# Patient Record
Sex: FEMALE | Race: WHITE | NOT HISPANIC OR LATINO | Employment: OTHER | URBAN - METROPOLITAN AREA
[De-identification: names, ages, dates, MRNs, and addresses within clinical notes are randomized per-mention and may not be internally consistent; named-entity substitution may affect disease eponyms.]

---

## 2020-07-09 ENCOUNTER — TELEPHONE (OUTPATIENT)
Dept: HEMATOLOGY/ONCOLOGY | Facility: CLINIC | Age: 55
End: 2020-07-09

## 2020-07-09 NOTE — TELEPHONE ENCOUNTER
LM for pt to call me regarding appt with DR. Negro made on her my chart acct.  And then need for record collection and proper insurance documentation.  Direct number left in message.

## 2020-07-13 ENCOUNTER — TELEPHONE (OUTPATIENT)
Dept: HEMATOLOGY/ONCOLOGY | Facility: CLINIC | Age: 55
End: 2020-07-13

## 2020-07-13 NOTE — TELEPHONE ENCOUNTER
"Pt returned my call regarding her appt with DR. Negro for tomorrow.  She has [place] medicare and has not attempted to get any [place] insurance to date.  She is living with her parents right now and we updated her home phone number as she stated she does "not get good cell reception out there. " She was inquiring about how to get insurance coverage. I explained that she would have to cancel her [place] medicare and then apply for [place] coverage.  She was frustrated with the insurance attaining process and stated for me to just cancel her appt for tomorrow until she can get her insurance situation cleared up.  I offered for the social work team to reach out to her to possibly help her obtain some coverage. She agreed to have them call her and will apply as directed. I have spoken to Mariajose Burk in social work and she will reach out to the pt . Once insurance coverage is obtained I will set the pt up with Dr. Negro as this is the MD she is requesting since he was her father's MD in the past.  She will still need to provide us with records .  She has my direct contact number to reach me once she has insurance.  I will schedule at that point.   "

## 2020-07-13 NOTE — TELEPHONE ENCOUNTER
Pt referred to [facility] by nurse navigator due to concern over her insurance not being accepted at [facility] ([place] Medicare). Navigator mentioned pt being upset. SW called to talk to pt and offer assistance. It is unclear if patient has a [place] Medicare plan or if she just has traditional Medicare. [facility] will e-mail pt so that pt has SW contact info and asked her to e-mail SW a copy of card. She e-mailed a traditional Medicare card to SW. Tomorrow morning will ask registration team to verify coverage. Also referred pt to [facility] for assistance with determining if she qualifies for a Medicare Savings Program. Will plan to f/u again tomorrow.

## 2020-07-14 ENCOUNTER — DOCUMENTATION ONLY (OUTPATIENT)
Dept: HEMATOLOGY/ONCOLOGY | Facility: CLINIC | Age: 55
End: 2020-07-14

## 2020-07-14 ENCOUNTER — TELEPHONE (OUTPATIENT)
Dept: HEMATOLOGY/ONCOLOGY | Facility: CLINIC | Age: 55
End: 2020-07-14

## 2020-07-14 ENCOUNTER — SOCIAL WORK (OUTPATIENT)
Dept: HEMATOLOGY/ONCOLOGY | Facility: CLINIC | Age: 55
End: 2020-07-14

## 2020-07-14 NOTE — PROGRESS NOTES
Met with pt to f/u. She was able to come and get a Medicaid application in with [facility] team member. Spent about an hour with patient assisting to coordinate benefits. She does have Medicare A & B but listed as secondary. Contacted Medicare coordination of benefits ([phone]). Patient spoke to the representative and had them drop [employer] as her primary coverage since pt said this was canceled when Medicare A & B became effective. Now Medicare should be primary and only coverage. Representative advised that it would take about 72 hours to reflect in the system and recommended waiting to bill Medicaid for at least 10 days. As such, pt was scheduled to see Dr. Negro for consult July 27th. 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    Completed initial distress screening and addressed with pt (score 7/10) (see below). She is receptive to an appointment with a psychologist for therapy once she is worked up. She is currently in town 1) to seek a second opinion on treatment and 2) to help her mother who is sick. She anticipates being in town at least 6 months with ultimate goal to return to [place], [place], where she has lived the last 20 years.     SW will plan to f/u with pt over the upcoming weeks to complete full needs assessment and make appropriate referrals. Pt provided with SW contact info and encouraged to call if needed in interim.     DISTRESS SCREENING 7/14/2020   Distress Score 7    No   Housing No   Insurance / Financial Yes   Transportation No   Work / School No   Treatment Decisions No   Dealing with Children No   Dealing with Partner No   Ability to Have Children No   Family Health Issues Yes   Depression Yes   Fears Yes   Nervousness Yes   Sadness Yes   Worry Yes   Loss of Interest in Usual Activities Yes   Spritual / Yarsanism Concerns No   Appearance Yes   Bathing / Dressing No   Breathing Yes   Changes in Urination Yes   Constipation No   Diarrhea No   Eating No   Fatigue  Yes   Feeling Swollen No   Fevers No   Getting Around Yes   Indigestion No   Memory / Concentration Yes   Mouth Sores No   Nausea Yes   Nose Dry / Congested No   Pain Yes   Sexual Yes   Skin Dry / Itchy No   Sleep No   Substance Abuse No   Tingling in Hands / Feet Yes

## 2020-07-14 NOTE — TELEPHONE ENCOUNTER
Patient access rep ran pt's Medicare card. Confirmed that she DOES have Medicare A & B, but it is secondary. She has [facility], which upon further investigation, is a [place] health insurance plan. Attempted to reach pt to discuss; left voicemail on phone. Called and spoke to her mother, who said pt was asleep but she would have her call SW. SW will f/u when call is returned.

## 2020-07-14 NOTE — NURSING
Pt arrived today in person at the [facility] and was meeting with Mariajose Burk in social work.  Mariajose asked that I meet with the patient regarding her insurance, medical record collection and scheduling appts for pt.  I met with patient and together with pt and Mariajose we set up appt for pt to see Dr. Negro 20 at the [facility] center.  This will give us enough time to establish pts insurance and obtain outside records that are needed to make the visit a success as pt is requesting to see Dr. Negro only as he was her father's doctor in the past.  She understands that her insurance may take a while to process and she is wanting to wait until all of that is straight prior to the appt.  I will request records from provider information she shared with me during our meeting and will document this request once accomplished. She has given me an envelope with some records in it from her previous providers in [place]. I will scan this into her record. See Mariajoes's note from today. Pt has my direct number to call with any further concerns leading up to future appt.   Oncology Navigation   Intake  Contact Method: In person  Date Worked: 20  Appointment Date: 20  Schedule to Appointment Timeline (days): 13  Multiple appointments: No  Reason if booked > 7 days after scheduling: Waiting on records;Patient request;Transfer of care;Other  Other reason booked > 7 days: insurance     Treatment                  Acuity  Treatment Tolerability: Minimal symptoms  ECO  Comorbidities in Medical History: 0   Needed: 0  Support: 1  Verbalizes Financial Concerns: 1  Psychological Factors (+1 each): Emotional during conversation  Verbalizes the need for more education: 1  Navigation Acuity: 8     Follow Up  No follow-ups on file.

## 2020-07-27 ENCOUNTER — TELEPHONE (OUTPATIENT)
Dept: HEMATOLOGY/ONCOLOGY | Facility: CLINIC | Age: 55
End: 2020-07-27

## 2020-07-27 NOTE — PROGRESS NOTES
Called pt to check in since she rescheduled her consult today due to fever. Pt is speaking fairly rapidly. Said she has been running fever off and on for the last 3 days. Does not feel that it is COVID, and thinks it may have been due to helping mother clean grace/dirty old trailer. Nevertheless, she is planning to get a COVID test. If she tests negative she requests a sooner appointment because she is afraid she may run out of pain pills and she is also having issues with her colostomy bag. Asked pt to call SW after she gets COVID test to reschedule f/u based on outcome. Nurse navigator notified.

## 2020-07-28 ENCOUNTER — TELEPHONE (OUTPATIENT)
Dept: HEMATOLOGY/ONCOLOGY | Facility: CLINIC | Age: 55
End: 2020-07-28

## 2020-07-28 ENCOUNTER — DOCUMENTATION ONLY (OUTPATIENT)
Dept: HEMATOLOGY/ONCOLOGY | Facility: CLINIC | Age: 55
End: 2020-07-28

## 2020-07-28 NOTE — PROGRESS NOTES
Rec'd e-mail with negative test results per recent fever. Coordinated with nurse navigator to get consult rescheduled. Will scan test results into EMR and will plan to f/u when pt sees Dr. Negro for future plans/needs.    DISTRESS SCREENING 7/14/2020   Distress Score 7    No   Housing No   Insurance / Financial Yes   Transportation No   Work / School No   Treatment Decisions No   Dealing with Children No   Dealing with Partner No   Ability to Have Children No   Family Health Issues Yes   Depression Yes   Fears Yes   Nervousness Yes   Sadness Yes   Worry Yes   Loss of Interest in Usual Activities Yes   Spritual / Worship Concerns No   Appearance Yes   Bathing / Dressing No   Breathing Yes   Changes in Urination Yes   Constipation No   Diarrhea No   Eating No   Fatigue Yes   Feeling Swollen No   Fevers No   Getting Around Yes   Indigestion No   Memory / Concentration Yes   Mouth Sores No   Nausea Yes   Nose Dry / Congested No   Pain Yes   Sexual Yes   Skin Dry / Itchy No   Sleep No   Substance Abuse No   Tingling in Hands / Feet Yes     Distress screening previously addressed, encouraged her to bring up physical symptoms to MD.

## 2020-07-28 NOTE — TELEPHONE ENCOUNTER
Per pt request , appt with dr. Negro changed to 8/5/20 at the [place] location. She wanted to reschedule following a bout with fever. She has been tested for Covid and has found to be negative for that , strep and the flu. Those test results were scanned into pt media tab of epic chart per Mariajose Burk. She will attend appt as set now and has my direct number to call me if she needs anything further.

## 2020-08-05 ENCOUNTER — OFFICE VISIT (OUTPATIENT)
Dept: HEMATOLOGY/ONCOLOGY | Facility: CLINIC | Age: 55
End: 2020-08-05
Payer: MEDICARE

## 2020-08-05 ENCOUNTER — LAB VISIT (OUTPATIENT)
Dept: LAB | Facility: HOSPITAL | Age: 55
End: 2020-08-05
Attending: INTERNAL MEDICINE
Payer: MEDICARE

## 2020-08-05 VITALS
HEART RATE: 87 BPM | SYSTOLIC BLOOD PRESSURE: 97 MMHG | HEIGHT: 64 IN | WEIGHT: 91.25 LBS | DIASTOLIC BLOOD PRESSURE: 65 MMHG | TEMPERATURE: 98 F | BODY MASS INDEX: 15.58 KG/M2

## 2020-08-05 DIAGNOSIS — N34.2 INFECTIVE URETHRITIS: ICD-10-CM

## 2020-08-05 DIAGNOSIS — R18.8 OTHER ASCITES: ICD-10-CM

## 2020-08-05 DIAGNOSIS — C77.2 CANCER OF APPENDIX METASTATIC TO INTRA-ABDOMINAL LYMPH NODE: ICD-10-CM

## 2020-08-05 DIAGNOSIS — Z12.89 ENCOUNTER FOR SCREENING FOR MALIGNANT NEOPLASM OF OTHER SITES: ICD-10-CM

## 2020-08-05 DIAGNOSIS — C18.1 CANCER OF APPENDIX METASTATIC TO INTRA-ABDOMINAL LYMPH NODE: ICD-10-CM

## 2020-08-05 DIAGNOSIS — C18.1 CANCER OF APPENDIX METASTATIC TO INTRA-ABDOMINAL LYMPH NODE: Primary | ICD-10-CM

## 2020-08-05 DIAGNOSIS — C77.2 CANCER OF APPENDIX METASTATIC TO INTRA-ABDOMINAL LYMPH NODE: Primary | ICD-10-CM

## 2020-08-05 LAB
ALBUMIN SERPL BCP-MCNC: 3.4 G/DL (ref 3.5–5.2)
ALP SERPL-CCNC: 119 U/L (ref 55–135)
ALT SERPL W/O P-5'-P-CCNC: 15 U/L (ref 10–44)
ANION GAP SERPL CALC-SCNC: 10 MMOL/L (ref 8–16)
AST SERPL-CCNC: 21 U/L (ref 10–40)
BASOPHILS # BLD AUTO: 0.05 K/UL (ref 0–0.2)
BASOPHILS NFR BLD: 0.5 % (ref 0–1.9)
BILIRUB SERPL-MCNC: 0.3 MG/DL (ref 0.1–1)
BUN SERPL-MCNC: 22 MG/DL (ref 6–20)
CALCIUM SERPL-MCNC: 9.3 MG/DL (ref 8.7–10.5)
CHLORIDE SERPL-SCNC: 107 MMOL/L (ref 95–110)
CO2 SERPL-SCNC: 21 MMOL/L (ref 23–29)
CREAT SERPL-MCNC: 0.9 MG/DL (ref 0.5–1.4)
DIFFERENTIAL METHOD: ABNORMAL
EOSINOPHIL # BLD AUTO: 0.2 K/UL (ref 0–0.5)
EOSINOPHIL NFR BLD: 1.8 % (ref 0–8)
ERYTHROCYTE [DISTWIDTH] IN BLOOD BY AUTOMATED COUNT: 12.8 % (ref 11.5–14.5)
EST. GFR  (AFRICAN AMERICAN): >60 ML/MIN/1.73 M^2
EST. GFR  (NON AFRICAN AMERICAN): >60 ML/MIN/1.73 M^2
GLUCOSE SERPL-MCNC: 86 MG/DL (ref 70–110)
HCT VFR BLD AUTO: 34.2 % (ref 37–48.5)
HGB BLD-MCNC: 10.6 G/DL (ref 12–16)
IMM GRANULOCYTES # BLD AUTO: 0.06 K/UL (ref 0–0.04)
IMM GRANULOCYTES NFR BLD AUTO: 0.6 % (ref 0–0.5)
LDH SERPL L TO P-CCNC: 189 U/L (ref 110–260)
LYMPHOCYTES # BLD AUTO: 2.2 K/UL (ref 1–4.8)
LYMPHOCYTES NFR BLD: 22 % (ref 18–48)
MCH RBC QN AUTO: 28.3 PG (ref 27–31)
MCHC RBC AUTO-ENTMCNC: 31 G/DL (ref 32–36)
MCV RBC AUTO: 91 FL (ref 82–98)
MONOCYTES # BLD AUTO: 0.6 K/UL (ref 0.3–1)
MONOCYTES NFR BLD: 6.1 % (ref 4–15)
NEUTROPHILS # BLD AUTO: 7 K/UL (ref 1.8–7.7)
NEUTROPHILS NFR BLD: 69.6 % (ref 38–73)
NRBC BLD-RTO: 0 /100 WBC
PLATELET # BLD AUTO: 420 K/UL (ref 150–350)
PMV BLD AUTO: 8.5 FL (ref 9.2–12.9)
POTASSIUM SERPL-SCNC: 4.3 MMOL/L (ref 3.5–5.1)
PROT SERPL-MCNC: 7.6 G/DL (ref 6–8.4)
RBC # BLD AUTO: 3.74 M/UL (ref 4–5.4)
SODIUM SERPL-SCNC: 138 MMOL/L (ref 136–145)
WBC # BLD AUTO: 10.05 K/UL (ref 3.9–12.7)

## 2020-08-05 PROCEDURE — 99999 PR PBB SHADOW E&M-EST. PATIENT-LVL IV: ICD-10-PCS | Mod: PBBFAC,,, | Performed by: INTERNAL MEDICINE

## 2020-08-05 PROCEDURE — 83615 LACTATE (LD) (LDH) ENZYME: CPT

## 2020-08-05 PROCEDURE — 82728 ASSAY OF FERRITIN: CPT

## 2020-08-05 PROCEDURE — 85025 COMPLETE CBC W/AUTO DIFF WBC: CPT

## 2020-08-05 PROCEDURE — 99214 OFFICE O/P EST MOD 30 MIN: CPT | Mod: PBBFAC | Performed by: INTERNAL MEDICINE

## 2020-08-05 PROCEDURE — 83540 ASSAY OF IRON: CPT

## 2020-08-05 PROCEDURE — 99205 PR OFFICE/OUTPT VISIT, NEW, LEVL V, 60-74 MIN: ICD-10-PCS | Mod: S$PBB,,, | Performed by: INTERNAL MEDICINE

## 2020-08-05 PROCEDURE — 36415 COLL VENOUS BLD VENIPUNCTURE: CPT

## 2020-08-05 PROCEDURE — 80053 COMPREHEN METABOLIC PANEL: CPT

## 2020-08-05 PROCEDURE — 99999 PR PBB SHADOW E&M-EST. PATIENT-LVL IV: CPT | Mod: PBBFAC,,, | Performed by: INTERNAL MEDICINE

## 2020-08-05 PROCEDURE — 99205 OFFICE O/P NEW HI 60 MIN: CPT | Mod: S$PBB,,, | Performed by: INTERNAL MEDICINE

## 2020-08-05 RX ORDER — FENTANYL 25 UG/1
1 PATCH TRANSDERMAL
Qty: 5 PATCH | Refills: 0 | Status: SHIPPED | OUTPATIENT
Start: 2020-08-05 | End: 2020-08-13 | Stop reason: SDUPTHER

## 2020-08-05 RX ORDER — OXYCODONE AND ACETAMINOPHEN 10; 325 MG/1; MG/1
1 TABLET ORAL EVERY 4 HOURS PRN
COMMUNITY
Start: 2020-07-23 | End: 2020-08-05 | Stop reason: SDUPTHER

## 2020-08-05 RX ORDER — OXYCODONE AND ACETAMINOPHEN 10; 325 MG/1; MG/1
1 TABLET ORAL EVERY 4 HOURS PRN
Qty: 60 TABLET | Refills: 0 | Status: SHIPPED | OUTPATIENT
Start: 2020-08-05 | End: 2020-08-28 | Stop reason: SDUPTHER

## 2020-08-05 NOTE — PROGRESS NOTES
Subjective:       Patient ID: Madeline Warner is a 55 y.o. female.    Chief Complaint: Cancer (Appendiceal) and Results    HPI 52-year-old female without significant medical problems until 10/30/2017 when diagnosed with acute appendicitis.  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  Patient completed 12 cycles of FOLFOX chemotherapy and is status post cytoreductive surgery with HIPEC on 09/19/2018 all performed at [facility] in [facility].  Patient underwent a exploratory laparotomy on 02/20/2020 with lysis of adhesions, loop colostomy.  And serosal enterorrhaphy small and large bowel.  Patient has return to the [facility] to be closer to family in for follow-up.  Today she is complaining of dysuria and is concerned she has a urinary tract infection.    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 No pertinent family history.  Social History     Socioeconomic History    Marital status:      Spouse name: Not on file    Number of children: Not on file    Years of education: Not on file    Highest education level: Not on file   Occupational History    Not on file   Social Needs    Financial resource strain: Not on file    Food insecurity     Worry: Not on file     Inability: Not on file    Transportation needs     Medical: Not on file     Non-medical: Not on file   Tobacco Use    Smoking status: Never Smoker    Smokeless tobacco: Never Used   Substance and Sexual Activity    Alcohol use: Never     Frequency: Never    Drug use: Never     Sexual activity: Never   Lifestyle    Physical activity     Days per week: Not on file     Minutes per session: Not on file    Stress: Not on file   Relationships    Social connections     Talks on phone: Not on file     Gets together: Not on file     Attends [employer] service: Not on file     Active member of club or organization: Not on file     Attends meetings of clubs or organizations: Not on file     Relationship status: Not on file   Other Topics Concern    Not on file   Social History Narrative    Not on file     History reviewed. No pertinent surgical history.    Labs:  No results found for: WBC, HGB, HCT, MCV, PLT  BMP  No results found for: NA, K, CL, CO2, BUN, CREATININE, CALCIUM, ANIONGAP, ESTGFRAFRICA, EGFRNONAA  No results found for: ALT, AST, GGT, ALKPHOS, BILITOT    No results found for: IRON, TIBC, FERRITIN, SATURATEDIRO  No results found for: [id number]  No results found for: FOLATE  No results found for: TSH      Review of Systems   Constitutional: Positive for activity change, appetite change, fatigue and unexpected weight change. Negative for chills, diaphoresis and fever.   HENT: Negative for congestion, dental problem, drooling, ear discharge, ear pain, facial swelling, hearing loss, mouth sores, nosebleeds, postnasal drip, rhinorrhea, sinus pressure, sneezing, sore throat, tinnitus, trouble swallowing and voice change.    Eyes: Negative for photophobia, pain, discharge, redness, itching and visual disturbance.   Respiratory: Negative for cough, choking, chest tightness, shortness of breath, wheezing and stridor.    Cardiovascular: Negative for chest pain, palpitations and leg swelling.   Gastrointestinal: Negative for abdominal distention, abdominal pain, anal bleeding, blood in stool, constipation, diarrhea, nausea, rectal pain and vomiting.   Endocrine: Negative for cold intolerance, heat intolerance, polydipsia, polyphagia and polyuria.   Genitourinary: Positive for dysuria.  Negative for decreased urine volume, difficulty urinating, dyspareunia, enuresis, flank pain, frequency, genital sores, hematuria, menstrual problem, pelvic pain, urgency, vaginal bleeding, vaginal discharge and vaginal pain.   Musculoskeletal: Positive for arthralgias and myalgias. Negative for back pain, gait problem, joint swelling, neck pain and neck stiffness.   Skin: Negative for color change, pallor and rash.   Allergic/Immunologic: Negative for environmental allergies, food allergies and immunocompromised state.   Neurological: Positive for weakness. Negative for dizziness, tremors, seizures, syncope, facial asymmetry, speech difficulty, light-headedness, numbness and headaches.   Hematological: Negative for adenopathy. Does not bruise/bleed easily.   Psychiatric/Behavioral: Positive for dysphoric mood. Negative for agitation, behavioral problems, confusion, decreased concentration, hallucinations, self-injury, sleep disturbance and suicidal ideas. The patient is nervous/anxious. The patient is not hyperactive.        Objective:      Physical Exam  Vitals signs reviewed.   Constitutional:       General: She is not in acute distress.     Appearance: She is well-developed. She is cachectic. She is ill-appearing. She is not diaphoretic.   HENT:      Head: Normocephalic and atraumatic.      Right Ear: External ear normal.      Left Ear: External ear normal.      Nose: Nose normal.      Right Sinus: No maxillary sinus tenderness or frontal sinus tenderness.      Left Sinus: No maxillary sinus tenderness or frontal sinus tenderness.      Mouth/Throat:      Pharynx: No oropharyngeal exudate.   Eyes:      General: Lids are normal. No scleral icterus.        Right eye: No discharge.         Left eye: No discharge.      Conjunctiva/sclera: Conjunctivae normal.      Right eye: Right conjunctiva is not injected. No hemorrhage.     Left eye: Left conjunctiva is not injected. No hemorrhage.     Pupils: Pupils are equal,  round, and reactive to light.   Neck:      Musculoskeletal: Normal range of motion and neck supple.      Thyroid: No thyromegaly.      Vascular: No JVD.      Trachea: No tracheal deviation.   Cardiovascular:      Rate and Rhythm: Normal rate.   Pulmonary:      Effort: Pulmonary effort is normal. No respiratory distress.      Breath sounds: No stridor.   Chest:      Chest wall: No tenderness.   Abdominal:      General: Bowel sounds are normal. There is no distension.      Palpations: Abdomen is soft. There is no hepatomegaly, splenomegaly or mass.      Tenderness: There is no abdominal tenderness. There is no rebound.       Musculoskeletal: Normal range of motion.         General: No tenderness.   Lymphadenopathy:      Cervical: No cervical adenopathy.      Upper Body:      Right upper body: No supraclavicular adenopathy.      Left upper body: No supraclavicular adenopathy.   Skin:     General: Skin is dry.      Findings: No erythema or rash.   Neurological:      Mental Status: She is alert and oriented to person, place, and time.      Cranial Nerves: No cranial nerve deficit.      Coordination: Coordination normal.   Psychiatric:         Behavior: Behavior normal.         Thought Content: Thought content normal.         Judgment: Judgment normal.             Assessment:      1. 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 Other ascites    3. Encounter for screening for malignant neoplasm of other sites    4. Infective urethritis           Plan:     Extensive conversation and review of records.  At this point I have recommended that she obtain a most recent imaging studies from [facility] in [facility] nurse navigator where once this is done will proceed with CT chest abdomen pelvis for comparison.  At this point patient does request additional narcotics.  Prescription for fentanyl patch 25 mcg Q 72 hr along with Percocet given in [facility].  Patient will be seen by palliative  care and I will see patient after imaging studies over the next 1-2 weeks baseline laboratory studies today UA urinalysis ordered if positive will followed and sent to her local pharmacy antibiotic for treatment        Oh Negro Jr, MD FACP

## 2020-08-06 ENCOUNTER — LAB VISIT (OUTPATIENT)
Dept: LAB | Facility: HOSPITAL | Age: 55
End: 2020-08-06
Attending: INTERNAL MEDICINE
Payer: MEDICARE

## 2020-08-06 DIAGNOSIS — N34.2 INFECTIVE URETHRITIS: ICD-10-CM

## 2020-08-06 DIAGNOSIS — C77.2 CANCER OF APPENDIX METASTATIC TO INTRA-ABDOMINAL LYMPH NODE: ICD-10-CM

## 2020-08-06 DIAGNOSIS — C18.1 CANCER OF APPENDIX METASTATIC TO INTRA-ABDOMINAL LYMPH NODE: ICD-10-CM

## 2020-08-06 LAB
FERRITIN SERPL-MCNC: 304 NG/ML (ref 20–300)
IRON SERPL-MCNC: 32 UG/DL (ref 30–160)
SATURATED IRON: 11 % (ref 20–50)
TOTAL IRON BINDING CAPACITY: 292 UG/DL (ref 250–450)
TRANSFERRIN SERPL-MCNC: 197 MG/DL (ref 200–375)

## 2020-08-06 PROCEDURE — 87077 CULTURE AEROBIC IDENTIFY: CPT

## 2020-08-06 PROCEDURE — 87088 URINE BACTERIA CULTURE: CPT

## 2020-08-06 PROCEDURE — 87086 URINE CULTURE/COLONY COUNT: CPT

## 2020-08-06 PROCEDURE — 87186 SC STD MICRODIL/AGAR DIL: CPT

## 2020-08-07 DIAGNOSIS — N30.00 ACUTE CYSTITIS WITHOUT HEMATURIA: Primary | ICD-10-CM

## 2020-08-07 RX ORDER — AMOXICILLIN AND CLAVULANATE POTASSIUM 500; 125 MG/1; MG/1
1 TABLET, FILM COATED ORAL 2 TIMES DAILY
Qty: 20 TABLET | Refills: 0 | Status: SHIPPED | OUTPATIENT
Start: 2020-08-07 | End: 2020-08-17

## 2020-08-10 LAB — BACTERIA UR CULT: ABNORMAL

## 2020-08-13 ENCOUNTER — OFFICE VISIT (OUTPATIENT)
Dept: PALLIATIVE MEDICINE | Facility: CLINIC | Age: 55
End: 2020-08-13
Payer: MEDICARE

## 2020-08-13 DIAGNOSIS — C77.2 CANCER OF APPENDIX METASTATIC TO INTRA-ABDOMINAL LYMPH NODE: ICD-10-CM

## 2020-08-13 DIAGNOSIS — C18.1 CANCER OF APPENDIX METASTATIC TO INTRA-ABDOMINAL LYMPH NODE: ICD-10-CM

## 2020-08-13 PROCEDURE — 99205 OFFICE O/P NEW HI 60 MIN: CPT | Mod: 95,,, | Performed by: FAMILY MEDICINE

## 2020-08-13 PROCEDURE — 99205 PR OFFICE/OUTPT VISIT, NEW, LEVL V, 60-74 MIN: ICD-10-PCS | Mod: 95,,, | Performed by: FAMILY MEDICINE

## 2020-08-13 RX ORDER — FENTANYL 25 UG/1
1 PATCH TRANSDERMAL
Qty: 15 PATCH | Refills: 0 | Status: SHIPPED | OUTPATIENT
Start: 2020-08-13 | End: 2020-08-28 | Stop reason: SDUPTHER

## 2020-08-13 NOTE — LETTER
August 17, 2020      Oh Negro MD  [address]  [place] [address]           [facility]  [address]  [facility] [place] [facility] [place] [address]  Phone: [phone]  Fax: [phone]          Patient: Madeline Warner   MR Number: [id number]   YOB: 1965   Date of Visit: 8/13/2020       Dear Dr. Oh Negro:    Thank you for referring Madeline Warner to me for evaluation. Attached you will find relevant portions of my assessment and plan of care.    If you have questions, please do not hesitate to call me. I look forward to following Madeline Warner along with you.    Sincerely,    Anita Grimes MD    Enclosure  CC:  No Recipients    If you would like to receive this communication electronically, please contact [email or web address] or [phone] to request more information on [facility] access.    For providers and/or their staff who would like to refer a patient to [facility], please contact us through our one-stop-shop provider referral line, [facility], at [phone].    If you feel you have received this communication in error or would no longer like to receive these types of communications, please e-mail [email or web address]

## 2020-08-13 NOTE — PROGRESS NOTES
"Subjective:       Patient ID: Madeline Warner is a 55 y.o. female.    Chief Complaint: initial palliative consult  The patient location is: [place]  The chief complaint leading to consultation is: initial palliative consult    Visit type: audiovisual    Face to Face time with patient: 35  60 minutes of total time spent on the encounter, which includes face to face time and non-face to face time preparing to see the patient (eg, review of tests), Obtaining and/or reviewing separately obtained history, Documenting clinical information in the electronic or other health record, Independently interpreting results (not separately reported) and communicating results to the patient/family/caregiver, or Care coordination (not separately reported).         Each patient to whom he or she provides medical services by telemedicine is:  (1) informed of the relationship between the physician and patient and the respective role of any other health care provider with respect to management of the patient; and (2) notified that he or she may decline to receive medical services by telemedicine and may withdraw from such care at any time.    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 We discussed the role of palliative care in pain and symptom management, goals of care discussions, home based healthcare service coordination, and advanced care planning.     Her pain is focused around the site of her ostomy stoma and has begun to worsen and generalize to her abdomen. She has good functional status and quality of life and reports that her pain has been controlled much better since starting on fentanyl 25 mcg patch prescribed by her oncologist. She has oxycodone for PRN use but has needed less since adding fentanyl. 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 She denies nausea, vomiting, significant anxiety or sadness. She feels she is coping well, considering.          Advance Care Planning     Power of   I initiated the process of advance care planning today and explained the importance of this process to the patient.  I introduced the concept of advance directives to the patient, as well. Then the patient received detailed information about the importance of designating a Health Care Power of  (HCPOA). She was also instructed to communicate with this person about their wishes for future healthcare, should she become sick and lose decision-making capacity. The patient has not previously appointed a HCPOA. After our discussion, the patient has decided to complete a HCPOA and has appointed her mother and NAME:Nancy Su [phone], secondary would be daughter Carolyn Ramirez.   I spent a total time of 10 minutes discussing this issue with the patient.    Advance Care Planning     Living Will  During this visit, I engaged the patient  in the advance care planning process.  The patient and I reviewed the role for advance directives and their purpose in directing future healthcare if the patient's unable to speak for him/herself.  At this point in time, the patient does have full decision-making capacity.  We discussed different extreme health states that she could experience, and reviewed what kind of medical care she would want in those situations.  The patient communicated that if she were comatose and had little chance of a meaningful recovery, she would not want machines/life-sustaining treatments used. In addition to the above preference, other important end-of-life issues for the patient include. The patient has completed a living will to reflect these preferences.  I spent a total of 20 minutes engaging the patient in this advance care planning discussion. Does not want prolonged life " support or artificial  Nutrition.                        does not have any pertinent problems on file.  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 No pertinent surgical history.  History reviewed. No pertinent family history.  Social History     Socioeconomic History    Marital status:      Spouse name: Not on file    Number of children: Not on file    Years of education: Not on file    Highest education level: Not on file   Occupational History    Not on file   Social Needs    Financial resource strain: Not on file    Food insecurity     Worry: Not on file     Inability: Not on file    Transportation needs     Medical: Not on file     Non-medical: Not on file   Tobacco Use    Smoking status: Never Smoker    Smokeless tobacco: Never Used   Substance and Sexual Activity    Alcohol use: Never     Frequency: Never    Drug use: Never    Sexual activity: Never   Lifestyle    Physical activity     Days per week: Not on file     Minutes per session: Not on file    Stress: Not on file   Relationships    Social connections     Talks on phone: Not on file     Gets together: Not on file     Attends Buddhist service: Not on file     Active member of club or organization: Not on file     Attends meetings of clubs or organizations: Not on file     Relationship status: Not on file   Other Topics Concern    Not on file   Social History Narrative    Not on file     Review of Systems   A comprehensive 14-point review of systems was reviewed with patient and was negative other than as specified above.     Objective:   There were no vitals filed for this visit.     Physical Exam  Constitutional:       General: She is not in acute distress.     Appearance: She is well-developed.   HENT:      Head: Normocephalic and atraumatic.   Eyes:      General: No scleral icterus.  Neck:       Musculoskeletal: Normal range of motion and neck supple.   Pulmonary:      Effort: Pulmonary effort is normal. No respiratory distress.   Musculoskeletal: Normal range of motion.   Skin:     Findings: No rash.   Neurological:      Mental Status: She is alert and oriented to person, place, and time.   Psychiatric:         Behavior: Behavior normal.         Thought Content: Thought content normal.         Review of Symptoms    Symptom Assessment (ESAS 0-10 Scale)  Pain:  4  Dyspnea:  3  Anxiety:  3  Nausea:  2  Depression:  2  Anorexia:  3  Fatigue:  4  Insomnia:  0  Restlessness:  0  Agitation:  0     CAM / Delirium:  Negative    Pain Assessment:  Location(s): abdomen      ECOG Performance Status Grade:  1 - Ambulates, capable of light work      Assessment:       1. 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 I advised her that I will take over her pain prescriptions; new rx for fentanyl sent in. She is planning a trip to visit friends in [place]. We discussed logistics of keeping up her pain meds while being out of state for several weeks; she reports having a palliative MD in [place] who she will schedule an appointment with if needed.     Thank you for involving me in the care of this patient.     Anita Grimes MD  > 50% of   60   min visit spent in chart review, face to face discussion of goals of care, symptom assessment, coordination of care and emotional support

## 2020-08-18 ENCOUNTER — DOCUMENTATION ONLY (OUTPATIENT)
Dept: HEMATOLOGY/ONCOLOGY | Facility: CLINIC | Age: 55
End: 2020-08-18

## 2020-08-18 ENCOUNTER — DOCUMENTATION ONLY (OUTPATIENT)
Dept: PALLIATIVE MEDICINE | Facility: CLINIC | Age: 55
End: 2020-08-18

## 2020-08-18 ENCOUNTER — TELEPHONE (OUTPATIENT)
Dept: HEMATOLOGY/ONCOLOGY | Facility: CLINIC | Age: 55
End: 2020-08-18

## 2020-08-18 NOTE — PROGRESS NOTES
Palliative Care:    Called patient's home number, no answer, no voicemail. Sent message via patient portal with 1 month follow up appointment.  Also, requested patient call me at her convenience to discuss coordination of visits for completion of ACP documents.    Palmira Bland RN

## 2020-08-18 NOTE — TELEPHONE ENCOUNTER
Spoke to pt on the phone to schedule ct scans ordered by Dr. Negro. I reviewed my role as nurse navigaotr and gave her my contact information. I informed her that we received her scan on disc from previous facility. Pt states she is out of town until next week, so together we scheduled her appt for 8/27 @ 4pm at the [facility] on [address]. Pt knows to fast ( have nothing by mouth) for 6 hours prior to the test and to arrive 1hr before test time. We also scheduled for her to f/u with Dr. Negro on 8/28@ 320pm at the cancer center location. Pt is agreeable to all of the above.

## 2020-08-24 DIAGNOSIS — N30.00 ACUTE CYSTITIS WITHOUT HEMATURIA: Primary | ICD-10-CM

## 2020-08-24 RX ORDER — CIPROFLOXACIN 500 MG/1
500 TABLET ORAL 2 TIMES DAILY
Qty: 20 TABLET | Refills: 0 | Status: SHIPPED | OUTPATIENT
Start: 2020-08-24 | End: 2020-09-03

## 2020-08-27 ENCOUNTER — HOSPITAL ENCOUNTER (OUTPATIENT)
Dept: RADIOLOGY | Facility: HOSPITAL | Age: 55
Discharge: HOME | End: 2020-08-27
Attending: INTERNAL MEDICINE
Payer: MEDICARE

## 2020-08-27 DIAGNOSIS — Z12.89 ENCOUNTER FOR SCREENING FOR MALIGNANT NEOPLASM OF OTHER SITES: ICD-10-CM

## 2020-08-27 DIAGNOSIS — R18.8 OTHER ASCITES: ICD-10-CM

## 2020-08-27 PROCEDURE — 74177 CT ABD & PELVIS W/CONTRAST: CPT | Mod: TC

## 2020-08-27 PROCEDURE — 71260 CT THORAX DX C+: CPT | Mod: TC

## 2020-08-27 PROCEDURE — 25500020 PHARM REV CODE 255: Performed by: INTERNAL MEDICINE

## 2020-08-27 RX ADMIN — IOHEXOL 100 ML: 350 INJECTION, SOLUTION INTRAVENOUS at 04:08

## 2020-08-28 ENCOUNTER — OFFICE VISIT (OUTPATIENT)
Dept: HEMATOLOGY/ONCOLOGY | Facility: CLINIC | Age: 55
End: 2020-08-28
Payer: MEDICARE

## 2020-08-28 VITALS
HEIGHT: 64 IN | TEMPERATURE: 97 F | DIASTOLIC BLOOD PRESSURE: 79 MMHG | BODY MASS INDEX: 16.78 KG/M2 | SYSTOLIC BLOOD PRESSURE: 122 MMHG | HEART RATE: 75 BPM | OXYGEN SATURATION: 97 % | WEIGHT: 98.31 LBS

## 2020-08-28 DIAGNOSIS — C77.2 CANCER OF APPENDIX METASTATIC TO INTRA-ABDOMINAL LYMPH NODE: ICD-10-CM

## 2020-08-28 DIAGNOSIS — G89.3 CHRONIC PAIN DUE TO NEOPLASM: ICD-10-CM

## 2020-08-28 DIAGNOSIS — C18.1 CANCER OF APPENDIX METASTATIC TO INTRA-ABDOMINAL LYMPH NODE: ICD-10-CM

## 2020-08-28 DIAGNOSIS — N30.00 ACUTE CYSTITIS WITHOUT HEMATURIA: Primary | ICD-10-CM

## 2020-08-28 PROCEDURE — 99214 OFFICE O/P EST MOD 30 MIN: CPT | Mod: S$PBB,,, | Performed by: INTERNAL MEDICINE

## 2020-08-28 PROCEDURE — 99213 OFFICE O/P EST LOW 20 MIN: CPT | Mod: PBBFAC | Performed by: INTERNAL MEDICINE

## 2020-08-28 PROCEDURE — 99214 PR OFFICE/OUTPT VISIT, EST, LEVL IV, 30-39 MIN: ICD-10-PCS | Mod: S$PBB,,, | Performed by: INTERNAL MEDICINE

## 2020-08-28 PROCEDURE — 99999 PR PBB SHADOW E&M-EST. PATIENT-LVL III: ICD-10-PCS | Mod: PBBFAC,,, | Performed by: INTERNAL MEDICINE

## 2020-08-28 PROCEDURE — 99999 PR PBB SHADOW E&M-EST. PATIENT-LVL III: CPT | Mod: PBBFAC,,, | Performed by: INTERNAL MEDICINE

## 2020-08-28 RX ORDER — OXYCODONE AND ACETAMINOPHEN 10; 325 MG/1; MG/1
1 TABLET ORAL EVERY 4 HOURS PRN
Qty: 60 TABLET | Refills: 0 | Status: SHIPPED | OUTPATIENT
Start: 2020-08-28 | End: 2020-09-18 | Stop reason: SDUPTHER

## 2020-08-28 RX ORDER — FENTANYL 25 UG/1
1 PATCH TRANSDERMAL
Qty: 15 PATCH | Refills: 0 | Status: SHIPPED | OUTPATIENT
Start: 2020-08-28 | End: 2020-10-12 | Stop reason: SDUPTHER

## 2020-08-28 NOTE — PROGRESS NOTES
Subjective:       Patient ID: Madeline Warner is a 55 y.o. female.    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  Patient had UTI symptoms started initially on amoxicillin was not successful treated with Cipro and improvement.  Before treatment.    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 No pertinent family history.  Social History     Socioeconomic History    Marital status:      Spouse name: Not on file    Number of children: Not on file    Years of education: Not on file    Highest education level: Not on file   Occupational History    Not on file   Social Needs    Financial resource strain: Not on file    Food insecurity     Worry: Not on file     Inability: Not on file    Transportation needs     Medical: Not on file     Non-medical: Not on file   Tobacco Use    Smoking status: Never Smoker    Smokeless tobacco: Never Used   Substance and Sexual Activity    Alcohol use: Never     Frequency: Never    Drug use: Never    Sexual activity: Never   Lifestyle    Physical activity     Days per week: Not on file     Minutes per session: Not on file    Stress: Not on file   Relationships    Social connections     Talks on phone: Not on file     Gets together: Not on file     Attends Mandaeism service: Not on file     Active member of club or organization: Not on file     Attends meetings of clubs or organizations: Not on file     Relationship status: Not on file   Other Topics Concern    Not on file   Social History Narrative    Not on file     History reviewed. No pertinent surgical history.    Labs:  Lab Results   Component Value Date    WBC 10.05 08/05/2020    HGB 10.6 (L) 08/05/2020    HCT 34.2 (L) 08/05/2020    MCV 91 08/05/2020     (H) 08/05/2020     BMP  Lab Results   Component Value Date      08/05/2020    K 4.3 08/05/2020     08/05/2020    CO2 21 (L) 08/05/2020    BUN 22 (H) 08/05/2020    CREATININE 0.9 08/05/2020    CALCIUM 9.3 08/05/2020    ANIONGAP 10 08/05/2020    ESTGFRAFRICA >60 08/05/2020    EGFRNONAA >60 08/05/2020     Lab Results   Component Value Date    ALT 15 08/05/2020    AST 21 08/05/2020    ALKPHOS 119 08/05/2020    BILITOT 0.3 08/05/2020       Lab Results   Component Value Date    IRON 32 08/05/2020    TIBC 292 08/05/2020    FERRITIN 304 (H) 08/05/2020     No results found for: ROOMMGKH28  No results found for: FOLATE  No results found for: TSH      Review of Systems   Constitutional: Positive for activity change and fatigue. Negative for chills, diaphoresis, fever and unexpected weight change.   HENT: Negative for congestion, dental problem, drooling, ear discharge, ear pain, facial swelling, hearing loss, mouth sores, nosebleeds, postnasal drip, rhinorrhea, sinus pressure, sneezing, sore throat, tinnitus, trouble swallowing and voice change.    Eyes: Negative for photophobia, pain, discharge, redness, itching and visual disturbance.   Respiratory: Negative for cough, choking, chest tightness, shortness of breath, wheezing and stridor.    Cardiovascular: Negative for chest pain, palpitations and leg swelling.   Gastrointestinal: Negative for abdominal distention, abdominal pain, anal bleeding, blood in stool, constipation, diarrhea, nausea, rectal pain and vomiting.   Endocrine: Negative for cold intolerance, heat intolerance, polydipsia, polyphagia and polyuria.   Genitourinary: Positive for difficulty urinating, enuresis and frequency. Negative for decreased urine volume, dyspareunia, dysuria, flank pain, genital sores, hematuria, menstrual problem, pelvic pain, urgency, vaginal bleeding, vaginal discharge and vaginal pain.   Musculoskeletal: Negative for arthralgias, back pain, gait problem, joint swelling, myalgias, neck pain and neck stiffness.   Skin: Negative for  color change, pallor and rash.   Allergic/Immunologic: Negative for environmental allergies, food allergies and immunocompromised state.   Neurological: Positive for weakness. Negative for dizziness, tremors, seizures, syncope, facial asymmetry, speech difficulty, light-headedness, numbness and headaches.   Hematological: Negative for adenopathy. Does not bruise/bleed easily.   Psychiatric/Behavioral: Positive for dysphoric mood. Negative for agitation, behavioral problems, confusion, decreased concentration, hallucinations, self-injury, sleep disturbance and suicidal ideas. The patient is nervous/anxious. The patient is not hyperactive.        Objective:      Physical Exam  Vitals signs reviewed.   Constitutional:       General: She is not in acute distress.     Appearance: She is well-developed and underweight. She is ill-appearing. She is not diaphoretic.   HENT:      Head: Normocephalic and atraumatic.      Right Ear: External ear normal.      Left Ear: External ear normal.      Nose: Nose normal.      Right Sinus: No maxillary sinus tenderness or frontal sinus tenderness.      Left Sinus: No maxillary sinus tenderness or frontal sinus tenderness.      Mouth/Throat:      Pharynx: No oropharyngeal exudate.   Eyes:      General: Lids are normal. No scleral icterus.        Right eye: No discharge.         Left eye: No discharge.      Conjunctiva/sclera: Conjunctivae normal.      Right eye: Right conjunctiva is not injected. No hemorrhage.     Left eye: Left conjunctiva is not injected. No hemorrhage.     Pupils: Pupils are equal, round, and reactive to light.   Neck:      Musculoskeletal: Normal range of motion and neck supple.      Thyroid: No thyromegaly.      Vascular: No JVD.      Trachea: No tracheal deviation.   Cardiovascular:      Rate and Rhythm: Normal rate.   Pulmonary:      Effort: Pulmonary effort is normal. No respiratory distress.      Breath sounds: No stridor.   Chest:      Chest wall: No  tenderness.   Abdominal:      General: Bowel sounds are normal. There is no distension.      Palpations: Abdomen is soft. There is no hepatomegaly, splenomegaly or mass.      Tenderness: There is no abdominal tenderness. There is no rebound.   Musculoskeletal: Normal range of motion.         General: No tenderness.   Lymphadenopathy:      Cervical: No cervical adenopathy.      Upper Body:      Right upper body: No supraclavicular adenopathy.      Left upper body: No supraclavicular adenopathy.   Skin:     General: Skin is dry.      Findings: No erythema or rash.   Neurological:      Mental Status: She is alert and oriented to person, place, and time.      Cranial Nerves: No cranial nerve deficit.      Coordination: Coordination normal.   Psychiatric:         Behavior: Behavior normal.         Thought Content: Thought content normal.         Judgment: Judgment normal.             Assessment:      1. Acute cystitis without hematuria    2. 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 Chronic pain due to neoplasm           Plan:   Will repeat culture on 09/10/2020 patient will be seen back week of 09/14/2020 for review continue with chronic stable narcotics all narcotics written within [facility].  Recent CT chest abdomen pelvis today compared to previous from [facility] demonstrates no evidence of progressive disease reassurance given will make referral to Urology to see whether not any options available for urinary incontinence          Oh Negro Jr, MD FACP

## 2020-09-02 ENCOUNTER — SOCIAL WORK (OUTPATIENT)
Dept: HEMATOLOGY/ONCOLOGY | Facility: CLINIC | Age: 55
End: 2020-09-02

## 2020-09-02 ENCOUNTER — OFFICE VISIT (OUTPATIENT)
Dept: HEMATOLOGY/ONCOLOGY | Facility: CLINIC | Age: 55
End: 2020-09-02
Payer: MEDICARE

## 2020-09-02 VITALS
BODY MASS INDEX: 16.41 KG/M2 | TEMPERATURE: 100 F | OXYGEN SATURATION: 100 % | DIASTOLIC BLOOD PRESSURE: 82 MMHG | SYSTOLIC BLOOD PRESSURE: 130 MMHG | WEIGHT: 96.13 LBS | RESPIRATION RATE: 18 BRPM | HEIGHT: 64 IN | HEART RATE: 109 BPM

## 2020-09-02 DIAGNOSIS — R11.0 NAUSEA: ICD-10-CM

## 2020-09-02 DIAGNOSIS — R32 URINARY INCONTINENCE IN FEMALE: ICD-10-CM

## 2020-09-02 DIAGNOSIS — C77.2 CANCER OF APPENDIX METASTATIC TO INTRA-ABDOMINAL LYMPH NODE: ICD-10-CM

## 2020-09-02 DIAGNOSIS — C18.1 CANCER OF APPENDIX METASTATIC TO INTRA-ABDOMINAL LYMPH NODE: ICD-10-CM

## 2020-09-02 DIAGNOSIS — C79.9 METASTATIC SIGNET RING CELL CARCINOMA: Primary | ICD-10-CM

## 2020-09-02 DIAGNOSIS — C18.1 MALIGNANT NEOPLASM OF APPENDIX: ICD-10-CM

## 2020-09-02 DIAGNOSIS — N30.00 ACUTE CYSTITIS WITHOUT HEMATURIA: ICD-10-CM

## 2020-09-02 PROCEDURE — 99999 PR PBB SHADOW E&M-EST. PATIENT-LVL V: ICD-10-PCS | Mod: PBBFAC,,, | Performed by: INTERNAL MEDICINE

## 2020-09-02 PROCEDURE — 99999 PR PBB SHADOW E&M-EST. PATIENT-LVL V: CPT | Mod: PBBFAC,,, | Performed by: INTERNAL MEDICINE

## 2020-09-02 PROCEDURE — 99215 OFFICE O/P EST HI 40 MIN: CPT | Mod: PBBFAC | Performed by: INTERNAL MEDICINE

## 2020-09-02 PROCEDURE — 99215 PR OFFICE/OUTPT VISIT, EST, LEVL V, 40-54 MIN: ICD-10-PCS | Mod: S$PBB,,, | Performed by: INTERNAL MEDICINE

## 2020-09-02 PROCEDURE — 99215 OFFICE O/P EST HI 40 MIN: CPT | Mod: S$PBB,,, | Performed by: INTERNAL MEDICINE

## 2020-09-02 RX ORDER — ONDANSETRON 8 MG/1
8 TABLET, ORALLY DISINTEGRATING ORAL EVERY 12 HOURS PRN
Qty: 30 TABLET | Refills: 1 | Status: SHIPPED | OUTPATIENT
Start: 2020-09-02 | End: 2021-02-22

## 2020-09-02 NOTE — PROGRESS NOTES
Subjective:      DATE OF VISIT: 9/2/20     ?  Patient ID:?Madeline Warner is a 55 y.o. female.?? MR#: [id number]   ?    ? Primary Care Providers:  Primary Doctor No (General)     CHIEF COMPLAINT: Establish care with new provider????   ?   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   CURRENT TREATMENT: TBD    PAST TREATMENT:  Appendectomy, 10/30/17  Right hemicolectomy, 12/1/17  FOLFOX x 12 q2wk cycles  Cytoreduction, HIPEC, 09/19/2018  Exploratory laparotomy, lysis of adhesion, colostomy, 2/12/20  ?   ONCOLOGIC HISTORY:   ?   I had the pleasure meeting for the 1st time Ms. Warner as she transfers her care to [place], previously treated in [place].    I have carefully reviewed her oncologic history along with her notable for the following:    Ms. Warner has been in excellent health without known medical issues.      She presented at 52 years old to outside hospital in [place] with acute abdominal pain on 10/30/2017 diagnosed with acute appendicitis.     10/30/2017 appendectomy  Pathology report noted 2.5x0.7cm carcinoma, ex-goblet cell type carcinoid, high-grade, proximal margin positive, lymphovascular invasion present, pT4 NX MX.    12/01/2017 right hemicolectomy  Pathology:  Metastatic signet ring cell adenocarcinoma in peritoneal implant    FOLFOX x 12 q2wk cycles    09/19/2018 cytoreduction, HIPEC  Pathology:  Pelvic nodule, colon nodule, rectal nodule, rectal margin final, bilateral ovaries with involvement of metastatic signet ring cell adenocarcinoma    9/2019 EGD with benign findings    1-2/2020 she reports abdominal discomfort. 2/9/20 CT reported abnormal wall thickening finding suggestive of obstruction.    02/07/2020 rectal wall biopsy adenocarcinoma with signet ring morphology    2/13/20 exploratory laparotomy, lysis of adhesion and colostomy revealed numerous peritoneal deposits.  Pathology:  Peritoneum biopsy metastatic signet ring cell  carcinoma    02/19/2020 MRI brain noted to be negative for metastatic disease    3/4/20 PET without noted avid disease or peritoneal carcinomatosis    05/15/2020 CT chest abdomen pelvis  report noted status post hemicolectomy left lower quadrant colostomy.   no evidence of bowel obstruction, new ascites or peritoneal carcinomatosis.  Mucosal thickening had rectosigmoid colon stable.   no new lymphadenopathy or metastatic disease seen.  Interval resolution of recent right pyelonephritis.    01/29/2020 CEA 2.01  06/29/2020 CEA 1.49    She moved to [place] to be closer to family.  She saw my colleague Dr. Negro who had ordered CT chest abdomen pelvis for new baseline here on 08/27/2020 which noted postoperative findings from had right hemicolectomy loop colostomy no lymphadenopathy or masses within chest abdomen or pelvis.      INTERVAL EVENTS:    She had prior chest port removed and notes prior attempt and failed venous access.  She has been doing better with colostomy care.  Most recently she did have dysuria status post ciprofloxacin 1 day left with improvement.  She does however have issues with urinary incontinence pending urogynecology consultation.  She does have some nausea without vomiting helped by sublingual ondansetron.  Diffuse bony pain as well as diffuse abdominal pain generally well controlled with fentanyl and Percocet as needed.  She is pending palliative care consult.  With prior FOLFOX chemotherapy she did suffer from significant neuropathy in hands and feet which has since resolved.  She is looking forward to road trip with her good friend along [facility] from 9/11/20-9/25/20.      Review of Systems    ?   A comprehensive 14-point review of systems was reviewed with patient and was negative other than as specified above.   ?   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     PAST SURGICAL HISTORY:   History reviewed. No pertinent surgical history.   ?   ALLERGIES:   Allergies as of 09/02/2020    (No Known Allergies)      ?   MEDICATIONS:?   Outpatient Medications Marked as Taking for the 9/2/20 encounter (Office Visit) with Mariam Peña MD   Medication Sig Dispense Refill    ciprofloxacin HCl (CIPRO) 500 MG tablet Take 1 tablet (500 mg total) by mouth 2 (two) times daily. for 10 days 20 tablet 0    fentaNYL (DURAGESIC) 25 mcg/hr Place 1 patch onto the skin every 72 hours. 15 patch 0    oxyCODONE-acetaminophen (PERCOCET)  mg per tablet Take 1 tablet by mouth every 4 (four) hours as needed. 60 tablet 0      ?   SOCIAL HISTORY:?   Social History     Tobacco Use    Smoking status: Never Smoker    Smokeless tobacco: Never Used   Substance Use Topics    Alcohol use: Never     Frequency: Never      ?    She is a 36-year-old daughter and 8-year-old granddaughter who live in [place].  ?   FAMILY HISTORY:   family history is not on file.   ?   Father with prostate cancer     Objective:      Physical Exam      ?   Vitals:    09/02/20 1411   BP: 130/82   Pulse: 109   Resp: 18   Temp: 99.7 °F (37.6 °C)      ?   ECOG:?0   General appearance: Generally well appearing, in no acute distress.   Head, eyes, ears, nose, and throat: moist mucous membranes.   Respiratory:  Normal work of breathing  Abdomen:  Thin, nontender, nondistended.   Extremities: Warm, without edema.   Neurologic: Alert and oriented. Grossly normal strength, coordination, and gait.   Skin: No rashes, ecchymoses or petechial lesion.   Psychiatric:  Normal mood and affect.    ?   Laboratory:  ?   No visits with results within 1 Day(s) from this visit.   Latest known visit with results is:   Lab Visit on 08/06/2020   Component Date Value Ref Range Status    Urine Culture, Routine 08/06/2020 *  Final                    Value:KLEBSIELLA PNEUMONIAE  >100,000 cfu/ml        Lab Results   Component  Value Date    WBC 10.05 08/05/2020    HGB 10.6 (L) 08/05/2020    HCT 34.2 (L) 08/05/2020    MCV 91 08/05/2020     (H) 08/05/2020         Chemistry        Component Value Date/Time     08/05/2020 1220    K 4.3 08/05/2020 1220     08/05/2020 1220    CO2 21 (L) 08/05/2020 1220    BUN 22 (H) 08/05/2020 1220    CREATININE 0.9 08/05/2020 1220    GLU 86 08/05/2020 1220        Component Value Date/Time    CALCIUM 9.3 08/05/2020 1220    ALKPHOS 119 08/05/2020 1220    AST 21 08/05/2020 1220    ALT 15 08/05/2020 1220    BILITOT 0.3 08/05/2020 1220    ESTGFRAFRICA >60 08/05/2020 1220    EGFRNONAA >60 08/05/2020 1220        No results found for: CEA    ?   Tumor markers   ?   ?   Imaging:  ?  Per above      Pathology:    Per above     ?   Assessment/Plan:       1. Metastatic signet ring cell carcinoma    2. 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 Acute cystitis without hematuria    4. Urinary incontinence in female    5. Nausea    6. Malignant neoplasm of appendix           Plan:     # metastatic signet ring cell adenocarcinoma:     Initial diagnosis October 2017 with appendectomy pathology noting ex-goblet cell type carcinoid, high-grade. Subsequent right hemicolectomy December 2017 with pathology showing metastatic signet ring cell adenocarcinoma involving peritoneal implant s/p FOLFOX x 6 months, and 9/2019 cytoreductive surgery and HIPEC, final pathology noting pelvic nodule, colon nodule, rectal nodule, rectal margin final, bilateral ovaries with involvement of metastatic signet ring cell adenocarcinoma. 2/2020 bowel obstruction with ex lap revealing numerous peritoneal deposits.  Peritoneum biopsy pathology showed metastatic signet ring cell carcinoma.     She has had sepsis and imaging in May and most recently 08/27/2020 CT chest abdomen pelvis without noted evidence of disease.  I did discuss with her however is likely discordance and imaging may not reflect true degree of  peritoneal disease involvement.  She has not yet had any systemic therapy in the setting of her recurrent metastatic signet ring cell carcinoma.  I discussed natural history of this disease and role of palliative chemotherapy to slow further progression of disease.  She is hesitant to initiate chemotherapy due to concerns for toxicity and just is the importance of quality of life which is quite reasonable given palliative nature of therapy at this point.  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  I discussed options of 5 fluorouracil based regimen, may prefer capecitabine given difficult venous access.  I also discussed agents of irinotecan and bevacizumab as well as potential toxicities.  There may be role for other agents including targeted therapy and immunotherapy pending mutational testing, MSI status.     I did encourage her to follow-up with palliative care for ongoing goals of care and advance care planning discussions. She currently has symptoms of nausea, abdominal in bony pain diffusely (imaging thus far including CT and PET 03/04/2020 on outside noted to be without evidence of bony metastatic disease), well controlled with ondansetron ODT and fentanyl and percocet.    From my review of outside pathology reports I do not see MSI testing and it does not appear she has germ line or somatic mutational testing including MSI status unknown.  I discussed importance of sending this testing.     Nausea/Pain: She currently has symptoms of nausea, abdominal in bony pain diffusely (imaging thus far including CT and PET 03/04/2020 on outside noted to be without evidence of bony metastatic disease), well controlled with ondansetron ODT and fentanyl and percocet.    # urinary incontinence:  She is very bothered by this symptom, referral to urogynecology pending    # UTI:  Klebsiella infection, pansensitive status post ciprofloxacin, with improvement in dysuria, follow-up with urogynecology  per above.  Repeat UA pending.    Advance Care Planning   pall care referral       Follow-Up:   Urogynecology referral  UA today  Labs CBC, CMP, CEA when possible  Request for STRATA, and MSI status, pathology from OSH review  Will plan to discuss case at multidisciplinary tumor board  Note:  It is very important to patient that she go on trips to [place] 9/11/20-9/25/20 and is amenable to meeting after this discuss next steps/therapy.

## 2020-09-02 NOTE — PATIENT INSTRUCTIONS
Labs CBC, CMP, CEA when possible  Request for STRATA, and MSI status, pathology from OSH review  Will plan to discuss case at multidisciplinary tumor board  Note:  It is very important to patient that she go on trips to [place] 9/11/20-9/25/20 and is amenable to meeting after this discuss next steps/therapy.

## 2020-09-02 NOTE — Clinical Note
Please see AVS, arrange for osh path review here and STRATA with MSI, and tumor board discussion with review of outside path, our most recent imaging. Please let patient know plans for path review and tumor board discussion before meeting and deciding on chemo plan/chemo teaching. RV with me after path review and tumor board discussion. Call me if questions please.

## 2020-09-03 NOTE — PROGRESS NOTES
"Met with pt who was referred back to  for assistance with ACP documents. Also took the time to complete new pt assessment with pt. Pt is comfortably dressed and in no visible acute distress today.     Advance Care Planning   Per Dr. Grimes's note and per  discussion with pt, she reiterated what was discussed with Dr. Grimes. She said she would consider that she is a the "selective treatment" phase--she would like to continue life-prolonging measures as long as they are not burdensome. She would not want to have CPR or be resuscitated if there is no meaningful chance of recovery, but she would want CPR if she had a chance of recovery. No artificial tube feedings with no reasonable chance of recovery. As per Dr. Grimes's note: mother Nancy is primary HCPOA and daughter Carolyn second choice.         Pt's primary interest at this time is of obtaining counseling. She was interested in a referral to Cancer Services as this is offered by them. As such referral will be completed. She expressed no other needs from a social service standpoint at this time. She has  contact info should any future needs arise.  team will remain available to provide ongoing support and assistance.     Oncology Social Work   Intake  Date of Diagnosis: 12/01/17  MD Assigned: Mariam Peña  Patient currently being followed by outpatient case management: No  Date of referral to social work: 09/02/20  Initial social work contact: 09/02/20  Referral to initial contact timeline: 0  Referral contact method: Other  Other referral contact method: secure chat  Contact method: In person  Date worked: 09/02/20     Intervention  Current Status: Staging work-up       Concerns: ACP, emotional support     Supportive Checklist  I have emotional concerns that I would like to discuss: Y  I am interested in a referral to: [facility]     Follow Up  Follow up ongoing as needed.     "

## 2020-09-04 ENCOUNTER — DOCUMENTATION ONLY (OUTPATIENT)
Dept: HEMATOLOGY/ONCOLOGY | Facility: CLINIC | Age: 55
End: 2020-09-04

## 2020-09-04 ENCOUNTER — LAB VISIT (OUTPATIENT)
Dept: LAB | Facility: HOSPITAL | Age: 55
End: 2020-09-04
Attending: INTERNAL MEDICINE
Payer: MEDICARE

## 2020-09-04 DIAGNOSIS — N30.00 ACUTE CYSTITIS WITHOUT HEMATURIA: ICD-10-CM

## 2020-09-04 PROCEDURE — 87086 URINE CULTURE/COLONY COUNT: CPT

## 2020-09-04 PROCEDURE — 87088 URINE BACTERIA CULTURE: CPT

## 2020-09-04 NOTE — PROGRESS NOTES
"Spoke to Alma in the pathology department [facility] and faxed pathology request for H/E slides and tumor block from peritoneal biopsy from Feb 2020 ([id number]) for pathology review here and NGS testing on the block and H/E slides from 9/19/2018 ([id number]) to be shipped to Dr. Fatmata Hilario at [facility].      Your fax has been successfully sent to [phone] at [phone].  ------------------------------------------------------------  From: [id number]  ------------------------------------------------------------  9/4/2020 2:29:54 PM Transmission Record   Sent to [phone] with remote ID ""   Result: (0/339;0/0) Success   Page record: 1 - 4   Elapsed time: 01:58 on channel 53      "

## 2020-09-06 LAB — BACTERIA UR CULT: ABNORMAL

## 2020-09-10 ENCOUNTER — OFFICE VISIT (OUTPATIENT)
Dept: PALLIATIVE MEDICINE | Facility: CLINIC | Age: 55
End: 2020-09-10
Payer: MEDICARE

## 2020-09-10 DIAGNOSIS — G89.3 CHRONIC PAIN DUE TO NEOPLASM: ICD-10-CM

## 2020-09-10 DIAGNOSIS — C18.1 CANCER OF APPENDIX METASTATIC TO INTRA-ABDOMINAL LYMPH NODE: ICD-10-CM

## 2020-09-10 DIAGNOSIS — C77.2 CANCER OF APPENDIX METASTATIC TO INTRA-ABDOMINAL LYMPH NODE: ICD-10-CM

## 2020-09-10 PROCEDURE — 99214 OFFICE O/P EST MOD 30 MIN: CPT | Mod: 95,,, | Performed by: FAMILY MEDICINE

## 2020-09-10 PROCEDURE — 99214 PR OFFICE/OUTPT VISIT, EST, LEVL IV, 30-39 MIN: ICD-10-PCS | Mod: 95,,, | Performed by: FAMILY MEDICINE

## 2020-09-10 NOTE — PROGRESS NOTES
Subjective:       Patient ID: Madeline Warner is a 55 y.o. female.    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  She reports that she has had good relief of pain on the current regimen.  She was able to take a trip to [place] that she was planning and is now heading out for a trip to the beach.  She is satisfied with her current plan of care and is thrilled that she is able to do the things that she wants to do with the time she has left.    does not have any pertinent problems on file.  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 No pertinent surgical history.  History reviewed. No pertinent family history.  Social History     Socioeconomic History    Marital status:      Spouse name: Not on file    Number of children: Not on file    Years of education: Not on file    Highest education level: Not on file   Occupational History    Not on file   Social Needs    Financial resource strain: Not on file    Food insecurity     Worry: Not on file     Inability: Not on file    Transportation needs     Medical: Not on file     Non-medical: Not on file   Tobacco Use    Smoking status: Never Smoker    Smokeless tobacco: Never Used   Substance and Sexual Activity    Alcohol use: Never     Frequency: Never    Drug use: Never    Sexual activity: Never   Lifestyle    Physical activity     Days per week: Not on file     Minutes per session: Not on file    Stress: Not on file   Relationships    Social connections     Talks on phone: Not on file     Gets together: Not on file     Attends Gnosticist service: Not on file     Active member of club or organization: Not on file     Attends meetings of clubs or organizations: Not on file     Relationship status: Not on file   Other Topics Concern    Not on file    Social History Narrative    Not on file     Review of Systems   A comprehensive 14-point review of systems was reviewed with patient and was negative other than as specified above.     Objective:   There were no vitals filed for this visit.     Physical Exam  Constitutional:       General: She is not in acute distress.     Appearance: Normal appearance. She is well-developed and normal weight. She is not ill-appearing.   HENT:      Head: Normocephalic and atraumatic.   Eyes:      General: No scleral icterus.  Neck:      Musculoskeletal: Normal range of motion and neck supple.   Pulmonary:      Effort: Pulmonary effort is normal. No respiratory distress.   Musculoskeletal: Normal range of motion.         General: No deformity.   Skin:     Findings: No rash.   Neurological:      Mental Status: She is alert and oriented to person, place, and time.   Psychiatric:         Behavior: Behavior normal.         Thought Content: Thought content normal.         Review of Symptoms    Symptom Assessment (ESAS 0-10 Scale)  Pain:  4  Dyspnea:  2  Anxiety:  0  Nausea:  0  Depression:  0  Anorexia:  5  Fatigue:  3  Insomnia:  0  Restlessness:  0  Agitation:  0     CAM / Delirium:  Negative  Constipation:  Negative  Diarrhea:  Negative          ECOG Performance Status Grade:  0 - Fully Active    Living Arrangements:  Lives with family    Advance Care Planning   Advance Directives:   Living Will: Yes    [place]POST: No    Do Not Resuscitate Status: No    Medical Power of : Yes      Decision Making:  Patient answered questions         Assessment:       1. Chronic pain due to neoplasm    2. 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         Chronic pain due to neoplasm    Current Assessment & Plan      reviewed.  Her oncologist refill her fentanyl oxycodone on 08/28 2 ago.  She reports that she has been doing well on this regimen and no changes are needed.            Will see back in 1 month.    The patient location is: [place]  The chief complaint leading to consultation is: palliative f/u, pain    Visit type: audiovisual    Face to Face time with patient: 20  35 minutes of total time spent on the encounter, which includes face to face time and non-face to face time preparing to see the patient (eg, review of tests), Obtaining and/or reviewing separately obtained history, Documenting clinical information in the electronic or other health record, Independently interpreting results (not separately reported) and communicating results to the patient/family/caregiver, or Care coordination (not separately reported).         Each patient to whom he or she provides medical services by telemedicine is:  (1) informed of the relationship between the physician and patient and the respective role of any other health care provider with respect to management of the patient; and (2) notified that he or she may decline to receive medical services by telemedicine and may withdraw from such care at any time.

## 2020-09-11 ENCOUNTER — TELEPHONE (OUTPATIENT)
Dept: HEMATOLOGY/ONCOLOGY | Facility: CLINIC | Age: 55
End: 2020-09-11

## 2020-09-11 NOTE — TELEPHONE ENCOUNTER
Spoke with patient over the phone today regarding the change in date for her case presentation at Multidisciplinary Tumor Conference to next Friday 9/18/20. She stated understanding and will await call to set up follow up with Dr. Peña after that Tumor Conference case evaluation. She has my direct number to call with any further concerns in the meantime

## 2020-09-14 NOTE — ASSESSMENT & PLAN NOTE
reviewed.  Her oncologist refill her fentanyl oxycodone on 08/28 2 ago.  She reports that she has been doing well on this regimen and no changes are needed.

## 2020-09-15 ENCOUNTER — PATIENT MESSAGE (OUTPATIENT)
Dept: HEMATOLOGY/ONCOLOGY | Facility: CLINIC | Age: 55
End: 2020-09-15

## 2020-09-16 DIAGNOSIS — C18.1 CANCER OF APPENDIX METASTATIC TO INTRA-ABDOMINAL LYMPH NODE: Primary | ICD-10-CM

## 2020-09-16 DIAGNOSIS — C77.2 CANCER OF APPENDIX METASTATIC TO INTRA-ABDOMINAL LYMPH NODE: Primary | ICD-10-CM

## 2020-09-17 DIAGNOSIS — C18.1 CANCER OF APPENDIX METASTATIC TO INTRA-ABDOMINAL LYMPH NODE: Primary | ICD-10-CM

## 2020-09-17 DIAGNOSIS — C77.2 CANCER OF APPENDIX METASTATIC TO INTRA-ABDOMINAL LYMPH NODE: Primary | ICD-10-CM

## 2020-09-18 ENCOUNTER — TELEPHONE (OUTPATIENT)
Dept: HEMATOLOGY/ONCOLOGY | Facility: CLINIC | Age: 55
End: 2020-09-18

## 2020-09-18 ENCOUNTER — TUMOR BOARD CONFERENCE (OUTPATIENT)
Dept: HEMATOLOGY/ONCOLOGY | Facility: CLINIC | Age: 55
End: 2020-09-18

## 2020-09-18 ENCOUNTER — PATIENT MESSAGE (OUTPATIENT)
Dept: PALLIATIVE MEDICINE | Facility: CLINIC | Age: 55
End: 2020-09-18

## 2020-09-18 DIAGNOSIS — C77.2 CANCER OF APPENDIX METASTATIC TO INTRA-ABDOMINAL LYMPH NODE: ICD-10-CM

## 2020-09-18 DIAGNOSIS — C18.1 CANCER OF APPENDIX METASTATIC TO INTRA-ABDOMINAL LYMPH NODE: ICD-10-CM

## 2020-09-18 NOTE — TELEPHONE ENCOUNTER
Called patient back after speaking to Dr. Peña, explained that she would like to see her prior to the surgical consult. Scheduled for Thursday at the [place] location per patient request. Patient verbalized understanding

## 2020-09-18 NOTE — TELEPHONE ENCOUNTER
Spoke to patient about tumor board discussion and recommendation for surgical oncology consult. Patient scheduled with Dr. Vincent Garcia Friday the 25th at 11am. Reviewed the location of [facility], verbalized understanding to all information.     Pt inquired whether she should also set up a f/u with Dr. Peña for after the surgical consult. We decided to make appt and will cancel if Dr. Peña does not feel it is necessary. Scheduled for the 28th at 1pm per patient request. Reviewed [place] location information. No questions/concerns regarding plan of care, she has my number if she needs anything.

## 2020-09-18 NOTE — PROGRESS NOTES
Tumor Board Documentation      Madeline Warner was presented by Mariam Peña MD at our Tumor Board on 9/18/2020, which included representatives from Medical Oncology, Hematology, Radiation Oncology, Surgical Oncology, Pathology, Navigation, Research, Radiology, Gastrointestinal.    Madeline currently presents as a current patient with Other(signet ring adenocarcinoma), with history of the following treatments: Surgical Intervention(s), Adjuvant Chemotherapy.    Additionally, we reviewed previous medical and familial history, history of present illness, and recent lab results along with all available histopathologic and imaging studies. The tumor board considered available treatment options and made the following recommendations:    Consider systemic chemotherapy, discuss folfox vs folfiri concern of increased neuropathy with further oxaliplatin, consult surgical oncology for potential repeat hipec, wait for strata.    Chemotherapy       The following procedures/referrals were also placed: No orders of the defined types were placed in this encounter.      Clinical Trial Status: Enrolled     National site-specific guidelines were discussed with respect to the case.    Tumor board is a meeting of clinicians from various specialty areas who evaluate and discuss patients for whom a multidisciplinary approach is being considered. Final determinations in the plan of care are those of the provider(s). The responsibility for follow up of recommendations given during tumor board is that of the provider.     Mariam Peña MD

## 2020-09-18 NOTE — TELEPHONE ENCOUNTER
----- Message from Mariam Peña MD sent at 9/18/2020 11:21 AM CDT -----  Can you please let me knowIf you can initiate referral for question HIPEC and let me know which physician she is going to see so can of discussion prior to that visit with them?

## 2020-09-19 ENCOUNTER — PATIENT MESSAGE (OUTPATIENT)
Dept: UROLOGY | Facility: CLINIC | Age: 55
End: 2020-09-19

## 2020-09-20 RX ORDER — OXYCODONE AND ACETAMINOPHEN 10; 325 MG/1; MG/1
1 TABLET ORAL EVERY 4 HOURS PRN
Qty: 60 TABLET | Refills: 0 | Status: SHIPPED | OUTPATIENT
Start: 2020-09-20 | End: 2020-10-12 | Stop reason: SDUPTHER

## 2020-09-21 DIAGNOSIS — C77.2 CANCER OF APPENDIX METASTATIC TO INTRA-ABDOMINAL LYMPH NODE: Primary | ICD-10-CM

## 2020-09-21 DIAGNOSIS — C18.1 CANCER OF APPENDIX METASTATIC TO INTRA-ABDOMINAL LYMPH NODE: Primary | ICD-10-CM

## 2020-09-24 ENCOUNTER — TELEPHONE (OUTPATIENT)
Dept: PALLIATIVE MEDICINE | Facility: CLINIC | Age: 55
End: 2020-09-24

## 2020-09-24 ENCOUNTER — TELEPHONE (OUTPATIENT)
Dept: PHARMACY | Facility: CLINIC | Age: 55
End: 2020-09-24

## 2020-09-24 ENCOUNTER — OFFICE VISIT (OUTPATIENT)
Dept: HEMATOLOGY/ONCOLOGY | Facility: CLINIC | Age: 55
End: 2020-09-24
Payer: MEDICARE

## 2020-09-24 VITALS
OXYGEN SATURATION: 100 % | SYSTOLIC BLOOD PRESSURE: 114 MMHG | TEMPERATURE: 98 F | BODY MASS INDEX: 16.78 KG/M2 | WEIGHT: 98.31 LBS | DIASTOLIC BLOOD PRESSURE: 77 MMHG | HEART RATE: 96 BPM | HEIGHT: 64 IN

## 2020-09-24 DIAGNOSIS — C77.2 CANCER OF APPENDIX METASTATIC TO INTRA-ABDOMINAL LYMPH NODE: Primary | ICD-10-CM

## 2020-09-24 DIAGNOSIS — C18.1 CANCER OF APPENDIX METASTATIC TO INTRA-ABDOMINAL LYMPH NODE: Primary | ICD-10-CM

## 2020-09-24 PROCEDURE — 99999 PR PBB SHADOW E&M-EST. PATIENT-LVL IV: ICD-10-PCS | Mod: PBBFAC,,, | Performed by: INTERNAL MEDICINE

## 2020-09-24 PROCEDURE — 99999 PR PBB SHADOW E&M-EST. PATIENT-LVL IV: CPT | Mod: PBBFAC,,, | Performed by: INTERNAL MEDICINE

## 2020-09-24 PROCEDURE — 99215 PR OFFICE/OUTPT VISIT, EST, LEVL V, 40-54 MIN: ICD-10-PCS | Mod: S$PBB,,, | Performed by: INTERNAL MEDICINE

## 2020-09-24 PROCEDURE — 99214 OFFICE O/P EST MOD 30 MIN: CPT | Mod: PBBFAC | Performed by: INTERNAL MEDICINE

## 2020-09-24 PROCEDURE — 99215 OFFICE O/P EST HI 40 MIN: CPT | Mod: S$PBB,,, | Performed by: INTERNAL MEDICINE

## 2020-09-24 RX ORDER — CAPECITABINE 500 MG/1
1500 TABLET, FILM COATED ORAL 2 TIMES DAILY
Qty: 84 TABLET | Refills: 2 | Status: SHIPPED | OUTPATIENT
Start: 2020-09-24 | End: 2020-09-30
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
## 2020-09-24 NOTE — PLAN OF CARE
START ON PATHWAY REGIMEN - Colorectal    [id number]        Capecitabine (Xeloda)       Bevacizumab-xxxx       Irinotecan (Camptosar)           Additional Orders: Do not administer bevacizumab for at least 28 days   prior to elective surgery and for at least 28 days following surgery and until   the wound is fully healed.    **Always confirm dose/schedule in your pharmacy ordering system**    Patient Characteristics:  Distant Metastases, Nonsurgical Candidate, KRAS/NRAS Mutation Positive/Unknown   (BRAF V600 Wild-Type/Unknown), Standard Cytotoxic Therapy, First Line Standard   Cytotoxic Therapy, Bevacizumab Eligible, PS = 0,1  Tumor Location: Colon  Therapeutic Status: Distant Metastases  Microsatellite/Mismatch Repair Status: Unknown  BRAF Mutation Status: Awaiting Test Results  KRAS/NRAS Mutation Status: Awaiting Test Results  Standard Cytotoxic Line of Therapy: First Line Standard Cytotoxic Therapy  ECOG Performance Status: 0  Bevacizumab Eligibility: Eligible  Intent of Therapy:  Non-Curative / Palliative Intent, Discussed with Patient

## 2020-09-24 NOTE — PATIENT INSTRUCTIONS
Chemo teaching/consent/antiemetic prescription with NP next week- scheduled with April on Monday 9-    Chemo C1D1 pending insurance/nurse navigation to arrange, CBC, CMP note: holding bevacizumab first couple cycles to determine tolerance     Dr. Garcia consultation to discuss possible HIPEC tomorrow 9/25/20    Urogynecology referral 9/25

## 2020-09-24 NOTE — TELEPHONE ENCOUNTER
LVM for callback to inform patient that [facility] received prescription for Capecitabine and prior authorization is required.  [facility] will be back in touch once insurance determination is received.

## 2020-09-24 NOTE — TELEPHONE ENCOUNTER
Palliative Care:    Returned patient's call.  No answer, left voicemail with my direct line to call back.      Palmira Bland RN

## 2020-09-25 ENCOUNTER — PATIENT MESSAGE (OUTPATIENT)
Dept: HEMATOLOGY/ONCOLOGY | Facility: CLINIC | Age: 55
End: 2020-09-25

## 2020-09-25 ENCOUNTER — OFFICE VISIT (OUTPATIENT)
Dept: SURGICAL ONCOLOGY | Facility: CLINIC | Age: 55
End: 2020-09-25
Payer: MEDICARE

## 2020-09-25 ENCOUNTER — DOCUMENTATION ONLY (OUTPATIENT)
Dept: HEMATOLOGY/ONCOLOGY | Facility: CLINIC | Age: 55
End: 2020-09-25

## 2020-09-25 ENCOUNTER — OFFICE VISIT (OUTPATIENT)
Dept: UROLOGY | Facility: CLINIC | Age: 55
End: 2020-09-25
Payer: MEDICARE

## 2020-09-25 ENCOUNTER — TELEPHONE (OUTPATIENT)
Dept: SURGERY | Facility: CLINIC | Age: 55
End: 2020-09-25

## 2020-09-25 VITALS
DIASTOLIC BLOOD PRESSURE: 81 MMHG | HEIGHT: 64 IN | TEMPERATURE: 99 F | SYSTOLIC BLOOD PRESSURE: 128 MMHG | WEIGHT: 99 LBS | BODY MASS INDEX: 16.9 KG/M2 | HEART RATE: 94 BPM

## 2020-09-25 VITALS
SYSTOLIC BLOOD PRESSURE: 130 MMHG | BODY MASS INDEX: 16.82 KG/M2 | WEIGHT: 98 LBS | DIASTOLIC BLOOD PRESSURE: 73 MMHG | HEART RATE: 92 BPM | TEMPERATURE: 98 F

## 2020-09-25 DIAGNOSIS — C77.2 CANCER OF APPENDIX METASTATIC TO INTRA-ABDOMINAL LYMPH NODE: Primary | ICD-10-CM

## 2020-09-25 DIAGNOSIS — N39.0 URINARY TRACT INFECTION WITHOUT HEMATURIA, SITE UNSPECIFIED: ICD-10-CM

## 2020-09-25 DIAGNOSIS — R30.0 DYSURIA: Primary | ICD-10-CM

## 2020-09-25 DIAGNOSIS — R33.9 INCOMPLETE EMPTYING OF BLADDER: ICD-10-CM

## 2020-09-25 DIAGNOSIS — C18.1 CANCER OF APPENDIX METASTATIC TO INTRA-ABDOMINAL LYMPH NODE: Primary | ICD-10-CM

## 2020-09-25 DIAGNOSIS — C78.6 PERITONEAL CARCINOMATOSIS: ICD-10-CM

## 2020-09-25 LAB
BILIRUB SERPL-MCNC: ABNORMAL MG/DL
BLOOD URINE, POC: 250
CLARITY, POC UA: ABNORMAL
COLOR, POC UA: ABNORMAL
GLUCOSE UR QL STRIP: ABNORMAL
KETONES UR QL STRIP: ABNORMAL
LEUKOCYTE ESTERASE URINE, POC: ABNORMAL
NITRITE, POC UA: ABNORMAL
PH, POC UA: 5
PROTEIN, POC: 30
SPECIFIC GRAVITY, POC UA: 1.02
UROBILINOGEN, POC UA: ABNORMAL

## 2020-09-25 PROCEDURE — 87186 SC STD MICRODIL/AGAR DIL: CPT

## 2020-09-25 PROCEDURE — 99213 OFFICE O/P EST LOW 20 MIN: CPT | Mod: PBBFAC,27,25 | Performed by: SURGERY

## 2020-09-25 PROCEDURE — 99999 PR PBB SHADOW E&M-EST. PATIENT-LVL IV: CPT | Mod: PBBFAC,,, | Performed by: STUDENT IN AN ORGANIZED HEALTH CARE EDUCATION/TRAINING PROGRAM

## 2020-09-25 PROCEDURE — 87086 URINE CULTURE/COLONY COUNT: CPT

## 2020-09-25 PROCEDURE — 99999 PR PBB SHADOW E&M-EST. PATIENT-LVL IV: ICD-10-PCS | Mod: PBBFAC,,, | Performed by: STUDENT IN AN ORGANIZED HEALTH CARE EDUCATION/TRAINING PROGRAM

## 2020-09-25 PROCEDURE — 99205 OFFICE O/P NEW HI 60 MIN: CPT | Mod: S$PBB,,, | Performed by: SURGERY

## 2020-09-25 PROCEDURE — 99204 PR OFFICE/OUTPT VISIT, NEW, LEVL IV, 45-59 MIN: ICD-10-PCS | Mod: S$PBB,,, | Performed by: STUDENT IN AN ORGANIZED HEALTH CARE EDUCATION/TRAINING PROGRAM

## 2020-09-25 PROCEDURE — 81002 URINALYSIS NONAUTO W/O SCOPE: CPT | Mod: PBBFAC,PO | Performed by: STUDENT IN AN ORGANIZED HEALTH CARE EDUCATION/TRAINING PROGRAM

## 2020-09-25 PROCEDURE — 99204 OFFICE O/P NEW MOD 45 MIN: CPT | Mod: S$PBB,,, | Performed by: STUDENT IN AN ORGANIZED HEALTH CARE EDUCATION/TRAINING PROGRAM

## 2020-09-25 PROCEDURE — 87077 CULTURE AEROBIC IDENTIFY: CPT

## 2020-09-25 PROCEDURE — 99999 PR PBB SHADOW E&M-EST. PATIENT-LVL III: CPT | Mod: PBBFAC,,, | Performed by: SURGERY

## 2020-09-25 PROCEDURE — 99214 OFFICE O/P EST MOD 30 MIN: CPT | Mod: PBBFAC,PO,25 | Performed by: STUDENT IN AN ORGANIZED HEALTH CARE EDUCATION/TRAINING PROGRAM

## 2020-09-25 PROCEDURE — 99999 PR PBB SHADOW E&M-EST. PATIENT-LVL III: ICD-10-PCS | Mod: PBBFAC,,, | Performed by: SURGERY

## 2020-09-25 PROCEDURE — 87088 URINE BACTERIA CULTURE: CPT

## 2020-09-25 PROCEDURE — 51798 US URINE CAPACITY MEASURE: CPT | Mod: PBBFAC,PO | Performed by: STUDENT IN AN ORGANIZED HEALTH CARE EDUCATION/TRAINING PROGRAM

## 2020-09-25 PROCEDURE — 99205 PR OFFICE/OUTPT VISIT, NEW, LEVL V, 60-74 MIN: ICD-10-PCS | Mod: S$PBB,,, | Performed by: SURGERY

## 2020-09-25 NOTE — PROGRESS NOTES
"Subjective:       Patient ID: Madeline Warner is a 55 y.o. female.    Chief Complaint: Urinary Tract Infection  This is a 55 y.o.  female patient that is new to me.  The patient is referred to me by Oh Negro for urinary leakage. She has a history of metastatic signet ring cell adenocarcinoma with metastasis to lymph nodes, and peritoneal carcinomatosis. She underwent an appy 10/30/17, right hemicolectomy 12/1/17, chemotherapy FOLFOX, cytoreduction HIPEC 9/19/18, and an ex lap, CARLOS, and colostomy 2/12/20.     She notes that during the day she will urinary frequently, they will often be small dribbles only, then she will leak urine shortly after. This has been an ongoing thing since her surgery in 2/2020. She did get admitted in 4/2020 with a UTI and pyelonephritis and "infection that went into the blood stream" /bacteremia. She said when she was receiving IV abx, she did not experience the urinary leakage. That is the only time she was free of the urinary leakage.   NTF - 0x  Hydration  Has colostomy     Bactrim helps, augmentin was causing nausea.   Urine dip +nitriate  PVR just under 2 oz.      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 No pertinent surgical history.    History reviewed. No pertinent family history.    Social History     Tobacco Use    Smoking status: Never Smoker    Smokeless tobacco: Never Used   Substance Use Topics    Alcohol use: Never     Frequency: Never    Drug use: Never       Current Outpatient Medications on File Prior to Visit   Medication Sig Dispense Refill    capecitabine (XELODA) 500 MG Tab Take 3 tablets (1,500 mg total) by mouth 2 (two) times daily. Take as directed on days 1-14 of chemotherapy cycle. 84 tablet 2    fentaNYL (DURAGESIC) 25 mcg/hr Place 1 patch onto the skin " every 72 hours. 15 patch 0    ondansetron (ZOFRAN-ODT) 8 MG TbDL Take 1 tablet (8 mg total) by mouth every 12 (twelve) hours as needed. 30 tablet 1    oxyCODONE-acetaminophen (PERCOCET)  mg per tablet Take 1 tablet by mouth every 4 (four) hours as needed. 60 tablet 0     No current facility-administered medications on file prior to visit.        Review of patient's allergies indicates:  No Known Allergies    Review of Systems   Constitutional: Negative for activity change.   HENT: Negative for congestion.    Eyes: Negative for visual disturbance.   Respiratory: Negative for shortness of breath.    Cardiovascular: Negative for chest pain.   Gastrointestinal: Negative for abdominal distention.   Musculoskeletal: Negative for gait problem.   Skin: Negative for color change.   Neurological: Negative for dizziness.   Psychiatric/Behavioral: Negative for agitation.       Objective:      Physical Exam  Constitutional:       Appearance: She is well-developed.   HENT:      Head: Normocephalic and atraumatic.   Neck:      Musculoskeletal: Normal range of motion.   Pulmonary:      Effort: Pulmonary effort is normal.   Musculoskeletal: Normal range of motion.   Skin:     General: Skin is warm and dry.   Neurological:      Mental Status: She is alert and oriented to person, place, and time.         Assessment:       1. Dysuria    2. Incomplete emptying of bladder    3. Urinary tract infection without hematuria, site unspecified        Plan:       1. Urine for culture. If +growth of bacteria, even low growth will treat with abx and have her give a followup urine sample 1-2 days after abx. If no improvement, then will try bethanechol.  2. Urinate every 2-3 hours, avoid postponing urination. Perform a double void (pee a second time after the initial void), and gentle pressure.     Dysuria  -     Urine culture  -     POCT Bladder Scan  -     POCT urine dipstick without microscope    Incomplete emptying of bladder  -     POCT  Bladder Scan  -     POCT urine dipstick without microscope    Urinary tract infection without hematuria, site unspecified  -     POCT Bladder Scan  -     POCT urine dipstick without microscope

## 2020-09-25 NOTE — LETTER
September 26, 2020      Mariam Peña MD  [address]  [place] [address]           [facility]  [id number] [place] [id number]  Phone: [phone]  Fax: [phone]          Patient: Madeline Warner   MR Number: [id number]   YOB: 1965   Date of Visit: 9/25/2020       Dear Dr. Mariam Peña:    Thank you for referring Madeline Warner to me for evaluation. Attached you will find relevant portions of my assessment and plan of care.    If you have questions, please do not hesitate to call me. I look forward to following Madeline Warner along with you.    Sincerely,    Vincent Garcia MD    Enclosure  CC:  No Recipients    If you would like to receive this communication electronically, please contact [email or web address] or [phone] to request more information on [email or web address] Link access.    For providers and/or their staff who would like to refer a patient to [facility], please contact us through our one-stop-shop provider referral line, [facility], at [phone].    If you feel you have received this communication in error or would no longer like to receive these types of communications, please e-mail [email or web address]

## 2020-09-25 NOTE — TELEPHONE ENCOUNTER
----- Message from Vincent Garcia MD sent at 9/25/2020 12:05 PM CDT -----  Ingrid, Ms. Warner has tons of records scanned in. I poured through most of them, the one thing I need but cant find is an operative note from Feb 2020 (ex lap, colostomy).    This was done at [facility] in [place].    Can we please reach out and get this so I can review it?    Thanks,  Vincent

## 2020-09-25 NOTE — NURSING
Received communication via CC'd Chart from Dr. Peña to start pt on Xeloda.  Rx sent to [facility] 20.  Pt will have chemotherapy education 20 and is seeing Surg/onc today as well as urology. I have left a voicemail as well as sent a my chart message to pt today regarding the [facility] process and the need for her to notify me once she has received her medication.  I will follow pt and set her up for follow up with Dr. Peña C1D1 after pt receives her medication. My direct number left in messages. I will follow pt.   Oncology Navigation   Intake  Date of Diagnosis: 17  MD Assigned: Mariam Peña  Contact Method: Pool basket  Date Worked: 20  Appointment Date: 20  Schedule to Appointment Timeline (days): 13  Multiple appointments: No  Reason if booked > 7 days after scheduling: Waiting on records;Patient request;Transfer of care;Other  Other reason booked > 7 days: insurance  Reason for Treatment Delay: Other (awaiting [facility])     Treatment  Current Status: Active  Treatment Type(s): Chemotherapy  Chemotherapy Regimen: Xeloda           General Referrals: Surgical oncology;Other  Other Referral: uro/onc    Support Systems: Parent  Concerns: ACP, emotional support     Acuity  Systemic Treatment - predicted or initiated: Oral chemotherapy only (+1)  Treatment Tolerability: Has not started treatment yet/treatment fully completed and side effects resolved  ECO  Comorbidities in Medical History: 0   Needed: 0  Support: 1  Verbalizes Financial Concerns: 1  Psychological Factors (+1 each): Emotional during conversation  Verbalizes the need for more education: 1  Navigation Acuity: 8     Follow Up  No follow-ups on file.

## 2020-09-25 NOTE — LETTER
September 25, 2020      Oh Negro MD  [id number] [facility]  [place] [address]           [facility]  [address]  [facility] [address]  Phone: [phone]          Patient: Madeline Warner   MR Number: [id number]   YOB: 1965   Date of Visit: 9/25/2020       Dear Dr. Oh Negro:    Thank you for referring Madeline Warner to me for evaluation. Attached you will find relevant portions of my assessment and plan of care.    If you have questions, please do not hesitate to call me. I look forward to following Madeline Warner along with you.    Sincerely,    Chaya Pride MD    Enclosure  CC:  No Recipients    If you would like to receive this communication electronically, please contact [email or web address] or [phone] to request more information on [employer] Link access.    For providers and/or their staff who would like to refer a patient to [facility], please contact us through our one-stop-shop provider referral line, Merrick Burks, at [phone].    If you feel you have received this communication in error or would no longer like to receive these types of communications, please e-mail [email or web address]

## 2020-09-26 NOTE — PROGRESS NOTES
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 On her hemicolectomy there was evidence of peritoneal disease. She underwent 6 months of chemotherapy and then a very difficult CRS/HIPEC in 2018 with prolonged recovery. She received no additional chemotherapy and this was 2 years ago. Most recently she developed a bowel obstruction and underwent laparotomy and diverting Andrew's colostomy 2/2020 where there was note of additional peritoneal disease. This is not evidence on CT or PET/CT. Since her diversion she is asymptomatic and has good quality of life. She travels, eats what she wants, and has minimal abdominal pain. She has seen Dr. Peña to discuss systemic therapy options and is referred to me to discuss repeat HIPEC.      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 No pertinent surgical history.    FAMILY HISTORY:  History reviewed. No pertinent family history.    ROS:  Review of Systems   Constitutional: Negative for chills, fatigue, fever and unexpected weight change.   Respiratory: Negative for cough and stridor.    Cardiovascular: Negative for chest pain and palpitations.   Gastrointestinal: Negative for abdominal pain, constipation, diarrhea, nausea and vomiting.   Genitourinary: Negative for dysuria and frequency.       MEDICATIONS:     Medication List          Accurate as of September  25, 2020 11:59 PM. If you have any questions, ask your nurse or doctor.            CONTINUE taking these medications    capecitabine 500 MG Tab  Commonly known as: XELODA  Take 3 tablets (1,500 mg total) by mouth 2 (two) times daily. Take as directed on days 1-14 of chemotherapy cycle.     fentaNYL 25 mcg/hr  Commonly known as: DURAGESIC  Place 1 patch onto the skin every 72 hours.     ondansetron 8 MG Tbdl  Commonly known as: ZOFRAN-ODT  Take 1 tablet (8 mg total) by mouth every 12 (twelve) hours as needed.     oxyCODONE-acetaminophen  mg per tablet  Commonly known as: PERCOCET  Take 1 tablet by mouth every 4 (four) hours as needed.            SOCIAL HISTORY:  Social History     Socioeconomic History    Marital status:      Spouse name: Not on file    Number of children: Not on file    Years of education: Not on file    Highest education level: Not on file   Occupational History    Not on file   Social Needs    Financial resource strain: Not on file    Food insecurity     Worry: Not on file     Inability: Not on file    Transportation needs     Medical: Not on file     Non-medical: Not on file   Tobacco Use    Smoking status: Never Smoker    Smokeless tobacco: Never Used   Substance and Sexual Activity    Alcohol use: Never     Frequency: Never    Drug use: Never    Sexual activity: Never   Lifestyle    Physical activity     Days per week: Not on file     Minutes per session: Not on file    Stress: Not on file   Relationships    Social connections     Talks on phone: Not on file     Gets together: Not on file     Attends Confucianist service: Not on file     Active member of club or organization: Not on file     Attends meetings of clubs or organizations: Not on file     Relationship status: Not on file   Other Topics Concern    Not on file   Social History Narrative    Not on file       REFERRING PHYSICIAN INFORMATION / PCP:  Med Onc: Mariana    RACE / BMI:  Race: White  BMI: Body mass  index is 16.82 kg/m².    ALLERGIES:  Review of patient's allergies indicates:  No Known Allergies    RADIOGRAPHIC FINDINGS:  CT C/A/P  Impression:  1. Postoperative findings from right hemicolectomy and loop colostomy.  No lymphadenopathy or mass like lesions within the chest, abdomen, or pelvis.  2. Prior granulomatous infection.  3. Prior cholecystectomy.  4. Small size of the thyroid gland.    LABS:  Tumor Markers:  No results found for: CEA, AMYLASE, ASPIRATE, GPG638, , AF8190, AFP, AFPTM    Nutritional:  Lab Results   Component Value Date    ALBUMIN 3.4 (L) 08/05/2020       LFTs:  Lab Results   Component Value Date    ALBUMIN 3.4 (L) 08/05/2020    BILITOT 0.3 08/05/2020    AST 21 08/05/2020    PROT 7.6 08/05/2020       CMP:  Lab Results   Component Value Date    GLU 86 08/05/2020    CALCIUM 9.3 08/05/2020    ALBUMIN 3.4 (L) 08/05/2020    PROT 7.6 08/05/2020     08/05/2020    K 4.3 08/05/2020    CO2 21 (L) 08/05/2020     08/05/2020    BUN 22 (H) 08/05/2020    CREATININE 0.9 08/05/2020    ALKPHOS 119 08/05/2020    ALT 15 08/05/2020    AST 21 08/05/2020    BILITOT 0.3 08/05/2020       PATH:  Signet ring adenocarcinoma    PHYSICAL EXAM:  Physical Exam  Constitutional:       General: She is not in acute distress.     Appearance: She is well-developed.   HENT:      Head: Normocephalic and atraumatic.   Eyes:      General: No scleral icterus.  Neck:      Vascular: No JVD.   Cardiovascular:      Heart sounds: Normal heart sounds.   Pulmonary:      Breath sounds: Normal breath sounds.   Abdominal:      General: Bowel sounds are normal. There is no distension.      Palpations: Abdomen is soft.      Tenderness: There is no abdominal tenderness.   Skin:     General: Skin is warm and dry.   Neurological:      Mental Status: She is alert and oriented to person, place, and time.         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  Madeline  and I had a long talk. She has preserved quality of life currently. Her disease is interesting in that the grade/signet ring morphology would indicate aggressive biology, but her disease course has been fairly indolent. She has not had systemic treatment in 2 years and has no imaging-evident disease. We discussed the role of repeat CRS/HIPEC in GI malignancy which is certainly debatable. She has considerable reservations about any repeat operation given her current QOL and how difficult they were to overcome previously.    I have reviewed her most recent operative note from 2/2020 which indicated fairly diffuse miliary disease including the serosal surfaces of the bowel. I do not believe this is consistent with disease that is cytoreducible to appropriate levels for HIPEC. She is set to start systemic therapy which I think is her most appropriate course of action. Strata and MSI status pending which will better characterize her options.     I spent >60 minutes with Ms. Warner, with >50% of time spent face to face and additional time preparing to see the patient (eg, review of tests), obtaining and/or reviewing separately obtained history, documenting clinical information in the electronic or other health record, independently interpreting results (not separately reported) and communicating results to the patient/family/caregiver, or Care coordination (not separately reported).       Vincent Garcia MD  Upper GI / Hepatobiliary Surgical Oncology  [facility] [place], [place]  Office: [phone]  Fax: [phone]

## 2020-09-28 ENCOUNTER — PATIENT MESSAGE (OUTPATIENT)
Dept: HEMATOLOGY/ONCOLOGY | Facility: CLINIC | Age: 55
End: 2020-09-28

## 2020-09-28 ENCOUNTER — TELEPHONE (OUTPATIENT)
Dept: HEMATOLOGY/ONCOLOGY | Facility: CLINIC | Age: 55
End: 2020-09-28

## 2020-09-28 LAB — BACTERIA UR CULT: ABNORMAL

## 2020-09-28 NOTE — TELEPHONE ENCOUNTER
Unable to reach patient to let her know of new education appointment. I left message letting her know that she is scheduled with Jaky Saavedra on 9/30 at 1pm and if she couldn't make this appointment to just call me back to reschedule.

## 2020-09-29 ENCOUNTER — PATIENT MESSAGE (OUTPATIENT)
Dept: UROLOGY | Facility: CLINIC | Age: 55
End: 2020-09-29

## 2020-09-29 DIAGNOSIS — N39.0 URINARY TRACT INFECTION WITHOUT HEMATURIA, SITE UNSPECIFIED: Primary | ICD-10-CM

## 2020-09-29 RX ORDER — CIPROFLOXACIN 500 MG/1
500 TABLET ORAL EVERY 12 HOURS
Qty: 20 TABLET | Refills: 0 | Status: SHIPPED | OUTPATIENT
Start: 2020-09-29 | End: 2020-10-09

## 2020-09-30 ENCOUNTER — OFFICE VISIT (OUTPATIENT)
Dept: HEMATOLOGY/ONCOLOGY | Facility: CLINIC | Age: 55
End: 2020-09-30
Payer: MEDICARE

## 2020-09-30 DIAGNOSIS — C77.2 CANCER OF APPENDIX METASTATIC TO INTRA-ABDOMINAL LYMPH NODE: Primary | ICD-10-CM

## 2020-09-30 DIAGNOSIS — C18.1 CANCER OF APPENDIX METASTATIC TO INTRA-ABDOMINAL LYMPH NODE: Primary | ICD-10-CM

## 2020-09-30 PROCEDURE — 99215 OFFICE O/P EST HI 40 MIN: CPT | Mod: S$PBB,,, | Performed by: NURSE PRACTITIONER

## 2020-09-30 PROCEDURE — 99999 PR PBB SHADOW E&M-EST. PATIENT-LVL II: ICD-10-PCS | Mod: PBBFAC,,, | Performed by: NURSE PRACTITIONER

## 2020-09-30 PROCEDURE — 99215 PR OFFICE/OUTPT VISIT, EST, LEVL V, 40-54 MIN: ICD-10-PCS | Mod: S$PBB,,, | Performed by: NURSE PRACTITIONER

## 2020-09-30 PROCEDURE — 99212 OFFICE O/P EST SF 10 MIN: CPT | Mod: PBBFAC | Performed by: NURSE PRACTITIONER

## 2020-09-30 PROCEDURE — 99999 PR PBB SHADOW E&M-EST. PATIENT-LVL II: CPT | Mod: PBBFAC,,, | Performed by: NURSE PRACTITIONER

## 2020-09-30 RX ORDER — ONDANSETRON 8 MG/1
8 TABLET, ORALLY DISINTEGRATING ORAL EVERY 12 HOURS PRN
Qty: 30 TABLET | Refills: 1 | Status: SHIPPED | OUTPATIENT
Start: 2020-09-30 | End: 2021-02-23 | Stop reason: ALTCHOICE

## 2020-09-30 RX ORDER — PROCHLORPERAZINE MALEATE 5 MG
5 TABLET ORAL EVERY 6 HOURS PRN
Qty: 30 TABLET | Refills: 1 | Status: SHIPPED | OUTPATIENT
Start: 2020-09-30 | End: 2021-01-01 | Stop reason: ALTCHOICE

## 2020-09-30 NOTE — TELEPHONE ENCOUNTER
Capecitabine is approved by the patient's insurance with a high copay. We will be assisting the patient in applying to the  patient assistance program. Sending a staff message to Dr Peña regarding assistance application and faxing application prescription for her review and signature.

## 2020-10-01 NOTE — ASSESSMENT & PLAN NOTE
Chemotherapy teaching done on Xeloda, Avastin, Irinotecan. She knows to contact office once she receives Xeloda. NN to communicate with patient for follow up arrangements

## 2020-10-12 ENCOUNTER — DOCUMENTATION ONLY (OUTPATIENT)
Dept: HEMATOLOGY/ONCOLOGY | Facility: CLINIC | Age: 55
End: 2020-10-12

## 2020-10-12 ENCOUNTER — OFFICE VISIT (OUTPATIENT)
Dept: PALLIATIVE MEDICINE | Facility: CLINIC | Age: 55
End: 2020-10-12
Payer: MEDICARE

## 2020-10-12 DIAGNOSIS — C18.1 CANCER OF APPENDIX METASTATIC TO INTRA-ABDOMINAL LYMPH NODE: ICD-10-CM

## 2020-10-12 DIAGNOSIS — C77.2 CANCER OF APPENDIX METASTATIC TO INTRA-ABDOMINAL LYMPH NODE: ICD-10-CM

## 2020-10-12 PROCEDURE — 99214 OFFICE O/P EST MOD 30 MIN: CPT | Mod: 95,,, | Performed by: FAMILY MEDICINE

## 2020-10-12 PROCEDURE — 99214 PR OFFICE/OUTPT VISIT, EST, LEVL IV, 30-39 MIN: ICD-10-PCS | Mod: 95,,, | Performed by: FAMILY MEDICINE

## 2020-10-12 RX ORDER — FENTANYL 25 UG/1
1 PATCH TRANSDERMAL
Qty: 15 PATCH | Refills: 0 | Status: SHIPPED | OUTPATIENT
Start: 2020-10-12 | End: 2020-11-17 | Stop reason: SDUPTHER

## 2020-10-12 RX ORDER — OXYCODONE AND ACETAMINOPHEN 10; 325 MG/1; MG/1
1 TABLET ORAL EVERY 4 HOURS PRN
Qty: 60 TABLET | Refills: 0 | Status: SHIPPED | OUTPATIENT
Start: 2020-10-12 | End: 2020-10-21 | Stop reason: SDUPTHER

## 2020-10-12 NOTE — NURSING
Contacted Michelle Stevens with [facility] to check status on financial situation with pt oral chemo.  Awaiting response.    [9:05 AM] Valerie Caal Michelle, just checking on Madeline Warner MRN [id number] .  do you know the status on her financial situation? thanks  ?[9:13 AM] Michelle Stevens      Good morning! I am waiting on patient to return her portion of the assistance application. I spoke to her on Friday 10/9 and she stated she was mailing the paper work back to me. I will submit entire application to the  as soon as I receive paperwork in the mail.     Oncology Navigation   Intake  Date of Diagnosis: 17  Date Worked: 10/12/20  Appointment Date: 20  Schedule to Appointment Timeline (days): 13  Multiple appointments: No  Reason if booked > 7 days after scheduling: Waiting on records;Patient request;Transfer of care;Other  Other reason booked > 7 days: insurance  Reason for Treatment Delay: Other (awaiting [facility])     Treatment  Current Status: Active  Date Presented to Tumor Board: 20       Chemotherapy Regimen: Xeloda           General Referrals: Surgical oncology;Other  Other Referral: uro/onc          Support Systems: Parent  Concerns: ACP, emotional support     Acuity  Systemic Treatment - predicted or initiated: Oral chemotherapy only (+1)  Treatment Tolerability: Has not started treatment yet/treatment fully completed and side effects resolved  ECO  Comorbidities in Medical History: 0   Needed: 0  Support: 1  Verbalizes Financial Concerns: 1  Psychological Factors (+1 each): Emotional during conversation  Verbalizes the need for more education: 1  Navigation Acuity: 8     Follow Up  No follow-ups on file.

## 2020-10-12 NOTE — PROGRESS NOTES
Subjective:       Patient ID: Madeline Warner is a 55 y.o. female.    Chief Complaint: No chief complaint on file.    54 yo female with appendiceal cancer seen for f/u; doing well with pain using fentanyl 25 mcg patch and oxycodone 10, usually 2-3 times daily. Reports having good days and bad days, adjusting oxycodone usage from 1-3 times per day depending, but overall feels pain is controlled on this regimen.     does not have any pertinent problems on file.  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 No pertinent surgical history.  History reviewed. No pertinent family history.  Social History     Socioeconomic History    Marital status:      Spouse name: Not on file    Number of children: Not on file    Years of education: Not on file    Highest education level: Not on file   Occupational History    Not on file   Social Needs    Financial resource strain: Not on file    Food insecurity     Worry: Not on file     Inability: Not on file    Transportation needs     Medical: Not on file     Non-medical: Not on file   Tobacco Use    Smoking status: Never Smoker    Smokeless tobacco: Never Used   Substance and Sexual Activity    Alcohol use: Never     Frequency: Never    Drug use: Never    Sexual activity: Never   Lifestyle    Physical activity     Days per week: Not on file     Minutes per session: Not on file    Stress: Not on file   Relationships    Social connections     Talks on phone: Not on file     Gets together: Not on file     Attends Anabaptist service: Not on file     Active member of club or organization: Not on file     Attends meetings of clubs or organizations: Not on file     Relationship status: Not on file   Other Topics Concern    Not on file   Social History Narrative    Not on file     Review of Systems    Objective:   There were no vitals filed for  this visit.     Physical Exam  Constitutional:       General: She is not in acute distress.     Appearance: She is well-developed.   HENT:      Head: Normocephalic and atraumatic.   Eyes:      General: No scleral icterus.  Neck:      Musculoskeletal: Normal range of motion and neck supple.   Pulmonary:      Effort: Pulmonary effort is normal. No respiratory distress.   Musculoskeletal: Normal range of motion.   Skin:     Findings: No rash.   Neurological:      Mental Status: She is alert and oriented to person, place, and time.   Psychiatric:         Behavior: Behavior normal.         Thought Content: Thought content normal.         Review of Symptoms    Symptom Assessment (ESAS 0-10 Scale)  Pain:  3  Dyspnea:  0  Anxiety:  1  Nausea:  0  Depression:  1  Anorexia:  3  Fatigue:  4  Insomnia:  0  Restlessness:  0  Agitation:  0     CAM / Delirium:  Negative    Pain Assessment:  Location(s): abdomen and back      ECOG Performance Status Grade:  1 - Ambulates, capable of light work    Living Arrangements:  Lives with family    Advance Care Planning   Advance Directives:   Living Will: Yes    Medical Power of : Yes           Assessment:       1. 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    Overview     T4 N1bM1 B 3/85 nodes positive         Relevant Medications    oxyCODONE-acetaminophen (PERCOCET)  mg per tablet    fentaNYL (DURAGESIC) 25 mcg/hr      The patient location is: [place]  The chief complaint leading to consultation is: palliative f/u, pain    Visit type: audiovisual    Face to Face time with patient: 25  35 minutes of total time spent on the encounter, which includes face to face time and non-face to face time preparing to see the patient (eg, review of tests), Obtaining and/or reviewing separately obtained history, Documenting clinical  information in the electronic or other health record, Independently interpreting results (not separately reported) and communicating results to the patient/family/caregiver, or Care coordination (not separately reported).         Each patient to whom he or she provides medical services by telemedicine is:  (1) informed of the relationship between the physician and patient and the respective role of any other health care provider with respect to management of the patient; and (2) notified that he or she may decline to receive medical services by telemedicine and may withdraw from such care at any time.

## 2020-10-15 ENCOUNTER — PATIENT MESSAGE (OUTPATIENT)
Dept: UROLOGY | Facility: CLINIC | Age: 55
End: 2020-10-15

## 2020-10-16 ENCOUNTER — PATIENT MESSAGE (OUTPATIENT)
Dept: PALLIATIVE MEDICINE | Facility: CLINIC | Age: 55
End: 2020-10-16

## 2020-10-19 ENCOUNTER — OFFICE VISIT (OUTPATIENT)
Dept: SURGERY | Facility: CLINIC | Age: 55
End: 2020-10-19
Payer: MEDICARE

## 2020-10-19 VITALS
DIASTOLIC BLOOD PRESSURE: 72 MMHG | WEIGHT: 97 LBS | BODY MASS INDEX: 16.65 KG/M2 | SYSTOLIC BLOOD PRESSURE: 111 MMHG | TEMPERATURE: 99 F | HEART RATE: 87 BPM

## 2020-10-19 DIAGNOSIS — Z01.818 PRE-OP TESTING: Primary | ICD-10-CM

## 2020-10-19 DIAGNOSIS — C18.1 CANCER OF APPENDIX METASTATIC TO INTRA-ABDOMINAL LYMPH NODE: ICD-10-CM

## 2020-10-19 DIAGNOSIS — K94.00 COLOSTOMY COMPLICATION: Primary | ICD-10-CM

## 2020-10-19 DIAGNOSIS — C77.2 CANCER OF APPENDIX METASTATIC TO INTRA-ABDOMINAL LYMPH NODE: ICD-10-CM

## 2020-10-19 PROCEDURE — 99204 OFFICE O/P NEW MOD 45 MIN: CPT | Mod: S$PBB,,, | Performed by: COLON & RECTAL SURGERY

## 2020-10-19 PROCEDURE — 99215 OFFICE O/P EST HI 40 MIN: CPT | Mod: PBBFAC | Performed by: COLON & RECTAL SURGERY

## 2020-10-19 PROCEDURE — 99999 PR PBB SHADOW E&M-EST. PATIENT-LVL V: CPT | Mod: PBBFAC,,, | Performed by: COLON & RECTAL SURGERY

## 2020-10-19 PROCEDURE — 99999 PR PBB SHADOW E&M-EST. PATIENT-LVL V: ICD-10-PCS | Mod: PBBFAC,,, | Performed by: COLON & RECTAL SURGERY

## 2020-10-19 PROCEDURE — 99204 PR OFFICE/OUTPT VISIT, NEW, LEVL IV, 45-59 MIN: ICD-10-PCS | Mod: S$PBB,,, | Performed by: COLON & RECTAL SURGERY

## 2020-10-19 RX ORDER — SODIUM CHLORIDE, SODIUM LACTATE, POTASSIUM CHLORIDE, CALCIUM CHLORIDE 600; 310; 30; 20 MG/100ML; MG/100ML; MG/100ML; MG/100ML
INJECTION, SOLUTION INTRAVENOUS CONTINUOUS
Status: CANCELLED | OUTPATIENT
Start: 2020-10-19

## 2020-10-19 RX ORDER — ONDANSETRON 2 MG/ML
4 INJECTION INTRAMUSCULAR; INTRAVENOUS EVERY 12 HOURS PRN
Status: CANCELLED | OUTPATIENT
Start: 2020-10-19

## 2020-10-19 NOTE — PROGRESS NOTES
History & Physical    SUBJECTIVE:     CC: Ostomy dysfunction/growth  Ref: Jaky Saavedra NP    History of Present Illness:  Patient is a 55 y.o. female presents for evaluation of ostomy dysfunction and growth.  Patient has a very complicated medical history.  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  During her hemicolectomy, there was evidence of peritoneal disease.  She then underwent 6 months of chemotherapy that a very difficult cytoreductive surgery and HIPEC in 2018 with a very prolonged recovery.  She then did not receive any additional chemotherapy following this however then developed a bowel obstruction and underwent a laparotomy and diverting Andrew's colostomy in February 2020 where there was noted to be additional peritoneal disease.  She has been seen by both Surgical Oncology and Medical Oncology in felt to be a good candidate for systemic chemotherapy which is pending initiation.  However, she has had retraction of her colostomy and a growth making pouching very difficult.  She states she has a rash around her ostomy due to leakage and difficulty with pouching due to this growth that she would like to be evaluated for excision.  She denies any use fevers, chills, nausea, vomiting.    Review of patient's allergies indicates:  No Known Allergies    Current Outpatient Medications   Medication Sig Dispense Refill    fentaNYL (DURAGESIC) 25 mcg/hr Place 1 patch onto the skin every 72 hours. 15 patch 0    ondansetron (ZOFRAN-ODT) 8 MG TbDL Take 1 tablet (8 mg total) by mouth every 12 (twelve) hours as needed. 30 tablet 1    ondansetron (ZOFRAN-ODT) 8 MG TbDL Take 1 tablet (8 mg total) by mouth every 12 (twelve) hours as needed. 30 tablet 1    oxyCODONE-acetaminophen (PERCOCET)  mg per tablet Take 1 tablet by mouth every 4 (four) hours as needed. 60  tablet 0    prochlorperazine (COMPAZINE) 5 MG tablet Take 1 tablet (5 mg total) by mouth every 6 (six) hours as needed for Nausea. 30 tablet 1     No current facility-administered medications for this visit.        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 No pertinent family history.  Social History     Tobacco Use    Smoking status: Never Smoker    Smokeless tobacco: Never Used   Substance Use Topics    Alcohol use: Never     Frequency: Never    Drug use: Never        Review of Systems:  Review of Systems   Constitutional: Negative for activity change, appetite change, chills, fatigue, fever and unexpected weight change.   HENT: Negative for congestion, ear pain, sore throat and trouble swallowing.    Eyes: Negative for pain, redness and itching.   Respiratory: Negative for cough, shortness of breath and wheezing.    Cardiovascular: Negative for chest pain, palpitations and leg swelling.   Gastrointestinal: Positive for abdominal pain. Negative for abdominal distention, anal bleeding, blood in stool, constipation, diarrhea, nausea and rectal pain.   Endocrine: Negative for cold intolerance, heat intolerance and polyuria.   Genitourinary: Negative for dysuria, flank pain, frequency and hematuria.   Musculoskeletal: Negative for gait problem, joint swelling and neck pain.   Skin: Positive for wound. Negative for color change and rash.   Allergic/Immunologic: Negative for environmental allergies and immunocompromised state.   Neurological: Negative for dizziness, speech difficulty, weakness and numbness.   Psychiatric/Behavioral: Negative for  agitation, confusion and hallucinations.       OBJECTIVE:     Vital Signs (Most Recent)  Temp: 98.6 °F (37 °C) (10/19/20 1311)  Pulse: 87 (10/19/20 1311)  BP: 111/72 (10/19/20 1311)     44 kg (97 lb)     Physical Exam:  Physical Exam  Constitutional:       Appearance: She is well-developed.   HENT:      Head: Normocephalic and atraumatic.   Eyes:      Conjunctiva/sclera: Conjunctivae normal.   Neck:      Musculoskeletal: Normal range of motion.      Thyroid: No thyromegaly.   Cardiovascular:      Rate and Rhythm: Normal rate and regular rhythm.   Pulmonary:      Effort: Pulmonary effort is normal. No respiratory distress.   Abdominal:      Comments: Well-healed midline incision; L-sided ostomy slightly retracted beneath skin level with growth above skin concerning for cancerous growth at the superior portion of stoma (see picture)   Musculoskeletal: Normal range of motion.         General: No tenderness.   Skin:     General: Skin is warm and dry.      Capillary Refill: Capillary refill takes less than 2 seconds.      Findings: No rash.   Neurological:      Mental Status: She is alert and oriented to person, place, and time.               Laboratory  Lab Results   Component Value Date    WBC 10.05 08/05/2020    HGB 10.6 (L) 08/05/2020    HCT 34.2 (L) 08/05/2020     (H) 08/05/2020    ALT 15 08/05/2020    AST 21 08/05/2020     08/05/2020    K 4.3 08/05/2020     08/05/2020    CREATININE 0.9 08/05/2020    BUN 22 (H) 08/05/2020    CO2 21 (L) 08/05/2020         Diagnostic Results:  PET Scan: Reviewed    ASSESSMENT/PLAN:     56yo F with complicated course with appendiceal goblet cell and adenocarcinoma who has a Andrew's for obstruction with retraction of colostomy and new growth making pouching difficult who presents to discuss revision    - Long discussion with the patient regarding her current disease.  Discussed that I believe the protrusion of tissue at the superior portion of her ostomy is likely  a cancerous growth.  Discussed that we could attempt to revise the entire colostomy and excise this cancerous growth at the same time, but this may require laparoscopic versus open mobilization of the colon further to achieve a better pouching system.  The patient is not interested in full revision at this time but would like us to excise the growth to allow for better pouching and reapproximate the mucocutaneous junction.  We will plan to do this in the near future so that this can be performed she can heal prior to initiation of chemotherapy.  - plan for colostomy revision tomorrow  - no bowel prep necessary  - All risks, benefits and alternatives fully explained to patient. Risks include, but are not limited to, bleeding, infection, anastomotic leak, damage to ureter, damage to other intra-abdominal organs such as colon, rectum, small bowel, stomach, liver, bladder, reproductive organs, sexual dysfunction, urinary dysfunction, postoperative abscess, conversion to open operation, perioperative MI, CVA and death.  All questions field and appropriately answered to patient's satisfaction.  Consent signed and placed on chart.    Over 25 minutes were spent in face to face contact with the patient with greater than 50% spent discussing their diagnosis and coordination of care.    Layton Anderson MD  Colon and Rectal Surgery  [facility]

## 2020-10-19 NOTE — PRE ADMISSION SCREENING
Pre op instructions reviewed with patient per phone:    To confirm, Your surgeon has instructed you:  Surgery is scheduled 10/20/20at 1030.      Please report to [facility] 1st floor main lobby by 0830.       Covid 19 testing is scheduled for  Rapid Covid- late add on   Please self quarantine after Covid testing, prior to surgery      INSTRUCTIONS IMPORTANT!!!  ¨ Do not eat, drink, or smoke after 12 midnight prior to surgery, including water. OK to brush teeth, no gum, candy or mints!    ¨ Take only these medicines with a small swallow of water-morning of surgery.  N/A              ____   Due to COVID 19 concerns, 1 visitor will be allowed in the pre operative area, and must adhere to social distancing guidelines.  One visitor/family member is currently allowed to visit in-patient rooms from 08:00 a.m - 6:00 p.m    ____   Family/caregivers will be updated re pt status via text/cell phone      ____  Do not wear makeup, including mascara.  ____  No powder, lotions or creams to surgical area.  ____  Please remove all jewelry, including piercings and leave at home.  ____  No money or valuables needed. Please leave at home.  ____  Please bring identification and insurance information to hospital.  ____  If going home the same day, arrange for a ride home. You will not be able to   drive if Anesthesia was used.  ____  Children, under 12 years old, must remain in the waiting room with an adult.  They are not allowed in patient areas.  ____  Wear loose fitting clothing. Allow for dressings, bandages.  ____  Stop Aspirin, Ibuprofen, Motrin and Aleve at least 5-7 days before surgery, unless otherwise instructed by your doctor, or the nurse.   You MAY use Tylenol/acetaminophen until day of surgery.  ____  If you take diabetic medication, do not take am of surgery unless instructed by   Doctor.  ____ Stop taking any Fish Oil supplement or any Vitamins that contain Vitamin E at least 5 days prior to  surgery.          Bathing Instructions-- The night before surgery and the morning prior to coming to the hospital:   -Do not shave the surgical area.   -Shower and wash your hair and body as usual with anti-bacterial  soap and shampoo.   -Rinse your hair and body completely.   -Use one packet of hibiclens to wash the surgical site (using your hand) gently for 5 minutes.  Do not scrub you skin too hard.   -Do not use hibiclens on your head, face, or genitals.   -Do not wash with anti-bacterial soap after you use the hibiclens.   -Rinse your body thoroughly.   -Dry with clean, soft towel.  Do not use lotion, cream, deodorant, or powders on   the surgical site.    Use antibacterial soap in place of hibiclens if your surgery is on the head, face or genitals.         Surgical Site Infection    Prevention of surgical site infections:     -Keep incisions clean and dry.   -Do not soak/submerge incisions in water until completely healed.   -Do not apply lotions, powders, creams, or deodorants to site.   -Always make sure hands are cleaned with antibacterial soap/ alcohol-based   prior to touching the surgical site.  (This includes doctors, nurses, staff, and yourself.)    Signs and symptoms:   -Redness and pain around the area where you had surgery   -Drainage of cloudy fluid from your surgical wound   -Fever over 100.4  I have read or had read and explained to me, and understand the above information.

## 2020-10-19 NOTE — LETTER
October 19, 2020      Jaky Saavedra NP  [address] [place] [address] [address] [place] [id number]            [facility]  [address] [place] [address] , 1ST FLOOR  [place] [address]  Phone: [phone]  Fax: [phone]          Patient: Madeline Warner   MR Number: [id number]   YOB: 1965   Date of Visit: 10/19/2020       Dear Jaky Saavedra:    Thank you for referring Madeline Warner to me for evaluation. Attached you will find relevant portions of my assessment and plan of care.    If you have questions, please do not hesitate to call me. I look forward to following Madeline Warner along with you.    Sincerely,    Layton Anderson MD    Enclosure  CC:  No Recipients    If you would like to receive this communication electronically, please contact [email or web address] or [phone] to request more information on [employer] access.    For providers and/or their staff who would like to refer a patient to [facility], please contact us through our one-stop-shop provider referral line, [facility] , at [phone].    If you feel you have received this communication in error or would no longer like to receive these types of communications, please e-mail [email or web address]

## 2020-10-20 ENCOUNTER — HOSPITAL ENCOUNTER (OUTPATIENT)
Facility: HOSPITAL | Age: 55
Discharge: HOME | End: 2020-10-20
Attending: COLON & RECTAL SURGERY | Admitting: COLON & RECTAL SURGERY
Payer: MEDICARE

## 2020-10-20 ENCOUNTER — TELEPHONE (OUTPATIENT)
Dept: SURGERY | Facility: CLINIC | Age: 55
End: 2020-10-20

## 2020-10-20 ENCOUNTER — ANESTHESIA (OUTPATIENT)
Dept: SURGERY | Facility: HOSPITAL | Age: 55
End: 2020-10-20
Payer: MEDICARE

## 2020-10-20 ENCOUNTER — ANESTHESIA EVENT (OUTPATIENT)
Dept: SURGERY | Facility: HOSPITAL | Age: 55
End: 2020-10-20
Payer: MEDICARE

## 2020-10-20 VITALS
WEIGHT: 97 LBS | SYSTOLIC BLOOD PRESSURE: 113 MMHG | TEMPERATURE: 98 F | RESPIRATION RATE: 15 BRPM | BODY MASS INDEX: 16.56 KG/M2 | HEART RATE: 74 BPM | DIASTOLIC BLOOD PRESSURE: 63 MMHG | OXYGEN SATURATION: 99 % | HEIGHT: 64 IN

## 2020-10-20 DIAGNOSIS — K94.00 COLOSTOMY COMPLICATION: ICD-10-CM

## 2020-10-20 DIAGNOSIS — C77.2 CANCER OF APPENDIX METASTATIC TO INTRA-ABDOMINAL LYMPH NODE: ICD-10-CM

## 2020-10-20 DIAGNOSIS — C18.1 CANCER OF APPENDIX METASTATIC TO INTRA-ABDOMINAL LYMPH NODE: ICD-10-CM

## 2020-10-20 LAB — SARS-COV-2 RDRP RESP QL NAA+PROBE: NEGATIVE

## 2020-10-20 PROCEDURE — 44340 REVISION OF COLOSTOMY: CPT | Mod: ,,, | Performed by: COLON & RECTAL SURGERY

## 2020-10-20 PROCEDURE — 88304 TISSUE EXAM BY PATHOLOGIST: CPT | Performed by: PATHOLOGY

## 2020-10-20 PROCEDURE — 37000009 HC ANESTHESIA EA ADD 15 MINS: Performed by: COLON & RECTAL SURGERY

## 2020-10-20 PROCEDURE — 25000003 PHARM REV CODE 250: Performed by: COLON & RECTAL SURGERY

## 2020-10-20 PROCEDURE — 37000008 HC ANESTHESIA 1ST 15 MINUTES: Performed by: COLON & RECTAL SURGERY

## 2020-10-20 PROCEDURE — 88304 PR  SURG PATH,LEVEL III: ICD-10-PCS | Mod: 26,,, | Performed by: PATHOLOGY

## 2020-10-20 PROCEDURE — 36000709 HC OR TIME LEV III EA ADD 15 MIN: Performed by: COLON & RECTAL SURGERY

## 2020-10-20 PROCEDURE — 44340 PR REVISION OF COLOSTOMY,SIMPLE: ICD-10-PCS | Mod: ,,, | Performed by: COLON & RECTAL SURGERY

## 2020-10-20 PROCEDURE — U0002 COVID-19 LAB TEST NON-CDC: HCPCS

## 2020-10-20 PROCEDURE — 71000015 HC POSTOP RECOV 1ST HR: Performed by: COLON & RECTAL SURGERY

## 2020-10-20 PROCEDURE — 63600175 PHARM REV CODE 636 W HCPCS: Performed by: NURSE ANESTHETIST, CERTIFIED REGISTERED

## 2020-10-20 PROCEDURE — 36000708 HC OR TIME LEV III 1ST 15 MIN: Performed by: COLON & RECTAL SURGERY

## 2020-10-20 PROCEDURE — 88304 TISSUE EXAM BY PATHOLOGIST: CPT | Mod: 26,,, | Performed by: PATHOLOGY

## 2020-10-20 PROCEDURE — 71000033 HC RECOVERY, INTIAL HOUR: Performed by: COLON & RECTAL SURGERY

## 2020-10-20 PROCEDURE — 25000003 PHARM REV CODE 250: Performed by: NURSE ANESTHETIST, CERTIFIED REGISTERED

## 2020-10-20 RX ORDER — MIDAZOLAM HYDROCHLORIDE 1 MG/ML
INJECTION, SOLUTION INTRAMUSCULAR; INTRAVENOUS
Status: DISCONTINUED | OUTPATIENT
Start: 2020-10-20 | End: 2020-10-20

## 2020-10-20 RX ORDER — LIDOCAINE HCL/PF 100 MG/5ML
SYRINGE (ML) INTRAVENOUS
Status: DISCONTINUED | OUTPATIENT
Start: 2020-10-20 | End: 2020-10-20

## 2020-10-20 RX ORDER — SODIUM CHLORIDE, SODIUM LACTATE, POTASSIUM CHLORIDE, CALCIUM CHLORIDE 600; 310; 30; 20 MG/100ML; MG/100ML; MG/100ML; MG/100ML
INJECTION, SOLUTION INTRAVENOUS CONTINUOUS
Status: DISCONTINUED | OUTPATIENT
Start: 2020-10-20 | End: 2020-10-20 | Stop reason: HOSPADM

## 2020-10-20 RX ORDER — FENTANYL CITRATE 50 UG/ML
25 INJECTION, SOLUTION INTRAMUSCULAR; INTRAVENOUS EVERY 5 MIN PRN
Status: DISCONTINUED | OUTPATIENT
Start: 2020-10-20 | End: 2020-10-20 | Stop reason: HOSPADM

## 2020-10-20 RX ORDER — OXYCODONE HYDROCHLORIDE 5 MG/1
10 TABLET ORAL EVERY 4 HOURS PRN
Status: DISCONTINUED | OUTPATIENT
Start: 2020-10-20 | End: 2020-10-20 | Stop reason: HOSPADM

## 2020-10-20 RX ORDER — PHENYLEPHRINE HYDROCHLORIDE 10 MG/ML
INJECTION INTRAVENOUS
Status: DISCONTINUED | OUTPATIENT
Start: 2020-10-20 | End: 2020-10-20

## 2020-10-20 RX ORDER — ONDANSETRON 2 MG/ML
4 INJECTION INTRAMUSCULAR; INTRAVENOUS EVERY 12 HOURS PRN
Status: DISCONTINUED | OUTPATIENT
Start: 2020-10-20 | End: 2020-10-20 | Stop reason: HOSPADM

## 2020-10-20 RX ORDER — LIDOCAINE HYDROCHLORIDE AND EPINEPHRINE 10; 10 MG/ML; UG/ML
INJECTION, SOLUTION INFILTRATION; PERINEURAL
Status: DISCONTINUED | OUTPATIENT
Start: 2020-10-20 | End: 2020-10-20 | Stop reason: HOSPADM

## 2020-10-20 RX ORDER — OXYCODONE HYDROCHLORIDE 5 MG/1
5 TABLET ORAL EVERY 4 HOURS PRN
Status: DISCONTINUED | OUTPATIENT
Start: 2020-10-20 | End: 2020-10-20 | Stop reason: HOSPADM

## 2020-10-20 RX ORDER — ONDANSETRON 2 MG/ML
4 INJECTION INTRAMUSCULAR; INTRAVENOUS DAILY PRN
Status: DISCONTINUED | OUTPATIENT
Start: 2020-10-20 | End: 2020-10-20 | Stop reason: HOSPADM

## 2020-10-20 RX ORDER — SODIUM CHLORIDE, SODIUM LACTATE, POTASSIUM CHLORIDE, CALCIUM CHLORIDE 600; 310; 30; 20 MG/100ML; MG/100ML; MG/100ML; MG/100ML
INJECTION, SOLUTION INTRAVENOUS CONTINUOUS PRN
Status: DISCONTINUED | OUTPATIENT
Start: 2020-10-20 | End: 2020-10-20

## 2020-10-20 RX ORDER — PROPOFOL 10 MG/ML
INJECTION, EMULSION INTRAVENOUS
Status: DISCONTINUED | OUTPATIENT
Start: 2020-10-20 | End: 2020-10-20

## 2020-10-20 RX ADMIN — PHENYLEPHRINE HYDROCHLORIDE 100 MCG: 10 INJECTION INTRAVENOUS at 10:10

## 2020-10-20 RX ADMIN — PROPOFOL 50 MG: 10 INJECTION, EMULSION INTRAVENOUS at 10:10

## 2020-10-20 RX ADMIN — PROPOFOL 20 MG: 10 INJECTION, EMULSION INTRAVENOUS at 10:10

## 2020-10-20 RX ADMIN — LIDOCAINE HYDROCHLORIDE 50 MG: 20 INJECTION, SOLUTION INTRAVENOUS at 10:10

## 2020-10-20 RX ADMIN — MIDAZOLAM 2 MG: 1 INJECTION INTRAMUSCULAR; INTRAVENOUS at 10:10

## 2020-10-20 RX ADMIN — SODIUM CHLORIDE, SODIUM LACTATE, POTASSIUM CHLORIDE, AND CALCIUM CHLORIDE: 600; 310; 30; 20 INJECTION, SOLUTION INTRAVENOUS at 10:10

## 2020-10-20 NOTE — TELEPHONE ENCOUNTER
Spoke to and scheduled pt's 6 week Post Op appt with Dr Anderson for Monday 11/30 [facility] @ 1040 - Pt correctly repeated back all appt information    ----- Message from Meche Israel sent at 10/20/2020 11:34 AM CDT -----  Regarding: post op appt  Good morning,        Pt needs a 6 week follow up post sx. Pt can be reached at [phone]      Thanks,  Meche Israel

## 2020-10-20 NOTE — OP NOTE
[facility]  Surgery Department  Operative Note    SUMMARY     Date of Procedure: 10/20/2020     Procedure: Procedure(s) (LRB):  REVISION, COLOSTOMY (N/A)     Surgeon(s) and Role:     * Layton Anderson MD - Primary    Assisting Surgeon: None    Pre-Operative Diagnosis: Colostomy complication [K94.00]    Post-Operative Diagnosis: Post-Op Diagnosis Codes:     * Colostomy complication [K94.00]    Anesthesia: Local MAC    Technical Procedures Used:   1.  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  Mac anesthesia was then induced.  The abdomen was then prepped and draped usual sterile fashion.  Preop surgical time-out was then performed confirming the correct patient, procedure and preop medications given.  The ostomy was identified and evaluated.  There was a 2 cm growth in the superior portion of the ostomy at the mucocutaneous junction.  The ostomy was retracted as previously defined in clinic.  The patient was previously offered a full colostomy revision with dissection of the mucocutaneous junction and attempting to pull the colostomy further out of the skin and correct the retraction although she did not want this at this time so this was not performed.  The lesion was grasped with an Allis and was excised at the mucocutaneous junction using electrocautery.  This was then passed off the field for final pathology.  The area of resection was then reapproximated for the mucocutaneous junction using  interrupted 3-0 Vicryl suture to attempt to bring the mucosa closer to the skin level.  Local infiltration was then completed with injection around the ostomy site.  Once this was completed, new ostomy appliance was applied.  The patient was then awoken from mac anesthesia and transferred to the postanesthesia care in stable condition.  She tolerated procedure well.  All sponge, needle and instrument counts were correct at the end of the case.    Significant Surgical Tasks Conducted by the Assistant(s), if Applicable: N/A    Complications: No    Estimated Blood Loss (EBL): 5mL           Implants: * No implants in log *    Specimens:   Specimen (12h ago, onward)    None                  Condition: Good    Disposition: PACU - hemodynamically stable.    Attestation: I performed the procedure.

## 2020-10-20 NOTE — ANESTHESIA POSTPROCEDURE EVALUATION
Anesthesia Post Evaluation    Patient: Madeline Warner    Procedure(s) Performed: Procedure(s) (LRB):  REVISION, COLOSTOMY (N/A)    Final Anesthesia Type: MAC    Patient location during evaluation: PACU  Patient participation: Yes- Able to Participate  Level of consciousness: awake and alert  Post-procedure vital signs: reviewed and stable  Pain management: adequate  Airway patency: patent  ROSARIO mitigation strategies: Extubation while patient is awake  PONV status at discharge: No PONV  Anesthetic complications: no      Cardiovascular status: hemodynamically stable  Respiratory status: spontaneous ventilation  Hydration status: euvolemic  Follow-up not needed.          Vitals Value Taken Time   /63 10/20/20 1146   Temp 36.7 °C (98 °F) 10/20/20 1145   Pulse 84 10/20/20 1150   Resp 15 10/20/20 1145   SpO2 100 % 10/20/20 1150   Vitals shown include unvalidated device data.      Event Time   Out of Recovery 11:55:00         Pain/Kenisha Score: Kenisha Score: 10 (10/20/2020 12:00 PM)

## 2020-10-20 NOTE — INTERVAL H&P NOTE
The patient has been examined and the H&P has been reviewed:    I concur with the findings and no changes have occurred since H&P was written.    Anesthesia/Surgery risks, benefits and alternative options discussed and understood by patient/family.          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

## 2020-10-20 NOTE — ANESTHESIA PREPROCEDURE EVALUATION
10/20/2020  Madeline Warner is a 55 y.o., female.    Anesthesia Evaluation    I have reviewed the Patient Summary Reports.    I have reviewed the Nursing Notes. I have reviewed the NPO Status.   I have reviewed the Medications.     Review of Systems  Anesthesia Hx:  No problems with previous Anesthesia Denies Hx of Anesthetic complications  Denies Family Hx of Anesthesia complications.   Denies Personal Hx of Anesthesia complications.   Social:  Non-Smoker, No Alcohol Use    Cardiovascular:  Cardiovascular Normal  ECG has been reviewed.    Pulmonary:  Pulmonary Normal    Renal/:  Renal/ Normal     Hepatic/GI:  Hepatic/GI Normal    Neurological:  Neurology Normal    Endocrine:  Endocrine Normal    Psych:  Psychiatric Normal           Physical Exam  General:  Well nourished    Airway/Jaw/Neck:  Airway Findings: Mouth Opening: Normal Tongue: Normal  General Airway Assessment: Adult  Mallampati: II  TM Distance: 4 - 6 cm  Jaw/Neck Findings:  Neck ROM: Normal ROM      Dental:  Dental Findings: In tact   Chest/Lungs:  Chest/Lungs Findings: Clear to auscultation, Normal Respiratory Rate     Heart/Vascular:  Heart Findings: Rate: Normal  Rhythm: Regular Rhythm  Sounds: Normal        Mental Status:  Mental Status Findings:  Cooperative, Alert and Oriented         Anesthesia Plan  Type of Anesthesia, risks & benefits discussed:  Anesthesia Type:  MAC  Patient's Preference:   Intra-op Monitoring Plan: standard ASA monitors  Intra-op Monitoring Plan Comments:   Post Op Pain Control Plan: per primary service following discharge from PACU  Post Op Pain Control Plan Comments:   Induction:   IV  Beta Blocker:  Patient is not currently on a Beta-Blocker (No further documentation required).       Informed Consent: Patient understands risks and agrees with Anesthesia plan.  Questions answered. Anesthesia consent signed  with patient.  ASA Score: 2     Day of Surgery Review of History & Physical: I have interviewed and examined the patient. I have reviewed the patient's H&P dated:  There are no significant changes.  H&P update referred to the surgeon.         Ready For Surgery From Anesthesia Perspective.

## 2020-10-20 NOTE — DISCHARGE SUMMARY
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       Discharged Condition: good    Hospital Course: Patient was admitted for an outpatient procedure and tolerated the procedure well with no complications.    Final Diagnoses: Same as principal problem.    Disposition: Home or Self Care    Follow up/Patient Instructions:    Medications:  Reconciled Home Medications:      Medication List      CONTINUE taking these medications    fentaNYL 25 mcg/hr  Commonly known as: DURAGESIC  Place 1 patch onto the skin every 72 hours.     * ondansetron 8 MG Tbdl  Commonly known as: ZOFRAN-ODT  Take 1 tablet (8 mg total) by mouth every 12 (twelve) hours as needed.     * ondansetron 8 MG Tbdl  Commonly known as: ZOFRAN-ODT  Take 1 tablet (8 mg total) by mouth every 12 (twelve) hours as needed.     oxyCODONE-acetaminophen  mg per tablet  Commonly known as: PERCOCET  Take 1 tablet by mouth every 4 (four) hours as needed.     prochlorperazine 5 MG tablet  Commonly known as: COMPAZINE  Take 1 tablet (5 mg total) by mouth every 6 (six) hours as needed for Nausea.         * This list has 2 medication(s) that are the same as other medications prescribed for you. Read the directions carefully, and ask your doctor or other care provider to review them with you.              Discharge Procedure Orders   Diet general     Call MD for:  extreme fatigue     Call MD for:  persistent dizziness or light-headedness     Call MD for:  hives     Call MD for:  redness, tenderness, or signs of infection (pain, swelling, redness, odor or green/yellow  discharge around incision site)     Call MD for:  difficulty breathing, headache or visual disturbances     Call MD for:  severe uncontrolled pain     Call MD for:  persistent nausea and vomiting     Call MD for:  temperature >100.4     No dressing needed     Activity as tolerated     Follow-up Information     Layton Anderson MD In 6 weeks.    Specialties: Colon and Rectal Surgery, General Surgery  Why: As needed  Contact information:  [address] [place] [address] [address] [place] [address] [phone]                   Discharge Procedure Orders (must include Diet, Follow-up, Activity):   Discharge Procedure Orders (must include Diet, Follow-up, Activity)   Diet general     Call MD for:  extreme fatigue     Call MD for:  persistent dizziness or light-headedness     Call MD for:  hives     Call MD for:  redness, tenderness, or signs of infection (pain, swelling, redness, odor or green/yellow discharge around incision site)     Call MD for:  difficulty breathing, headache or visual disturbances     Call MD for:  severe uncontrolled pain     Call MD for:  persistent nausea and vomiting     Call MD for:  temperature >100.4     No dressing needed     Activity as tolerated

## 2020-10-22 ENCOUNTER — PATIENT MESSAGE (OUTPATIENT)
Dept: UROLOGY | Facility: CLINIC | Age: 55
End: 2020-10-22

## 2020-10-22 LAB
FINAL PATHOLOGIC DIAGNOSIS: NORMAL
GROSS: NORMAL
Lab: NORMAL

## 2020-10-22 NOTE — TELEPHONE ENCOUNTER
FOR DOCUMENTATION ONLY:  Financial Assistance for Xeloda is approved from 10- and infefinately  [employer]    [facility] is the dispensing pharmacy    Sending a staff message to Dr Peña regarding approval

## 2020-10-25 ENCOUNTER — TELEPHONE (OUTPATIENT)
Dept: HEMATOLOGY/ONCOLOGY | Facility: CLINIC | Age: 55
End: 2020-10-25

## 2020-10-25 DIAGNOSIS — C77.2 CANCER OF APPENDIX METASTATIC TO INTRA-ABDOMINAL LYMPH NODE: Primary | ICD-10-CM

## 2020-10-25 DIAGNOSIS — C18.1 CANCER OF APPENDIX METASTATIC TO INTRA-ABDOMINAL LYMPH NODE: Primary | ICD-10-CM

## 2020-10-25 RX ORDER — CAPECITABINE 500 MG/1
1000 TABLET, FILM COATED ORAL 2 TIMES DAILY
Qty: 84 TABLET | Refills: 2 | Status: SHIPPED | OUTPATIENT
Start: 2020-10-25 | End: 2020-11-08

## 2020-10-25 NOTE — TELEPHONE ENCOUNTER
----- Message from Nadya Alvarado sent at 10/22/2020 11:57 AM CDT -----  Regarding: Xeloda Approval  Patient was approved to receive her Xeloda from [employer]. Please either call in a verbal prescription into [facility] @ [phone] or escribe. I added [facility] to the patients list of preferred pharmacies.  Thank you  Anita  [id number]

## 2020-10-26 ENCOUNTER — DOCUMENTATION ONLY (OUTPATIENT)
Dept: HEMATOLOGY/ONCOLOGY | Facility: CLINIC | Age: 55
End: 2020-10-26

## 2020-10-26 NOTE — PROGRESS NOTES
Sw received call from [facility]. Pharmacy required a verbal consent before medication is mailed to patient. Sw confirmed script with pharmacist. Pharmacist reports Xeloda will be mailed out to patient. No further needs at this time.

## 2020-10-28 ENCOUNTER — DOCUMENTATION ONLY (OUTPATIENT)
Dept: HEMATOLOGY/ONCOLOGY | Facility: CLINIC | Age: 55
End: 2020-10-28

## 2020-10-28 NOTE — NURSING
Unable to reach patient at home number no voicemail.  Left a second message today on pt mobile number . My direct number to call back left in message.

## 2020-10-28 NOTE — NURSING
Spoke with pt today over the phone and she states that she is currently out of town and will return 11/4/20.  She has spoken to [facility] and arranged a shipment of her xeloda to her home , however is not sure the medication has arrived yet or not.  She will notify me of receipt of her medication and we will set up follow up with dr Peña at the start of her Xeloda.  Pt is good with this plan and will call me once she is back in town 11/4/20.  I will follow pt . She has my direct contact number.

## 2020-11-05 ENCOUNTER — DOCUMENTATION ONLY (OUTPATIENT)
Dept: HEMATOLOGY/ONCOLOGY | Facility: CLINIC | Age: 55
End: 2020-11-05

## 2020-11-05 NOTE — NURSING
I reached out to pt via phone this am to find out if she had received her Xeloda yet as she returned from vacation.  She states she got in late last night and has not gone through her mail to see if the medication is there yet.  She stated that she is sleeping and will regroup today and check through her mail and let me know if she has her medication or not.  She has my direct number to call me back and I will follow.  We will set follow up with dR giron before she starts medication.

## 2020-11-06 ENCOUNTER — DOCUMENTATION ONLY (OUTPATIENT)
Dept: HEMATOLOGY/ONCOLOGY | Facility: CLINIC | Age: 55
End: 2020-11-06

## 2020-11-06 NOTE — NURSING
Pt called in today to state that she had received her medication and together we set up follow up with Dr. Peña to discuss starting medication. Pt states she is unsure whether she wants to take it or not and will discuss with MD on 20 at apt. She has my direct number and will call with any further concerns.   Oncology Navigation   Intake  Date of Diagnosis: 17  Date Worked: 20  Appointment Date: 20  Schedule to Appointment Timeline (days): 13  Multiple appointments: No  Reason if booked > 7 days after scheduling: Waiting on records;Patient request;Transfer of care;Other  Other reason booked > 7 days: insurance  Reason for Treatment Delay: Other (awaiting OSP)     Treatment  Current Status: Active  Date Presented to Tumor Board: 20       Chemotherapy Regimen: Xeloda   Oral Therapy: Planned  Oral Therapy Name: Xeloda           General Referrals: Surgical oncology;Other  Other Referral: uro/onc          Support Systems: Parent  Concerns: ACP, emotional support     Acuity  Systemic Treatment - predicted or initiated: Oral chemotherapy only (+1)  Treatment Tolerability: Has not started treatment yet/treatment fully completed and side effects resolved  ECO  Comorbidities in Medical History: 0   Needed: 0  Support: 1  Verbalizes Financial Concerns: 1  Psychological Factors (+1 each): Emotional during conversation  Verbalizes the need for more education: 1  Navigation Acuity: 8     Follow Up  Follow up in about 5 days (around 2020).

## 2020-11-06 NOTE — NURSING
LM again to day for pt to call me to let me know if she received her medication yet.  Direct number left in the message

## 2020-11-11 ENCOUNTER — OFFICE VISIT (OUTPATIENT)
Dept: HEMATOLOGY/ONCOLOGY | Facility: CLINIC | Age: 55
End: 2020-11-11
Payer: MEDICARE

## 2020-11-11 VITALS
WEIGHT: 99 LBS | HEART RATE: 82 BPM | DIASTOLIC BLOOD PRESSURE: 80 MMHG | SYSTOLIC BLOOD PRESSURE: 136 MMHG | OXYGEN SATURATION: 100 % | RESPIRATION RATE: 16 BRPM | BODY MASS INDEX: 16.9 KG/M2 | TEMPERATURE: 98 F | HEIGHT: 64 IN

## 2020-11-11 DIAGNOSIS — C77.2 CANCER OF APPENDIX METASTATIC TO INTRA-ABDOMINAL LYMPH NODE: ICD-10-CM

## 2020-11-11 DIAGNOSIS — C18.1 CANCER OF APPENDIX METASTATIC TO INTRA-ABDOMINAL LYMPH NODE: ICD-10-CM

## 2020-11-11 DIAGNOSIS — G89.3 CHRONIC PAIN DUE TO NEOPLASM: ICD-10-CM

## 2020-11-11 DIAGNOSIS — C79.9 METASTATIC SIGNET RING CELL CARCINOMA: Primary | ICD-10-CM

## 2020-11-11 DIAGNOSIS — R32 URINARY INCONTINENCE IN FEMALE: ICD-10-CM

## 2020-11-11 PROCEDURE — 99999 PR PBB SHADOW E&M-EST. PATIENT-LVL IV: CPT | Mod: PBBFAC,,, | Performed by: INTERNAL MEDICINE

## 2020-11-11 PROCEDURE — 99999 PR PBB SHADOW E&M-EST. PATIENT-LVL IV: ICD-10-PCS | Mod: PBBFAC,,, | Performed by: INTERNAL MEDICINE

## 2020-11-11 PROCEDURE — 99214 OFFICE O/P EST MOD 30 MIN: CPT | Mod: S$PBB,,, | Performed by: INTERNAL MEDICINE

## 2020-11-11 PROCEDURE — 99214 PR OFFICE/OUTPT VISIT, EST, LEVL IV, 30-39 MIN: ICD-10-PCS | Mod: S$PBB,,, | Performed by: INTERNAL MEDICINE

## 2020-11-11 PROCEDURE — 99214 OFFICE O/P EST MOD 30 MIN: CPT | Mod: PBBFAC | Performed by: INTERNAL MEDICINE

## 2020-11-11 NOTE — Clinical Note
Please note capecitabine received and she is holding onto, not using now. Please relay to not have specialty pharmacy send further shipments.

## 2020-11-11 NOTE — PATIENT INSTRUCTIONS
Patient declined chemo at this time, put tx plan on hold  Myriad test today  RV in 2 months with labs and scan prior

## 2020-11-11 NOTE — PROGRESS NOTES
Subjective:      DATE OF VISIT: 11/11/20     ?  Patient ID:?Madeline Warner is a 55 y.o. female.?? MR#: [id number]   ?    ? Primary Care Providers:  Primary Doctor No (General)     CHIEF COMPLAINT:  Follow-up   ?   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   CURRENT TREATMENT: TBD    PAST TREATMENT:  Appendectomy, 10/30/17  Right hemicolectomy, 12/1/17  FOLFOX x 12 q2wk cycles  Cytoreduction, HIPEC, 09/19/2018  Exploratory laparotomy, lysis of adhesion, colostomy, 2/12/20  ?   ONCOLOGIC HISTORY:   ?   I had the pleasure meeting for the 1st time Ms. Warner as she transfers her care to [place], previously treated in [place].    I have carefully reviewed her oncologic history along with her notable for the following:    Ms. Warner has been in excellent health without known medical issues.      She presented at 52 years old to outside hospital in [place] with acute abdominal pain on 10/30/2017 diagnosed with acute appendicitis.     10/30/2017 appendectomy  Pathology report noted 2.5x0.7cm carcinoma, ex-goblet cell type carcinoid, high-grade, proximal margin positive, lymphovascular invasion present, pT4 NX MX.    12/01/2017 right hemicolectomy  Pathology:  Metastatic signet ring cell adenocarcinoma in peritoneal implant    FOLFOX x 12 q2wk cycles    09/19/2018 cytoreduction, HIPEC  Pathology:  Pelvic nodule, colon nodule, rectal nodule, rectal margin final, bilateral ovaries with involvement of metastatic signet ring cell adenocarcinoma    9/2019 EGD with benign findings    1-2/2020 she reports abdominal discomfort. 2/9/20 CT reported abnormal wall thickening finding suggestive of obstruction.    02/07/2020 rectal wall biopsy adenocarcinoma with signet ring morphology    2/13/20 exploratory laparotomy, lysis of adhesion and colostomy revealed numerous peritoneal deposits.  Pathology:  Peritoneum biopsy metastatic signet ring cell carcinoma    02/19/2020  MRI brain noted to be negative for metastatic disease    3/4/20 PET without noted avid disease or peritoneal carcinomatosis    05/15/2020 CT chest abdomen pelvis  report noted status post hemicolectomy left lower quadrant colostomy.   no evidence of bowel obstruction, new ascites or peritoneal carcinomatosis.  Mucosal thickening had rectosigmoid colon stable.   no new lymphadenopathy or metastatic disease seen.  Interval resolution of recent right pyelonephritis.    01/29/2020 CEA 2.01  06/29/2020 CEA 1.49    She moved to [place] to be closer to family.  She saw my colleague Dr. Negro who had ordered CT chest abdomen pelvis for new baseline here on 08/27/2020 which noted postoperative findings from had right hemicolectomy loop colostomy no lymphadenopathy or masses within chest abdomen or pelvis.      INTERVAL EVENTS:    She returns from travel to [place] and [facility].  She has enjoyed good functional status during this time today for consideration of recommended chemotherapy she has opted to not pursue at this time.  She did have excision of ostomy with significant improvement in functionality and tenderness but still uncomfortable.  She is using fentanyl patch and Percocet as needed.  Review of Systems    ?   A comprehensive 14-point review of systems was reviewed with patient and was negative other than as specified above.   ?   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     PAST SURGICAL HISTORY:   Past Surgical History:   Procedure Laterality Date    APPENDECTOMY      BILATERAL OOPHORECTOMY      CHOLECYSTECTOMY      with Cytoreduction and salpingectomy    COLOSTOMY      CYTOREDUCTION      EXCISION OF LESION  10/20/2020    Procedure: EXCISION, LESION COLOSTOMY;  Surgeon: Layton Anderson MD;  [facility];  Service: General;;     LAPAROTOMY, EXPLORATORY  02/12/2020    LYSIS OF ADHESION, COLOSTOMY    REVISION COLOSTOMY N/A 10/20/2020    Procedure: REVISION, COLOSTOMY;  Surgeon: Layton Anderson MD;  Location: [facility];  Service: General;  Laterality: N/A;  Ostomy bag placed    RIGHT COLECTOMY        ?   ALLERGIES:   Allergies as of 11/11/2020    (No Known Allergies)      ?   MEDICATIONS:?   Outpatient Medications Marked as Taking for the 11/11/20 encounter (Office Visit) with Mariam Peña MD   Medication Sig Dispense Refill    fentaNYL (DURAGESIC) 25 mcg/hr Place 1 patch onto the skin every 72 hours. 15 patch 0    ondansetron (ZOFRAN-ODT) 8 MG TbDL Take 1 tablet (8 mg total) by mouth every 12 (twelve) hours as needed. 30 tablet 1    ondansetron (ZOFRAN-ODT) 8 MG TbDL Take 1 tablet (8 mg total) by mouth every 12 (twelve) hours as needed. 30 tablet 1    oxyCODONE-acetaminophen (PERCOCET)  mg per tablet Take 1 tablet by mouth every 4 (four) hours as needed. 60 tablet 0    prochlorperazine (COMPAZINE) 5 MG tablet Take 1 tablet (5 mg total) by mouth every 6 (six) hours as needed for Nausea. 30 tablet 1      ?   SOCIAL HISTORY:?   Social History     Tobacco Use    Smoking status: Never Smoker    Smokeless tobacco: Never Used   Substance Use Topics    Alcohol use: Never     Frequency: Never      ?    She is a 36-year-old daughter and 8-year-old granddaughter who live in [place].  ?   FAMILY HISTORY:   family history is not on file.   ?   Father with prostate cancer     Objective:      Physical Exam      ?   Vitals:    11/11/20 0932   BP: 136/80   Pulse: 82   Resp: 16   Temp: 98 °F (36.7 °C)      ?   ECOG:?0   General appearance: Generally well appearing, in no acute distress.   Head, eyes, ears, nose, and throat: moist mucous membranes.   Respiratory:  Normal work of breathing  Abdomen:  Thin, nontender, nondistended.   Extremities: Warm, without edema.   Neurologic: Alert and oriented. Grossly normal strength,  "coordination, and gait.   Skin: No rashes, ecchymoses or petechial lesion.   Psychiatric:  Normal mood and affect.    ?   Laboratory:  ?   No visits with results within 1 Day(s) from this visit.   Latest known visit with results is:   Admission on 10/20/2020, Discharged on 10/20/2020   Component Date Value Ref Range Status    SARS-CoV-2 RNA, Amplification, Qual 10/20/2020 Negative  Negative Final    Final Pathologic Diagnosis 10/20/2020    Final                    Value:COLOSTOMY, EXCISION:  Benign hypertrophic squamous mucosa  Negative for neoplasia and malignancy      Gross 10/20/2020    Final                    Value:Surgery ID:  [id number];  Pathology ID:  [id number]  1.  Received in formalin labeled "colostomy growth" are 3 pale tan tissue  fragments, ranging from 0.5 cm in greatest dimension to 1.2 x 1.1 x 0.9 cm.  The resection margin of the larger fragment is inked black.  The specimen is  entirely embedded in 2 cassettes.  NJF--1-A  MPN--1-B  Grossed by: Topher Dotson      Disclaimer 10/20/2020    Final                    Value:Unless the case is a 'gross only' or additional testing only, the final  diagnosis for each specimen is based on a microscopic examination of  appropriate tissue sections.        Lab Results   Component Value Date    WBC 10.05 08/05/2020    HGB 10.6 (L) 08/05/2020    HCT 34.2 (L) 08/05/2020    MCV 91 08/05/2020     (H) 08/05/2020         Chemistry        Component Value Date/Time     08/05/2020 1220    K 4.3 08/05/2020 1220     08/05/2020 1220    CO2 21 (L) 08/05/2020 1220    BUN 22 (H) 08/05/2020 1220    CREATININE 0.9 08/05/2020 1220    GLU 86 08/05/2020 1220        Component Value Date/Time    CALCIUM 9.3 08/05/2020 1220    ALKPHOS 119 08/05/2020 1220    AST 21 08/05/2020 1220    ALT 15 08/05/2020 1220    BILITOT 0.3 08/05/2020 1220    ESTGFRAFRICA >60 08/05/2020 1220    EGFRNONAA >60 08/05/2020 1220        No results found for: CEA    ?   Tumor " markers   ?   ?   Imaging:  ?  Per above      Pathology:    Per above     ?   Assessment/Plan:       1. Metastatic signet ring cell carcinoma    2. 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 Urinary incontinence in female    4. Chronic pain due to neoplasm          Plan:     # metastatic signet ring cell adenocarcinoma:     Initial diagnosis October 2017 with appendectomy pathology noting ex-goblet cell type carcinoid, high-grade. Subsequent right hemicolectomy December 2017 with pathology showing metastatic signet ring cell adenocarcinoma involving peritoneal implant s/p FOLFOX x 6 months, and 9/2019 cytoreductive surgery and HIPEC, final pathology noting pelvic nodule, colon nodule, rectal nodule, rectal margin final, bilateral ovaries with involvement of metastatic signet ring cell adenocarcinoma. 2/2020 bowel obstruction with ex lap revealing numerous peritoneal deposits.  Peritoneum biopsy pathology showed metastatic signet ring cell carcinoma.     She has had sepsis and imaging in May and most recently 08/27/2020 CT chest abdomen pelvis without noted evidence of disease.  I did discuss with her however is likely discordance and imaging may not reflect true degree of peritoneal disease involvement.  She has not yet had any systemic therapy in the setting of her recurrent metastatic signet ring cell carcinoma.  I discussed natural history of this disease and role of palliative chemotherapy to slow further progression of disease.  She is hesitant to initiate chemotherapy due to concerns for toxicity and just is the importance of quality of life which is quite reasonable given palliative nature of therapy at this point.  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  I discussed options of 5 fluorouracil based regimen, may prefer capecitabine given difficult venous access.  I also discussed agents of irinotecan and bevacizumab as well as potential toxicities.  There  may be role for other agents including targeted therapy and immunotherapy pending mutational testing, MSI status.     We reviewed her case at last multidisciplinary tumor board and relayed - review of pathology was consistent with prior diagnosis of signet ring adenocarcinoma with recommendation for systemic therapy. Consideration of HIPEC was discussed in tumor board although I reviewed with her that this is no currently standard of care and would not recommend this is substitute systemic therapy. She did receive HIPEC in setting of surgical resection in past (see oncologic history above) and did have significant toxicity and is understandably hesitant at pursuing this again.  She is amenable to consultation with Surgical Oncology to discuss HIPEC potential further with visit planned tomorrow 09/25/2020 with Dr. Garcia    Given prior issue with chest port she is most interested in oral capecitabine as opposed to 5 fluorouracil and discussed addition of irinotecan and may consider bevacizumab if tolerating well via peripheral IV.      Completed chemotherapy teaching however after much discussion patient now opting for surveillance noting good quality of life.  She has received oral keeps I to be in and will on to this in case she decides to initiate treatment in the future.  She does understand likely progressive disease in the absence of treatment.  She is amenable to repeat imaging in several months and may consider treatment in the future.    Strata showed microsatellite stable.    Recommended germline genetic testing, Explorra discussed and sent for 11/11/2020    # Nausea/Pain:  Continue follow-up with palliative Care, currently on fentanyl and Percocet.    # urinary incontinence:  Stable, follow-up with urogynecology as needed..    # ostomy:  Excision of ostomy growth with pathology without evidence of malignancy.  Offered by colorectal surgery further revision however decline.  Continue to follow-up colorectal  surgery is needed.    Advance Care Planning   pall care referral       Follow-Up:   Patient Instructions   Patient declined chemo at this time, put tx plan on hold  Myriad test today  RV in 2 months with labs and scan prior

## 2020-11-12 ENCOUNTER — DOCUMENTATION ONLY (OUTPATIENT)
Dept: HEMATOLOGY/ONCOLOGY | Facility: CLINIC | Age: 55
End: 2020-11-12

## 2020-11-12 NOTE — PROGRESS NOTES
LATE NOTE:    Labs drawn for [facility] genetic testing, 1 purple top tube which is contained in  myriad testing kit.    Labs drawn  by  [facility], on 11- from [facility],  Patient tolerated well, gauze and coban applied to site after one stick using a  butterfly needle.    Patient verified full name and date of birth, labs sample collected was labeled in presence of pt.and mailed     [employer]ia [employer]  E  Trk # [id number]    by STANTON Orourke    [employer] confirmation  # [id number]

## 2020-11-16 ENCOUNTER — DOCUMENTATION ONLY (OUTPATIENT)
Dept: HEMATOLOGY/ONCOLOGY | Facility: CLINIC | Age: 55
End: 2020-11-16

## 2020-11-16 NOTE — NURSING
Spoke with Isaiah at [employer] [phone] to hold any further shipments of Xeloda for pt as she has chosen not to start the medication at this time.  She will follow up with dr Peña in 2021 .   Oncology Navigation   Intake  Date of Diagnosis: 17  Date Worked: 20  Appointment Date: 20  Schedule to Appointment Timeline (days): 13  Multiple appointments: No  Reason if booked > 7 days after scheduling: Waiting on records;Patient request;Transfer of care;Other  Other reason booked > 7 days: insurance  Reason for Treatment Delay: Other (awaiting OSP)     Treatment  Current Status: Active  Date Presented to Tumor Board: 20       Chemotherapy Regimen: Xeloda   Oral Therapy: Planned  Oral Therapy Name: Xeloda           General Referrals: Surgical oncology;Other  Other Referral: uro/onc          Support Systems: Parent  Concerns: ACP, emotional support     Acuity  Systemic Treatment - predicted or initiated: Oral chemotherapy only (+1)  Treatment Tolerability: Has not started treatment yet/treatment fully completed and side effects resolved  ECO  Comorbidities in Medical History: 0   Needed: 0  Support: 1  Verbalizes Financial Concerns: 1  Psychological Factors (+1 each): Emotional during conversation  Verbalizes the need for more education: 1  Navigation Acuity: 8     Follow Up  Follow up in about 8 weeks (around 2021).

## 2020-12-09 ENCOUNTER — PATIENT MESSAGE (OUTPATIENT)
Dept: HEMATOLOGY/ONCOLOGY | Facility: CLINIC | Age: 55
End: 2020-12-09

## 2020-12-11 ENCOUNTER — TELEPHONE (OUTPATIENT)
Dept: HEMATOLOGY/ONCOLOGY | Facility: CLINIC | Age: 55
End: 2020-12-11

## 2020-12-11 NOTE — TELEPHONE ENCOUNTER
Nurse received  A notification of canceled test. Nurse called [facility] and spoke Ms Caridad Reynolds to get more information of who pt can get approved for test. She requested any pathology results to be sent over if any and she emailed a form for colaris testing for medicare beneficiaries supplementary information form for dr giron to review, if apply to pt based off her pathology/ and eligibility with tumor sample.

## 2020-12-11 NOTE — TELEPHONE ENCOUNTER
Nurse called pt back regarding her genetic test status, her test will be canceled due to insurance non coverage. No answer at time of this call, message left to call office.

## 2020-12-11 NOTE — TELEPHONE ENCOUNTER
[employer] Note:  Nurse received notification from [facility] of canceled test genetic testing, letter sent to dr giron, who reviewed and stated pt didn't have family his to support request, insurance denied coverage.  nurse called to notify pt no answer at numbers igor in [employer]. Nurse will call back

## 2020-12-16 ENCOUNTER — PATIENT MESSAGE (OUTPATIENT)
Dept: PALLIATIVE MEDICINE | Facility: CLINIC | Age: 55
End: 2020-12-16

## 2020-12-16 RX ORDER — ZOLPIDEM TARTRATE 5 MG/1
5 TABLET ORAL NIGHTLY PRN
Qty: 60 TABLET | Refills: 1 | Status: SHIPPED | OUTPATIENT
Start: 2020-12-16 | End: 2021-01-01 | Stop reason: SDUPTHER

## 2020-12-24 ENCOUNTER — DOCUMENTATION ONLY (OUTPATIENT)
Dept: HEMATOLOGY/ONCOLOGY | Facility: CLINIC | Age: 55
End: 2020-12-24

## 2020-12-28 NOTE — PROGRESS NOTES
Received return appointment letter that was previously mailed on today. [facility] states no mail receptacle.

## 2020-12-30 DIAGNOSIS — C18.1 CANCER OF APPENDIX METASTATIC TO INTRA-ABDOMINAL LYMPH NODE: ICD-10-CM

## 2020-12-30 DIAGNOSIS — C77.2 CANCER OF APPENDIX METASTATIC TO INTRA-ABDOMINAL LYMPH NODE: ICD-10-CM

## 2020-12-31 DIAGNOSIS — C77.2 CANCER OF APPENDIX METASTATIC TO INTRA-ABDOMINAL LYMPH NODE: ICD-10-CM

## 2020-12-31 DIAGNOSIS — C18.1 CANCER OF APPENDIX METASTATIC TO INTRA-ABDOMINAL LYMPH NODE: ICD-10-CM

## 2020-12-31 RX ORDER — FENTANYL 25 UG/1
1 PATCH TRANSDERMAL
Qty: 15 PATCH | Refills: 0 | Status: SHIPPED | OUTPATIENT
Start: 2020-12-31 | End: 2021-01-08 | Stop reason: SDUPTHER

## 2020-12-31 RX ORDER — OXYCODONE AND ACETAMINOPHEN 10; 325 MG/1; MG/1
1 TABLET ORAL EVERY 4 HOURS PRN
Qty: 60 TABLET | Refills: 0 | Status: SHIPPED | OUTPATIENT
Start: 2020-12-31 | End: 2021-01-08

## 2021-01-01 ENCOUNTER — HOSPITAL ENCOUNTER (OUTPATIENT)
Dept: RADIOLOGY | Facility: HOSPITAL | Age: 56
Discharge: HOME | End: 2021-12-14
Attending: INTERNAL MEDICINE
Payer: MEDICARE

## 2021-01-01 ENCOUNTER — OFFICE VISIT (OUTPATIENT)
Dept: HEMATOLOGY/ONCOLOGY | Facility: CLINIC | Age: 56
End: 2021-01-01
Payer: MEDICARE

## 2021-01-01 ENCOUNTER — TELEPHONE (OUTPATIENT)
Dept: RADIOLOGY | Facility: HOSPITAL | Age: 56
End: 2021-01-01
Payer: MEDICARE

## 2021-01-01 ENCOUNTER — ANESTHESIA (OUTPATIENT)
Dept: SURGERY | Facility: HOSPITAL | Age: 56
End: 2021-01-01
Payer: MEDICARE

## 2021-01-01 ENCOUNTER — TELEPHONE (OUTPATIENT)
Dept: HEMATOLOGY/ONCOLOGY | Facility: CLINIC | Age: 56
End: 2021-01-01
Payer: MEDICARE

## 2021-01-01 ENCOUNTER — ANESTHESIA EVENT (OUTPATIENT)
Dept: SURGERY | Facility: HOSPITAL | Age: 56
End: 2021-01-01
Payer: MEDICARE

## 2021-01-01 ENCOUNTER — NURSE TRIAGE (OUTPATIENT)
Dept: ADMINISTRATIVE | Facility: CLINIC | Age: 56
End: 2021-01-01

## 2021-01-01 ENCOUNTER — ANESTHESIA EVENT (OUTPATIENT)
Dept: ENDOSCOPY | Facility: HOSPITAL | Age: 56
DRG: 871 | End: 2021-01-01
Payer: MEDICARE

## 2021-01-01 ENCOUNTER — PATIENT MESSAGE (OUTPATIENT)
Dept: HEMATOLOGY/ONCOLOGY | Facility: CLINIC | Age: 56
End: 2021-01-01
Payer: MEDICARE

## 2021-01-01 ENCOUNTER — TELEPHONE (OUTPATIENT)
Dept: GASTROENTEROLOGY | Facility: CLINIC | Age: 56
End: 2021-01-01

## 2021-01-01 ENCOUNTER — TELEPHONE (OUTPATIENT)
Dept: RADIOLOGY | Facility: HOSPITAL | Age: 56
End: 2021-01-01

## 2021-01-01 ENCOUNTER — LAB VISIT (OUTPATIENT)
Dept: LAB | Facility: HOSPITAL | Age: 56
End: 2021-01-01
Attending: INTERNAL MEDICINE
Payer: MEDICARE

## 2021-01-01 ENCOUNTER — PATIENT MESSAGE (OUTPATIENT)
Dept: GASTROENTEROLOGY | Facility: CLINIC | Age: 56
End: 2021-01-01

## 2021-01-01 ENCOUNTER — HOSPITAL ENCOUNTER (EMERGENCY)
Facility: HOSPITAL | Age: 56
Discharge: HOME | End: 2021-05-17
Attending: FAMILY MEDICINE
Payer: MEDICARE

## 2021-01-01 ENCOUNTER — TELEPHONE (OUTPATIENT)
Dept: PREADMISSION TESTING | Facility: HOSPITAL | Age: 56
End: 2021-01-01

## 2021-01-01 ENCOUNTER — PATIENT OUTREACH (OUTPATIENT)
Dept: ADMINISTRATIVE | Facility: HOSPITAL | Age: 56
End: 2021-01-01

## 2021-01-01 ENCOUNTER — TELEPHONE (OUTPATIENT)
Dept: UROLOGY | Facility: CLINIC | Age: 56
End: 2021-01-01
Payer: MEDICARE

## 2021-01-01 ENCOUNTER — TELEPHONE (OUTPATIENT)
Dept: PRIMARY CARE CLINIC | Facility: CLINIC | Age: 56
End: 2021-01-01

## 2021-01-01 ENCOUNTER — TELEPHONE (OUTPATIENT)
Dept: PALLIATIVE MEDICINE | Facility: CLINIC | Age: 56
End: 2021-01-01
Payer: MEDICARE

## 2021-01-01 ENCOUNTER — HOSPITAL ENCOUNTER (OUTPATIENT)
Facility: HOSPITAL | Age: 56
Discharge: HOME | End: 2021-12-21
Attending: UROLOGY | Admitting: UROLOGY
Payer: MEDICARE

## 2021-01-01 ENCOUNTER — LAB VISIT (OUTPATIENT)
Dept: OTOLARYNGOLOGY | Facility: CLINIC | Age: 56
End: 2021-01-01
Payer: MEDICARE

## 2021-01-01 ENCOUNTER — DOCUMENTATION ONLY (OUTPATIENT)
Dept: GASTROENTEROLOGY | Facility: HOSPITAL | Age: 56
End: 2021-01-01

## 2021-01-01 ENCOUNTER — PATIENT MESSAGE (OUTPATIENT)
Dept: HEMATOLOGY/ONCOLOGY | Facility: CLINIC | Age: 56
End: 2021-01-01

## 2021-01-01 ENCOUNTER — OFFICE VISIT (OUTPATIENT)
Dept: GASTROENTEROLOGY | Facility: CLINIC | Age: 56
End: 2021-01-01
Payer: MEDICARE

## 2021-01-01 ENCOUNTER — PATIENT MESSAGE (OUTPATIENT)
Dept: ENDOSCOPY | Facility: HOSPITAL | Age: 56
End: 2021-01-01

## 2021-01-01 ENCOUNTER — HOSPITAL ENCOUNTER (OUTPATIENT)
Dept: RADIOLOGY | Facility: HOSPITAL | Age: 56
Discharge: HOME | End: 2021-05-07
Attending: INTERNAL MEDICINE
Payer: MEDICARE

## 2021-01-01 ENCOUNTER — HOSPITAL ENCOUNTER (OUTPATIENT)
Dept: RADIOLOGY | Facility: HOSPITAL | Age: 56
Discharge: HOME | End: 2021-12-06
Attending: INTERNAL MEDICINE
Payer: MEDICARE

## 2021-01-01 ENCOUNTER — ANESTHESIA (OUTPATIENT)
Dept: ENDOSCOPY | Facility: HOSPITAL | Age: 56
End: 2021-01-01
Payer: MEDICARE

## 2021-01-01 ENCOUNTER — PATIENT MESSAGE (OUTPATIENT)
Dept: PALLIATIVE MEDICINE | Facility: CLINIC | Age: 56
End: 2021-01-01

## 2021-01-01 ENCOUNTER — HOSPITAL ENCOUNTER (EMERGENCY)
Facility: HOSPITAL | Age: 56
Discharge: HOME | End: 2021-04-07
Attending: EMERGENCY MEDICINE
Payer: MEDICARE

## 2021-01-01 ENCOUNTER — HOSPITAL ENCOUNTER (INPATIENT)
Facility: HOSPITAL | Age: 56
LOS: 3 days | Discharge: HOME | DRG: 871 | End: 2021-06-16
Attending: EMERGENCY MEDICINE | Admitting: HOSPITALIST
Payer: MEDICARE

## 2021-01-01 ENCOUNTER — OFFICE VISIT (OUTPATIENT)
Dept: UROLOGY | Facility: CLINIC | Age: 56
End: 2021-01-01
Payer: MEDICARE

## 2021-01-01 ENCOUNTER — PATIENT MESSAGE (OUTPATIENT)
Dept: PALLIATIVE MEDICINE | Facility: CLINIC | Age: 56
End: 2021-01-01
Payer: MEDICARE

## 2021-01-01 ENCOUNTER — OFFICE VISIT (OUTPATIENT)
Dept: SURGERY | Facility: CLINIC | Age: 56
End: 2021-01-01
Payer: MEDICARE

## 2021-01-01 ENCOUNTER — HOSPITAL ENCOUNTER (OUTPATIENT)
Facility: HOSPITAL | Age: 56
Discharge: HOME | End: 2021-05-06
Attending: INTERNAL MEDICINE | Admitting: INTERNAL MEDICINE
Payer: MEDICARE

## 2021-01-01 ENCOUNTER — PATIENT MESSAGE (OUTPATIENT)
Dept: PREADMISSION TESTING | Facility: HOSPITAL | Age: 56
End: 2021-01-01

## 2021-01-01 ENCOUNTER — HOSPITAL ENCOUNTER (OUTPATIENT)
Dept: CARDIOLOGY | Facility: HOSPITAL | Age: 56
Discharge: HOME | End: 2021-12-20
Attending: UROLOGY
Payer: MEDICARE

## 2021-01-01 ENCOUNTER — ANESTHESIA EVENT (OUTPATIENT)
Dept: ENDOSCOPY | Facility: HOSPITAL | Age: 56
End: 2021-01-01
Payer: MEDICARE

## 2021-01-01 ENCOUNTER — OUTPATIENT CASE MANAGEMENT (OUTPATIENT)
Dept: ADMINISTRATIVE | Facility: OTHER | Age: 56
End: 2021-01-01

## 2021-01-01 ENCOUNTER — TELEPHONE (OUTPATIENT)
Dept: HEMATOLOGY/ONCOLOGY | Facility: CLINIC | Age: 56
End: 2021-01-01

## 2021-01-01 ENCOUNTER — TELEPHONE (OUTPATIENT)
Dept: SURGERY | Facility: CLINIC | Age: 56
End: 2021-01-01

## 2021-01-01 ENCOUNTER — OFFICE VISIT (OUTPATIENT)
Dept: PALLIATIVE MEDICINE | Facility: CLINIC | Age: 56
End: 2021-01-01
Payer: MEDICARE

## 2021-01-01 ENCOUNTER — IMMUNIZATION (OUTPATIENT)
Dept: INTERNAL MEDICINE | Facility: CLINIC | Age: 56
End: 2021-01-01
Payer: MEDICARE

## 2021-01-01 ENCOUNTER — DOCUMENTATION ONLY (OUTPATIENT)
Dept: PALLIATIVE MEDICINE | Facility: CLINIC | Age: 56
End: 2021-01-01
Payer: MEDICARE

## 2021-01-01 ENCOUNTER — HOSPITAL ENCOUNTER (OUTPATIENT)
Dept: RADIOLOGY | Facility: HOSPITAL | Age: 56
Discharge: HOME | End: 2021-04-27
Attending: INTERNAL MEDICINE
Payer: MEDICARE

## 2021-01-01 ENCOUNTER — HOSPITAL ENCOUNTER (OUTPATIENT)
Dept: RADIOLOGY | Facility: HOSPITAL | Age: 56
Discharge: HOME | End: 2021-04-13
Attending: INTERNAL MEDICINE
Payer: MEDICARE

## 2021-01-01 ENCOUNTER — TELEPHONE (OUTPATIENT)
Dept: FAMILY MEDICINE | Facility: CLINIC | Age: 56
End: 2021-01-01

## 2021-01-01 ENCOUNTER — ANESTHESIA (OUTPATIENT)
Dept: ENDOSCOPY | Facility: HOSPITAL | Age: 56
DRG: 871 | End: 2021-01-01
Payer: MEDICARE

## 2021-01-01 ENCOUNTER — TELEPHONE (OUTPATIENT)
Dept: SURGERY | Facility: CLINIC | Age: 56
End: 2021-01-01
Payer: MEDICARE

## 2021-01-01 ENCOUNTER — TELEPHONE (OUTPATIENT)
Dept: PHARMACY | Facility: CLINIC | Age: 56
End: 2021-01-01
Payer: MEDICARE

## 2021-01-01 ENCOUNTER — OFFICE VISIT (OUTPATIENT)
Dept: GYNECOLOGIC ONCOLOGY | Facility: CLINIC | Age: 56
End: 2021-01-01
Payer: MEDICARE

## 2021-01-01 ENCOUNTER — TUMOR BOARD CONFERENCE (OUTPATIENT)
Dept: HEMATOLOGY/ONCOLOGY | Facility: CLINIC | Age: 56
End: 2021-01-01
Payer: MEDICARE

## 2021-01-01 VITALS
WEIGHT: 89.5 LBS | HEIGHT: 64 IN | TEMPERATURE: 98 F | DIASTOLIC BLOOD PRESSURE: 68 MMHG | HEART RATE: 87 BPM | BODY MASS INDEX: 15.28 KG/M2 | RESPIRATION RATE: 13 BRPM | SYSTOLIC BLOOD PRESSURE: 145 MMHG | OXYGEN SATURATION: 96 %

## 2021-01-01 VITALS
TEMPERATURE: 98 F | WEIGHT: 93.94 LBS | DIASTOLIC BLOOD PRESSURE: 69 MMHG | RESPIRATION RATE: 16 BRPM | BODY MASS INDEX: 16.04 KG/M2 | HEIGHT: 64 IN | OXYGEN SATURATION: 100 % | HEART RATE: 100 BPM | SYSTOLIC BLOOD PRESSURE: 118 MMHG

## 2021-01-01 VITALS
BODY MASS INDEX: 15.89 KG/M2 | OXYGEN SATURATION: 100 % | WEIGHT: 93.06 LBS | HEART RATE: 84 BPM | TEMPERATURE: 97 F | HEIGHT: 64 IN | DIASTOLIC BLOOD PRESSURE: 67 MMHG | SYSTOLIC BLOOD PRESSURE: 103 MMHG

## 2021-01-01 VITALS
WEIGHT: 96.13 LBS | BODY MASS INDEX: 16.41 KG/M2 | SYSTOLIC BLOOD PRESSURE: 101 MMHG | DIASTOLIC BLOOD PRESSURE: 73 MMHG | HEART RATE: 80 BPM | HEIGHT: 64 IN | TEMPERATURE: 97 F | RESPIRATION RATE: 16 BRPM

## 2021-01-01 VITALS
DIASTOLIC BLOOD PRESSURE: 77 MMHG | SYSTOLIC BLOOD PRESSURE: 121 MMHG | HEART RATE: 86 BPM | HEIGHT: 64 IN | BODY MASS INDEX: 16.04 KG/M2 | WEIGHT: 93.94 LBS

## 2021-01-01 VITALS
RESPIRATION RATE: 20 BRPM | HEART RATE: 70 BPM | DIASTOLIC BLOOD PRESSURE: 61 MMHG | SYSTOLIC BLOOD PRESSURE: 127 MMHG | OXYGEN SATURATION: 97 % | BODY MASS INDEX: 20.82 KG/M2 | WEIGHT: 121.94 LBS | HEIGHT: 64 IN | TEMPERATURE: 98 F

## 2021-01-01 VITALS
SYSTOLIC BLOOD PRESSURE: 107 MMHG | HEART RATE: 80 BPM | BODY MASS INDEX: 16.37 KG/M2 | RESPIRATION RATE: 15 BRPM | WEIGHT: 95.88 LBS | HEIGHT: 64 IN | TEMPERATURE: 98 F | DIASTOLIC BLOOD PRESSURE: 68 MMHG | OXYGEN SATURATION: 99 %

## 2021-01-01 VITALS
SYSTOLIC BLOOD PRESSURE: 126 MMHG | DIASTOLIC BLOOD PRESSURE: 75 MMHG | TEMPERATURE: 98 F | BODY MASS INDEX: 16.57 KG/M2 | DIASTOLIC BLOOD PRESSURE: 70 MMHG | WEIGHT: 96.56 LBS | SYSTOLIC BLOOD PRESSURE: 122 MMHG | HEIGHT: 64 IN | BODY MASS INDEX: 16.49 KG/M2 | WEIGHT: 96.56 LBS | HEART RATE: 91 BPM

## 2021-01-01 VITALS
HEIGHT: 64 IN | BODY MASS INDEX: 15.65 KG/M2 | SYSTOLIC BLOOD PRESSURE: 104 MMHG | WEIGHT: 91.69 LBS | RESPIRATION RATE: 19 BRPM | DIASTOLIC BLOOD PRESSURE: 59 MMHG | OXYGEN SATURATION: 100 % | HEART RATE: 70 BPM | TEMPERATURE: 98 F

## 2021-01-01 VITALS
BODY MASS INDEX: 16.29 KG/M2 | WEIGHT: 95.44 LBS | HEIGHT: 64 IN | DIASTOLIC BLOOD PRESSURE: 75 MMHG | TEMPERATURE: 98 F | SYSTOLIC BLOOD PRESSURE: 107 MMHG | HEART RATE: 94 BPM

## 2021-01-01 VITALS
SYSTOLIC BLOOD PRESSURE: 102 MMHG | WEIGHT: 90.38 LBS | BODY MASS INDEX: 15.43 KG/M2 | DIASTOLIC BLOOD PRESSURE: 72 MMHG | HEIGHT: 64 IN | HEART RATE: 68 BPM

## 2021-01-01 VITALS
RESPIRATION RATE: 16 BRPM | HEART RATE: 87 BPM | WEIGHT: 92.06 LBS | HEIGHT: 64 IN | SYSTOLIC BLOOD PRESSURE: 103 MMHG | TEMPERATURE: 97 F | DIASTOLIC BLOOD PRESSURE: 71 MMHG | OXYGEN SATURATION: 98 % | BODY MASS INDEX: 15.72 KG/M2

## 2021-01-01 VITALS
DIASTOLIC BLOOD PRESSURE: 81 MMHG | WEIGHT: 91.25 LBS | SYSTOLIC BLOOD PRESSURE: 136 MMHG | HEIGHT: 64 IN | HEART RATE: 86 BPM | TEMPERATURE: 98 F | BODY MASS INDEX: 15.58 KG/M2 | OXYGEN SATURATION: 100 %

## 2021-01-01 VITALS
BODY MASS INDEX: 16.54 KG/M2 | HEART RATE: 80 BPM | SYSTOLIC BLOOD PRESSURE: 105 MMHG | DIASTOLIC BLOOD PRESSURE: 73 MMHG | WEIGHT: 96.31 LBS

## 2021-01-01 DIAGNOSIS — R31.9 HEMATURIA, UNSPECIFIED TYPE: Primary | ICD-10-CM

## 2021-01-01 DIAGNOSIS — C18.1 CANCER OF APPENDIX METASTATIC TO INTRA-ABDOMINAL LYMPH NODE: ICD-10-CM

## 2021-01-01 DIAGNOSIS — C77.2 CANCER OF APPENDIX METASTATIC TO INTRA-ABDOMINAL LYMPH NODE: ICD-10-CM

## 2021-01-01 DIAGNOSIS — K21.9 GERD (GASTROESOPHAGEAL REFLUX DISEASE): ICD-10-CM

## 2021-01-01 DIAGNOSIS — G89.3 PAIN, NEOPLASM-RELATED: ICD-10-CM

## 2021-01-01 DIAGNOSIS — R93.5 ABNORMAL FINDINGS ON DIAGNOSTIC IMAGING OF OTHER ABDOMINAL REGIONS, INCLUDING RETROPERITONEUM: ICD-10-CM

## 2021-01-01 DIAGNOSIS — R11.0 NAUSEA: ICD-10-CM

## 2021-01-01 DIAGNOSIS — N32.89 BLADDER MASS: Primary | ICD-10-CM

## 2021-01-01 DIAGNOSIS — R19.00 PELVIC MASS: ICD-10-CM

## 2021-01-01 DIAGNOSIS — Z01.818 PRE-OP TESTING: Primary | ICD-10-CM

## 2021-01-01 DIAGNOSIS — R79.89 ELEVATED LFTS: ICD-10-CM

## 2021-01-01 DIAGNOSIS — R19.00 PELVIC MASS: Primary | ICD-10-CM

## 2021-01-01 DIAGNOSIS — G89.3 CHRONIC PAIN DUE TO NEOPLASM: ICD-10-CM

## 2021-01-01 DIAGNOSIS — Z01.818 PRE-OP TESTING: ICD-10-CM

## 2021-01-01 DIAGNOSIS — R06.02 SOB (SHORTNESS OF BREATH): ICD-10-CM

## 2021-01-01 DIAGNOSIS — K85.90 ACUTE PANCREATITIS, UNSPECIFIED COMPLICATION STATUS, UNSPECIFIED PANCREATITIS TYPE: ICD-10-CM

## 2021-01-01 DIAGNOSIS — K21.00 GASTROESOPHAGEAL REFLUX DISEASE WITH ESOPHAGITIS, UNSPECIFIED WHETHER HEMORRHAGE: Primary | ICD-10-CM

## 2021-01-01 DIAGNOSIS — R17 JAUNDICE: ICD-10-CM

## 2021-01-01 DIAGNOSIS — G89.3 CHRONIC PAIN DUE TO NEOPLASM: Primary | ICD-10-CM

## 2021-01-01 DIAGNOSIS — N39.41 URGE INCONTINENCE: ICD-10-CM

## 2021-01-01 DIAGNOSIS — Z23 NEED FOR VACCINATION: Primary | ICD-10-CM

## 2021-01-01 DIAGNOSIS — R07.9 CHEST PAIN: ICD-10-CM

## 2021-01-01 DIAGNOSIS — C79.9 METASTATIC SIGNET RING CELL CARCINOMA: Primary | ICD-10-CM

## 2021-01-01 DIAGNOSIS — R10.9 ABDOMINAL PAIN, UNSPECIFIED ABDOMINAL LOCATION: ICD-10-CM

## 2021-01-01 DIAGNOSIS — K94.00 COLOSTOMY COMPLICATION: ICD-10-CM

## 2021-01-01 DIAGNOSIS — K91.89 POST-ERCP ACUTE PANCREATITIS: ICD-10-CM

## 2021-01-01 DIAGNOSIS — R31.9 HEMATURIA, UNSPECIFIED TYPE: ICD-10-CM

## 2021-01-01 DIAGNOSIS — R10.13 EPIGASTRIC PAIN: ICD-10-CM

## 2021-01-01 DIAGNOSIS — K21.9 GASTROESOPHAGEAL REFLUX DISEASE WITHOUT ESOPHAGITIS: ICD-10-CM

## 2021-01-01 DIAGNOSIS — C79.9 METASTATIC SIGNET RING CELL CARCINOMA: ICD-10-CM

## 2021-01-01 DIAGNOSIS — R74.01 TRANSAMINITIS: ICD-10-CM

## 2021-01-01 DIAGNOSIS — R93.5 ABNORMAL FINDINGS ON DIAGNOSTIC IMAGING OF OTHER ABDOMINAL REGIONS, INCLUDING RETROPERITONEUM: Primary | ICD-10-CM

## 2021-01-01 DIAGNOSIS — C18.1 CANCER OF APPENDIX METASTATIC TO INTRA-ABDOMINAL LYMPH NODE: Primary | ICD-10-CM

## 2021-01-01 DIAGNOSIS — R94.5 ABNORMAL RESULTS OF LIVER FUNCTION STUDIES: ICD-10-CM

## 2021-01-01 DIAGNOSIS — R11.2 NON-INTRACTABLE VOMITING WITH NAUSEA, UNSPECIFIED VOMITING TYPE: ICD-10-CM

## 2021-01-01 DIAGNOSIS — R31.0 GROSS HEMATURIA: Primary | ICD-10-CM

## 2021-01-01 DIAGNOSIS — N32.89 BLADDER MASS: ICD-10-CM

## 2021-01-01 DIAGNOSIS — I95.9 HYPOTENSION, UNSPECIFIED HYPOTENSION TYPE: ICD-10-CM

## 2021-01-01 DIAGNOSIS — R91.8 LEFT LOWER LOBE PULMONARY INFILTRATE: Primary | ICD-10-CM

## 2021-01-01 DIAGNOSIS — R94.31 QT PROLONGATION: ICD-10-CM

## 2021-01-01 DIAGNOSIS — C79.9 METASTATIC ADENOCARCINOMA: ICD-10-CM

## 2021-01-01 DIAGNOSIS — R64 CANCER CACHEXIA: ICD-10-CM

## 2021-01-01 DIAGNOSIS — C77.2 CANCER OF APPENDIX METASTATIC TO INTRA-ABDOMINAL LYMPH NODE: Primary | ICD-10-CM

## 2021-01-01 DIAGNOSIS — R10.84 GENERALIZED ABDOMINAL PAIN: Primary | ICD-10-CM

## 2021-01-01 DIAGNOSIS — R10.9 ABDOMINAL PAIN, UNSPECIFIED ABDOMINAL LOCATION: Primary | ICD-10-CM

## 2021-01-01 DIAGNOSIS — K85.90 POST-ERCP ACUTE PANCREATITIS: ICD-10-CM

## 2021-01-01 DIAGNOSIS — K21.00 GASTROESOPHAGEAL REFLUX DISEASE WITH ESOPHAGITIS, UNSPECIFIED WHETHER HEMORRHAGE: ICD-10-CM

## 2021-01-01 DIAGNOSIS — Z51.5 ENCOUNTER FOR PALLIATIVE CARE: Primary | ICD-10-CM

## 2021-01-01 DIAGNOSIS — R11.10 CHRONIC VOMITING: Primary | ICD-10-CM

## 2021-01-01 DIAGNOSIS — N39.0 URINARY TRACT INFECTION WITHOUT HEMATURIA, SITE UNSPECIFIED: ICD-10-CM

## 2021-01-01 LAB
ALBUMIN SERPL BCP-MCNC: 2.3 G/DL (ref 3.5–5.2)
ALBUMIN SERPL BCP-MCNC: 2.5 G/DL (ref 3.5–5.2)
ALBUMIN SERPL BCP-MCNC: 2.7 G/DL (ref 3.5–5.2)
ALBUMIN SERPL BCP-MCNC: 3.5 G/DL (ref 3.5–5.2)
ALBUMIN SERPL BCP-MCNC: 3.9 G/DL (ref 3.5–5.2)
ALBUMIN SERPL BCP-MCNC: 3.9 G/DL (ref 3.5–5.2)
ALBUMIN SERPL BCP-MCNC: 4.1 G/DL (ref 3.5–5.2)
ALBUMIN SERPL BCP-MCNC: 4.9 G/DL (ref 3.5–5.2)
ALLENS TEST: ABNORMAL
ALP SERPL-CCNC: 124 U/L (ref 55–135)
ALP SERPL-CCNC: 133 U/L (ref 55–135)
ALP SERPL-CCNC: 138 U/L (ref 55–135)
ALP SERPL-CCNC: 153 U/L (ref 55–135)
ALP SERPL-CCNC: 194 U/L (ref 55–135)
ALP SERPL-CCNC: 210 U/L (ref 55–135)
ALP SERPL-CCNC: 282 U/L (ref 55–135)
ALP SERPL-CCNC: 84 U/L (ref 55–135)
ALT SERPL W/O P-5'-P-CCNC: 130 U/L (ref 10–44)
ALT SERPL W/O P-5'-P-CCNC: 14 U/L (ref 10–44)
ALT SERPL W/O P-5'-P-CCNC: 14 U/L (ref 10–44)
ALT SERPL W/O P-5'-P-CCNC: 18 U/L (ref 10–44)
ALT SERPL W/O P-5'-P-CCNC: 30 U/L (ref 10–44)
ALT SERPL W/O P-5'-P-CCNC: 34 U/L (ref 10–44)
ALT SERPL W/O P-5'-P-CCNC: 51 U/L (ref 10–44)
ALT SERPL W/O P-5'-P-CCNC: 73 U/L (ref 10–44)
AMPHET+METHAMPHET UR QL: NORMAL
AMYLASE SERPL-CCNC: 121 U/L (ref 20–110)
ANA SER QL IF: NORMAL
ANION GAP SERPL CALC-SCNC: 10 MMOL/L (ref 8–16)
ANION GAP SERPL CALC-SCNC: 12 MMOL/L (ref 8–16)
ANION GAP SERPL CALC-SCNC: 14 MMOL/L (ref 8–16)
ANION GAP SERPL CALC-SCNC: 16 MMOL/L (ref 8–16)
ANION GAP SERPL CALC-SCNC: 6 MMOL/L (ref 8–16)
ANION GAP SERPL CALC-SCNC: 7 MMOL/L (ref 8–16)
ANION GAP SERPL CALC-SCNC: 7 MMOL/L (ref 8–16)
ANION GAP SERPL CALC-SCNC: 8 MMOL/L (ref 8–16)
ANION GAP SERPL CALC-SCNC: 9 MMOL/L (ref 8–16)
APTT BLDCRRT: 27.4 SEC (ref 21–32)
AST SERPL-CCNC: 123 U/L (ref 10–40)
AST SERPL-CCNC: 13 U/L (ref 10–40)
AST SERPL-CCNC: 18 U/L (ref 10–40)
AST SERPL-CCNC: 20 U/L (ref 10–40)
AST SERPL-CCNC: 22 U/L (ref 10–40)
AST SERPL-CCNC: 26 U/L (ref 10–40)
AST SERPL-CCNC: 33 U/L (ref 10–40)
AST SERPL-CCNC: 47 U/L (ref 10–40)
BACTERIA #/AREA URNS HPF: ABNORMAL /HPF
BACTERIA BLD CULT: NORMAL
BACTERIA BLD CULT: NORMAL
BACTERIA UR CULT: ABNORMAL
BARBITURATES UR QL SCN>200 NG/ML: NEGATIVE
BASOPHILS # BLD AUTO: 0.01 K/UL (ref 0–0.2)
BASOPHILS # BLD AUTO: 0.01 K/UL (ref 0–0.2)
BASOPHILS # BLD AUTO: 0.02 K/UL (ref 0–0.2)
BASOPHILS # BLD AUTO: 0.03 K/UL (ref 0–0.2)
BASOPHILS NFR BLD: 0 % (ref 0–1.9)
BASOPHILS NFR BLD: 0.2 % (ref 0–1.9)
BASOPHILS NFR BLD: 0.3 % (ref 0–1.9)
BASOPHILS NFR BLD: 0.4 % (ref 0–1.9)
BASOPHILS NFR BLD: 0.4 % (ref 0–1.9)
BENZODIAZ UR QL SCN>200 NG/ML: NEGATIVE
BILIRUB DIRECT SERPL-MCNC: 1 MG/DL (ref 0.1–0.3)
BILIRUB SERPL-MCNC: 0.3 MG/DL (ref 0.1–1)
BILIRUB SERPL-MCNC: 0.3 MG/DL (ref 0.1–1)
BILIRUB SERPL-MCNC: 0.4 MG/DL (ref 0.1–1)
BILIRUB SERPL-MCNC: 0.4 MG/DL (ref 0.1–1)
BILIRUB SERPL-MCNC: 0.5 MG/DL (ref 0.1–1)
BILIRUB SERPL-MCNC: 0.8 MG/DL (ref 0.1–1)
BILIRUB SERPL-MCNC: 0.9 MG/DL (ref 0.1–1)
BILIRUB SERPL-MCNC: 2.5 MG/DL (ref 0.1–1)
BILIRUB SERPL-MCNC: NORMAL MG/DL
BILIRUB UR QL STRIP: NEGATIVE
BILIRUB UR QL STRIP: NEGATIVE
BLOOD URINE, POC: NORMAL
BNP SERPL-MCNC: 106 PG/ML (ref 0–99)
BNP SERPL-MCNC: <10 PG/ML (ref 0–99)
BUN SERPL-MCNC: 13 MG/DL (ref 6–20)
BUN SERPL-MCNC: 16 MG/DL (ref 6–20)
BUN SERPL-MCNC: 17 MG/DL (ref 6–20)
BUN SERPL-MCNC: 21 MG/DL (ref 6–20)
BUN SERPL-MCNC: 23 MG/DL (ref 6–20)
BUN SERPL-MCNC: 24 MG/DL (ref 6–20)
BUN SERPL-MCNC: 4 MG/DL (ref 6–20)
BUN SERPL-MCNC: 5 MG/DL (ref 6–20)
BUN SERPL-MCNC: 5 MG/DL (ref 6–20)
BZE UR QL SCN: NEGATIVE
CALCIUM SERPL-MCNC: 10 MG/DL (ref 8.7–10.5)
CALCIUM SERPL-MCNC: 7.6 MG/DL (ref 8.7–10.5)
CALCIUM SERPL-MCNC: 7.8 MG/DL (ref 8.7–10.5)
CALCIUM SERPL-MCNC: 7.9 MG/DL (ref 8.7–10.5)
CALCIUM SERPL-MCNC: 7.9 MG/DL (ref 8.7–10.5)
CALCIUM SERPL-MCNC: 9 MG/DL (ref 8.7–10.5)
CALCIUM SERPL-MCNC: 9.1 MG/DL (ref 8.7–10.5)
CALCIUM SERPL-MCNC: 9.2 MG/DL (ref 8.7–10.5)
CALCIUM SERPL-MCNC: 9.5 MG/DL (ref 8.7–10.5)
CANNABINOIDS UR QL SCN: NEGATIVE
CEA SERPL-MCNC: 3.6 NG/ML (ref 0–5)
CHLORIDE SERPL-SCNC: 102 MMOL/L (ref 95–110)
CHLORIDE SERPL-SCNC: 106 MMOL/L (ref 95–110)
CHLORIDE SERPL-SCNC: 109 MMOL/L (ref 95–110)
CHLORIDE SERPL-SCNC: 111 MMOL/L (ref 95–110)
CHLORIDE SERPL-SCNC: 113 MMOL/L (ref 95–110)
CHLORIDE SERPL-SCNC: 114 MMOL/L (ref 95–110)
CHLORIDE SERPL-SCNC: 95 MMOL/L (ref 95–110)
CHLORIDE SERPL-SCNC: 99 MMOL/L (ref 95–110)
CHLORIDE SERPL-SCNC: 99 MMOL/L (ref 95–110)
CLARITY UR: ABNORMAL
CLARITY UR: CLEAR
CLARITY, POC UA: CLEAR
CO2 SERPL-SCNC: 18 MMOL/L (ref 23–29)
CO2 SERPL-SCNC: 20 MMOL/L (ref 23–29)
CO2 SERPL-SCNC: 22 MMOL/L (ref 23–29)
CO2 SERPL-SCNC: 25 MMOL/L (ref 23–29)
CO2 SERPL-SCNC: 26 MMOL/L (ref 23–29)
CO2 SERPL-SCNC: 28 MMOL/L (ref 23–29)
CO2 SERPL-SCNC: 29 MMOL/L (ref 23–29)
COLOR UR: YELLOW
COLOR UR: YELLOW
COLOR, POC UA: YELLOW
CREAT SERPL-MCNC: 0.7 MG/DL (ref 0.5–1.4)
CREAT SERPL-MCNC: 0.7 MG/DL (ref 0.5–1.4)
CREAT SERPL-MCNC: 0.8 MG/DL (ref 0.5–1.4)
CREAT SERPL-MCNC: 0.9 MG/DL (ref 0.5–1.4)
CREAT SERPL-MCNC: 1.2 MG/DL (ref 0.5–1.4)
CREAT UR-MCNC: 272.3 MG/DL (ref 15–325)
CTP QC/QA: YES
CTP QC/QA: YES
DELSYS: ABNORMAL
DIFFERENTIAL METHOD: ABNORMAL
DIFFERENTIAL METHOD: NORMAL
EOSINOPHIL # BLD AUTO: 0.1 K/UL (ref 0–0.5)
EOSINOPHIL # BLD AUTO: 0.1 K/UL (ref 0–0.5)
EOSINOPHIL # BLD AUTO: 0.2 K/UL (ref 0–0.5)
EOSINOPHIL # BLD AUTO: 0.4 K/UL (ref 0–0.5)
EOSINOPHIL NFR BLD: 1.7 % (ref 0–8)
EOSINOPHIL NFR BLD: 1.8 % (ref 0–8)
EOSINOPHIL NFR BLD: 2 % (ref 0–8)
EOSINOPHIL NFR BLD: 2.3 % (ref 0–8)
EOSINOPHIL NFR BLD: 3.3 % (ref 0–8)
EOSINOPHIL NFR BLD: 3.9 % (ref 0–8)
EOSINOPHIL NFR BLD: 5.5 % (ref 0–8)
ERYTHROCYTE [DISTWIDTH] IN BLOOD BY AUTOMATED COUNT: 11.9 % (ref 11.5–14.5)
ERYTHROCYTE [DISTWIDTH] IN BLOOD BY AUTOMATED COUNT: 12 % (ref 11.5–14.5)
ERYTHROCYTE [DISTWIDTH] IN BLOOD BY AUTOMATED COUNT: 13.3 % (ref 11.5–14.5)
ERYTHROCYTE [DISTWIDTH] IN BLOOD BY AUTOMATED COUNT: 13.3 % (ref 11.5–14.5)
ERYTHROCYTE [DISTWIDTH] IN BLOOD BY AUTOMATED COUNT: 13.6 % (ref 11.5–14.5)
ERYTHROCYTE [DISTWIDTH] IN BLOOD BY AUTOMATED COUNT: 13.7 % (ref 11.5–14.5)
ERYTHROCYTE [DISTWIDTH] IN BLOOD BY AUTOMATED COUNT: 14.6 % (ref 11.5–14.5)
EST. GFR  (AFRICAN AMERICAN): 59 ML/MIN/1.73 M^2
EST. GFR  (AFRICAN AMERICAN): >60 ML/MIN/1.73 M^2
EST. GFR  (NON AFRICAN AMERICAN): 51 ML/MIN/1.73 M^2
EST. GFR  (NON AFRICAN AMERICAN): >60 ML/MIN/1.73 M^2
FINAL PATHOLOGIC DIAGNOSIS: NORMAL
FIO2: 21
GLUCOSE SERPL-MCNC: 112 MG/DL (ref 70–110)
GLUCOSE SERPL-MCNC: 115 MG/DL (ref 70–110)
GLUCOSE SERPL-MCNC: 121 MG/DL (ref 70–110)
GLUCOSE SERPL-MCNC: 122 MG/DL (ref 70–110)
GLUCOSE SERPL-MCNC: 74 MG/DL (ref 70–110)
GLUCOSE SERPL-MCNC: 91 MG/DL (ref 70–110)
GLUCOSE SERPL-MCNC: 91 MG/DL (ref 70–110)
GLUCOSE SERPL-MCNC: 95 MG/DL (ref 70–110)
GLUCOSE SERPL-MCNC: 97 MG/DL (ref 70–110)
GLUCOSE UR QL STRIP: NEGATIVE
GLUCOSE UR QL STRIP: NEGATIVE
GLUCOSE UR QL STRIP: NORMAL
GROSS: NORMAL
HAV IGM SERPL QL IA: NEGATIVE
HBV CORE IGM SERPL QL IA: NEGATIVE
HBV SURFACE AG SERPL QL IA: NEGATIVE
HCO3 UR-SCNC: 20.5 MMOL/L (ref 24–28)
HCT VFR BLD AUTO: 27.7 % (ref 37–48.5)
HCT VFR BLD AUTO: 28.8 % (ref 37–48.5)
HCT VFR BLD AUTO: 29.1 % (ref 37–48.5)
HCT VFR BLD AUTO: 32.1 % (ref 37–48.5)
HCT VFR BLD AUTO: 34.9 % (ref 37–48.5)
HCT VFR BLD AUTO: 39.1 % (ref 37–48.5)
HCT VFR BLD AUTO: 40.1 % (ref 37–48.5)
HCV AB SERPL QL IA: NEGATIVE
HGB BLD-MCNC: 11 G/DL (ref 12–16)
HGB BLD-MCNC: 12.6 G/DL (ref 12–16)
HGB BLD-MCNC: 13 G/DL (ref 12–16)
HGB BLD-MCNC: 8.8 G/DL (ref 12–16)
HGB BLD-MCNC: 9 G/DL (ref 12–16)
HGB BLD-MCNC: 9.3 G/DL (ref 12–16)
HGB BLD-MCNC: 9.8 G/DL (ref 12–16)
HGB UR QL STRIP: ABNORMAL
HGB UR QL STRIP: ABNORMAL
HYALINE CASTS #/AREA URNS LPF: 0 /LPF
IMM GRANULOCYTES # BLD AUTO: 0.01 K/UL (ref 0–0.04)
IMM GRANULOCYTES # BLD AUTO: 0.02 K/UL (ref 0–0.04)
IMM GRANULOCYTES # BLD AUTO: 0.02 K/UL (ref 0–0.04)
IMM GRANULOCYTES # BLD AUTO: 0.03 K/UL (ref 0–0.04)
IMM GRANULOCYTES # BLD AUTO: 0.05 K/UL (ref 0–0.04)
IMM GRANULOCYTES # BLD AUTO: 0.08 K/UL (ref 0–0.04)
IMM GRANULOCYTES # BLD AUTO: ABNORMAL K/UL (ref 0–0.04)
IMM GRANULOCYTES NFR BLD AUTO: 0.2 % (ref 0–0.5)
IMM GRANULOCYTES NFR BLD AUTO: 0.2 % (ref 0–0.5)
IMM GRANULOCYTES NFR BLD AUTO: 0.3 % (ref 0–0.5)
IMM GRANULOCYTES NFR BLD AUTO: 0.6 % (ref 0–0.5)
IMM GRANULOCYTES NFR BLD AUTO: 0.8 % (ref 0–0.5)
IMM GRANULOCYTES NFR BLD AUTO: 0.9 % (ref 0–0.5)
IMM GRANULOCYTES NFR BLD AUTO: ABNORMAL % (ref 0–0.5)
INR PPP: 1 (ref 0.8–1.2)
KETONES UR QL STRIP: ABNORMAL
KETONES UR QL STRIP: NEGATIVE
KETONES UR QL STRIP: NORMAL
L PNEUMO AG UR QL IA: NEGATIVE
LACTATE SERPL-SCNC: 1.3 MMOL/L (ref 0.5–2.2)
LEUKOCYTE ESTERASE UR QL STRIP: NEGATIVE
LEUKOCYTE ESTERASE UR QL STRIP: NEGATIVE
LEUKOCYTE ESTERASE URINE, POC: 7
LIPASE SERPL-CCNC: 182 U/L (ref 4–60)
LIPASE SERPL-CCNC: 251 U/L (ref 4–60)
LIPASE SERPL-CCNC: 254 U/L (ref 4–60)
LIPASE SERPL-CCNC: 255 U/L (ref 4–60)
LIPASE SERPL-CCNC: 69 U/L (ref 4–60)
LIPASE SERPL-CCNC: >1000 U/L (ref 4–60)
LKM AB SER-ACNC: 0.9 UNITS
LYMPHOCYTES # BLD AUTO: 0.5 K/UL (ref 1–4.8)
LYMPHOCYTES # BLD AUTO: 0.9 K/UL (ref 1–4.8)
LYMPHOCYTES # BLD AUTO: 0.9 K/UL (ref 1–4.8)
LYMPHOCYTES # BLD AUTO: 1.2 K/UL (ref 1–4.8)
LYMPHOCYTES # BLD AUTO: 1.3 K/UL (ref 1–4.8)
LYMPHOCYTES # BLD AUTO: 1.6 K/UL (ref 1–4.8)
LYMPHOCYTES NFR BLD: 13.5 % (ref 18–48)
LYMPHOCYTES NFR BLD: 14.3 % (ref 18–48)
LYMPHOCYTES NFR BLD: 19 % (ref 18–48)
LYMPHOCYTES NFR BLD: 19.1 % (ref 18–48)
LYMPHOCYTES NFR BLD: 26 % (ref 18–48)
LYMPHOCYTES NFR BLD: 5.7 % (ref 18–48)
LYMPHOCYTES NFR BLD: 6 % (ref 18–48)
Lab: NORMAL
MCH RBC QN AUTO: 26.2 PG (ref 27–31)
MCH RBC QN AUTO: 27.2 PG (ref 27–31)
MCH RBC QN AUTO: 27.2 PG (ref 27–31)
MCH RBC QN AUTO: 27.3 PG (ref 27–31)
MCH RBC QN AUTO: 27.4 PG (ref 27–31)
MCH RBC QN AUTO: 28 PG (ref 27–31)
MCH RBC QN AUTO: 28.3 PG (ref 27–31)
MCHC RBC AUTO-ENTMCNC: 30.5 G/DL (ref 32–36)
MCHC RBC AUTO-ENTMCNC: 31.3 G/DL (ref 32–36)
MCHC RBC AUTO-ENTMCNC: 31.5 G/DL (ref 32–36)
MCHC RBC AUTO-ENTMCNC: 31.8 G/DL (ref 32–36)
MCHC RBC AUTO-ENTMCNC: 32 G/DL (ref 32–36)
MCHC RBC AUTO-ENTMCNC: 32.2 G/DL (ref 32–36)
MCHC RBC AUTO-ENTMCNC: 32.4 G/DL (ref 32–36)
MCV RBC AUTO: 85 FL (ref 82–98)
MCV RBC AUTO: 86 FL (ref 82–98)
MCV RBC AUTO: 86 FL (ref 82–98)
MCV RBC AUTO: 87 FL (ref 82–98)
METHADONE UR QL SCN>300 NG/ML: NEGATIVE
MICROSCOPIC COMMENT: ABNORMAL
MICROSCOPIC EXAM: NORMAL
MITOCHONDRIA AB TITR SER IF: NORMAL {TITER}
MODE: ABNORMAL
MONOCYTES # BLD AUTO: 0.3 K/UL (ref 0.3–1)
MONOCYTES # BLD AUTO: 0.4 K/UL (ref 0.3–1)
MONOCYTES # BLD AUTO: 0.5 K/UL (ref 0.3–1)
MONOCYTES # BLD AUTO: 0.5 K/UL (ref 0.3–1)
MONOCYTES NFR BLD: 3 % (ref 4–15)
MONOCYTES NFR BLD: 4.7 % (ref 4–15)
MONOCYTES NFR BLD: 4.8 % (ref 4–15)
MONOCYTES NFR BLD: 5.4 % (ref 4–15)
MONOCYTES NFR BLD: 6.3 % (ref 4–15)
MONOCYTES NFR BLD: 6.6 % (ref 4–15)
MONOCYTES NFR BLD: 7.8 % (ref 4–15)
NEUTROPHILS # BLD AUTO: 3.2 K/UL (ref 1.8–7.7)
NEUTROPHILS # BLD AUTO: 4.3 K/UL (ref 1.8–7.7)
NEUTROPHILS # BLD AUTO: 4.8 K/UL (ref 1.8–7.7)
NEUTROPHILS # BLD AUTO: 5.2 K/UL (ref 1.8–7.7)
NEUTROPHILS # BLD AUTO: 6 K/UL (ref 1.8–7.7)
NEUTROPHILS # BLD AUTO: 7.9 K/UL (ref 1.8–7.7)
NEUTROPHILS NFR BLD: 62.5 % (ref 38–73)
NEUTROPHILS NFR BLD: 67.2 % (ref 38–73)
NEUTROPHILS NFR BLD: 72.3 % (ref 38–73)
NEUTROPHILS NFR BLD: 76.8 % (ref 38–73)
NEUTROPHILS NFR BLD: 77 % (ref 38–73)
NEUTROPHILS NFR BLD: 78.7 % (ref 38–73)
NEUTROPHILS NFR BLD: 86.1 % (ref 38–73)
NEUTS BAND NFR BLD MANUAL: 12 %
NITRITE UR QL STRIP: NEGATIVE
NITRITE UR QL STRIP: POSITIVE
NITRITE, POC UA: NORMAL
NRBC BLD-RTO: 0 /100 WBC
OPIATES UR QL SCN: NORMAL
PCO2 BLDA: 40.6 MMHG (ref 35–45)
PCP UR QL SCN>25 NG/ML: NEGATIVE
PH SMN: 7.31 [PH] (ref 7.35–7.45)
PH UR STRIP: 7 [PH] (ref 5–8)
PH UR STRIP: 7 [PH] (ref 5–8)
PH, POC UA: 1.01
PLATELET # BLD AUTO: 166 K/UL (ref 150–450)
PLATELET # BLD AUTO: 167 K/UL (ref 150–450)
PLATELET # BLD AUTO: 172 K/UL (ref 150–450)
PLATELET # BLD AUTO: 188 K/UL (ref 150–450)
PLATELET # BLD AUTO: 262 K/UL (ref 150–450)
PLATELET # BLD AUTO: 296 K/UL (ref 150–450)
PLATELET # BLD AUTO: 327 K/UL (ref 150–450)
PLATELET BLD QL SMEAR: ABNORMAL
PMV BLD AUTO: 10 FL (ref 9.2–12.9)
PMV BLD AUTO: 10.2 FL (ref 9.2–12.9)
PMV BLD AUTO: 10.3 FL (ref 9.2–12.9)
PMV BLD AUTO: 10.7 FL (ref 9.2–12.9)
PMV BLD AUTO: 9.7 FL (ref 9.2–12.9)
PMV BLD AUTO: 9.8 FL (ref 9.2–12.9)
PMV BLD AUTO: 9.9 FL (ref 9.2–12.9)
PO2 BLDA: 31 MMHG (ref 40–60)
POC BE: -6 MMOL/L
POC SATURATED O2: 54 % (ref 95–100)
POTASSIUM SERPL-SCNC: 3.4 MMOL/L (ref 3.5–5.1)
POTASSIUM SERPL-SCNC: 3.5 MMOL/L (ref 3.5–5.1)
POTASSIUM SERPL-SCNC: 3.7 MMOL/L (ref 3.5–5.1)
POTASSIUM SERPL-SCNC: 3.9 MMOL/L (ref 3.5–5.1)
POTASSIUM SERPL-SCNC: 4.3 MMOL/L (ref 3.5–5.1)
POTASSIUM SERPL-SCNC: 4.3 MMOL/L (ref 3.5–5.1)
PROCALCITONIN SERPL IA-MCNC: 6.37 NG/ML
PROT SERPL-MCNC: 4.8 G/DL (ref 6–8.4)
PROT SERPL-MCNC: 5.1 G/DL (ref 6–8.4)
PROT SERPL-MCNC: 5.3 G/DL (ref 6–8.4)
PROT SERPL-MCNC: 6.8 G/DL (ref 6–8.4)
PROT SERPL-MCNC: 7 G/DL (ref 6–8.4)
PROT SERPL-MCNC: 7.1 G/DL (ref 6–8.4)
PROT SERPL-MCNC: 7.3 G/DL (ref 6–8.4)
PROT SERPL-MCNC: 7.9 G/DL (ref 6–8.4)
PROT UR QL STRIP: ABNORMAL
PROT UR QL STRIP: NEGATIVE
PROTEIN, POC: NORMAL
PROTHROMBIN TIME: 11.3 SEC (ref 9–12.5)
RBC # BLD AUTO: 3.23 M/UL (ref 4–5.4)
RBC # BLD AUTO: 3.31 M/UL (ref 4–5.4)
RBC # BLD AUTO: 3.41 M/UL (ref 4–5.4)
RBC # BLD AUTO: 3.74 M/UL (ref 4–5.4)
RBC # BLD AUTO: 4.02 M/UL (ref 4–5.4)
RBC # BLD AUTO: 4.5 M/UL (ref 4–5.4)
RBC # BLD AUTO: 4.6 M/UL (ref 4–5.4)
RBC #/AREA URNS HPF: 7 /HPF (ref 0–4)
SAMPLE: ABNORMAL
SARS-COV-2 RDRP RESP QL NAA+PROBE: NEGATIVE
SARS-COV-2 RDRP RESP QL NAA+PROBE: NEGATIVE
SARS-COV-2 RNA RESP QL NAA+PROBE: NOT DETECTED
SITE: ABNORMAL
SMOOTH MUSCLE AB TITR SER IF: NORMAL {TITER}
SODIUM SERPL-SCNC: 133 MMOL/L (ref 136–145)
SODIUM SERPL-SCNC: 136 MMOL/L (ref 136–145)
SODIUM SERPL-SCNC: 138 MMOL/L (ref 136–145)
SODIUM SERPL-SCNC: 140 MMOL/L (ref 136–145)
SODIUM SERPL-SCNC: 142 MMOL/L (ref 136–145)
SP GR UR STRIP: 1.02 (ref 1–1.03)
SP GR UR STRIP: 1.02 (ref 1–1.03)
SPECIFIC GRAVITY, POC UA: NORMAL
TOXICOLOGY INFORMATION: NORMAL
TRIGL SERPL-MCNC: 89 MG/DL (ref 30–150)
TROPONIN I SERPL DL<=0.01 NG/ML-MCNC: <0.006 NG/ML (ref 0–0.03)
URN SPEC COLLECT METH UR: ABNORMAL
URN SPEC COLLECT METH UR: ABNORMAL
UROBILINOGEN UR STRIP-ACNC: NEGATIVE EU/DL
UROBILINOGEN UR STRIP-ACNC: NEGATIVE EU/DL
UROBILINOGEN, POC UA: NORMAL
VANCOMYCIN SERPL-MCNC: 10.1 UG/ML
VANCOMYCIN SERPL-MCNC: 10.3 UG/ML
WBC # BLD AUTO: 5.12 K/UL (ref 3.9–12.7)
WBC # BLD AUTO: 6.28 K/UL (ref 3.9–12.7)
WBC # BLD AUTO: 6.37 K/UL (ref 3.9–12.7)
WBC # BLD AUTO: 6.56 K/UL (ref 3.9–12.7)
WBC # BLD AUTO: 8.27 K/UL (ref 3.9–12.7)
WBC # BLD AUTO: 9.16 K/UL (ref 3.9–12.7)
WBC # BLD AUTO: 9.88 K/UL (ref 3.9–12.7)
WBC #/AREA URNS HPF: 35 /HPF (ref 0–5)

## 2021-01-01 PROCEDURE — 99999 PR PBB SHADOW E&M-EST. PATIENT-LVL III: CPT | Mod: PBBFAC,,, | Performed by: INTERNAL MEDICINE

## 2021-01-01 PROCEDURE — 52234 CYSTOSCOPY AND TREATMENT: CPT | Mod: ,,, | Performed by: UROLOGY

## 2021-01-01 PROCEDURE — 25000003 PHARM REV CODE 250: Performed by: HOSPITALIST

## 2021-01-01 PROCEDURE — 99999 PR PBB SHADOW E&M-EST. PATIENT-LVL III: ICD-10-PCS | Mod: PBBFAC,,, | Performed by: COLON & RECTAL SURGERY

## 2021-01-01 PROCEDURE — 80202 ASSAY OF VANCOMYCIN: CPT | Mod: 91 | Performed by: HOSPITALIST

## 2021-01-01 PROCEDURE — A9585 GADOBUTROL INJECTION: HCPCS | Mod: PO | Performed by: INTERNAL MEDICINE

## 2021-01-01 PROCEDURE — 88342 CHG IMMUNOCYTOCHEMISTRY: ICD-10-PCS | Mod: 26,,, | Performed by: PATHOLOGY

## 2021-01-01 PROCEDURE — 99999 PR PBB SHADOW E&M-EST. PATIENT-LVL IV: ICD-10-PCS | Mod: PBBFAC,,, | Performed by: OBSTETRICS & GYNECOLOGY

## 2021-01-01 PROCEDURE — 93010 EKG 12-LEAD: ICD-10-PCS | Mod: ,,, | Performed by: INTERNAL MEDICINE

## 2021-01-01 PROCEDURE — 88341 PR IHC OR ICC EACH ADD'L SINGLE ANTIBODY  STAINPR: ICD-10-PCS | Mod: 26,,, | Performed by: PATHOLOGY

## 2021-01-01 PROCEDURE — 99999 PR PBB SHADOW E&M-EST. PATIENT-LVL IV: ICD-10-PCS | Mod: PBBFAC,,, | Performed by: INTERNAL MEDICINE

## 2021-01-01 PROCEDURE — 99497 ADVNCD CARE PLAN 30 MIN: CPT | Mod: 25,,, | Performed by: FAMILY MEDICINE

## 2021-01-01 PROCEDURE — 93010 ELECTROCARDIOGRAM REPORT: CPT | Mod: ,,, | Performed by: INTERNAL MEDICINE

## 2021-01-01 PROCEDURE — 83690 ASSAY OF LIPASE: CPT | Performed by: EMERGENCY MEDICINE

## 2021-01-01 PROCEDURE — 72197 MRI PELVIS W/O & W/DYE: CPT | Mod: TC,PO

## 2021-01-01 PROCEDURE — 63600175 PHARM REV CODE 636 W HCPCS: Performed by: NURSE PRACTITIONER

## 2021-01-01 PROCEDURE — 83880 ASSAY OF NATRIURETIC PEPTIDE: CPT | Performed by: EMERGENCY MEDICINE

## 2021-01-01 PROCEDURE — 71260 CT THORAX DX C+: CPT | Mod: 26,,, | Performed by: RADIOLOGY

## 2021-01-01 PROCEDURE — 63600175 PHARM REV CODE 636 W HCPCS: Performed by: HOSPITALIST

## 2021-01-01 PROCEDURE — 37000008 HC ANESTHESIA 1ST 15 MINUTES: Performed by: INTERNAL MEDICINE

## 2021-01-01 PROCEDURE — 85025 COMPLETE CBC W/AUTO DIFF WBC: CPT | Performed by: NURSE PRACTITIONER

## 2021-01-01 PROCEDURE — 85027 COMPLETE CBC AUTOMATED: CPT | Performed by: EMERGENCY MEDICINE

## 2021-01-01 PROCEDURE — 99215 OFFICE O/P EST HI 40 MIN: CPT | Mod: 95,,, | Performed by: NURSE PRACTITIONER

## 2021-01-01 PROCEDURE — 63600175 PHARM REV CODE 636 W HCPCS: Performed by: EMERGENCY MEDICINE

## 2021-01-01 PROCEDURE — 99214 OFFICE O/P EST MOD 30 MIN: CPT | Mod: S$PBB,,, | Performed by: COLON & RECTAL SURGERY

## 2021-01-01 PROCEDURE — D9220A PRA ANESTHESIA: Mod: CRNA,,, | Performed by: NURSE ANESTHETIST, CERTIFIED REGISTERED

## 2021-01-01 PROCEDURE — 71000033 HC RECOVERY, INTIAL HOUR: Performed by: UROLOGY

## 2021-01-01 PROCEDURE — 25000003 PHARM REV CODE 250: Performed by: NURSE ANESTHETIST, CERTIFIED REGISTERED

## 2021-01-01 PROCEDURE — 86256 FLUORESCENT ANTIBODY TITER: CPT | Mod: 91 | Performed by: INTERNAL MEDICINE

## 2021-01-01 PROCEDURE — 99204 OFFICE O/P NEW MOD 45 MIN: CPT | Mod: S$PBB,,, | Performed by: INTERNAL MEDICINE

## 2021-01-01 PROCEDURE — 83690 ASSAY OF LIPASE: CPT | Performed by: FAMILY MEDICINE

## 2021-01-01 PROCEDURE — 52000 PR CYSTOURETHROSCOPY: ICD-10-PCS | Mod: S$PBB,,, | Performed by: UROLOGY

## 2021-01-01 PROCEDURE — U0002 COVID-19 LAB TEST NON-CDC: HCPCS | Performed by: FAMILY MEDICINE

## 2021-01-01 PROCEDURE — 63600175 PHARM REV CODE 636 W HCPCS: Performed by: ANESTHESIOLOGY

## 2021-01-01 PROCEDURE — 99284 EMERGENCY DEPT VISIT MOD MDM: CPT | Mod: 25

## 2021-01-01 PROCEDURE — 76700 US EXAM ABDOM COMPLETE: CPT | Mod: 26,,, | Performed by: RADIOLOGY

## 2021-01-01 PROCEDURE — 25000003 PHARM REV CODE 250: Performed by: NURSE PRACTITIONER

## 2021-01-01 PROCEDURE — 99214 OFFICE O/P EST MOD 30 MIN: CPT | Mod: S$PBB,,, | Performed by: INTERNAL MEDICINE

## 2021-01-01 PROCEDURE — D9220A PRA ANESTHESIA: ICD-10-PCS | Mod: ANES,,, | Performed by: ANESTHESIOLOGY

## 2021-01-01 PROCEDURE — 63600175 PHARM REV CODE 636 W HCPCS: Performed by: NURSE ANESTHETIST, CERTIFIED REGISTERED

## 2021-01-01 PROCEDURE — 88342 IMHCHEM/IMCYTCHM 1ST ANTB: CPT | Mod: 26,,, | Performed by: PATHOLOGY

## 2021-01-01 PROCEDURE — 25500020 PHARM REV CODE 255: Performed by: EMERGENCY MEDICINE

## 2021-01-01 PROCEDURE — 99233 SBSQ HOSP IP/OBS HIGH 50: CPT | Mod: ,,, | Performed by: INTERNAL MEDICINE

## 2021-01-01 PROCEDURE — 11000001 HC ACUTE MED/SURG PRIVATE ROOM

## 2021-01-01 PROCEDURE — 85610 PROTHROMBIN TIME: CPT | Performed by: EMERGENCY MEDICINE

## 2021-01-01 PROCEDURE — 99999 PR PBB SHADOW E&M-EST. PATIENT-LVL IV: ICD-10-PCS | Mod: PBBFAC,,, | Performed by: UROLOGY

## 2021-01-01 PROCEDURE — 99999 PR PBB SHADOW E&M-EST. PATIENT-LVL III: ICD-10-PCS | Mod: PBBFAC,,, | Performed by: INTERNAL MEDICINE

## 2021-01-01 PROCEDURE — 99215 OFFICE O/P EST HI 40 MIN: CPT | Mod: PBBFAC,25 | Performed by: FAMILY MEDICINE

## 2021-01-01 PROCEDURE — 99999 PR PBB SHADOW E&M-EST. PATIENT-LVL IV: CPT | Mod: PBBFAC,,, | Performed by: OBSTETRICS & GYNECOLOGY

## 2021-01-01 PROCEDURE — 88341 IMHCHEM/IMCYTCHM EA ADD ANTB: CPT | Performed by: PATHOLOGY

## 2021-01-01 PROCEDURE — 80074 ACUTE HEPATITIS PANEL: CPT | Performed by: INTERNAL MEDICINE

## 2021-01-01 PROCEDURE — 99291 CRITICAL CARE FIRST HOUR: CPT | Mod: 25

## 2021-01-01 PROCEDURE — 63600175 PHARM REV CODE 636 W HCPCS: Performed by: FAMILY MEDICINE

## 2021-01-01 PROCEDURE — 99214 PR OFFICE/OUTPT VISIT, EST, LEVL IV, 30-39 MIN: ICD-10-PCS | Mod: 95,,, | Performed by: NURSE PRACTITIONER

## 2021-01-01 PROCEDURE — 82378 CARCINOEMBRYONIC ANTIGEN: CPT | Performed by: INTERNAL MEDICINE

## 2021-01-01 PROCEDURE — 81000 URINALYSIS NONAUTO W/SCOPE: CPT | Mod: 59 | Performed by: FAMILY MEDICINE

## 2021-01-01 PROCEDURE — 99900035 HC TECH TIME PER 15 MIN (STAT)

## 2021-01-01 PROCEDURE — 96361 HYDRATE IV INFUSION ADD-ON: CPT

## 2021-01-01 PROCEDURE — 93005 ELECTROCARDIOGRAM TRACING: CPT

## 2021-01-01 PROCEDURE — 99213 OFFICE O/P EST LOW 20 MIN: CPT | Mod: PBBFAC | Performed by: INTERNAL MEDICINE

## 2021-01-01 PROCEDURE — 80307 DRUG TEST PRSMV CHEM ANLYZR: CPT | Performed by: FAMILY MEDICINE

## 2021-01-01 PROCEDURE — 99215 PR OFFICE/OUTPT VISIT, EST, LEVL V, 40-54 MIN: ICD-10-PCS | Mod: S$PBB,,, | Performed by: FAMILY MEDICINE

## 2021-01-01 PROCEDURE — U0002 COVID-19 LAB TEST NON-CDC: HCPCS | Performed by: EMERGENCY MEDICINE

## 2021-01-01 PROCEDURE — 80053 COMPREHEN METABOLIC PANEL: CPT | Performed by: FAMILY MEDICINE

## 2021-01-01 PROCEDURE — 80053 COMPREHEN METABOLIC PANEL: CPT | Performed by: INTERNAL MEDICINE

## 2021-01-01 PROCEDURE — S0030 INJECTION, METRONIDAZOLE: HCPCS | Performed by: NURSE PRACTITIONER

## 2021-01-01 PROCEDURE — U0005 INFEC AGEN DETEC AMPLI PROBE: HCPCS | Performed by: INTERNAL MEDICINE

## 2021-01-01 PROCEDURE — 99214 OFFICE O/P EST MOD 30 MIN: CPT | Mod: PBBFAC | Performed by: OBSTETRICS & GYNECOLOGY

## 2021-01-01 PROCEDURE — 99204 PR OFFICE/OUTPT VISIT, NEW, LEVL IV, 45-59 MIN: ICD-10-PCS | Mod: S$PBB,,, | Performed by: INTERNAL MEDICINE

## 2021-01-01 PROCEDURE — 99233 PR SUBSEQUENT HOSPITAL CARE,LEVL III: ICD-10-PCS | Mod: ,,, | Performed by: INTERNAL MEDICINE

## 2021-01-01 PROCEDURE — 37000009 HC ANESTHESIA EA ADD 15 MINS: Performed by: UROLOGY

## 2021-01-01 PROCEDURE — 99214 OFFICE O/P EST MOD 30 MIN: CPT | Mod: PBBFAC | Performed by: INTERNAL MEDICINE

## 2021-01-01 PROCEDURE — 80053 COMPREHEN METABOLIC PANEL: CPT | Performed by: EMERGENCY MEDICINE

## 2021-01-01 PROCEDURE — D9220A PRA ANESTHESIA: Mod: ANES,,, | Performed by: ANESTHESIOLOGY

## 2021-01-01 PROCEDURE — 71260 CT THORAX DX C+: CPT | Mod: TC

## 2021-01-01 PROCEDURE — 84484 ASSAY OF TROPONIN QUANT: CPT | Performed by: EMERGENCY MEDICINE

## 2021-01-01 PROCEDURE — 81002 URINALYSIS NONAUTO W/O SCOPE: CPT | Mod: PBBFAC | Performed by: UROLOGY

## 2021-01-01 PROCEDURE — 99222 1ST HOSP IP/OBS MODERATE 55: CPT | Mod: ,,, | Performed by: INTERNAL MEDICINE

## 2021-01-01 PROCEDURE — 52000 CYSTOURETHROSCOPY: CPT | Mod: S$PBB,,, | Performed by: UROLOGY

## 2021-01-01 PROCEDURE — 52000 CYSTOURETHROSCOPY: CPT | Mod: PBBFAC | Performed by: UROLOGY

## 2021-01-01 PROCEDURE — 81003 URINALYSIS AUTO W/O SCOPE: CPT | Performed by: HOSPITALIST

## 2021-01-01 PROCEDURE — 25500020 PHARM REV CODE 255: Performed by: INTERNAL MEDICINE

## 2021-01-01 PROCEDURE — 43239 EGD BIOPSY SINGLE/MULTIPLE: CPT | Performed by: INTERNAL MEDICINE

## 2021-01-01 PROCEDURE — 76700 US EXAM ABDOM COMPLETE: CPT | Mod: TC

## 2021-01-01 PROCEDURE — 52234 PR CYSTOURETHROSCOPY,FULGUR 0.5-2 CM LESN: ICD-10-PCS | Mod: ,,, | Performed by: UROLOGY

## 2021-01-01 PROCEDURE — D9220A PRA ANESTHESIA: ICD-10-PCS | Mod: CRNA,,, | Performed by: NURSE ANESTHETIST, CERTIFIED REGISTERED

## 2021-01-01 PROCEDURE — A9698 NON-RAD CONTRAST MATERIALNOC: HCPCS | Performed by: INTERNAL MEDICINE

## 2021-01-01 PROCEDURE — 87449 NOS EACH ORGANISM AG IA: CPT | Performed by: INTERNAL MEDICINE

## 2021-01-01 PROCEDURE — 99215 OFFICE O/P EST HI 40 MIN: CPT | Mod: 95,,, | Performed by: INTERNAL MEDICINE

## 2021-01-01 PROCEDURE — C9113 INJ PANTOPRAZOLE SODIUM, VIA: HCPCS | Performed by: NURSE PRACTITIONER

## 2021-01-01 PROCEDURE — 82803 BLOOD GASES ANY COMBINATION: CPT

## 2021-01-01 PROCEDURE — 43239 PR EGD, FLEX, W/BIOPSY, SGL/MULTI: ICD-10-PCS | Mod: ,,, | Performed by: INTERNAL MEDICINE

## 2021-01-01 PROCEDURE — 96365 THER/PROPH/DIAG IV INF INIT: CPT

## 2021-01-01 PROCEDURE — 99232 PR SUBSEQUENT HOSPITAL CARE,LEVL II: ICD-10-PCS | Mod: ,,, | Performed by: INTERNAL MEDICINE

## 2021-01-01 PROCEDURE — 99214 PR OFFICE/OUTPT VISIT, EST, LEVL IV, 30-39 MIN: ICD-10-PCS | Mod: S$PBB,,, | Performed by: INTERNAL MEDICINE

## 2021-01-01 PROCEDURE — 99999 PR PBB SHADOW E&M-EST. PATIENT-LVL IV: CPT | Mod: PBBFAC,,, | Performed by: INTERNAL MEDICINE

## 2021-01-01 PROCEDURE — 99215 OFFICE O/P EST HI 40 MIN: CPT | Mod: S$PBB,,, | Performed by: FAMILY MEDICINE

## 2021-01-01 PROCEDURE — 63600175 PHARM REV CODE 636 W HCPCS: Performed by: INTERNAL MEDICINE

## 2021-01-01 PROCEDURE — 84484 ASSAY OF TROPONIN QUANT: CPT | Performed by: FAMILY MEDICINE

## 2021-01-01 PROCEDURE — 72197 MRI PELVIS W/O & W/DYE: CPT | Mod: 26,,, | Performed by: RADIOLOGY

## 2021-01-01 PROCEDURE — 99285 EMERGENCY DEPT VISIT HI MDM: CPT | Mod: 25

## 2021-01-01 PROCEDURE — 99999 PR PBB SHADOW E&M-EST. PATIENT-LVL V: ICD-10-PCS | Mod: PBBFAC,,, | Performed by: FAMILY MEDICINE

## 2021-01-01 PROCEDURE — 25000003 PHARM REV CODE 250: Performed by: EMERGENCY MEDICINE

## 2021-01-01 PROCEDURE — 88341 IMHCHEM/IMCYTCHM EA ADD ANTB: CPT | Mod: 26,,, | Performed by: PATHOLOGY

## 2021-01-01 PROCEDURE — 99497 PR ADVNCD CARE PLAN 30 MIN: ICD-10-PCS | Mod: 25,,, | Performed by: FAMILY MEDICINE

## 2021-01-01 PROCEDURE — 91300 COVID-19, MRNA, LNP-S, PF, 30 MCG/0.3 ML DOSE VACCINE: CPT | Mod: PBBFAC | Performed by: FAMILY MEDICINE

## 2021-01-01 PROCEDURE — 86038 ANTINUCLEAR ANTIBODIES: CPT | Performed by: INTERNAL MEDICINE

## 2021-01-01 PROCEDURE — 99999 PR PBB SHADOW E&M-EST. PATIENT-LVL IV: CPT | Mod: PBBFAC,,, | Performed by: UROLOGY

## 2021-01-01 PROCEDURE — 21400001 HC TELEMETRY ROOM

## 2021-01-01 PROCEDURE — 71260 CT CHEST WITH CONTRAST: ICD-10-PCS | Mod: 26,,, | Performed by: RADIOLOGY

## 2021-01-01 PROCEDURE — 36000707: Performed by: UROLOGY

## 2021-01-01 PROCEDURE — 27201423 OPTIME MED/SURG SUP & DEVICES STERILE SUPPLY: Performed by: UROLOGY

## 2021-01-01 PROCEDURE — 96375 TX/PRO/DX INJ NEW DRUG ADDON: CPT

## 2021-01-01 PROCEDURE — 99215 PR OFFICE/OUTPT VISIT, EST, LEVL V, 40-54 MIN: ICD-10-PCS | Mod: S$PBB,,, | Performed by: NURSE PRACTITIONER

## 2021-01-01 PROCEDURE — 82248 BILIRUBIN DIRECT: CPT | Performed by: HOSPITALIST

## 2021-01-01 PROCEDURE — 99205 OFFICE O/P NEW HI 60 MIN: CPT | Mod: S$PBB,,, | Performed by: OBSTETRICS & GYNECOLOGY

## 2021-01-01 PROCEDURE — 99222 PR INITIAL HOSPITAL CARE,LEVL II: ICD-10-PCS | Mod: ,,, | Performed by: INTERNAL MEDICINE

## 2021-01-01 PROCEDURE — 99232 SBSQ HOSP IP/OBS MODERATE 35: CPT | Mod: ,,, | Performed by: INTERNAL MEDICINE

## 2021-01-01 PROCEDURE — 83880 ASSAY OF NATRIURETIC PEPTIDE: CPT | Performed by: FAMILY MEDICINE

## 2021-01-01 PROCEDURE — 43239 EGD BIOPSY SINGLE/MULTIPLE: CPT | Mod: ,,, | Performed by: INTERNAL MEDICINE

## 2021-01-01 PROCEDURE — 99214 OFFICE O/P EST MOD 30 MIN: CPT | Mod: PBBFAC | Performed by: NURSE PRACTITIONER

## 2021-01-01 PROCEDURE — 99223 1ST HOSP IP/OBS HIGH 75: CPT | Mod: ,,, | Performed by: FAMILY MEDICINE

## 2021-01-01 PROCEDURE — 99214 PR OFFICE/OUTPT VISIT, EST, LEVL IV, 30-39 MIN: ICD-10-PCS | Mod: S$PBB,,, | Performed by: COLON & RECTAL SURGERY

## 2021-01-01 PROCEDURE — 25000003 PHARM REV CODE 250: Performed by: FAMILY MEDICINE

## 2021-01-01 PROCEDURE — 85730 THROMBOPLASTIN TIME PARTIAL: CPT | Performed by: EMERGENCY MEDICINE

## 2021-01-01 PROCEDURE — 99214 OFFICE O/P EST MOD 30 MIN: CPT | Mod: PBBFAC,25 | Performed by: UROLOGY

## 2021-01-01 PROCEDURE — 99215 PR OFFICE/OUTPT VISIT, EST, LEVL V, 40-54 MIN: ICD-10-PCS | Mod: 95,,, | Performed by: NURSE PRACTITIONER

## 2021-01-01 PROCEDURE — 37000008 HC ANESTHESIA 1ST 15 MINUTES: Performed by: UROLOGY

## 2021-01-01 PROCEDURE — 99214 PR OFFICE/OUTPT VISIT, EST, LEVL IV, 30-39 MIN: ICD-10-PCS | Mod: S$PBB,25,, | Performed by: UROLOGY

## 2021-01-01 PROCEDURE — 85025 COMPLETE CBC W/AUTO DIFF WBC: CPT | Performed by: INTERNAL MEDICINE

## 2021-01-01 PROCEDURE — U0003 INFECTIOUS AGENT DETECTION BY NUCLEIC ACID (DNA OR RNA); SEVERE ACUTE RESPIRATORY SYNDROME CORONAVIRUS 2 (SARS-COV-2) (CORONAVIRUS DISEASE [COVID-19]), AMPLIFIED PROBE TECHNIQUE, MAKING USE OF HIGH THROUGHPUT TECHNOLOGIES AS DESCRIBED BY CMS-2020-01-R: HCPCS | Performed by: INTERNAL MEDICINE

## 2021-01-01 PROCEDURE — 86235 NUCLEAR ANTIGEN ANTIBODY: CPT | Performed by: INTERNAL MEDICINE

## 2021-01-01 PROCEDURE — 99213 OFFICE O/P EST LOW 20 MIN: CPT | Mod: PBBFAC | Performed by: COLON & RECTAL SURGERY

## 2021-01-01 PROCEDURE — 88342 IMHCHEM/IMCYTCHM 1ST ANTB: CPT | Performed by: PATHOLOGY

## 2021-01-01 PROCEDURE — 74181 MRI ABDOMEN W/O CONTRAST: CPT | Mod: TC

## 2021-01-01 PROCEDURE — 85025 COMPLETE CBC W/AUTO DIFF WBC: CPT | Performed by: FAMILY MEDICINE

## 2021-01-01 PROCEDURE — 87086 URINE CULTURE/COLONY COUNT: CPT | Performed by: FAMILY MEDICINE

## 2021-01-01 PROCEDURE — 99291 CRITICAL CARE FIRST HOUR: CPT | Mod: ,,, | Performed by: INTERNAL MEDICINE

## 2021-01-01 PROCEDURE — 99215 PR OFFICE/OUTPT VISIT, EST, LEVL V, 40-54 MIN: ICD-10-PCS | Mod: 95,,, | Performed by: INTERNAL MEDICINE

## 2021-01-01 PROCEDURE — 87040 BLOOD CULTURE FOR BACTERIA: CPT | Mod: 59 | Performed by: EMERGENCY MEDICINE

## 2021-01-01 PROCEDURE — 99291 PR CRITICAL CARE, E/M 30-74 MINUTES: ICD-10-PCS | Mod: ,,, | Performed by: INTERNAL MEDICINE

## 2021-01-01 PROCEDURE — 36415 COLL VENOUS BLD VENIPUNCTURE: CPT | Performed by: INTERNAL MEDICINE

## 2021-01-01 PROCEDURE — 82150 ASSAY OF AMYLASE: CPT | Performed by: INTERNAL MEDICINE

## 2021-01-01 PROCEDURE — 88307 TISSUE EXAM BY PATHOLOGIST: CPT | Performed by: PATHOLOGY

## 2021-01-01 PROCEDURE — 99205 PR OFFICE/OUTPT VISIT, NEW, LEVL V, 60-74 MIN: ICD-10-PCS | Mod: S$PBB,,, | Performed by: OBSTETRICS & GYNECOLOGY

## 2021-01-01 PROCEDURE — 99999 PR PBB SHADOW E&M-EST. PATIENT-LVL IV: ICD-10-PCS | Mod: PBBFAC,,, | Performed by: NURSE PRACTITIONER

## 2021-01-01 PROCEDURE — 88305 TISSUE EXAM BY PATHOLOGIST: ICD-10-PCS | Mod: 26,,, | Performed by: PATHOLOGY

## 2021-01-01 PROCEDURE — 37000009 HC ANESTHESIA EA ADD 15 MINS: Performed by: INTERNAL MEDICINE

## 2021-01-01 PROCEDURE — 85007 BL SMEAR W/DIFF WBC COUNT: CPT | Performed by: EMERGENCY MEDICINE

## 2021-01-01 PROCEDURE — 84478 ASSAY OF TRIGLYCERIDES: CPT | Performed by: INTERNAL MEDICINE

## 2021-01-01 PROCEDURE — 85025 COMPLETE CBC W/AUTO DIFF WBC: CPT | Performed by: EMERGENCY MEDICINE

## 2021-01-01 PROCEDURE — 88305 TISSUE EXAM BY PATHOLOGIST: CPT | Performed by: PATHOLOGY

## 2021-01-01 PROCEDURE — 84145 PROCALCITONIN (PCT): CPT | Performed by: NURSE PRACTITIONER

## 2021-01-01 PROCEDURE — 88305 TISSUE EXAM BY PATHOLOGIST: CPT | Mod: 26,,, | Performed by: PATHOLOGY

## 2021-01-01 PROCEDURE — 87088 URINE BACTERIA CULTURE: CPT | Performed by: FAMILY MEDICINE

## 2021-01-01 PROCEDURE — 87077 CULTURE AEROBIC IDENTIFY: CPT | Performed by: FAMILY MEDICINE

## 2021-01-01 PROCEDURE — 86376 MICROSOMAL ANTIBODY EACH: CPT | Performed by: INTERNAL MEDICINE

## 2021-01-01 PROCEDURE — 88307 TISSUE EXAM BY PATHOLOGIST: CPT | Mod: 26,,, | Performed by: PATHOLOGY

## 2021-01-01 PROCEDURE — 27201012 HC FORCEPS, HOT/COLD, DISP: Performed by: INTERNAL MEDICINE

## 2021-01-01 PROCEDURE — 72197 MRI PELVIS W WO CONTRAST: ICD-10-PCS | Mod: 26,,, | Performed by: RADIOLOGY

## 2021-01-01 PROCEDURE — 99214 OFFICE O/P EST MOD 30 MIN: CPT | Mod: S$PBB,25,, | Performed by: UROLOGY

## 2021-01-01 PROCEDURE — 99999 PR PBB SHADOW E&M-EST. PATIENT-LVL IV: CPT | Mod: PBBFAC,,, | Performed by: NURSE PRACTITIONER

## 2021-01-01 PROCEDURE — 0002A COVID-19, MRNA, LNP-S, PF, 30 MCG/0.3 ML DOSE VACCINE: CPT | Mod: PBBFAC | Performed by: FAMILY MEDICINE

## 2021-01-01 PROCEDURE — 71000015 HC POSTOP RECOV 1ST HR: Performed by: UROLOGY

## 2021-01-01 PROCEDURE — 83690 ASSAY OF LIPASE: CPT | Mod: 91 | Performed by: INTERNAL MEDICINE

## 2021-01-01 PROCEDURE — 25000003 PHARM REV CODE 250: Performed by: INTERNAL MEDICINE

## 2021-01-01 PROCEDURE — 36000706: Performed by: UROLOGY

## 2021-01-01 PROCEDURE — 83605 ASSAY OF LACTIC ACID: CPT | Performed by: EMERGENCY MEDICINE

## 2021-01-01 PROCEDURE — 80053 COMPREHEN METABOLIC PANEL: CPT | Performed by: NURSE PRACTITIONER

## 2021-01-01 PROCEDURE — 76700 US ABDOMEN COMPLETE: ICD-10-PCS | Mod: 26,,, | Performed by: RADIOLOGY

## 2021-01-01 PROCEDURE — 20000000 HC ICU ROOM

## 2021-01-01 PROCEDURE — 25000003 PHARM REV CODE 250: Performed by: ANESTHESIOLOGY

## 2021-01-01 PROCEDURE — 96367 TX/PROPH/DG ADDL SEQ IV INF: CPT

## 2021-01-01 PROCEDURE — 36556 INSERT NON-TUNNEL CV CATH: CPT

## 2021-01-01 PROCEDURE — 25500020 PHARM REV CODE 255: Mod: PO | Performed by: INTERNAL MEDICINE

## 2021-01-01 PROCEDURE — 99214 OFFICE O/P EST MOD 30 MIN: CPT | Mod: 95,,, | Performed by: NURSE PRACTITIONER

## 2021-01-01 PROCEDURE — 99999 PR PBB SHADOW E&M-EST. PATIENT-LVL V: CPT | Mod: PBBFAC,,, | Performed by: FAMILY MEDICINE

## 2021-01-01 PROCEDURE — 87899 AGENT NOS ASSAY W/OPTIC: CPT | Performed by: INTERNAL MEDICINE

## 2021-01-01 PROCEDURE — 99223 PR INITIAL HOSPITAL CARE,LEVL III: ICD-10-PCS | Mod: ,,, | Performed by: FAMILY MEDICINE

## 2021-01-01 PROCEDURE — 88307 PR  SURG PATH,LEVEL V: ICD-10-PCS | Mod: 26,,, | Performed by: PATHOLOGY

## 2021-01-01 PROCEDURE — 87186 SC STD MICRODIL/AGAR DIL: CPT | Performed by: FAMILY MEDICINE

## 2021-01-01 PROCEDURE — 83690 ASSAY OF LIPASE: CPT | Performed by: NURSE PRACTITIONER

## 2021-01-01 PROCEDURE — 99215 OFFICE O/P EST HI 40 MIN: CPT | Mod: S$PBB,,, | Performed by: NURSE PRACTITIONER

## 2021-01-01 PROCEDURE — 83690 ASSAY OF LIPASE: CPT | Performed by: INTERNAL MEDICINE

## 2021-01-01 PROCEDURE — 80048 BASIC METABOLIC PNL TOTAL CA: CPT | Performed by: INTERNAL MEDICINE

## 2021-01-01 PROCEDURE — 99999 PR PBB SHADOW E&M-EST. PATIENT-LVL III: CPT | Mod: PBBFAC,,, | Performed by: COLON & RECTAL SURGERY

## 2021-01-01 RX ORDER — ROCURONIUM BROMIDE 10 MG/ML
INJECTION, SOLUTION INTRAVENOUS
Status: DISCONTINUED | OUTPATIENT
Start: 2021-01-01 | End: 2021-01-01

## 2021-01-01 RX ORDER — SODIUM CHLORIDE 9 MG/ML
INJECTION, SOLUTION INTRAVENOUS
Status: COMPLETED | OUTPATIENT
Start: 2021-01-01 | End: 2021-01-01

## 2021-01-01 RX ORDER — MORPHINE SULFATE 4 MG/ML
4 INJECTION, SOLUTION INTRAMUSCULAR; INTRAVENOUS EVERY 4 HOURS PRN
Status: DISCONTINUED | OUTPATIENT
Start: 2021-01-01 | End: 2021-01-01

## 2021-01-01 RX ORDER — DEXAMETHASONE SODIUM PHOSPHATE 4 MG/ML
INJECTION, SOLUTION INTRA-ARTICULAR; INTRALESIONAL; INTRAMUSCULAR; INTRAVENOUS; SOFT TISSUE
Status: DISCONTINUED | OUTPATIENT
Start: 2021-01-01 | End: 2021-01-01

## 2021-01-01 RX ORDER — ZOLPIDEM TARTRATE 5 MG/1
5 TABLET ORAL NIGHTLY PRN
Qty: 60 TABLET | Refills: 1 | Status: SHIPPED | OUTPATIENT
Start: 2021-01-01 | End: 2021-01-01 | Stop reason: SDUPTHER

## 2021-01-01 RX ORDER — FENTANYL 50 UG/1
1 PATCH TRANSDERMAL
Qty: 10 PATCH | Refills: 0 | Status: SHIPPED | OUTPATIENT
Start: 2021-01-01 | End: 2021-01-01 | Stop reason: SDUPTHER

## 2021-01-01 RX ORDER — OXYCODONE AND ACETAMINOPHEN 5; 325 MG/1; MG/1
1 TABLET ORAL
Status: DISCONTINUED | OUTPATIENT
Start: 2021-01-01 | End: 2021-01-01 | Stop reason: HOSPADM

## 2021-01-01 RX ORDER — ONDANSETRON 2 MG/ML
4 INJECTION INTRAMUSCULAR; INTRAVENOUS
Status: COMPLETED | OUTPATIENT
Start: 2021-01-01 | End: 2021-01-01

## 2021-01-01 RX ORDER — ONDANSETRON 2 MG/ML
4 INJECTION INTRAMUSCULAR; INTRAVENOUS DAILY PRN
Status: DISCONTINUED | OUTPATIENT
Start: 2021-01-01 | End: 2021-01-01 | Stop reason: HOSPADM

## 2021-01-01 RX ORDER — OXYCODONE HYDROCHLORIDE 30 MG/1
30 TABLET ORAL EVERY 6 HOURS PRN
Qty: 120 TABLET | Refills: 0 | Status: SHIPPED | OUTPATIENT
Start: 2021-01-01 | End: 2021-01-01 | Stop reason: SDUPTHER

## 2021-01-01 RX ORDER — ONDANSETRON 4 MG/1
4 TABLET, FILM COATED ORAL EVERY 6 HOURS
Qty: 20 TABLET | Refills: 0 | Status: SHIPPED | OUTPATIENT
Start: 2021-01-01 | End: 2021-01-01

## 2021-01-01 RX ORDER — DRONABINOL 5 MG/1
5 CAPSULE ORAL
Qty: 60 CAPSULE | Refills: 0 | Status: SHIPPED | OUTPATIENT
Start: 2021-01-01 | End: 2021-01-01 | Stop reason: SDUPTHER

## 2021-01-01 RX ORDER — HYDROMORPHONE HYDROCHLORIDE 1 MG/ML
1 INJECTION, SOLUTION INTRAMUSCULAR; INTRAVENOUS; SUBCUTANEOUS EVERY 4 HOURS PRN
Status: DISCONTINUED | OUTPATIENT
Start: 2021-01-01 | End: 2021-01-01

## 2021-01-01 RX ORDER — SODIUM CHLORIDE, SODIUM LACTATE, POTASSIUM CHLORIDE, CALCIUM CHLORIDE 600; 310; 30; 20 MG/100ML; MG/100ML; MG/100ML; MG/100ML
INJECTION, SOLUTION INTRAVENOUS CONTINUOUS PRN
Status: DISCONTINUED | OUTPATIENT
Start: 2021-01-01 | End: 2021-01-01

## 2021-01-01 RX ORDER — ONDANSETRON 2 MG/ML
INJECTION INTRAMUSCULAR; INTRAVENOUS
Status: DISCONTINUED | OUTPATIENT
Start: 2021-01-01 | End: 2021-01-01

## 2021-01-01 RX ORDER — POTASSIUM CHLORIDE 20 MEQ/1
40 TABLET, EXTENDED RELEASE ORAL ONCE
Status: COMPLETED | OUTPATIENT
Start: 2021-01-01 | End: 2021-01-01

## 2021-01-01 RX ORDER — LIDOCAINE HYDROCHLORIDE 10 MG/ML
INJECTION, SOLUTION EPIDURAL; INFILTRATION; INTRACAUDAL; PERINEURAL
Status: DISCONTINUED | OUTPATIENT
Start: 2021-01-01 | End: 2021-01-01

## 2021-01-01 RX ORDER — FENTANYL 25 UG/1
2 PATCH TRANSDERMAL
Status: DISCONTINUED | OUTPATIENT
Start: 2021-01-01 | End: 2021-01-01

## 2021-01-01 RX ORDER — IBUPROFEN 200 MG
16 TABLET ORAL
Status: DISCONTINUED | OUTPATIENT
Start: 2021-01-01 | End: 2021-01-01 | Stop reason: HOSPADM

## 2021-01-01 RX ORDER — OXYCODONE HYDROCHLORIDE 30 MG/1
30 TABLET ORAL EVERY 6 HOURS PRN
Qty: 120 TABLET | Refills: 0 | OUTPATIENT
Start: 2021-01-01

## 2021-01-01 RX ORDER — SODIUM CHLORIDE, SODIUM LACTATE, POTASSIUM CHLORIDE, CALCIUM CHLORIDE 600; 310; 30; 20 MG/100ML; MG/100ML; MG/100ML; MG/100ML
INJECTION, SOLUTION INTRAVENOUS CONTINUOUS
Status: DISCONTINUED | OUTPATIENT
Start: 2021-01-01 | End: 2021-01-01 | Stop reason: HOSPADM

## 2021-01-01 RX ORDER — SUCRALFATE 1 G/10ML
1 SUSPENSION ORAL
Qty: 400 ML | Refills: 0 | Status: SHIPPED | OUTPATIENT
Start: 2021-01-01 | End: 2021-01-01

## 2021-01-01 RX ORDER — HYDROMORPHONE HYDROCHLORIDE 1 MG/ML
1 INJECTION, SOLUTION INTRAMUSCULAR; INTRAVENOUS; SUBCUTANEOUS
Status: DISCONTINUED | OUTPATIENT
Start: 2021-01-01 | End: 2021-01-01

## 2021-01-01 RX ORDER — GADOBUTROL 604.72 MG/ML
4 INJECTION INTRAVENOUS
Status: COMPLETED | OUTPATIENT
Start: 2021-01-01 | End: 2021-01-01

## 2021-01-01 RX ORDER — NOREPINEPHRINE BITARTRATE/D5W 4MG/250ML
0-3 PLASTIC BAG, INJECTION (ML) INTRAVENOUS CONTINUOUS
Status: DISCONTINUED | OUTPATIENT
Start: 2021-01-01 | End: 2021-01-01

## 2021-01-01 RX ORDER — METOCLOPRAMIDE 5 MG/1
10 TABLET ORAL
COMMUNITY
Start: 2021-01-01 | End: 2022-01-01 | Stop reason: SDUPTHER

## 2021-01-01 RX ORDER — FENTANYL 50 UG/1
1 PATCH TRANSDERMAL
Qty: 5 PATCH | Refills: 0 | Status: SHIPPED | OUTPATIENT
Start: 2021-01-01 | End: 2021-01-01 | Stop reason: SDUPTHER

## 2021-01-01 RX ORDER — LEVOFLOXACIN 750 MG/1
750 TABLET ORAL DAILY
Status: DISCONTINUED | OUTPATIENT
Start: 2021-01-01 | End: 2021-01-01 | Stop reason: HOSPADM

## 2021-01-01 RX ORDER — IBUPROFEN 200 MG
24 TABLET ORAL
Status: DISCONTINUED | OUTPATIENT
Start: 2021-01-01 | End: 2021-01-01 | Stop reason: HOSPADM

## 2021-01-01 RX ORDER — FENTANYL CITRATE 50 UG/ML
INJECTION, SOLUTION INTRAMUSCULAR; INTRAVENOUS
Status: DISCONTINUED | OUTPATIENT
Start: 2021-01-01 | End: 2021-01-01

## 2021-01-01 RX ORDER — LEVOFLOXACIN 750 MG/1
750 TABLET ORAL DAILY
Qty: 4 TABLET | Refills: 0 | Status: SHIPPED | OUTPATIENT
Start: 2021-01-01 | End: 2021-01-01

## 2021-01-01 RX ORDER — FENTANYL 100 UG/H
1 PATCH TRANSDERMAL
Qty: 10 PATCH | Refills: 0 | Status: SHIPPED | OUTPATIENT
Start: 2021-01-01

## 2021-01-01 RX ORDER — MIDAZOLAM HYDROCHLORIDE 1 MG/ML
INJECTION INTRAMUSCULAR; INTRAVENOUS
Status: DISCONTINUED | OUTPATIENT
Start: 2021-01-01 | End: 2021-01-01

## 2021-01-01 RX ORDER — PROPOFOL 10 MG/ML
VIAL (ML) INTRAVENOUS
Status: DISCONTINUED | OUTPATIENT
Start: 2021-01-01 | End: 2021-01-01

## 2021-01-01 RX ORDER — GLUCAGON 1 MG
1 KIT INJECTION
Status: DISCONTINUED | OUTPATIENT
Start: 2021-01-01 | End: 2021-01-01 | Stop reason: HOSPADM

## 2021-01-01 RX ORDER — MORPHINE SULFATE 2 MG/ML
6 INJECTION, SOLUTION INTRAMUSCULAR; INTRAVENOUS
Status: COMPLETED | OUTPATIENT
Start: 2021-01-01 | End: 2021-01-01

## 2021-01-01 RX ORDER — ACETAMINOPHEN 325 MG/1
650 TABLET ORAL EVERY 8 HOURS PRN
Status: DISCONTINUED | OUTPATIENT
Start: 2021-01-01 | End: 2021-01-01 | Stop reason: HOSPADM

## 2021-01-01 RX ORDER — LORAZEPAM 2 MG/ML
0.5 INJECTION INTRAMUSCULAR ONCE
Status: COMPLETED | OUTPATIENT
Start: 2021-01-01 | End: 2021-01-01

## 2021-01-01 RX ORDER — INSULIN ASPART 100 [IU]/ML
0-5 INJECTION, SOLUTION INTRAVENOUS; SUBCUTANEOUS
Status: DISCONTINUED | OUTPATIENT
Start: 2021-01-01 | End: 2021-01-01 | Stop reason: HOSPADM

## 2021-01-01 RX ORDER — PANTOPRAZOLE SODIUM 40 MG/1
40 TABLET, DELAYED RELEASE ORAL DAILY
Qty: 30 TABLET | Refills: 11 | Status: SHIPPED | OUTPATIENT
Start: 2021-01-01 | End: 2021-01-01

## 2021-01-01 RX ORDER — OXYCODONE HYDROCHLORIDE 30 MG/1
30 TABLET ORAL EVERY 6 HOURS PRN
Qty: 120 TABLET | Refills: 0 | Status: ON HOLD | OUTPATIENT
Start: 2021-01-01 | End: 2022-01-01 | Stop reason: HOSPADM

## 2021-01-01 RX ORDER — OXYCODONE HYDROCHLORIDE 30 MG/1
30 TABLET ORAL EVERY 6 HOURS PRN
Qty: 60 TABLET | Refills: 0 | Status: SHIPPED | OUTPATIENT
Start: 2021-01-01 | End: 2021-01-01 | Stop reason: SDUPTHER

## 2021-01-01 RX ORDER — OXYCODONE HYDROCHLORIDE 30 MG/1
30 TABLET ORAL EVERY 6 HOURS PRN
Qty: 60 TABLET | Refills: 0 | OUTPATIENT
Start: 2021-01-01

## 2021-01-01 RX ORDER — NOREPINEPHRINE BITARTRATE/D5W 4MG/250ML
0-.5 PLASTIC BAG, INJECTION (ML) INTRAVENOUS CONTINUOUS
Status: DISCONTINUED | OUTPATIENT
Start: 2021-01-01 | End: 2021-01-01

## 2021-01-01 RX ORDER — NALOXONE HYDROCHLORIDE 4 MG/.1ML
1 SPRAY NASAL ONCE
Qty: 2 EACH | Refills: 0 | Status: SHIPPED | OUTPATIENT
Start: 2021-01-01 | End: 2021-01-01

## 2021-01-01 RX ORDER — HYDROMORPHONE HYDROCHLORIDE 2 MG/ML
0.2 INJECTION, SOLUTION INTRAMUSCULAR; INTRAVENOUS; SUBCUTANEOUS EVERY 5 MIN PRN
Status: DISCONTINUED | OUTPATIENT
Start: 2021-01-01 | End: 2021-01-01 | Stop reason: HOSPADM

## 2021-01-01 RX ORDER — FENTANYL 100 UG/H
1 PATCH TRANSDERMAL
Qty: 10 PATCH | Refills: 0 | Status: SHIPPED | OUTPATIENT
Start: 2021-01-01 | End: 2021-01-01 | Stop reason: SDUPTHER

## 2021-01-01 RX ORDER — SUCCINYLCHOLINE CHLORIDE 20 MG/ML
INJECTION INTRAMUSCULAR; INTRAVENOUS
Status: DISCONTINUED | OUTPATIENT
Start: 2021-01-01 | End: 2021-01-01

## 2021-01-01 RX ORDER — KETOROLAC TROMETHAMINE 30 MG/ML
10 INJECTION, SOLUTION INTRAMUSCULAR; INTRAVENOUS
Status: COMPLETED | OUTPATIENT
Start: 2021-01-01 | End: 2021-01-01

## 2021-01-01 RX ORDER — MORPHINE SULFATE 2 MG/ML
1 INJECTION, SOLUTION INTRAMUSCULAR; INTRAVENOUS
Status: COMPLETED | OUTPATIENT
Start: 2021-01-01 | End: 2021-01-01

## 2021-01-01 RX ORDER — CIPROFLOXACIN 500 MG/1
500 TABLET ORAL
Status: COMPLETED | OUTPATIENT
Start: 2021-01-01 | End: 2021-01-01

## 2021-01-01 RX ORDER — HYDROMORPHONE HYDROCHLORIDE 1 MG/ML
1 INJECTION, SOLUTION INTRAMUSCULAR; INTRAVENOUS; SUBCUTANEOUS EVERY 6 HOURS PRN
Status: DISCONTINUED | OUTPATIENT
Start: 2021-01-01 | End: 2021-01-01 | Stop reason: HOSPADM

## 2021-01-01 RX ORDER — MAG HYDROX/ALUMINUM HYD/SIMETH 200-200-20
30 SUSPENSION, ORAL (FINAL DOSE FORM) ORAL
Status: DISCONTINUED | OUTPATIENT
Start: 2021-01-01 | End: 2021-01-01

## 2021-01-01 RX ORDER — PROMETHAZINE HYDROCHLORIDE 25 MG/1
12.5 TABLET ORAL EVERY 6 HOURS PRN
Qty: 15 TABLET | Refills: 0 | Status: SHIPPED | OUTPATIENT
Start: 2021-01-01

## 2021-01-01 RX ORDER — OMEPRAZOLE 40 MG/1
1 CAPSULE, DELAYED RELEASE ORAL DAILY
Status: ON HOLD | COMMUNITY
Start: 2021-01-01 | End: 2022-01-01 | Stop reason: HOSPADM

## 2021-01-01 RX ORDER — PHENAZOPYRIDINE HYDROCHLORIDE 200 MG/1
200 TABLET, FILM COATED ORAL 3 TIMES DAILY PRN
Qty: 15 TABLET | Refills: 0 | Status: ON HOLD | OUTPATIENT
Start: 2021-01-01 | End: 2022-01-01 | Stop reason: HOSPADM

## 2021-01-01 RX ORDER — POTASSIUM CHLORIDE 14.9 MG/ML
20 INJECTION INTRAVENOUS
Status: COMPLETED | OUTPATIENT
Start: 2021-01-01 | End: 2021-01-01

## 2021-01-01 RX ORDER — EPHEDRINE SULFATE 50 MG/ML
INJECTION, SOLUTION INTRAVENOUS
Status: DISCONTINUED | OUTPATIENT
Start: 2021-01-01 | End: 2021-01-01

## 2021-01-01 RX ORDER — FENTANYL 50 UG/1
1 PATCH TRANSDERMAL
Qty: 10 PATCH | Refills: 0 | OUTPATIENT
Start: 2021-01-01

## 2021-01-01 RX ORDER — MEROPENEM AND SODIUM CHLORIDE 500 MG/50ML
500 INJECTION, SOLUTION INTRAVENOUS
Status: DISCONTINUED | OUTPATIENT
Start: 2021-01-01 | End: 2021-01-01

## 2021-01-01 RX ORDER — CEFDINIR 300 MG/1
300 CAPSULE ORAL 2 TIMES DAILY
Qty: 10 CAPSULE | Refills: 0 | Status: SHIPPED | OUTPATIENT
Start: 2021-01-01 | End: 2021-01-01

## 2021-01-01 RX ORDER — PHENYLEPHRINE HYDROCHLORIDE 10 MG/ML
INJECTION INTRAVENOUS
Status: DISCONTINUED | OUTPATIENT
Start: 2021-01-01 | End: 2021-01-01

## 2021-01-01 RX ORDER — DRONABINOL 5 MG/1
5 CAPSULE ORAL
Qty: 60 CAPSULE | Refills: 0 | Status: ON HOLD | OUTPATIENT
Start: 2021-01-01 | End: 2021-01-01 | Stop reason: HOSPADM

## 2021-01-01 RX ORDER — ALPRAZOLAM 0.5 MG/1
0.5 TABLET ORAL
Status: DISCONTINUED | OUTPATIENT
Start: 2021-01-01 | End: 2021-01-01

## 2021-01-01 RX ORDER — ONDANSETRON 2 MG/ML
4 INJECTION INTRAMUSCULAR; INTRAVENOUS EVERY 8 HOURS PRN
Status: DISCONTINUED | OUTPATIENT
Start: 2021-01-01 | End: 2021-01-01 | Stop reason: HOSPADM

## 2021-01-01 RX ORDER — MUPIROCIN 20 MG/G
OINTMENT TOPICAL 2 TIMES DAILY
Status: DISCONTINUED | OUTPATIENT
Start: 2021-01-01 | End: 2021-01-01 | Stop reason: HOSPADM

## 2021-01-01 RX ORDER — ENOXAPARIN SODIUM 100 MG/ML
40 INJECTION SUBCUTANEOUS EVERY 24 HOURS
Status: DISCONTINUED | OUTPATIENT
Start: 2021-01-01 | End: 2021-01-01 | Stop reason: HOSPADM

## 2021-01-01 RX ORDER — SODIUM CHLORIDE 9 MG/ML
INJECTION, SOLUTION INTRAVENOUS CONTINUOUS
Status: DISCONTINUED | OUTPATIENT
Start: 2021-01-01 | End: 2021-01-01

## 2021-01-01 RX ORDER — HYDROMORPHONE HYDROCHLORIDE 2 MG/ML
2 INJECTION, SOLUTION INTRAMUSCULAR; INTRAVENOUS; SUBCUTANEOUS EVERY 4 HOURS PRN
Status: DISCONTINUED | OUTPATIENT
Start: 2021-01-01 | End: 2021-01-01

## 2021-01-01 RX ORDER — KETOROLAC TROMETHAMINE 30 MG/ML
15 INJECTION, SOLUTION INTRAMUSCULAR; INTRAVENOUS EVERY 8 HOURS PRN
Status: DISCONTINUED | OUTPATIENT
Start: 2021-01-01 | End: 2021-01-01 | Stop reason: HOSPADM

## 2021-01-01 RX ORDER — DEXTROSE MONOHYDRATE AND SODIUM CHLORIDE 5; .9 G/100ML; G/100ML
INJECTION, SOLUTION INTRAVENOUS CONTINUOUS
Status: DISCONTINUED | OUTPATIENT
Start: 2021-01-01 | End: 2021-01-01

## 2021-01-01 RX ORDER — PROMETHAZINE HYDROCHLORIDE 25 MG/ML
INJECTION, SOLUTION INTRAMUSCULAR; INTRAVENOUS
Status: DISCONTINUED
Start: 2021-01-01 | End: 2021-01-01 | Stop reason: HOSPADM

## 2021-01-01 RX ORDER — METRONIDAZOLE 500 MG/100ML
500 INJECTION, SOLUTION INTRAVENOUS
Status: DISCONTINUED | OUTPATIENT
Start: 2021-01-01 | End: 2021-01-01

## 2021-01-01 RX ORDER — TALC
6 POWDER (GRAM) TOPICAL NIGHTLY PRN
Status: DISCONTINUED | OUTPATIENT
Start: 2021-01-01 | End: 2021-01-01 | Stop reason: HOSPADM

## 2021-01-01 RX ORDER — LIDOCAINE HYDROCHLORIDE 20 MG/ML
INJECTION INTRAVENOUS
Status: DISCONTINUED | OUTPATIENT
Start: 2021-01-01 | End: 2021-01-01

## 2021-01-01 RX ORDER — GLUCAGON 1 MG
KIT INJECTION
Status: DISCONTINUED | OUTPATIENT
Start: 2021-01-01 | End: 2021-01-01

## 2021-01-01 RX ORDER — SCOLOPAMINE TRANSDERMAL SYSTEM 1 MG/1
1 PATCH, EXTENDED RELEASE TRANSDERMAL
Status: DISCONTINUED | OUTPATIENT
Start: 2021-01-01 | End: 2021-01-01 | Stop reason: HOSPADM

## 2021-01-01 RX ORDER — FENTANYL 50 UG/1
1 PATCH TRANSDERMAL
Status: DISCONTINUED | OUTPATIENT
Start: 2021-01-01 | End: 2021-01-01 | Stop reason: HOSPADM

## 2021-01-01 RX ORDER — SUCRALFATE 1 G/1
1 TABLET ORAL
Qty: 90 TABLET | Refills: 11 | Status: SHIPPED | OUTPATIENT
Start: 2021-01-01 | End: 2021-01-01

## 2021-01-01 RX ORDER — OXYCODONE HYDROCHLORIDE 30 MG/1
30 TABLET ORAL EVERY 6 HOURS PRN
Status: DISCONTINUED | OUTPATIENT
Start: 2021-01-01 | End: 2021-01-01 | Stop reason: HOSPADM

## 2021-01-01 RX ORDER — LIDOCAINE HYDROCHLORIDE 20 MG/ML
SOLUTION OROPHARYNGEAL EVERY 6 HOURS
Qty: 100 ML | Refills: 0 | Status: ON HOLD | OUTPATIENT
Start: 2021-01-01 | End: 2021-01-01 | Stop reason: HOSPADM

## 2021-01-01 RX ORDER — SODIUM CHLORIDE 0.9 % (FLUSH) 0.9 %
10 SYRINGE (ML) INJECTION
Status: DISCONTINUED | OUTPATIENT
Start: 2021-01-01 | End: 2021-01-01 | Stop reason: HOSPADM

## 2021-01-01 RX ORDER — SOLIFENACIN SUCCINATE 10 MG/1
10 TABLET, FILM COATED ORAL DAILY
Qty: 30 TABLET | Refills: 11 | Status: ON HOLD | OUTPATIENT
Start: 2021-01-01 | End: 2022-01-01 | Stop reason: HOSPADM

## 2021-01-01 RX ORDER — FENTANYL 50 UG/1
1 PATCH TRANSDERMAL
Qty: 5 PATCH | Refills: 0 | Status: SHIPPED | OUTPATIENT
Start: 2021-01-01 | End: 2021-01-01 | Stop reason: DRUGHIGH

## 2021-01-01 RX ORDER — MEROPENEM AND SODIUM CHLORIDE 1 G/50ML
1 INJECTION, SOLUTION INTRAVENOUS
Status: DISCONTINUED | OUTPATIENT
Start: 2021-01-01 | End: 2021-01-01

## 2021-01-01 RX ORDER — OXYCODONE AND ACETAMINOPHEN 5; 325 MG/1; MG/1
1 TABLET ORAL EVERY 4 HOURS PRN
Qty: 15 TABLET | Refills: 0 | Status: SHIPPED | OUTPATIENT
Start: 2021-01-01 | End: 2021-01-01 | Stop reason: ALTCHOICE

## 2021-01-01 RX ORDER — FENTANYL 50 UG/1
1 PATCH TRANSDERMAL
Qty: 5 PATCH | Refills: 0 | OUTPATIENT
Start: 2021-01-01

## 2021-01-01 RX ORDER — PANTOPRAZOLE SODIUM 40 MG/10ML
40 INJECTION, POWDER, LYOPHILIZED, FOR SOLUTION INTRAVENOUS DAILY
Status: DISCONTINUED | OUTPATIENT
Start: 2021-01-01 | End: 2021-01-01 | Stop reason: HOSPADM

## 2021-01-01 RX ORDER — HYDROMORPHONE HYDROCHLORIDE 2 MG/ML
2 INJECTION, SOLUTION INTRAMUSCULAR; INTRAVENOUS; SUBCUTANEOUS
Status: COMPLETED | OUTPATIENT
Start: 2021-01-01 | End: 2021-01-01

## 2021-01-01 RX ADMIN — ROCURONIUM BROMIDE 10 MG: 10 INJECTION, SOLUTION INTRAVENOUS at 01:12

## 2021-01-01 RX ADMIN — LIDOCAINE HYDROCHLORIDE 50 ML: 20 SOLUTION ORAL; TOPICAL at 02:04

## 2021-01-01 RX ADMIN — MEROPENEM AND SODIUM CHLORIDE 500 MG: 500 INJECTION, SOLUTION INTRAVENOUS at 02:06

## 2021-01-01 RX ADMIN — IOHEXOL 100 ML: 350 INJECTION, SOLUTION INTRAVENOUS at 12:06

## 2021-01-01 RX ADMIN — OXYCODONE HYDROCHLORIDE AND ACETAMINOPHEN 1 TABLET: 5; 325 TABLET ORAL at 02:12

## 2021-01-01 RX ADMIN — SODIUM CHLORIDE 1000 ML: 0.9 INJECTION, SOLUTION INTRAVENOUS at 02:06

## 2021-01-01 RX ADMIN — MEROPENEM AND SODIUM CHLORIDE 500 MG: 500 INJECTION, SOLUTION INTRAVENOUS at 07:06

## 2021-01-01 RX ADMIN — PHENYLEPHRINE HYDROCHLORIDE 200 MCG: 10 INJECTION INTRAVENOUS at 01:12

## 2021-01-01 RX ADMIN — LORAZEPAM 0.5 MG: 2 INJECTION INTRAMUSCULAR; INTRAVENOUS at 05:06

## 2021-01-01 RX ADMIN — CEFTRIAXONE 1 G: 1 INJECTION, SOLUTION INTRAVENOUS at 11:05

## 2021-01-01 RX ADMIN — MUPIROCIN: 20 OINTMENT TOPICAL at 09:06

## 2021-01-01 RX ADMIN — HYDROMORPHONE HYDROCHLORIDE 0.2 MG: 2 INJECTION, SOLUTION INTRAMUSCULAR; INTRAVENOUS; SUBCUTANEOUS at 02:12

## 2021-01-01 RX ADMIN — DEXAMETHASONE SODIUM PHOSPHATE 8 MG: 4 INJECTION, SOLUTION INTRA-ARTICULAR; INTRALESIONAL; INTRAMUSCULAR; INTRAVENOUS; SOFT TISSUE at 12:05

## 2021-01-01 RX ADMIN — SODIUM CHLORIDE: 0.9 INJECTION, SOLUTION INTRAVENOUS at 11:06

## 2021-01-01 RX ADMIN — DEXAMETHASONE SODIUM PHOSPHATE 4 MG: 4 INJECTION, SOLUTION INTRAMUSCULAR; INTRAVENOUS at 01:12

## 2021-01-01 RX ADMIN — PHENYLEPHRINE HYDROCHLORIDE 200 MCG: 10 INJECTION INTRAVENOUS at 03:06

## 2021-01-01 RX ADMIN — Medication 6 MG: at 01:06

## 2021-01-01 RX ADMIN — ONDANSETRON 4 MG: 2 INJECTION, SOLUTION INTRAMUSCULAR; INTRAVENOUS at 12:05

## 2021-01-01 RX ADMIN — EPHEDRINE SULFATE 10 MG: 50 INJECTION INTRAVENOUS at 02:12

## 2021-01-01 RX ADMIN — DEXTROSE AND SODIUM CHLORIDE: 5; .9 INJECTION, SOLUTION INTRAVENOUS at 07:06

## 2021-01-01 RX ADMIN — HYDROMORPHONE HYDROCHLORIDE 2 MG: 2 INJECTION INTRAMUSCULAR; INTRAVENOUS; SUBCUTANEOUS at 11:06

## 2021-01-01 RX ADMIN — SODIUM CHLORIDE, SODIUM LACTATE, POTASSIUM CHLORIDE, AND CALCIUM CHLORIDE: 600; 310; 30; 20 INJECTION, SOLUTION INTRAVENOUS at 01:12

## 2021-01-01 RX ADMIN — SUCCINYLCHOLINE CHLORIDE 140 MG: 20 INJECTION, SOLUTION INTRAMUSCULAR; INTRAVENOUS at 02:06

## 2021-01-01 RX ADMIN — GADOBUTROL 4 ML: 604.72 INJECTION INTRAVENOUS at 10:12

## 2021-01-01 RX ADMIN — MUPIROCIN: 20 OINTMENT TOPICAL at 08:06

## 2021-01-01 RX ADMIN — OXYCODONE HYDROCHLORIDE 30 MG: 30 TABLET ORAL at 01:06

## 2021-01-01 RX ADMIN — ONDANSETRON 4 MG: 2 INJECTION INTRAMUSCULAR; INTRAVENOUS at 08:05

## 2021-01-01 RX ADMIN — PROPOFOL 120 MG: 10 INJECTION, EMULSION INTRAVENOUS at 12:05

## 2021-01-01 RX ADMIN — OXYCODONE HYDROCHLORIDE 30 MG: 30 TABLET ORAL at 09:06

## 2021-01-01 RX ADMIN — GLYCOPYRROLATE 0.2 MG: 0.2 INJECTION, SOLUTION INTRAMUSCULAR; INTRAVITREAL at 12:05

## 2021-01-01 RX ADMIN — HYDROMORPHONE HYDROCHLORIDE 1 MG: 1 INJECTION, SOLUTION INTRAMUSCULAR; INTRAVENOUS; SUBCUTANEOUS at 03:06

## 2021-01-01 RX ADMIN — VANCOMYCIN HYDROCHLORIDE 750 MG: 750 INJECTION, POWDER, LYOPHILIZED, FOR SOLUTION INTRAVENOUS at 11:06

## 2021-01-01 RX ADMIN — HYDROMORPHONE HYDROCHLORIDE 2 MG: 2 INJECTION INTRAMUSCULAR; INTRAVENOUS; SUBCUTANEOUS at 02:06

## 2021-01-01 RX ADMIN — LEVOFLOXACIN 750 MG: 750 TABLET, FILM COATED ORAL at 10:06

## 2021-01-01 RX ADMIN — Medication 0.05 MCG/KG/MIN: at 06:06

## 2021-01-01 RX ADMIN — MORPHINE SULFATE 1 MG: 2 INJECTION, SOLUTION INTRAMUSCULAR; INTRAVENOUS at 04:06

## 2021-01-01 RX ADMIN — DEXTROSE AND SODIUM CHLORIDE: 5; .9 INJECTION, SOLUTION INTRAVENOUS at 08:06

## 2021-01-01 RX ADMIN — DEXTROSE AND SODIUM CHLORIDE: 5; .9 INJECTION, SOLUTION INTRAVENOUS at 06:06

## 2021-01-01 RX ADMIN — ENOXAPARIN SODIUM 40 MG: 40 INJECTION SUBCUTANEOUS at 04:06

## 2021-01-01 RX ADMIN — EPHEDRINE SULFATE 20 MG: 50 INJECTION INTRAVENOUS at 02:12

## 2021-01-01 RX ADMIN — SODIUM CHLORIDE, SODIUM LACTATE, POTASSIUM CHLORIDE, AND CALCIUM CHLORIDE: .6; .31; .03; .02 INJECTION, SOLUTION INTRAVENOUS at 10:05

## 2021-01-01 RX ADMIN — POTASSIUM CHLORIDE 40 MEQ: 1500 TABLET, EXTENDED RELEASE ORAL at 09:05

## 2021-01-01 RX ADMIN — Medication 100 MG: at 12:05

## 2021-01-01 RX ADMIN — HYDROMORPHONE HYDROCHLORIDE 1 MG: 1 INJECTION, SOLUTION INTRAMUSCULAR; INTRAVENOUS; SUBCUTANEOUS at 08:06

## 2021-01-01 RX ADMIN — CEFTRIAXONE 1 G: 1 INJECTION, SOLUTION INTRAVENOUS at 05:06

## 2021-01-01 RX ADMIN — Medication 0.05 MCG/KG/MIN: at 02:06

## 2021-01-01 RX ADMIN — PIPERACILLIN SODIUM AND TAZOBACTAM SODIUM 4.5 G: 4; .5 INJECTION, POWDER, FOR SOLUTION INTRAVENOUS at 12:06

## 2021-01-01 RX ADMIN — SODIUM CHLORIDE 1000 ML: 0.9 INJECTION, SOLUTION INTRAVENOUS at 08:05

## 2021-01-01 RX ADMIN — PHENYLEPHRINE HYDROCHLORIDE 100 MCG: 10 INJECTION INTRAVENOUS at 12:05

## 2021-01-01 RX ADMIN — IOHEXOL 500 ML: 9 SOLUTION ORAL at 11:04

## 2021-01-01 RX ADMIN — SODIUM CHLORIDE 500 ML: 0.9 INJECTION, SOLUTION INTRAVENOUS at 02:06

## 2021-01-01 RX ADMIN — HYDROMORPHONE HYDROCHLORIDE 2 MG: 2 INJECTION INTRAMUSCULAR; INTRAVENOUS; SUBCUTANEOUS at 08:06

## 2021-01-01 RX ADMIN — ONDANSETRON 4 MG: 2 INJECTION, SOLUTION INTRAMUSCULAR; INTRAVENOUS at 01:12

## 2021-01-01 RX ADMIN — IOHEXOL 75 ML: 350 INJECTION, SOLUTION INTRAVENOUS at 11:04

## 2021-01-01 RX ADMIN — POTASSIUM CHLORIDE 20 MEQ: 14.9 INJECTION, SOLUTION INTRAVENOUS at 08:06

## 2021-01-01 RX ADMIN — SUCCINYLCHOLINE CHLORIDE 12 MG: 20 INJECTION, SOLUTION INTRAMUSCULAR; INTRAVENOUS at 12:05

## 2021-01-01 RX ADMIN — MAGNESIUM HYDROXIDE/ALUMINUM HYDROXICE/SIMETHICONE 50 ML: 120; 1200; 1200 SUSPENSION ORAL at 08:05

## 2021-01-01 RX ADMIN — GLUCAGON HYDROCHLORIDE 1 MG: KIT at 03:06

## 2021-01-01 RX ADMIN — ONDANSETRON 4 MG: 2 INJECTION, SOLUTION INTRAMUSCULAR; INTRAVENOUS at 02:06

## 2021-01-01 RX ADMIN — ROCURONIUM BROMIDE 5 MG: 10 INJECTION, SOLUTION INTRAVENOUS at 12:05

## 2021-01-01 RX ADMIN — MIDAZOLAM HYDROCHLORIDE 2 MG: 1 INJECTION, SOLUTION INTRAMUSCULAR; INTRAVENOUS at 01:12

## 2021-01-01 RX ADMIN — PANTOPRAZOLE SODIUM 40 MG: 40 INJECTION, POWDER, FOR SOLUTION INTRAVENOUS at 09:06

## 2021-01-01 RX ADMIN — SODIUM CHLORIDE: 0.9 INJECTION, SOLUTION INTRAVENOUS at 03:06

## 2021-01-01 RX ADMIN — CIPROFLOXACIN 500 MG: 500 TABLET ORAL at 11:12

## 2021-01-01 RX ADMIN — LIDOCAINE HYDROCHLORIDE 40 MG: 10 INJECTION, SOLUTION EPIDURAL; INFILTRATION; INTRACAUDAL; PERINEURAL at 01:12

## 2021-01-01 RX ADMIN — METRONIDAZOLE 500 MG: 500 INJECTION, SOLUTION INTRAVENOUS at 05:06

## 2021-01-01 RX ADMIN — PROMETHAZINE HYDROCHLORIDE 25 MG: 25 INJECTION INTRAMUSCULAR; INTRAVENOUS at 10:05

## 2021-01-01 RX ADMIN — HYDROMORPHONE HYDROCHLORIDE 1 MG: 1 INJECTION, SOLUTION INTRAMUSCULAR; INTRAVENOUS; SUBCUTANEOUS at 06:06

## 2021-01-01 RX ADMIN — HYDROMORPHONE HYDROCHLORIDE 0.2 MG: 2 INJECTION, SOLUTION INTRAMUSCULAR; INTRAVENOUS; SUBCUTANEOUS at 03:12

## 2021-01-01 RX ADMIN — IOHEXOL 75 ML: 350 INJECTION, SOLUTION INTRAVENOUS at 09:12

## 2021-01-01 RX ADMIN — HYDROMORPHONE HYDROCHLORIDE 1 MG: 1 INJECTION, SOLUTION INTRAMUSCULAR; INTRAVENOUS; SUBCUTANEOUS at 09:06

## 2021-01-01 RX ADMIN — HYDROMORPHONE HYDROCHLORIDE 1 MG: 1 INJECTION, SOLUTION INTRAMUSCULAR; INTRAVENOUS; SUBCUTANEOUS at 12:06

## 2021-01-01 RX ADMIN — HYDROMORPHONE HYDROCHLORIDE 1 MG: 1 INJECTION, SOLUTION INTRAMUSCULAR; INTRAVENOUS; SUBCUTANEOUS at 05:06

## 2021-01-01 RX ADMIN — SCOPOLAMINE 1 PATCH: 1.5 PATCH, EXTENDED RELEASE TRANSDERMAL at 12:12

## 2021-01-01 RX ADMIN — PANTOPRAZOLE SODIUM 40 MG: 40 INJECTION, POWDER, FOR SOLUTION INTRAVENOUS at 08:06

## 2021-01-01 RX ADMIN — ROCURONIUM BROMIDE 5 MG: 10 INJECTION, SOLUTION INTRAVENOUS at 02:06

## 2021-01-01 RX ADMIN — FENTANYL 1 PATCH: 50 PATCH, EXTENDED RELEASE TRANSDERMAL at 01:06

## 2021-01-01 RX ADMIN — VANCOMYCIN HYDROCHLORIDE 750 MG: 750 INJECTION, POWDER, LYOPHILIZED, FOR SOLUTION INTRAVENOUS at 01:06

## 2021-01-01 RX ADMIN — SODIUM CHLORIDE 1000 ML: 0.9 INJECTION, SOLUTION INTRAVENOUS at 02:04

## 2021-01-01 RX ADMIN — POTASSIUM CHLORIDE 20 MEQ: 14.9 INJECTION, SOLUTION INTRAVENOUS at 06:06

## 2021-01-01 RX ADMIN — KETOROLAC TROMETHAMINE 10 MG: 30 INJECTION, SOLUTION INTRAMUSCULAR; INTRAVENOUS at 12:06

## 2021-01-01 RX ADMIN — MEROPENEM AND SODIUM CHLORIDE 500 MG: 500 INJECTION, SOLUTION INTRAVENOUS at 08:06

## 2021-01-01 RX ADMIN — PROPOFOL 150 MG: 10 INJECTION, EMULSION INTRAVENOUS at 02:06

## 2021-01-01 RX ADMIN — PROMETHAZINE HYDROCHLORIDE 12.5 MG: 25 INJECTION INTRAMUSCULAR; INTRAVENOUS at 02:04

## 2021-01-01 RX ADMIN — MEROPENEM AND SODIUM CHLORIDE 1 G: 1 INJECTION, SOLUTION INTRAVENOUS at 07:06

## 2021-01-01 RX ADMIN — MORPHINE SULFATE 6 MG: 2 INJECTION, SOLUTION INTRAMUSCULAR; INTRAVENOUS at 02:06

## 2021-01-01 RX ADMIN — FENTANYL CITRATE 50 MCG: 50 INJECTION, SOLUTION INTRAMUSCULAR; INTRAVENOUS at 12:05

## 2021-01-01 RX ADMIN — PROPOFOL 90 MG: 10 INJECTION, EMULSION INTRAVENOUS at 01:12

## 2021-01-01 RX ADMIN — PHENYLEPHRINE HYDROCHLORIDE 200 MCG: 10 INJECTION INTRAVENOUS at 12:05

## 2021-01-01 RX ADMIN — SODIUM CHLORIDE 500 ML: 0.9 INJECTION, SOLUTION INTRAVENOUS at 10:06

## 2021-01-01 RX ADMIN — VANCOMYCIN HYDROCHLORIDE 1500 MG: 1.5 INJECTION, POWDER, LYOPHILIZED, FOR SOLUTION INTRAVENOUS at 02:06

## 2021-01-01 RX ADMIN — SODIUM CHLORIDE, POTASSIUM CHLORIDE, SODIUM LACTATE AND CALCIUM CHLORIDE: 600; 310; 30; 20 INJECTION, SOLUTION INTRAVENOUS at 02:06

## 2021-01-01 RX ADMIN — SUCCINYLCHOLINE CHLORIDE 120 MG: 20 INJECTION, SOLUTION INTRAMUSCULAR; INTRAVENOUS at 01:12

## 2021-01-01 RX ADMIN — HYDROMORPHONE HYDROCHLORIDE 2 MG: 2 INJECTION INTRAMUSCULAR; INTRAVENOUS; SUBCUTANEOUS at 07:06

## 2021-01-01 RX ADMIN — LIDOCAINE HYDROCHLORIDE 50 MG: 10 INJECTION, SOLUTION EPIDURAL; INFILTRATION; INTRACAUDAL; PERINEURAL at 02:06

## 2021-01-01 RX ADMIN — FENTANYL CITRATE 100 MCG: 50 INJECTION, SOLUTION INTRAMUSCULAR; INTRAVENOUS at 01:12

## 2021-01-01 RX ADMIN — OXYCODONE HYDROCHLORIDE 30 MG: 30 TABLET ORAL at 12:06

## 2021-01-01 RX ADMIN — LEVOFLOXACIN 750 MG: 750 TABLET, FILM COATED ORAL at 09:06

## 2021-01-01 RX ADMIN — HYDROMORPHONE HYDROCHLORIDE 1 MG: 1 INJECTION, SOLUTION INTRAMUSCULAR; INTRAVENOUS; SUBCUTANEOUS at 10:06

## 2021-01-06 ENCOUNTER — PATIENT MESSAGE (OUTPATIENT)
Dept: RESEARCH | Facility: HOSPITAL | Age: 56
End: 2021-01-06

## 2021-01-08 ENCOUNTER — OFFICE VISIT (OUTPATIENT)
Dept: PALLIATIVE MEDICINE | Facility: CLINIC | Age: 56
End: 2021-01-08
Payer: MEDICARE

## 2021-01-08 DIAGNOSIS — C18.1 CANCER OF APPENDIX METASTATIC TO INTRA-ABDOMINAL LYMPH NODE: ICD-10-CM

## 2021-01-08 DIAGNOSIS — C77.2 CANCER OF APPENDIX METASTATIC TO INTRA-ABDOMINAL LYMPH NODE: ICD-10-CM

## 2021-01-08 PROCEDURE — 99215 OFFICE O/P EST HI 40 MIN: CPT | Mod: 95,,, | Performed by: FAMILY MEDICINE

## 2021-01-08 PROCEDURE — 99215 PR OFFICE/OUTPT VISIT, EST, LEVL V, 40-54 MIN: ICD-10-PCS | Mod: 95,,, | Performed by: FAMILY MEDICINE

## 2021-01-08 RX ORDER — FENTANYL 25 UG/1
1 PATCH TRANSDERMAL
Qty: 10 PATCH | Refills: 0 | Status: SHIPPED | OUTPATIENT
Start: 2021-01-08 | End: 2021-02-09

## 2021-01-08 RX ORDER — OXYCODONE HYDROCHLORIDE 15 MG/1
15-30 TABLET ORAL EVERY 4 HOURS PRN
Qty: 120 TABLET | Refills: 0 | Status: SHIPPED | OUTPATIENT
Start: 2021-01-08 | End: 2021-01-01

## 2021-01-11 ENCOUNTER — TELEPHONE (OUTPATIENT)
Dept: RADIOLOGY | Facility: HOSPITAL | Age: 56
End: 2021-01-11

## 2021-01-12 ENCOUNTER — HOSPITAL ENCOUNTER (OUTPATIENT)
Dept: RADIOLOGY | Facility: HOSPITAL | Age: 56
Discharge: HOME | End: 2021-01-12
Attending: INTERNAL MEDICINE
Payer: MEDICARE

## 2021-01-12 DIAGNOSIS — C18.1 CANCER OF APPENDIX METASTATIC TO INTRA-ABDOMINAL LYMPH NODE: ICD-10-CM

## 2021-01-12 DIAGNOSIS — C77.2 CANCER OF APPENDIX METASTATIC TO INTRA-ABDOMINAL LYMPH NODE: ICD-10-CM

## 2021-01-12 PROCEDURE — 71260 CT CHEST ABDOMEN PELVIS WITH CONTRAST (XPD): ICD-10-PCS | Mod: 26,,, | Performed by: RADIOLOGY

## 2021-01-12 PROCEDURE — 74177 CT CHEST ABDOMEN PELVIS WITH CONTRAST (XPD): ICD-10-PCS | Mod: 26,,, | Performed by: RADIOLOGY

## 2021-01-12 PROCEDURE — 74177 CT ABD & PELVIS W/CONTRAST: CPT | Mod: TC

## 2021-01-12 PROCEDURE — 71260 CT THORAX DX C+: CPT | Mod: 26,,, | Performed by: RADIOLOGY

## 2021-01-12 PROCEDURE — 74177 CT ABD & PELVIS W/CONTRAST: CPT | Mod: 26,,, | Performed by: RADIOLOGY

## 2021-01-12 PROCEDURE — 25500020 PHARM REV CODE 255: Performed by: INTERNAL MEDICINE

## 2021-01-12 PROCEDURE — 71260 CT THORAX DX C+: CPT | Mod: TC

## 2021-01-12 RX ADMIN — IOHEXOL 100 ML: 350 INJECTION, SOLUTION INTRAVENOUS at 11:01

## 2021-01-12 RX ADMIN — IOHEXOL 30 ML: 350 INJECTION, SOLUTION INTRAVENOUS at 10:01

## 2021-01-15 ENCOUNTER — PATIENT MESSAGE (OUTPATIENT)
Dept: OBSTETRICS AND GYNECOLOGY | Facility: CLINIC | Age: 56
End: 2021-01-15

## 2021-01-15 ENCOUNTER — OFFICE VISIT (OUTPATIENT)
Dept: OBSTETRICS AND GYNECOLOGY | Facility: CLINIC | Age: 56
End: 2021-01-15
Payer: MEDICARE

## 2021-01-15 ENCOUNTER — OFFICE VISIT (OUTPATIENT)
Dept: HEMATOLOGY/ONCOLOGY | Facility: CLINIC | Age: 56
End: 2021-01-15
Payer: MEDICARE

## 2021-01-15 VITALS
OXYGEN SATURATION: 100 % | TEMPERATURE: 98 F | DIASTOLIC BLOOD PRESSURE: 80 MMHG | HEIGHT: 64 IN | SYSTOLIC BLOOD PRESSURE: 127 MMHG | BODY MASS INDEX: 17.47 KG/M2 | HEART RATE: 100 BPM | WEIGHT: 102.31 LBS

## 2021-01-15 VITALS
SYSTOLIC BLOOD PRESSURE: 120 MMHG | DIASTOLIC BLOOD PRESSURE: 80 MMHG | BODY MASS INDEX: 17.72 KG/M2 | WEIGHT: 103.81 LBS | HEIGHT: 64 IN

## 2021-01-15 DIAGNOSIS — C77.2 CANCER OF APPENDIX METASTATIC TO INTRA-ABDOMINAL LYMPH NODE: ICD-10-CM

## 2021-01-15 DIAGNOSIS — C26.0 MALIGNANT NEOPLASM OF INTESTINAL TRACT, PART UNSPECIFIED: ICD-10-CM

## 2021-01-15 DIAGNOSIS — C79.9 METASTATIC SIGNET RING CELL CARCINOMA: ICD-10-CM

## 2021-01-15 DIAGNOSIS — N89.8 VAGINAL DRYNESS: Primary | ICD-10-CM

## 2021-01-15 DIAGNOSIS — C18.1 CANCER OF APPENDIX METASTATIC TO INTRA-ABDOMINAL LYMPH NODE: ICD-10-CM

## 2021-01-15 DIAGNOSIS — N89.8 VAGINAL DRYNESS: ICD-10-CM

## 2021-01-15 PROCEDURE — 99212 PR OFFICE/OUTPT VISIT, EST, LEVL II, 10-19 MIN: ICD-10-PCS | Mod: S$PBB,,, | Performed by: NURSE PRACTITIONER

## 2021-01-15 PROCEDURE — 99215 OFFICE O/P EST HI 40 MIN: CPT | Mod: PBBFAC | Performed by: INTERNAL MEDICINE

## 2021-01-15 PROCEDURE — 99214 PR OFFICE/OUTPT VISIT, EST, LEVL IV, 30-39 MIN: ICD-10-PCS | Mod: S$PBB,,, | Performed by: INTERNAL MEDICINE

## 2021-01-15 PROCEDURE — 99999 PR PBB SHADOW E&M-EST. PATIENT-LVL III: ICD-10-PCS | Mod: PBBFAC,,, | Performed by: NURSE PRACTITIONER

## 2021-01-15 PROCEDURE — 99212 OFFICE O/P EST SF 10 MIN: CPT | Mod: S$PBB,,, | Performed by: NURSE PRACTITIONER

## 2021-01-15 PROCEDURE — 99999 PR PBB SHADOW E&M-EST. PATIENT-LVL V: CPT | Mod: PBBFAC,,, | Performed by: INTERNAL MEDICINE

## 2021-01-15 PROCEDURE — 99999 PR PBB SHADOW E&M-EST. PATIENT-LVL V: ICD-10-PCS | Mod: PBBFAC,,, | Performed by: INTERNAL MEDICINE

## 2021-01-15 PROCEDURE — 99214 OFFICE O/P EST MOD 30 MIN: CPT | Mod: S$PBB,,, | Performed by: INTERNAL MEDICINE

## 2021-01-15 PROCEDURE — 99999 PR PBB SHADOW E&M-EST. PATIENT-LVL III: CPT | Mod: PBBFAC,,, | Performed by: NURSE PRACTITIONER

## 2021-01-15 PROCEDURE — 99213 OFFICE O/P EST LOW 20 MIN: CPT | Mod: PBBFAC,27 | Performed by: NURSE PRACTITIONER

## 2021-01-15 RX ORDER — ESTRADIOL 0.1 MG/G
1 CREAM VAGINAL DAILY
Qty: 42.5 G | Refills: 2 | Status: SHIPPED | OUTPATIENT
Start: 2021-01-15 | End: 2021-02-19 | Stop reason: SDUPTHER

## 2021-01-22 ENCOUNTER — TELEPHONE (OUTPATIENT)
Dept: PALLIATIVE MEDICINE | Facility: CLINIC | Age: 56
End: 2021-01-22

## 2021-02-01 ENCOUNTER — TELEPHONE (OUTPATIENT)
Dept: SURGERY | Facility: CLINIC | Age: 56
End: 2021-02-01

## 2021-02-01 ENCOUNTER — HOSPITAL ENCOUNTER (INPATIENT)
Facility: HOSPITAL | Age: 56
LOS: 2 days | Discharge: HOME | DRG: 376 | End: 2021-02-04
Attending: EMERGENCY MEDICINE | Admitting: COLON & RECTAL SURGERY
Payer: MEDICARE

## 2021-02-01 DIAGNOSIS — K56.609 INTESTINAL OBSTRUCTION, UNSPECIFIED CAUSE, UNSPECIFIED WHETHER PARTIAL OR COMPLETE: Primary | ICD-10-CM

## 2021-02-01 LAB
ALBUMIN SERPL BCP-MCNC: 4.2 G/DL (ref 3.5–5.2)
ALBUMIN SERPL BCP-MCNC: 4.6 G/DL (ref 3.5–5.2)
ALP SERPL-CCNC: 112 U/L (ref 55–135)
ALP SERPL-CCNC: 130 U/L (ref 55–135)
ALT SERPL W/O P-5'-P-CCNC: 14 U/L (ref 10–44)
ALT SERPL W/O P-5'-P-CCNC: 14 U/L (ref 10–44)
ANION GAP SERPL CALC-SCNC: 12 MMOL/L (ref 8–16)
ANION GAP SERPL CALC-SCNC: 16 MMOL/L (ref 8–16)
AST SERPL-CCNC: 18 U/L (ref 10–40)
AST SERPL-CCNC: 21 U/L (ref 10–40)
BACTERIA #/AREA URNS HPF: ABNORMAL /HPF
BASOPHILS # BLD AUTO: 0.01 K/UL (ref 0–0.2)
BASOPHILS # BLD AUTO: 0.01 K/UL (ref 0–0.2)
BASOPHILS NFR BLD: 0.1 % (ref 0–1.9)
BASOPHILS NFR BLD: 0.1 % (ref 0–1.9)
BILIRUB SERPL-MCNC: 0.6 MG/DL (ref 0.1–1)
BILIRUB SERPL-MCNC: 0.7 MG/DL (ref 0.1–1)
BILIRUB UR QL STRIP: NEGATIVE
BUN SERPL-MCNC: 15 MG/DL (ref 6–20)
BUN SERPL-MCNC: 18 MG/DL (ref 6–20)
CALCIUM SERPL-MCNC: 9 MG/DL (ref 8.7–10.5)
CALCIUM SERPL-MCNC: 9.7 MG/DL (ref 8.7–10.5)
CHLORIDE SERPL-SCNC: 102 MMOL/L (ref 95–110)
CHLORIDE SERPL-SCNC: 97 MMOL/L (ref 95–110)
CLARITY UR: CLEAR
CO2 SERPL-SCNC: 25 MMOL/L (ref 23–29)
CO2 SERPL-SCNC: 26 MMOL/L (ref 23–29)
COLOR UR: YELLOW
CREAT SERPL-MCNC: 0.8 MG/DL (ref 0.5–1.4)
CREAT SERPL-MCNC: 0.9 MG/DL (ref 0.5–1.4)
CTP QC/QA: YES
DIFFERENTIAL METHOD: ABNORMAL
DIFFERENTIAL METHOD: ABNORMAL
EOSINOPHIL # BLD AUTO: 0 K/UL (ref 0–0.5)
EOSINOPHIL # BLD AUTO: 0 K/UL (ref 0–0.5)
EOSINOPHIL NFR BLD: 0.1 % (ref 0–8)
EOSINOPHIL NFR BLD: 0.1 % (ref 0–8)
ERYTHROCYTE [DISTWIDTH] IN BLOOD BY AUTOMATED COUNT: 13 % (ref 11.5–14.5)
ERYTHROCYTE [DISTWIDTH] IN BLOOD BY AUTOMATED COUNT: 13.2 % (ref 11.5–14.5)
EST. GFR  (AFRICAN AMERICAN): >60 ML/MIN/1.73 M^2
EST. GFR  (AFRICAN AMERICAN): >60 ML/MIN/1.73 M^2
EST. GFR  (NON AFRICAN AMERICAN): >60 ML/MIN/1.73 M^2
EST. GFR  (NON AFRICAN AMERICAN): >60 ML/MIN/1.73 M^2
GLUCOSE SERPL-MCNC: 75 MG/DL (ref 70–110)
GLUCOSE SERPL-MCNC: 93 MG/DL (ref 70–110)
GLUCOSE UR QL STRIP: NEGATIVE
HCT VFR BLD AUTO: 36.9 % (ref 37–48.5)
HCT VFR BLD AUTO: 39.5 % (ref 37–48.5)
HCV AB SERPL QL IA: NEGATIVE
HGB BLD-MCNC: 12 G/DL (ref 12–16)
HGB BLD-MCNC: 12.9 G/DL (ref 12–16)
HGB UR QL STRIP: ABNORMAL
HIV 1+2 AB+HIV1 P24 AG SERPL QL IA: NEGATIVE
HYALINE CASTS #/AREA URNS LPF: 0 /LPF
IMM GRANULOCYTES # BLD AUTO: 0.01 K/UL (ref 0–0.04)
IMM GRANULOCYTES # BLD AUTO: 0.03 K/UL (ref 0–0.04)
IMM GRANULOCYTES NFR BLD AUTO: 0.1 % (ref 0–0.5)
IMM GRANULOCYTES NFR BLD AUTO: 0.3 % (ref 0–0.5)
KETONES UR QL STRIP: ABNORMAL
LEUKOCYTE ESTERASE UR QL STRIP: ABNORMAL
LIPASE SERPL-CCNC: 23 U/L (ref 4–60)
LYMPHOCYTES # BLD AUTO: 0.8 K/UL (ref 1–4.8)
LYMPHOCYTES # BLD AUTO: 1 K/UL (ref 1–4.8)
LYMPHOCYTES NFR BLD: 14.5 % (ref 18–48)
LYMPHOCYTES NFR BLD: 8.3 % (ref 18–48)
MCH RBC QN AUTO: 28.4 PG (ref 27–31)
MCH RBC QN AUTO: 28.7 PG (ref 27–31)
MCHC RBC AUTO-ENTMCNC: 32.5 G/DL (ref 32–36)
MCHC RBC AUTO-ENTMCNC: 32.7 G/DL (ref 32–36)
MCV RBC AUTO: 87 FL (ref 82–98)
MCV RBC AUTO: 88 FL (ref 82–98)
MICROSCOPIC COMMENT: ABNORMAL
MONOCYTES # BLD AUTO: 0.2 K/UL (ref 0.3–1)
MONOCYTES # BLD AUTO: 0.3 K/UL (ref 0.3–1)
MONOCYTES NFR BLD: 2.2 % (ref 4–15)
MONOCYTES NFR BLD: 4.6 % (ref 4–15)
NEUTROPHILS # BLD AUTO: 5.8 K/UL (ref 1.8–7.7)
NEUTROPHILS # BLD AUTO: 8.1 K/UL (ref 1.8–7.7)
NEUTROPHILS NFR BLD: 80.6 % (ref 38–73)
NEUTROPHILS NFR BLD: 89 % (ref 38–73)
NITRITE UR QL STRIP: POSITIVE
NRBC BLD-RTO: 0 /100 WBC
NRBC BLD-RTO: 0 /100 WBC
PH UR STRIP: 7 [PH] (ref 5–8)
PLATELET # BLD AUTO: 276 K/UL (ref 150–350)
PLATELET # BLD AUTO: 306 K/UL (ref 150–350)
PMV BLD AUTO: 9.3 FL (ref 9.2–12.9)
PMV BLD AUTO: 9.8 FL (ref 9.2–12.9)
POTASSIUM SERPL-SCNC: 4.2 MMOL/L (ref 3.5–5.1)
POTASSIUM SERPL-SCNC: 4.3 MMOL/L (ref 3.5–5.1)
PROT SERPL-MCNC: 7.1 G/DL (ref 6–8.4)
PROT SERPL-MCNC: 7.8 G/DL (ref 6–8.4)
PROT UR QL STRIP: ABNORMAL
RBC # BLD AUTO: 4.18 M/UL (ref 4–5.4)
RBC # BLD AUTO: 4.55 M/UL (ref 4–5.4)
RBC #/AREA URNS HPF: 5 /HPF (ref 0–4)
SARS-COV-2 RDRP RESP QL NAA+PROBE: NEGATIVE
SODIUM SERPL-SCNC: 139 MMOL/L (ref 136–145)
SODIUM SERPL-SCNC: 139 MMOL/L (ref 136–145)
SP GR UR STRIP: 1.02 (ref 1–1.03)
URN SPEC COLLECT METH UR: ABNORMAL
UROBILINOGEN UR STRIP-ACNC: NEGATIVE EU/DL
WBC # BLD AUTO: 7.16 K/UL (ref 3.9–12.7)
WBC # BLD AUTO: 9.08 K/UL (ref 3.9–12.7)
WBC #/AREA URNS HPF: 11 /HPF (ref 0–5)
WBC CLUMPS URNS QL MICRO: ABNORMAL

## 2021-02-01 PROCEDURE — 96375 TX/PRO/DX INJ NEW DRUG ADDON: CPT

## 2021-02-01 PROCEDURE — 86703 HIV-1/HIV-2 1 RESULT ANTBDY: CPT

## 2021-02-01 PROCEDURE — 99223 1ST HOSP IP/OBS HIGH 75: CPT | Mod: ,,, | Performed by: SURGERY

## 2021-02-01 PROCEDURE — 63600175 PHARM REV CODE 636 W HCPCS: Performed by: EMERGENCY MEDICINE

## 2021-02-01 PROCEDURE — G0378 HOSPITAL OBSERVATION PER HR: HCPCS

## 2021-02-01 PROCEDURE — 83690 ASSAY OF LIPASE: CPT

## 2021-02-01 PROCEDURE — 96374 THER/PROPH/DIAG INJ IV PUSH: CPT

## 2021-02-01 PROCEDURE — 87088 URINE BACTERIA CULTURE: CPT

## 2021-02-01 PROCEDURE — 80053 COMPREHEN METABOLIC PANEL: CPT | Mod: 91

## 2021-02-01 PROCEDURE — 99285 EMERGENCY DEPT VISIT HI MDM: CPT | Mod: 25

## 2021-02-01 PROCEDURE — S0028 INJECTION, FAMOTIDINE, 20 MG: HCPCS | Performed by: EMERGENCY MEDICINE

## 2021-02-01 PROCEDURE — 87086 URINE CULTURE/COLONY COUNT: CPT

## 2021-02-01 PROCEDURE — 25000003 PHARM REV CODE 250: Performed by: EMERGENCY MEDICINE

## 2021-02-01 PROCEDURE — U0002 COVID-19 LAB TEST NON-CDC: HCPCS | Performed by: EMERGENCY MEDICINE

## 2021-02-01 PROCEDURE — 81000 URINALYSIS NONAUTO W/SCOPE: CPT

## 2021-02-01 PROCEDURE — 86803 HEPATITIS C AB TEST: CPT

## 2021-02-01 PROCEDURE — 96361 HYDRATE IV INFUSION ADD-ON: CPT

## 2021-02-01 PROCEDURE — 85025 COMPLETE CBC W/AUTO DIFF WBC: CPT

## 2021-02-01 PROCEDURE — 96376 TX/PRO/DX INJ SAME DRUG ADON: CPT

## 2021-02-01 PROCEDURE — 36415 COLL VENOUS BLD VENIPUNCTURE: CPT

## 2021-02-01 PROCEDURE — 99223 PR INITIAL HOSPITAL CARE,LEVL III: ICD-10-PCS | Mod: ,,, | Performed by: SURGERY

## 2021-02-01 RX ORDER — HYDROMORPHONE HYDROCHLORIDE 1 MG/ML
1 INJECTION, SOLUTION INTRAMUSCULAR; INTRAVENOUS; SUBCUTANEOUS
Status: COMPLETED | OUTPATIENT
Start: 2021-02-01 | End: 2021-02-01

## 2021-02-01 RX ORDER — ACETAMINOPHEN 325 MG/1
650 TABLET ORAL EVERY 8 HOURS PRN
Status: DISCONTINUED | OUTPATIENT
Start: 2021-02-01 | End: 2021-02-04 | Stop reason: HOSPADM

## 2021-02-01 RX ORDER — TALC
6 POWDER (GRAM) TOPICAL NIGHTLY PRN
Status: DISCONTINUED | OUTPATIENT
Start: 2021-02-01 | End: 2021-02-04 | Stop reason: HOSPADM

## 2021-02-01 RX ORDER — MORPHINE SULFATE 4 MG/ML
4 INJECTION, SOLUTION INTRAMUSCULAR; INTRAVENOUS EVERY 4 HOURS PRN
Status: DISCONTINUED | OUTPATIENT
Start: 2021-02-01 | End: 2021-02-04 | Stop reason: HOSPADM

## 2021-02-01 RX ORDER — ONDANSETRON 2 MG/ML
4 INJECTION INTRAMUSCULAR; INTRAVENOUS
Status: COMPLETED | OUTPATIENT
Start: 2021-02-01 | End: 2021-02-01

## 2021-02-01 RX ORDER — SODIUM CHLORIDE 9 MG/ML
INJECTION, SOLUTION INTRAVENOUS CONTINUOUS
Status: DISCONTINUED | OUTPATIENT
Start: 2021-02-01 | End: 2021-02-03

## 2021-02-01 RX ORDER — ONDANSETRON 2 MG/ML
4 INJECTION INTRAMUSCULAR; INTRAVENOUS EVERY 12 HOURS PRN
Status: DISCONTINUED | OUTPATIENT
Start: 2021-02-01 | End: 2021-02-04 | Stop reason: HOSPADM

## 2021-02-01 RX ORDER — SODIUM CHLORIDE 0.9 % (FLUSH) 0.9 %
10 SYRINGE (ML) INJECTION
Status: DISCONTINUED | OUTPATIENT
Start: 2021-02-01 | End: 2021-02-04 | Stop reason: HOSPADM

## 2021-02-01 RX ORDER — FAMOTIDINE 20 MG/50ML
20 INJECTION, SOLUTION INTRAVENOUS EVERY 12 HOURS
Status: DISCONTINUED | OUTPATIENT
Start: 2021-02-01 | End: 2021-02-04 | Stop reason: HOSPADM

## 2021-02-01 RX ORDER — MORPHINE SULFATE 4 MG/ML
4 INJECTION, SOLUTION INTRAMUSCULAR; INTRAVENOUS
Status: COMPLETED | OUTPATIENT
Start: 2021-02-01 | End: 2021-02-01

## 2021-02-01 RX ORDER — MORPHINE SULFATE 2 MG/ML
2 INJECTION, SOLUTION INTRAMUSCULAR; INTRAVENOUS EVERY 4 HOURS PRN
Status: DISCONTINUED | OUTPATIENT
Start: 2021-02-01 | End: 2021-02-04 | Stop reason: HOSPADM

## 2021-02-01 RX ADMIN — MORPHINE SULFATE 4 MG: 4 INJECTION INTRAVENOUS at 03:02

## 2021-02-01 RX ADMIN — SODIUM CHLORIDE 1000 ML: 0.9 INJECTION, SOLUTION INTRAVENOUS at 01:02

## 2021-02-01 RX ADMIN — MORPHINE SULFATE 2 MG: 2 INJECTION, SOLUTION INTRAMUSCULAR; INTRAVENOUS at 04:02

## 2021-02-01 RX ADMIN — FAMOTIDINE 20 MG: 20 INJECTION, SOLUTION INTRAVENOUS at 08:02

## 2021-02-01 RX ADMIN — HYDROMORPHONE HYDROCHLORIDE 1 MG: 1 INJECTION, SOLUTION INTRAMUSCULAR; INTRAVENOUS; SUBCUTANEOUS at 01:02

## 2021-02-01 RX ADMIN — MORPHINE SULFATE 4 MG: 4 INJECTION INTRAVENOUS at 11:02

## 2021-02-01 RX ADMIN — ONDANSETRON 4 MG: 2 INJECTION INTRAMUSCULAR; INTRAVENOUS at 04:02

## 2021-02-01 RX ADMIN — ONDANSETRON 4 MG: 2 INJECTION INTRAMUSCULAR; INTRAVENOUS at 01:02

## 2021-02-01 RX ADMIN — MORPHINE SULFATE 4 MG: 4 INJECTION INTRAVENOUS at 04:02

## 2021-02-01 RX ADMIN — PROMETHAZINE HYDROCHLORIDE 6.25 MG: 25 INJECTION INTRAMUSCULAR; INTRAVENOUS at 07:02

## 2021-02-01 RX ADMIN — MORPHINE SULFATE 4 MG: 4 INJECTION INTRAVENOUS at 07:02

## 2021-02-01 RX ADMIN — SODIUM CHLORIDE: 0.9 INJECTION, SOLUTION INTRAVENOUS at 04:02

## 2021-02-01 RX ADMIN — ONDANSETRON 4 MG: 2 INJECTION INTRAMUSCULAR; INTRAVENOUS at 03:02

## 2021-02-02 LAB
ALBUMIN SERPL BCP-MCNC: 3.8 G/DL (ref 3.5–5.2)
ALP SERPL-CCNC: 106 U/L (ref 55–135)
ALT SERPL W/O P-5'-P-CCNC: 13 U/L (ref 10–44)
ANION GAP SERPL CALC-SCNC: 13 MMOL/L (ref 8–16)
AST SERPL-CCNC: 18 U/L (ref 10–40)
BACTERIA UR CULT: ABNORMAL
BASOPHILS # BLD AUTO: 0.01 K/UL (ref 0–0.2)
BASOPHILS NFR BLD: 0.2 % (ref 0–1.9)
BILIRUB SERPL-MCNC: 0.5 MG/DL (ref 0.1–1)
BUN SERPL-MCNC: 21 MG/DL (ref 6–20)
CALCIUM SERPL-MCNC: 8.6 MG/DL (ref 8.7–10.5)
CHLORIDE SERPL-SCNC: 108 MMOL/L (ref 95–110)
CO2 SERPL-SCNC: 19 MMOL/L (ref 23–29)
CREAT SERPL-MCNC: 0.8 MG/DL (ref 0.5–1.4)
DIFFERENTIAL METHOD: ABNORMAL
EOSINOPHIL # BLD AUTO: 0.1 K/UL (ref 0–0.5)
EOSINOPHIL NFR BLD: 1.7 % (ref 0–8)
ERYTHROCYTE [DISTWIDTH] IN BLOOD BY AUTOMATED COUNT: 12.9 % (ref 11.5–14.5)
EST. GFR  (AFRICAN AMERICAN): >60 ML/MIN/1.73 M^2
EST. GFR  (NON AFRICAN AMERICAN): >60 ML/MIN/1.73 M^2
GLUCOSE SERPL-MCNC: 45 MG/DL (ref 70–110)
HCT VFR BLD AUTO: 35.7 % (ref 37–48.5)
HGB BLD-MCNC: 11.2 G/DL (ref 12–16)
IMM GRANULOCYTES # BLD AUTO: 0.02 K/UL (ref 0–0.04)
IMM GRANULOCYTES NFR BLD AUTO: 0.3 % (ref 0–0.5)
LYMPHOCYTES # BLD AUTO: 1.3 K/UL (ref 1–4.8)
LYMPHOCYTES NFR BLD: 22.4 % (ref 18–48)
MCH RBC QN AUTO: 28.5 PG (ref 27–31)
MCHC RBC AUTO-ENTMCNC: 31.4 G/DL (ref 32–36)
MCV RBC AUTO: 91 FL (ref 82–98)
MONOCYTES # BLD AUTO: 0.4 K/UL (ref 0.3–1)
MONOCYTES NFR BLD: 6.5 % (ref 4–15)
NEUTROPHILS # BLD AUTO: 4.1 K/UL (ref 1.8–7.7)
NEUTROPHILS NFR BLD: 68.9 % (ref 38–73)
NRBC BLD-RTO: 0 /100 WBC
PLATELET # BLD AUTO: 234 K/UL (ref 150–350)
PMV BLD AUTO: 9.7 FL (ref 9.2–12.9)
POTASSIUM SERPL-SCNC: 4.1 MMOL/L (ref 3.5–5.1)
PROT SERPL-MCNC: 6.4 G/DL (ref 6–8.4)
RBC # BLD AUTO: 3.93 M/UL (ref 4–5.4)
SODIUM SERPL-SCNC: 140 MMOL/L (ref 136–145)
WBC # BLD AUTO: 5.99 K/UL (ref 3.9–12.7)

## 2021-02-02 PROCEDURE — 11000001 HC ACUTE MED/SURG PRIVATE ROOM

## 2021-02-02 PROCEDURE — 99232 SBSQ HOSP IP/OBS MODERATE 35: CPT | Mod: ,,, | Performed by: COLON & RECTAL SURGERY

## 2021-02-02 PROCEDURE — 99232 PR SUBSEQUENT HOSPITAL CARE,LEVL II: ICD-10-PCS | Mod: ,,, | Performed by: COLON & RECTAL SURGERY

## 2021-02-02 PROCEDURE — 96361 HYDRATE IV INFUSION ADD-ON: CPT

## 2021-02-02 PROCEDURE — 85025 COMPLETE CBC W/AUTO DIFF WBC: CPT

## 2021-02-02 PROCEDURE — 63600175 PHARM REV CODE 636 W HCPCS: Performed by: COLON & RECTAL SURGERY

## 2021-02-02 PROCEDURE — 96376 TX/PRO/DX INJ SAME DRUG ADON: CPT

## 2021-02-02 PROCEDURE — S0028 INJECTION, FAMOTIDINE, 20 MG: HCPCS | Performed by: EMERGENCY MEDICINE

## 2021-02-02 PROCEDURE — 36415 COLL VENOUS BLD VENIPUNCTURE: CPT

## 2021-02-02 PROCEDURE — 80053 COMPREHEN METABOLIC PANEL: CPT

## 2021-02-02 PROCEDURE — 25000003 PHARM REV CODE 250: Performed by: EMERGENCY MEDICINE

## 2021-02-02 PROCEDURE — 25000003 PHARM REV CODE 250: Performed by: SURGERY

## 2021-02-02 PROCEDURE — 63600175 PHARM REV CODE 636 W HCPCS: Performed by: EMERGENCY MEDICINE

## 2021-02-02 RX ORDER — ENOXAPARIN SODIUM 100 MG/ML
40 INJECTION SUBCUTANEOUS EVERY 24 HOURS
Status: DISCONTINUED | OUTPATIENT
Start: 2021-02-02 | End: 2021-02-04 | Stop reason: HOSPADM

## 2021-02-02 RX ADMIN — MORPHINE SULFATE 4 MG: 4 INJECTION INTRAVENOUS at 02:02

## 2021-02-02 RX ADMIN — FAMOTIDINE 20 MG: 20 INJECTION, SOLUTION INTRAVENOUS at 09:02

## 2021-02-02 RX ADMIN — SODIUM CHLORIDE: 0.9 INJECTION, SOLUTION INTRAVENOUS at 02:02

## 2021-02-02 RX ADMIN — ONDANSETRON 4 MG: 2 INJECTION INTRAMUSCULAR; INTRAVENOUS at 11:02

## 2021-02-02 RX ADMIN — MORPHINE SULFATE 4 MG: 4 INJECTION INTRAVENOUS at 08:02

## 2021-02-02 RX ADMIN — ENOXAPARIN SODIUM 40 MG: 40 INJECTION SUBCUTANEOUS at 05:02

## 2021-02-02 RX ADMIN — FAMOTIDINE 20 MG: 20 INJECTION, SOLUTION INTRAVENOUS at 08:02

## 2021-02-02 RX ADMIN — MORPHINE SULFATE 4 MG: 4 INJECTION INTRAVENOUS at 12:02

## 2021-02-02 RX ADMIN — SODIUM CHLORIDE: 0.9 INJECTION, SOLUTION INTRAVENOUS at 01:02

## 2021-02-02 RX ADMIN — MORPHINE SULFATE 4 MG: 4 INJECTION INTRAVENOUS at 04:02

## 2021-02-03 LAB
ALBUMIN SERPL BCP-MCNC: 3.7 G/DL (ref 3.5–5.2)
ALP SERPL-CCNC: 97 U/L (ref 55–135)
ALT SERPL W/O P-5'-P-CCNC: 9 U/L (ref 10–44)
ANION GAP SERPL CALC-SCNC: 13 MMOL/L (ref 8–16)
AST SERPL-CCNC: 18 U/L (ref 10–40)
BASOPHILS # BLD AUTO: 0.02 K/UL (ref 0–0.2)
BASOPHILS NFR BLD: 0.4 % (ref 0–1.9)
BILIRUB SERPL-MCNC: 0.3 MG/DL (ref 0.1–1)
BUN SERPL-MCNC: 14 MG/DL (ref 6–20)
CALCIUM SERPL-MCNC: 8.4 MG/DL (ref 8.7–10.5)
CHLORIDE SERPL-SCNC: 109 MMOL/L (ref 95–110)
CO2 SERPL-SCNC: 17 MMOL/L (ref 23–29)
CREAT SERPL-MCNC: 0.8 MG/DL (ref 0.5–1.4)
DIFFERENTIAL METHOD: ABNORMAL
EOSINOPHIL # BLD AUTO: 0.1 K/UL (ref 0–0.5)
EOSINOPHIL NFR BLD: 1.6 % (ref 0–8)
ERYTHROCYTE [DISTWIDTH] IN BLOOD BY AUTOMATED COUNT: 13 % (ref 11.5–14.5)
EST. GFR  (AFRICAN AMERICAN): >60 ML/MIN/1.73 M^2
EST. GFR  (NON AFRICAN AMERICAN): >60 ML/MIN/1.73 M^2
GLUCOSE SERPL-MCNC: 39 MG/DL (ref 70–110)
HCT VFR BLD AUTO: 34.2 % (ref 37–48.5)
HGB BLD-MCNC: 10.7 G/DL (ref 12–16)
IMM GRANULOCYTES # BLD AUTO: 0.02 K/UL (ref 0–0.04)
IMM GRANULOCYTES NFR BLD AUTO: 0.4 % (ref 0–0.5)
LYMPHOCYTES # BLD AUTO: 1.3 K/UL (ref 1–4.8)
LYMPHOCYTES NFR BLD: 22.8 % (ref 18–48)
MCH RBC QN AUTO: 28.6 PG (ref 27–31)
MCHC RBC AUTO-ENTMCNC: 31.3 G/DL (ref 32–36)
MCV RBC AUTO: 91 FL (ref 82–98)
MONOCYTES # BLD AUTO: 0.2 K/UL (ref 0.3–1)
MONOCYTES NFR BLD: 4.3 % (ref 4–15)
NEUTROPHILS # BLD AUTO: 3.9 K/UL (ref 1.8–7.7)
NEUTROPHILS NFR BLD: 70.5 % (ref 38–73)
NRBC BLD-RTO: 0 /100 WBC
PLATELET # BLD AUTO: 231 K/UL (ref 150–350)
PMV BLD AUTO: 9.4 FL (ref 9.2–12.9)
POTASSIUM SERPL-SCNC: 3.6 MMOL/L (ref 3.5–5.1)
PROT SERPL-MCNC: 6.1 G/DL (ref 6–8.4)
RBC # BLD AUTO: 3.74 M/UL (ref 4–5.4)
SODIUM SERPL-SCNC: 139 MMOL/L (ref 136–145)
WBC # BLD AUTO: 5.57 K/UL (ref 3.9–12.7)

## 2021-02-03 PROCEDURE — 80053 COMPREHEN METABOLIC PANEL: CPT

## 2021-02-03 PROCEDURE — 25000003 PHARM REV CODE 250: Performed by: EMERGENCY MEDICINE

## 2021-02-03 PROCEDURE — 25500020 PHARM REV CODE 255: Performed by: COLON & RECTAL SURGERY

## 2021-02-03 PROCEDURE — 99232 SBSQ HOSP IP/OBS MODERATE 35: CPT | Mod: ,,, | Performed by: COLON & RECTAL SURGERY

## 2021-02-03 PROCEDURE — 96372 THER/PROPH/DIAG INJ SC/IM: CPT

## 2021-02-03 PROCEDURE — S0028 INJECTION, FAMOTIDINE, 20 MG: HCPCS | Performed by: EMERGENCY MEDICINE

## 2021-02-03 PROCEDURE — 36415 COLL VENOUS BLD VENIPUNCTURE: CPT

## 2021-02-03 PROCEDURE — S5010 5% DEXTROSE AND 0.45% SALINE: HCPCS | Performed by: COLON & RECTAL SURGERY

## 2021-02-03 PROCEDURE — 63600175 PHARM REV CODE 636 W HCPCS: Performed by: EMERGENCY MEDICINE

## 2021-02-03 PROCEDURE — 25000003 PHARM REV CODE 250: Performed by: COLON & RECTAL SURGERY

## 2021-02-03 PROCEDURE — 85025 COMPLETE CBC W/AUTO DIFF WBC: CPT

## 2021-02-03 PROCEDURE — 11000001 HC ACUTE MED/SURG PRIVATE ROOM

## 2021-02-03 PROCEDURE — 63600175 PHARM REV CODE 636 W HCPCS: Performed by: COLON & RECTAL SURGERY

## 2021-02-03 PROCEDURE — 99232 PR SUBSEQUENT HOSPITAL CARE,LEVL II: ICD-10-PCS | Mod: ,,, | Performed by: COLON & RECTAL SURGERY

## 2021-02-03 RX ORDER — DEXTROSE MONOHYDRATE AND SODIUM CHLORIDE 5; .45 G/100ML; G/100ML
INJECTION, SOLUTION INTRAVENOUS CONTINUOUS
Status: DISCONTINUED | OUTPATIENT
Start: 2021-02-03 | End: 2021-02-04 | Stop reason: HOSPADM

## 2021-02-03 RX ADMIN — DEXTROSE AND SODIUM CHLORIDE: 5; .45 INJECTION, SOLUTION INTRAVENOUS at 08:02

## 2021-02-03 RX ADMIN — FAMOTIDINE 20 MG: 20 INJECTION, SOLUTION INTRAVENOUS at 07:02

## 2021-02-03 RX ADMIN — DIATRIZOATE MEGLUMINE AND DIATRIZOATE SODIUM 140 ML: 660; 100 LIQUID ORAL; RECTAL at 10:02

## 2021-02-03 RX ADMIN — ENOXAPARIN SODIUM 40 MG: 40 INJECTION SUBCUTANEOUS at 04:02

## 2021-02-03 RX ADMIN — FAMOTIDINE 20 MG: 20 INJECTION, SOLUTION INTRAVENOUS at 09:02

## 2021-02-03 RX ADMIN — MORPHINE SULFATE 2 MG: 2 INJECTION, SOLUTION INTRAMUSCULAR; INTRAVENOUS at 12:02

## 2021-02-03 RX ADMIN — MORPHINE SULFATE 4 MG: 4 INJECTION INTRAVENOUS at 03:02

## 2021-02-03 RX ADMIN — MORPHINE SULFATE 4 MG: 4 INJECTION INTRAVENOUS at 07:02

## 2021-02-04 VITALS
RESPIRATION RATE: 18 BRPM | DIASTOLIC BLOOD PRESSURE: 62 MMHG | HEART RATE: 88 BPM | BODY MASS INDEX: 17.58 KG/M2 | SYSTOLIC BLOOD PRESSURE: 107 MMHG | WEIGHT: 103 LBS | OXYGEN SATURATION: 96 % | HEIGHT: 64 IN | TEMPERATURE: 98 F

## 2021-02-04 LAB
ALBUMIN SERPL BCP-MCNC: 3.4 G/DL (ref 3.5–5.2)
ALP SERPL-CCNC: 104 U/L (ref 55–135)
ALT SERPL W/O P-5'-P-CCNC: 8 U/L (ref 10–44)
ANION GAP SERPL CALC-SCNC: 6 MMOL/L (ref 8–16)
AST SERPL-CCNC: 14 U/L (ref 10–40)
BASOPHILS # BLD AUTO: 0.01 K/UL (ref 0–0.2)
BASOPHILS NFR BLD: 0.1 % (ref 0–1.9)
BILIRUB SERPL-MCNC: 0.2 MG/DL (ref 0.1–1)
BUN SERPL-MCNC: 13 MG/DL (ref 6–20)
CALCIUM SERPL-MCNC: 8.2 MG/DL (ref 8.7–10.5)
CHLORIDE SERPL-SCNC: 113 MMOL/L (ref 95–110)
CO2 SERPL-SCNC: 20 MMOL/L (ref 23–29)
CREAT SERPL-MCNC: 0.8 MG/DL (ref 0.5–1.4)
DIFFERENTIAL METHOD: ABNORMAL
EOSINOPHIL # BLD AUTO: 0.4 K/UL (ref 0–0.5)
EOSINOPHIL NFR BLD: 4.7 % (ref 0–8)
ERYTHROCYTE [DISTWIDTH] IN BLOOD BY AUTOMATED COUNT: 13.2 % (ref 11.5–14.5)
EST. GFR  (AFRICAN AMERICAN): >60 ML/MIN/1.73 M^2
EST. GFR  (NON AFRICAN AMERICAN): >60 ML/MIN/1.73 M^2
GLUCOSE SERPL-MCNC: 117 MG/DL (ref 70–110)
HCT VFR BLD AUTO: 33.5 % (ref 37–48.5)
HGB BLD-MCNC: 10.9 G/DL (ref 12–16)
IMM GRANULOCYTES # BLD AUTO: 0.02 K/UL (ref 0–0.04)
IMM GRANULOCYTES NFR BLD AUTO: 0.3 % (ref 0–0.5)
LYMPHOCYTES # BLD AUTO: 1.3 K/UL (ref 1–4.8)
LYMPHOCYTES NFR BLD: 17 % (ref 18–48)
MCH RBC QN AUTO: 28.8 PG (ref 27–31)
MCHC RBC AUTO-ENTMCNC: 32.5 G/DL (ref 32–36)
MCV RBC AUTO: 88 FL (ref 82–98)
MONOCYTES # BLD AUTO: 0.5 K/UL (ref 0.3–1)
MONOCYTES NFR BLD: 6.9 % (ref 4–15)
NEUTROPHILS # BLD AUTO: 5.3 K/UL (ref 1.8–7.7)
NEUTROPHILS NFR BLD: 71 % (ref 38–73)
NRBC BLD-RTO: 0 /100 WBC
PLATELET # BLD AUTO: 233 K/UL (ref 150–350)
PMV BLD AUTO: 9.7 FL (ref 9.2–12.9)
POTASSIUM SERPL-SCNC: 3.5 MMOL/L (ref 3.5–5.1)
PROT SERPL-MCNC: 5.9 G/DL (ref 6–8.4)
RBC # BLD AUTO: 3.79 M/UL (ref 4–5.4)
SODIUM SERPL-SCNC: 139 MMOL/L (ref 136–145)
WBC # BLD AUTO: 7.41 K/UL (ref 3.9–12.7)

## 2021-02-04 PROCEDURE — 25000003 PHARM REV CODE 250: Performed by: COLON & RECTAL SURGERY

## 2021-02-04 PROCEDURE — 85025 COMPLETE CBC W/AUTO DIFF WBC: CPT

## 2021-02-04 PROCEDURE — 63600175 PHARM REV CODE 636 W HCPCS: Performed by: EMERGENCY MEDICINE

## 2021-02-04 PROCEDURE — 80053 COMPREHEN METABOLIC PANEL: CPT

## 2021-02-04 PROCEDURE — 99239 HOSP IP/OBS DSCHRG MGMT >30: CPT | Mod: ,,, | Performed by: COLON & RECTAL SURGERY

## 2021-02-04 PROCEDURE — 36415 COLL VENOUS BLD VENIPUNCTURE: CPT

## 2021-02-04 PROCEDURE — 25000003 PHARM REV CODE 250: Performed by: EMERGENCY MEDICINE

## 2021-02-04 PROCEDURE — S5010 5% DEXTROSE AND 0.45% SALINE: HCPCS | Performed by: COLON & RECTAL SURGERY

## 2021-02-04 PROCEDURE — S0028 INJECTION, FAMOTIDINE, 20 MG: HCPCS | Performed by: EMERGENCY MEDICINE

## 2021-02-04 PROCEDURE — 99239 PR HOSPITAL DISCHARGE DAY,>30 MIN: ICD-10-PCS | Mod: ,,, | Performed by: COLON & RECTAL SURGERY

## 2021-02-04 RX ADMIN — MORPHINE SULFATE 4 MG: 4 INJECTION INTRAVENOUS at 07:02

## 2021-02-04 RX ADMIN — MORPHINE SULFATE 4 MG: 4 INJECTION INTRAVENOUS at 12:02

## 2021-02-04 RX ADMIN — DEXTROSE AND SODIUM CHLORIDE: 5; .45 INJECTION, SOLUTION INTRAVENOUS at 07:02

## 2021-02-04 RX ADMIN — FAMOTIDINE 20 MG: 20 INJECTION, SOLUTION INTRAVENOUS at 08:02

## 2021-02-08 DIAGNOSIS — C77.2 CANCER OF APPENDIX METASTATIC TO INTRA-ABDOMINAL LYMPH NODE: ICD-10-CM

## 2021-02-08 DIAGNOSIS — C18.1 CANCER OF APPENDIX METASTATIC TO INTRA-ABDOMINAL LYMPH NODE: ICD-10-CM

## 2021-02-08 RX ORDER — OXYCODONE HYDROCHLORIDE 15 MG/1
15-30 TABLET ORAL EVERY 4 HOURS PRN
Qty: 120 TABLET | Refills: 0 | OUTPATIENT
Start: 2021-02-08

## 2021-02-09 ENCOUNTER — OFFICE VISIT (OUTPATIENT)
Dept: PALLIATIVE MEDICINE | Facility: CLINIC | Age: 56
End: 2021-02-09
Payer: MEDICARE

## 2021-02-09 ENCOUNTER — PATIENT MESSAGE (OUTPATIENT)
Dept: PALLIATIVE MEDICINE | Facility: CLINIC | Age: 56
End: 2021-02-09

## 2021-02-09 DIAGNOSIS — C77.2 CANCER OF APPENDIX METASTATIC TO INTRA-ABDOMINAL LYMPH NODE: Primary | ICD-10-CM

## 2021-02-09 DIAGNOSIS — C18.1 CANCER OF APPENDIX METASTATIC TO INTRA-ABDOMINAL LYMPH NODE: Primary | ICD-10-CM

## 2021-02-09 DIAGNOSIS — G89.3 CHRONIC PAIN DUE TO NEOPLASM: ICD-10-CM

## 2021-02-09 PROCEDURE — 99215 PR OFFICE/OUTPT VISIT, EST, LEVL V, 40-54 MIN: ICD-10-PCS | Mod: 95,,, | Performed by: FAMILY MEDICINE

## 2021-02-09 PROCEDURE — 99215 OFFICE O/P EST HI 40 MIN: CPT | Mod: 95,,, | Performed by: FAMILY MEDICINE

## 2021-02-09 RX ORDER — FENTANYL 50 UG/1
1 PATCH TRANSDERMAL
Qty: 10 PATCH | Refills: 0 | Status: SHIPPED | OUTPATIENT
Start: 2021-02-09 | End: 2021-01-01 | Stop reason: SDUPTHER

## 2021-02-09 RX ORDER — OXYCODONE AND ACETAMINOPHEN 10; 325 MG/1; MG/1
1 TABLET ORAL EVERY 4 HOURS PRN
Qty: 120 TABLET | Refills: 0 | Status: SHIPPED | OUTPATIENT
Start: 2021-02-09 | End: 2021-01-01 | Stop reason: ALTCHOICE

## 2021-02-18 ENCOUNTER — PATIENT MESSAGE (OUTPATIENT)
Dept: PALLIATIVE MEDICINE | Facility: CLINIC | Age: 56
End: 2021-02-18

## 2021-02-19 DIAGNOSIS — G89.3 CHRONIC PAIN DUE TO NEOPLASM: ICD-10-CM

## 2021-02-19 DIAGNOSIS — C77.2 CANCER OF APPENDIX METASTATIC TO INTRA-ABDOMINAL LYMPH NODE: Primary | ICD-10-CM

## 2021-02-19 DIAGNOSIS — C18.1 CANCER OF APPENDIX METASTATIC TO INTRA-ABDOMINAL LYMPH NODE: Primary | ICD-10-CM

## 2021-02-19 RX ORDER — MORPHINE SULFATE 15 MG/1
30 TABLET ORAL EVERY 4 HOURS PRN
Qty: 120 TABLET | Refills: 0 | Status: SHIPPED | OUTPATIENT
Start: 2021-02-19 | End: 2021-01-01

## 2021-02-19 RX ORDER — ZOLPIDEM TARTRATE 5 MG/1
5 TABLET ORAL NIGHTLY PRN
Qty: 60 TABLET | Refills: 1 | Status: CANCELLED | OUTPATIENT
Start: 2021-02-19 | End: 2021-01-01

## 2021-02-19 RX ORDER — MORPHINE SULFATE 15 MG/1
30 TABLET ORAL EVERY 4 HOURS PRN
Qty: 120 TABLET | Refills: 0 | Status: SHIPPED | OUTPATIENT
Start: 2021-02-19 | End: 2021-02-19

## 2021-02-22 ENCOUNTER — TELEPHONE (OUTPATIENT)
Dept: HEMATOLOGY/ONCOLOGY | Facility: CLINIC | Age: 56
End: 2021-02-22

## 2021-02-22 DIAGNOSIS — C77.2 CANCER OF APPENDIX METASTATIC TO INTRA-ABDOMINAL LYMPH NODE: ICD-10-CM

## 2021-02-22 DIAGNOSIS — C18.1 CANCER OF APPENDIX METASTATIC TO INTRA-ABDOMINAL LYMPH NODE: ICD-10-CM

## 2021-04-20 PROBLEM — R79.89 ELEVATED LFTS: Status: ACTIVE | Noted: 2021-01-01

## 2021-04-20 PROBLEM — K21.9 GASTROESOPHAGEAL REFLUX DISEASE WITHOUT ESOPHAGITIS: Status: ACTIVE | Noted: 2021-01-01

## 2021-06-13 PROBLEM — E86.1 HYPOTENSION DUE TO HYPOVOLEMIA: Status: ACTIVE | Noted: 2021-01-01

## 2021-06-13 PROBLEM — R91.8 LEFT LOWER LOBE PULMONARY INFILTRATE: Status: ACTIVE | Noted: 2021-01-01

## 2021-06-13 PROBLEM — K85.90 ACUTE PANCREATITIS: Status: ACTIVE | Noted: 2021-01-01

## 2021-06-13 PROBLEM — R09.89 ABNORMAL FINDING OF LUNG: Status: ACTIVE | Noted: 2021-01-01

## 2021-06-14 PROBLEM — R65.21 SEPTIC SHOCK: Status: ACTIVE | Noted: 2021-01-01

## 2021-06-14 PROBLEM — A41.9 SEPTIC SHOCK: Status: ACTIVE | Noted: 2021-01-01

## 2021-06-14 PROBLEM — Z93.3 COLOSTOMY PRESENT ON ADMISSION: Status: ACTIVE | Noted: 2021-01-01

## 2021-06-14 NOTE — PATIENT INSTRUCTIONS
"Urinate every 2-3 hours, avoid postponing urination. Perform a double void (pee a second time after the initial void), and gentle pressure.   Bethanechol has a weak possible effect of trying to make the bladder "squeeze" some more. Will try this after we evaluate for bacteria in the urine.   " - - -

## 2021-12-13 PROBLEM — N39.41 URGE INCONTINENCE: Status: ACTIVE | Noted: 2021-01-01

## 2021-12-13 PROBLEM — R31.0 GROSS HEMATURIA: Status: ACTIVE | Noted: 2021-01-01

## 2021-12-13 PROBLEM — N39.0 URINARY TRACT INFECTION WITHOUT HEMATURIA: Status: ACTIVE | Noted: 2021-01-01

## 2021-12-13 PROBLEM — N32.89 BLADDER MASS: Status: ACTIVE | Noted: 2021-01-01

## 2021-12-13 PROBLEM — R30.0 DYSURIA: Status: ACTIVE | Noted: 2021-01-01

## 2021-12-13 NOTE — H&P (VIEW-ONLY)
Chief Complaint:   Encounter Diagnoses   Name Primary?   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  Patient has had persistent intermittent gross hematuria for months now, she is initially thought that he could be vaginal in actually improved loaded after a Pap smear.  But this still persists.  She also has recurrent UTIs, presenting with increased urgency and urge incontinence, no significant dysuria though.  Patient states that she is not sure if she empties all the way, she has had these issues since her colostomy.  Patient also has persistent urgency and urge incontinence on a normal basis, is concerned and willing to attempt a medication.  She is up twice per evening to void, going every 2 hours during the daytime.  No other urological gynecological history, except for bilateral salpingo oophorectomy at the time of her initial surgery.  She states that her last infection was in October, she is currently doing well.  No evidence constipation.    Allergies:  Patient has no known allergies.    Medications:  has a current medication list which includes the following prescription(s): fentanyl, metoclopramide hcl, omeprazole, ondansetron, oxycodone, promethazine, zolpidem, dronabinol, estradiol, lidocaine hcl 2%, and solifenacin.    Review of Systems:  General: No fever, chills, fatigability, or weight loss.  Skin: No rashes, itching, or changes in color or texture of skin.  Chest: Denies AMBROSE, cyanosis, wheezing, cough, and sputum production.  Abdomen: Appetite fine. No weight loss. Denies diarrhea, abdominal pain, hematemesis, or blood in stool.  Musculoskeletal: No joint stiffness or swelling. Denies back pain.  : As above.  All other review of systems  negative.    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    PSH:   has a past surgical history that includes Appendectomy; Right colectomy; Colostomy; CYTOREDUCTION; LAPAROTOMY, EXPLORATORY (02/12/2020); Bilateral oophorectomy; Cholecystectomy; Revision Colostomy (N/A, 10/20/2020); Excision of lesion (10/20/2020); Esophagogastroduodenoscopy (N/A, 5/6/2021); Esophagogastroduodenoscopy (06/11/2021); Endoscopic ultrasound of upper gastrointestinal tract (N/A, 6/11/2021); and ERCP (N/A, 6/11/2021).    FamHx: family history is not on file.    SocHx:  reports that she has never smoked. She has never used smokeless tobacco. She reports that she does not drink alcohol and does not use drugs.      Physical Exam:  Vitals:    12/13/21 1044   BP: 107/75   Pulse: 94   Temp: 98.1 °F (36.7 °C)     General: A&Ox3, no apparent distress, no deformities  Neck: No masses, normal ROM  Lungs: normal inspiration, no use of accessory muscles  Heart: normal pulse, no arrhythmias  Abdomen: Soft, NT, ND, no masses, no hernias, no hepatosplenomegaly  Skin: The skin is warm and dry. No jaundice.  Ext: No c/c/e.  : 12/21- Normal introitus, no urethral abnomralities.    Labs/Studies:   Cystoscopy left lateral wall and base mass, difficult to assess the trigone 12/21  MRI pelvis pelvic mass 12/21  CT with contrast no urological involvement 6/21    Procedure: Diagnostic Cystoscopy    Procedure in Detail: After proper consents were obtained, the patient was prepped and draped in normal sterile fashion for diagnostic cystoscopy. The flexible cystoscope was then introduced into the urethra, and advanced into the bladder under direct vision. The urethral mucosa appeared normal, and no strictures were noted. The sphincter appeared to be normal.  The bladder neck was normal. Inspection of the interior of the  bladder was then carried out. The trigone was difficult to ascertain due to a bladder mass along the left lateral wall and base.  Could not distinctly identify the ureteral orifices.  Remainder of the bladder demonstrated no other mucosal lesions or masses.  The cystoscope was then removed, and the procedure terminated.     Findings:  Bladder mass, could not identify ureteral orifices due to edema of the trigone.      Impression/Plan:     1. Bladder mass- most likely consistent with metastatic deposit grown through the wall the bladder, will relay this information to Oncology.  Patient is due to see Gynecology Oncology this week.    2. Urge incontinence- most likely worsened by her bladder pathology will attempt VESIcare 10 mg. I have informed her that this can cause dry mouth, dry eyes, urinary retention and constipation.  She has any issues she is stop the medication contact our office.  Re-evaluate in 1 month.    3. Chronic UTIs- no evidence of infection today, call with a recurrence.    4. Gross hematuria- most likely result bladder mass, please see above.

## 2021-12-17 NOTE — PRE ADMISSION SCREENING
Pre op instructions reviewed with pt per phone: Spoke about pre op process and surgery instructions, verbalized understanding.    To confirm, Your surgeon has instructed you:  Surgery is scheduled on 12/21/21      Please report to the main hospital (1st Floor) at [facility] off of [facility] (2nd building on the left, in front of the flag pole).  Please arrive at 12pm. We will call you the afternoon prior to surgery to confirm arrival time, as it is subject to change due to cancellations & emergencies.        INSTRUCTIONS IMPORTANT!!!  Do Not Eat, Drink, or Smoke after 12 midnight! NO WATER after midnight! OK to brush teeth, no gum, candy or mints!      *Take only these medicines with a small swallow of water-morning of surgery.  none      ____  Do Not wear makeup, mascara or nail polish.   ____  NO Acrylic/fake nails worn day of surgery (hand/arm surgeries)  ____  NO powder, lotions, deodorants or creams to surgical area.  ____  Please remove all jewelry, including piercings and leave at home.  ____  Dentures, Hearing Aids and Contact Lens will need to be removed prior to the start of surgery.  ____  Please bring photo ID and insurance information to hospital (Leave Valuables at Home)  ____  If going home the same day, arrange for a ride home. You will not be able to             drive if Anesthesia was used.   ____  Wear clean, loose fitting clothing. Allow for dressings, bandages.  ____  Stop Aspirin, Ibuprofen, Motrin and Aleve at least 5-7 days before surgery, unless otherwise instructed by your doctor, or the nurse. You MAY use Tylenol/acetaminophen until day of                surgery.  ____  If you take diabetic medication, do NOT take morning of surgery unless instructed by             Doctor. Metformin to be stopped 24 hrs prior to surgery time. DO NOT take long-acting insulin the evening before surgery. Blood sugars will be checked in pre-op morning of.  ____ Stop taking any Fish Oil supplements or Vitamins  at least 5 days prior to surgery, unless instructed otherwise by your Doctor.          Bathing Instructions: The night before surgery and the morning prior to coming to the hospital:   -Do not shave your face or body!   -Shower & Rinse your body as usual with anti-bacterial Soap (Dial, Lever 2000, or Hibiclens)   -Do not use hibiclens on your head, face, or genitals.   -Do not wash with anti-bacterial soap after you use the hibiclens.   -Rinse your body thoroughly.      [facility] Visitor/Ride Policy:  Only 1 adult allowed (over the age of 18) to accompany you into Pre-op/Recovery Surgery Dept. All other visitors will need to wait in waiting room. Must have a ride home from a responsible adult that you know and trust. Medical Transport, Uber or Lyft can only be used if patient has a responsible adult to accompany them during ride home.      Surgical Site Infection:    Prevention of surgical site infections:     -Keep incisions clean and dry.   -Do not soak/submerge incisions in water until completely healed.   -Do not apply lotions, powders, creams, or deodorants to site.   -Always make sure hands are cleaned with antibacterial soap/ alcohol-based   prior to touching the surgical site.      Signs and symptoms:   -Redness and pain around the area where you had surgery   -Drainage of cloudy fluid from your surgical wound   -Fever over 100.4 or chills  >>>Call Surgeon office/on-call Surgeon if you experience any of these signs & symptoms post-surgery.      *Please Call [facility] Pre-Admissions Department with surgery instruction questions at [phone] or [phone].    *Insurance Questions, please call [phone].

## 2021-12-21 NOTE — DISCHARGE SUMMARY
[facility] - Surgery (Hospital)  Discharge Note  Short Stay    Procedure(s) (LRB):  TURBT (TRANSURETHRAL RESECTION OF BLADDER TUMOR) (N/A)  CYSTOSCOPY (N/A)    OUTCOME: Patient tolerated treatment/procedure well without complication and is now ready for discharge.    DISPOSITION: Home or Self Care    FINAL DIAGNOSIS:  <principal problem not specified>    FOLLOWUP: In clinic    DISCHARGE INSTRUCTIONS:    Discharge Procedure Orders   Diet Adult Regular     Notify your health care provider if you experience any of the following:  severe uncontrolled pain     Notify your health care provider if you experience any of the following:  persistent nausea and vomiting or diarrhea     Notify your health care provider if you experience any of the following:  temperature >100.4     Activity as tolerated        TIME SPENT ON DISCHARGE: 5 minutes

## 2021-12-21 NOTE — PLAN OF CARE
Patient prepped and ready for surgery; patient verbalized understanding of preop procedures; belongings given to family/secured; patient had no further questions; will continue to update patient while in preop and make safety rounds.

## 2021-12-21 NOTE — OP NOTE
Date of Procedure: 12/21/2021    PREOP DIAGNOSIS:  Bladder tumor.    POSTOP DIAGNOSIS:  Bladder tumor.    PROCEDURES:      TURBT small.        SURGEON:  John Martínez M.D.    Assistants: None    Specimen: Bladder tumor    ANESTHESIA:  General endotracheal.    BLOOD LOSS:  None.    FINDINGS:  Tumor invading the back wall, significant biopsies taken with fulguration for hemostasis.      PROCEDURE IN DETAIL:  Patient was brought to the operative suite and placed under general anesthesia and positioned into the dorsal lithotomy position.  After being sterilely prepped and draped a 21 Chilean sheath cystoscope was inserted into a normal urethra.  Bladder was examined, tumor was identified on the posterior wall, left side.  Bilateral ureteral orifices are normal in size, shape, caliber and location, well away from the tumor.  Cystoscope was removed, 24 Chilean resectoscope was then inserted into the urethra and bladder.  Dissection of the tumor was commenced all the way to almost the base the bladder, providing significant tissue for biopsy.  We then used the button to fulgurate the top for hemostasis, tumor does remain.  I will have the patient return in 3 weeks to discuss pathology.    COMPLICATIONS: None

## 2022-01-01 ENCOUNTER — PATIENT MESSAGE (OUTPATIENT)
Dept: PALLIATIVE MEDICINE | Facility: HOSPITAL | Age: 57
End: 2022-01-01
Payer: MEDICARE

## 2022-01-01 ENCOUNTER — PATIENT MESSAGE (OUTPATIENT)
Dept: HEMATOLOGY/ONCOLOGY | Facility: CLINIC | Age: 57
End: 2022-01-01

## 2022-01-01 ENCOUNTER — ANESTHESIA (OUTPATIENT)
Dept: ENDOSCOPY | Facility: HOSPITAL | Age: 57
DRG: 659 | End: 2022-01-01
Payer: MEDICARE

## 2022-01-01 ENCOUNTER — OFFICE VISIT (OUTPATIENT)
Dept: SURGERY | Facility: CLINIC | Age: 57
End: 2022-01-01
Payer: MEDICARE

## 2022-01-01 ENCOUNTER — IMMUNIZATION (OUTPATIENT)
Dept: PRIMARY CARE CLINIC | Facility: CLINIC | Age: 57
End: 2022-01-01
Payer: MEDICARE

## 2022-01-01 ENCOUNTER — TELEPHONE (OUTPATIENT)
Dept: RADIOLOGY | Facility: HOSPITAL | Age: 57
End: 2022-01-01
Payer: MEDICARE

## 2022-01-01 ENCOUNTER — ANESTHESIA EVENT (OUTPATIENT)
Dept: SURGERY | Facility: HOSPITAL | Age: 57
DRG: 659 | End: 2022-01-01
Payer: MEDICARE

## 2022-01-01 ENCOUNTER — HOSPITAL ENCOUNTER (INPATIENT)
Facility: HOSPITAL | Age: 57
LOS: 2 days | Discharge: HOME | DRG: 392 | End: 2022-01-06
Attending: EMERGENCY MEDICINE | Admitting: INTERNAL MEDICINE
Payer: MEDICARE

## 2022-01-01 ENCOUNTER — TELEPHONE (OUTPATIENT)
Dept: HEMATOLOGY/ONCOLOGY | Facility: CLINIC | Age: 57
End: 2022-01-01
Payer: MEDICARE

## 2022-01-01 ENCOUNTER — OUTPATIENT CASE MANAGEMENT (OUTPATIENT)
Dept: ADMINISTRATIVE | Facility: OTHER | Age: 57
End: 2022-01-01
Payer: MEDICARE

## 2022-01-01 ENCOUNTER — PATIENT MESSAGE (OUTPATIENT)
Dept: GYNECOLOGIC ONCOLOGY | Facility: CLINIC | Age: 57
End: 2022-01-01
Payer: MEDICARE

## 2022-01-01 ENCOUNTER — TELEPHONE (OUTPATIENT)
Dept: UROLOGY | Facility: CLINIC | Age: 57
End: 2022-01-01
Payer: MEDICARE

## 2022-01-01 ENCOUNTER — PATIENT MESSAGE (OUTPATIENT)
Dept: UROLOGY | Facility: CLINIC | Age: 57
End: 2022-01-01
Payer: MEDICARE

## 2022-01-01 ENCOUNTER — HOSPITAL ENCOUNTER (OUTPATIENT)
Dept: RADIOLOGY | Facility: HOSPITAL | Age: 57
Discharge: HOME | DRG: 659 | End: 2022-01-19
Attending: PHYSICIAN ASSISTANT
Payer: MEDICARE

## 2022-01-01 ENCOUNTER — PATIENT MESSAGE (OUTPATIENT)
Dept: GYNECOLOGIC ONCOLOGY | Facility: CLINIC | Age: 57
End: 2022-01-01

## 2022-01-01 ENCOUNTER — PATIENT MESSAGE (OUTPATIENT)
Dept: HEMATOLOGY/ONCOLOGY | Facility: CLINIC | Age: 57
End: 2022-01-01
Payer: MEDICARE

## 2022-01-01 ENCOUNTER — OFFICE VISIT (OUTPATIENT)
Dept: GYNECOLOGIC ONCOLOGY | Facility: CLINIC | Age: 57
End: 2022-01-01
Payer: MEDICARE

## 2022-01-01 ENCOUNTER — OFFICE VISIT (OUTPATIENT)
Dept: HEMATOLOGY/ONCOLOGY | Facility: CLINIC | Age: 57
End: 2022-01-01
Payer: MEDICARE

## 2022-01-01 ENCOUNTER — TUMOR BOARD CONFERENCE (OUTPATIENT)
Dept: HEMATOLOGY/ONCOLOGY | Facility: CLINIC | Age: 57
End: 2022-01-01
Payer: MEDICARE

## 2022-01-01 ENCOUNTER — HOSPITAL ENCOUNTER (INPATIENT)
Facility: HOSPITAL | Age: 57
LOS: 11 days | Discharge: HOSPICE | DRG: 659 | End: 2022-02-01
Attending: RADIOLOGY | Admitting: INTERNAL MEDICINE
Payer: MEDICARE

## 2022-01-01 ENCOUNTER — ANESTHESIA EVENT (OUTPATIENT)
Dept: ENDOSCOPY | Facility: HOSPITAL | Age: 57
DRG: 659 | End: 2022-01-01
Payer: MEDICARE

## 2022-01-01 ENCOUNTER — ANESTHESIA (OUTPATIENT)
Dept: SURGERY | Facility: HOSPITAL | Age: 57
DRG: 659 | End: 2022-01-01
Payer: MEDICARE

## 2022-01-01 ENCOUNTER — PATIENT MESSAGE (OUTPATIENT)
Dept: PHARMACY | Facility: CLINIC | Age: 57
End: 2022-01-01
Payer: MEDICARE

## 2022-01-01 VITALS
BODY MASS INDEX: 15.67 KG/M2 | SYSTOLIC BLOOD PRESSURE: 114 MMHG | TEMPERATURE: 97 F | HEART RATE: 90 BPM | DIASTOLIC BLOOD PRESSURE: 71 MMHG | WEIGHT: 91.25 LBS

## 2022-01-01 VITALS
BODY MASS INDEX: 18.56 KG/M2 | SYSTOLIC BLOOD PRESSURE: 120 MMHG | HEART RATE: 97 BPM | DIASTOLIC BLOOD PRESSURE: 72 MMHG | RESPIRATION RATE: 18 BRPM | OXYGEN SATURATION: 93 % | TEMPERATURE: 99 F | HEIGHT: 64 IN | WEIGHT: 108.69 LBS

## 2022-01-01 VITALS
SYSTOLIC BLOOD PRESSURE: 126 MMHG | WEIGHT: 89.06 LBS | HEIGHT: 64 IN | DIASTOLIC BLOOD PRESSURE: 79 MMHG | OXYGEN SATURATION: 95 % | TEMPERATURE: 98 F | HEART RATE: 93 BPM | BODY MASS INDEX: 15.21 KG/M2

## 2022-01-01 VITALS
OXYGEN SATURATION: 100 % | DIASTOLIC BLOOD PRESSURE: 86 MMHG | HEART RATE: 90 BPM | RESPIRATION RATE: 18 BRPM | SYSTOLIC BLOOD PRESSURE: 129 MMHG

## 2022-01-01 VITALS
RESPIRATION RATE: 18 BRPM | BODY MASS INDEX: 17.16 KG/M2 | TEMPERATURE: 98 F | WEIGHT: 100.5 LBS | OXYGEN SATURATION: 96 % | SYSTOLIC BLOOD PRESSURE: 91 MMHG | HEIGHT: 64 IN | DIASTOLIC BLOOD PRESSURE: 55 MMHG | HEART RATE: 80 BPM

## 2022-01-01 DIAGNOSIS — R93.5 ABNORMAL FINDINGS ON DIAGNOSTIC IMAGING OF OTHER ABDOMINAL REGIONS, INCLUDING RETROPERITONEUM: ICD-10-CM

## 2022-01-01 DIAGNOSIS — K56.690 OTHER PARTIAL INTESTINAL OBSTRUCTION: ICD-10-CM

## 2022-01-01 DIAGNOSIS — D68.9 COAGULATION DEFECT, UNSPECIFIED: Primary | ICD-10-CM

## 2022-01-01 DIAGNOSIS — C79.9 METASTATIC SIGNET RING CELL CARCINOMA: ICD-10-CM

## 2022-01-01 DIAGNOSIS — R11.2 INTRACTABLE NAUSEA AND VOMITING: ICD-10-CM

## 2022-01-01 DIAGNOSIS — C79.9 METASTATIC SIGNET RING CELL CARCINOMA: Primary | ICD-10-CM

## 2022-01-01 DIAGNOSIS — R93.5 ABNORMAL FINDINGS ON DIAGNOSTIC IMAGING OF OTHER ABDOMINAL REGIONS, INCLUDING RETROPERITONEUM: Primary | ICD-10-CM

## 2022-01-01 DIAGNOSIS — G89.3 CHRONIC PAIN DUE TO NEOPLASM: ICD-10-CM

## 2022-01-01 DIAGNOSIS — R78.81 BACTEREMIA: Primary | ICD-10-CM

## 2022-01-01 DIAGNOSIS — K31.84 GASTROPARESIS: ICD-10-CM

## 2022-01-01 DIAGNOSIS — E43 SEVERE MALNUTRITION: ICD-10-CM

## 2022-01-01 DIAGNOSIS — N32.89 BLADDER MASS: Primary | ICD-10-CM

## 2022-01-01 DIAGNOSIS — C77.2 CANCER OF APPENDIX METASTATIC TO INTRA-ABDOMINAL LYMPH NODE: ICD-10-CM

## 2022-01-01 DIAGNOSIS — K59.03 DRUG-INDUCED CONSTIPATION: ICD-10-CM

## 2022-01-01 DIAGNOSIS — R07.9 CHEST PAIN: ICD-10-CM

## 2022-01-01 DIAGNOSIS — R31.0 GROSS HEMATURIA: ICD-10-CM

## 2022-01-01 DIAGNOSIS — N13.30 HYDRONEPHROSIS, UNSPECIFIED HYDRONEPHROSIS TYPE: ICD-10-CM

## 2022-01-01 DIAGNOSIS — C77.2 CANCER OF APPENDIX METASTATIC TO INTRA-ABDOMINAL LYMPH NODE: Primary | ICD-10-CM

## 2022-01-01 DIAGNOSIS — R79.89 ELEVATED LFTS: ICD-10-CM

## 2022-01-01 DIAGNOSIS — C18.1 CANCER OF APPENDIX METASTATIC TO INTRA-ABDOMINAL LYMPH NODE: Primary | ICD-10-CM

## 2022-01-01 DIAGNOSIS — Z23 NEED FOR VACCINATION: Primary | ICD-10-CM

## 2022-01-01 DIAGNOSIS — Z51.5 PALLIATIVE CARE ENCOUNTER: ICD-10-CM

## 2022-01-01 DIAGNOSIS — R11.2 INTRACTABLE VOMITING WITH NAUSEA, UNSPECIFIED VOMITING TYPE: ICD-10-CM

## 2022-01-01 DIAGNOSIS — E86.1 HYPOTENSION DUE TO HYPOVOLEMIA: ICD-10-CM

## 2022-01-01 DIAGNOSIS — R07.89 FEELING OF CHEST TIGHTNESS: ICD-10-CM

## 2022-01-01 DIAGNOSIS — K94.00 COLOSTOMY COMPLICATION: ICD-10-CM

## 2022-01-01 DIAGNOSIS — C18.1 CANCER OF APPENDIX METASTATIC TO INTRA-ABDOMINAL LYMPH NODE: ICD-10-CM

## 2022-01-01 DIAGNOSIS — D84.9 IMMUNOSUPPRESSED STATUS: ICD-10-CM

## 2022-01-01 DIAGNOSIS — G89.3 CHRONIC PAIN DUE TO NEOPLASM: Primary | ICD-10-CM

## 2022-01-01 DIAGNOSIS — R11.10 INTRACTABLE VOMITING: ICD-10-CM

## 2022-01-01 LAB
ABO + RH BLD: NORMAL
ALBUMIN SERPL BCP-MCNC: 1.9 G/DL (ref 3.5–5.2)
ALBUMIN SERPL BCP-MCNC: 2.1 G/DL (ref 3.5–5.2)
ALBUMIN SERPL BCP-MCNC: 3.2 G/DL (ref 3.5–5.2)
ALBUMIN SERPL BCP-MCNC: 4.4 G/DL (ref 3.5–5.2)
ALP SERPL-CCNC: 57 U/L (ref 55–135)
ALP SERPL-CCNC: 68 U/L (ref 55–135)
ALP SERPL-CCNC: 90 U/L (ref 55–135)
ALT SERPL W/O P-5'-P-CCNC: 11 U/L (ref 10–44)
ALT SERPL W/O P-5'-P-CCNC: <5 U/L (ref 10–44)
ALT SERPL W/O P-5'-P-CCNC: <5 U/L (ref 10–44)
ANION GAP SERPL CALC-SCNC: 10 MMOL/L (ref 8–16)
ANION GAP SERPL CALC-SCNC: 11 MMOL/L (ref 8–16)
ANION GAP SERPL CALC-SCNC: 13 MMOL/L (ref 8–16)
ANION GAP SERPL CALC-SCNC: 13 MMOL/L (ref 8–16)
ANION GAP SERPL CALC-SCNC: 15 MMOL/L (ref 8–16)
ANION GAP SERPL CALC-SCNC: 15 MMOL/L (ref 8–16)
ANION GAP SERPL CALC-SCNC: 7 MMOL/L (ref 8–16)
ANION GAP SERPL CALC-SCNC: 8 MMOL/L (ref 8–16)
ANION GAP SERPL CALC-SCNC: 8 MMOL/L (ref 8–16)
ANION GAP SERPL CALC-SCNC: 9 MMOL/L (ref 8–16)
AST SERPL-CCNC: 10 U/L (ref 10–40)
AST SERPL-CCNC: 11 U/L (ref 10–40)
AST SERPL-CCNC: 18 U/L (ref 10–40)
BACTERIA #/AREA URNS HPF: ABNORMAL /HPF
BACTERIA #/AREA URNS HPF: ABNORMAL /HPF
BACTERIA BLD CULT: ABNORMAL
BACTERIA BLD CULT: NORMAL
BACTERIA BLD CULT: NORMAL
BACTERIA UR CULT: NO GROWTH
BASOPHILS # BLD AUTO: 0.01 K/UL (ref 0–0.2)
BASOPHILS # BLD AUTO: 0.02 K/UL (ref 0–0.2)
BASOPHILS # BLD AUTO: 0.03 K/UL (ref 0–0.2)
BASOPHILS # BLD AUTO: 0.03 K/UL (ref 0–0.2)
BASOPHILS NFR BLD: 0.1 % (ref 0–1.9)
BASOPHILS NFR BLD: 0.2 % (ref 0–1.9)
BASOPHILS NFR BLD: 0.3 % (ref 0–1.9)
BILIRUB SERPL-MCNC: 0.3 MG/DL (ref 0.1–1)
BILIRUB SERPL-MCNC: 0.3 MG/DL (ref 0.1–1)
BILIRUB SERPL-MCNC: 0.4 MG/DL (ref 0.1–1)
BILIRUB UR QL STRIP: NEGATIVE
BILIRUB UR QL STRIP: NEGATIVE
BLD GP AB SCN CELLS X3 SERPL QL: NORMAL
BLD PROD TYP BPU: NORMAL
BLOOD UNIT EXPIRATION DATE: NORMAL
BLOOD UNIT TYPE CODE: 6200
BLOOD UNIT TYPE: NORMAL
BUN SERPL-MCNC: 10 MG/DL (ref 6–20)
BUN SERPL-MCNC: 12 MG/DL (ref 6–20)
BUN SERPL-MCNC: 12 MG/DL (ref 6–20)
BUN SERPL-MCNC: 14 MG/DL (ref 6–20)
BUN SERPL-MCNC: 18 MG/DL (ref 6–20)
BUN SERPL-MCNC: 19 MG/DL (ref 6–20)
BUN SERPL-MCNC: 22 MG/DL (ref 6–20)
BUN SERPL-MCNC: 23 MG/DL (ref 6–20)
BUN SERPL-MCNC: 23 MG/DL (ref 6–20)
BUN SERPL-MCNC: 25 MG/DL (ref 6–20)
BUN SERPL-MCNC: 26 MG/DL (ref 6–20)
BUN SERPL-MCNC: 26 MG/DL (ref 6–20)
BUN SERPL-MCNC: 27 MG/DL (ref 6–20)
BUN SERPL-MCNC: 27 MG/DL (ref 6–20)
CALCIUM SERPL-MCNC: 10 MG/DL (ref 8.7–10.5)
CALCIUM SERPL-MCNC: 7.7 MG/DL (ref 8.7–10.5)
CALCIUM SERPL-MCNC: 7.7 MG/DL (ref 8.7–10.5)
CALCIUM SERPL-MCNC: 7.8 MG/DL (ref 8.7–10.5)
CALCIUM SERPL-MCNC: 7.9 MG/DL (ref 8.7–10.5)
CALCIUM SERPL-MCNC: 8.1 MG/DL (ref 8.7–10.5)
CALCIUM SERPL-MCNC: 8.2 MG/DL (ref 8.7–10.5)
CALCIUM SERPL-MCNC: 8.3 MG/DL (ref 8.7–10.5)
CALCIUM SERPL-MCNC: 8.5 MG/DL (ref 8.7–10.5)
CALCIUM SERPL-MCNC: 8.5 MG/DL (ref 8.7–10.5)
CALCIUM SERPL-MCNC: 8.7 MG/DL (ref 8.7–10.5)
CALCIUM SERPL-MCNC: 8.8 MG/DL (ref 8.7–10.5)
CALCIUM SERPL-MCNC: 8.9 MG/DL (ref 8.7–10.5)
CALCIUM SERPL-MCNC: 9.7 MG/DL (ref 8.7–10.5)
CHLORIDE SERPL-SCNC: 100 MMOL/L (ref 95–110)
CHLORIDE SERPL-SCNC: 103 MMOL/L (ref 95–110)
CHLORIDE SERPL-SCNC: 106 MMOL/L (ref 95–110)
CHLORIDE SERPL-SCNC: 107 MMOL/L (ref 95–110)
CHLORIDE SERPL-SCNC: 108 MMOL/L (ref 95–110)
CHLORIDE SERPL-SCNC: 108 MMOL/L (ref 95–110)
CHLORIDE SERPL-SCNC: 109 MMOL/L (ref 95–110)
CHLORIDE SERPL-SCNC: 111 MMOL/L (ref 95–110)
CHLORIDE SERPL-SCNC: 97 MMOL/L (ref 95–110)
CHLORIDE SERPL-SCNC: 99 MMOL/L (ref 95–110)
CLARITY UR: CLEAR
CLARITY UR: CLEAR
CO2 SERPL-SCNC: 15 MMOL/L (ref 23–29)
CO2 SERPL-SCNC: 16 MMOL/L (ref 23–29)
CO2 SERPL-SCNC: 19 MMOL/L (ref 23–29)
CO2 SERPL-SCNC: 20 MMOL/L (ref 23–29)
CO2 SERPL-SCNC: 20 MMOL/L (ref 23–29)
CO2 SERPL-SCNC: 21 MMOL/L (ref 23–29)
CO2 SERPL-SCNC: 21 MMOL/L (ref 23–29)
CO2 SERPL-SCNC: 22 MMOL/L (ref 23–29)
CO2 SERPL-SCNC: 23 MMOL/L (ref 23–29)
CO2 SERPL-SCNC: 23 MMOL/L (ref 23–29)
CO2 SERPL-SCNC: 24 MMOL/L (ref 23–29)
CO2 SERPL-SCNC: 25 MMOL/L (ref 23–29)
CO2 SERPL-SCNC: 27 MMOL/L (ref 23–29)
CO2 SERPL-SCNC: 29 MMOL/L (ref 23–29)
CODING SYSTEM: NORMAL
COLOR UR: YELLOW
COLOR UR: YELLOW
CREAT SERPL-MCNC: 0.9 MG/DL (ref 0.5–1.4)
CREAT SERPL-MCNC: 0.9 MG/DL (ref 0.5–1.4)
CREAT SERPL-MCNC: 1 MG/DL (ref 0.5–1.4)
CREAT SERPL-MCNC: 1.1 MG/DL (ref 0.5–1.4)
CREAT SERPL-MCNC: 1.1 MG/DL (ref 0.5–1.4)
CREAT SERPL-MCNC: 1.2 MG/DL (ref 0.5–1.4)
CREAT SERPL-MCNC: 1.3 MG/DL (ref 0.5–1.4)
CREAT SERPL-MCNC: 1.4 MG/DL (ref 0.5–1.4)
CREAT SERPL-MCNC: 1.5 MG/DL (ref 0.5–1.4)
CREAT SERPL-MCNC: 1.5 MG/DL (ref 0.5–1.4)
CTP QC/QA: YES
DIFFERENTIAL METHOD: ABNORMAL
DISPENSE STATUS: NORMAL
EOSINOPHIL # BLD AUTO: 0 K/UL (ref 0–0.5)
EOSINOPHIL # BLD AUTO: 0 K/UL (ref 0–0.5)
EOSINOPHIL # BLD AUTO: 0.1 K/UL (ref 0–0.5)
EOSINOPHIL # BLD AUTO: 0.3 K/UL (ref 0–0.5)
EOSINOPHIL # BLD AUTO: 0.3 K/UL (ref 0–0.5)
EOSINOPHIL # BLD AUTO: 0.4 K/UL (ref 0–0.5)
EOSINOPHIL NFR BLD: 0.1 % (ref 0–8)
EOSINOPHIL NFR BLD: 0.2 % (ref 0–8)
EOSINOPHIL NFR BLD: 0.5 % (ref 0–8)
EOSINOPHIL NFR BLD: 0.6 % (ref 0–8)
EOSINOPHIL NFR BLD: 0.9 % (ref 0–8)
EOSINOPHIL NFR BLD: 2.4 % (ref 0–8)
EOSINOPHIL NFR BLD: 3.3 % (ref 0–8)
EOSINOPHIL NFR BLD: 5 % (ref 0–8)
ERYTHROCYTE [DISTWIDTH] IN BLOOD BY AUTOMATED COUNT: 14.1 % (ref 11.5–14.5)
ERYTHROCYTE [DISTWIDTH] IN BLOOD BY AUTOMATED COUNT: 14.3 % (ref 11.5–14.5)
ERYTHROCYTE [DISTWIDTH] IN BLOOD BY AUTOMATED COUNT: 14.4 % (ref 11.5–14.5)
ERYTHROCYTE [DISTWIDTH] IN BLOOD BY AUTOMATED COUNT: 14.6 % (ref 11.5–14.5)
ERYTHROCYTE [DISTWIDTH] IN BLOOD BY AUTOMATED COUNT: 14.8 % (ref 11.5–14.5)
ERYTHROCYTE [DISTWIDTH] IN BLOOD BY AUTOMATED COUNT: 14.9 % (ref 11.5–14.5)
ERYTHROCYTE [DISTWIDTH] IN BLOOD BY AUTOMATED COUNT: 14.9 % (ref 11.5–14.5)
ERYTHROCYTE [DISTWIDTH] IN BLOOD BY AUTOMATED COUNT: 15.1 % (ref 11.5–14.5)
ERYTHROCYTE [DISTWIDTH] IN BLOOD BY AUTOMATED COUNT: 15.2 % (ref 11.5–14.5)
EST. GFR  (AFRICAN AMERICAN): 45 ML/MIN/1.73 M^2
EST. GFR  (AFRICAN AMERICAN): 45 ML/MIN/1.73 M^2
EST. GFR  (AFRICAN AMERICAN): 49 ML/MIN/1.73 M^2
EST. GFR  (AFRICAN AMERICAN): 53 ML/MIN/1.73 M^2
EST. GFR  (AFRICAN AMERICAN): 59 ML/MIN/1.73 M^2
EST. GFR  (AFRICAN AMERICAN): >60 ML/MIN/1.73 M^2
EST. GFR  (NON AFRICAN AMERICAN): 39 ML/MIN/1.73 M^2
EST. GFR  (NON AFRICAN AMERICAN): 39 ML/MIN/1.73 M^2
EST. GFR  (NON AFRICAN AMERICAN): 42 ML/MIN/1.73 M^2
EST. GFR  (NON AFRICAN AMERICAN): 46 ML/MIN/1.73 M^2
EST. GFR  (NON AFRICAN AMERICAN): 51 ML/MIN/1.73 M^2
EST. GFR  (NON AFRICAN AMERICAN): 57 ML/MIN/1.73 M^2
EST. GFR  (NON AFRICAN AMERICAN): 57 ML/MIN/1.73 M^2
EST. GFR  (NON AFRICAN AMERICAN): >60 ML/MIN/1.73 M^2
FINAL PATHOLOGIC DIAGNOSIS: NORMAL
GLUCOSE SERPL-MCNC: 112 MG/DL (ref 70–110)
GLUCOSE SERPL-MCNC: 115 MG/DL (ref 70–110)
GLUCOSE SERPL-MCNC: 116 MG/DL (ref 70–110)
GLUCOSE SERPL-MCNC: 119 MG/DL (ref 70–110)
GLUCOSE SERPL-MCNC: 59 MG/DL (ref 70–110)
GLUCOSE SERPL-MCNC: 67 MG/DL (ref 70–110)
GLUCOSE SERPL-MCNC: 70 MG/DL (ref 70–110)
GLUCOSE SERPL-MCNC: 72 MG/DL (ref 70–110)
GLUCOSE SERPL-MCNC: 77 MG/DL (ref 70–110)
GLUCOSE SERPL-MCNC: 81 MG/DL (ref 70–110)
GLUCOSE SERPL-MCNC: 92 MG/DL (ref 70–110)
GLUCOSE SERPL-MCNC: 93 MG/DL (ref 70–110)
GLUCOSE SERPL-MCNC: 93 MG/DL (ref 70–110)
GLUCOSE SERPL-MCNC: 96 MG/DL (ref 70–110)
GLUCOSE UR QL STRIP: NEGATIVE
GLUCOSE UR QL STRIP: NEGATIVE
GROSS: NORMAL
HCT VFR BLD AUTO: 22 % (ref 37–48.5)
HCT VFR BLD AUTO: 23.1 % (ref 37–48.5)
HCT VFR BLD AUTO: 28 % (ref 37–48.5)
HCT VFR BLD AUTO: 30.4 % (ref 37–48.5)
HCT VFR BLD AUTO: 30.5 % (ref 37–48.5)
HCT VFR BLD AUTO: 31 % (ref 37–48.5)
HCT VFR BLD AUTO: 33.1 % (ref 37–48.5)
HCT VFR BLD AUTO: 33.4 % (ref 37–48.5)
HCT VFR BLD AUTO: 36.2 % (ref 37–48.5)
HCV AB SERPL QL IA: NEGATIVE
HEP C VIRUS HOLD SPECIMEN: NORMAL
HGB BLD-MCNC: 10.2 G/DL (ref 12–16)
HGB BLD-MCNC: 10.6 G/DL (ref 12–16)
HGB BLD-MCNC: 11.1 G/DL (ref 12–16)
HGB BLD-MCNC: 7 G/DL (ref 12–16)
HGB BLD-MCNC: 7.3 G/DL (ref 12–16)
HGB BLD-MCNC: 8.2 G/DL (ref 12–16)
HGB BLD-MCNC: 8.8 G/DL (ref 12–16)
HGB BLD-MCNC: 9.2 G/DL (ref 12–16)
HGB BLD-MCNC: 9.8 G/DL (ref 12–16)
HGB UR QL STRIP: ABNORMAL
HGB UR QL STRIP: ABNORMAL
HIV 1+2 AB+HIV1 P24 AG SERPL QL IA: NEGATIVE
HYALINE CASTS #/AREA URNS LPF: 0 /LPF
HYALINE CASTS #/AREA URNS LPF: 0 /LPF
IMM GRANULOCYTES # BLD AUTO: 0.02 K/UL (ref 0–0.04)
IMM GRANULOCYTES # BLD AUTO: 0.03 K/UL (ref 0–0.04)
IMM GRANULOCYTES # BLD AUTO: 0.04 K/UL (ref 0–0.04)
IMM GRANULOCYTES # BLD AUTO: 0.05 K/UL (ref 0–0.04)
IMM GRANULOCYTES # BLD AUTO: 0.06 K/UL (ref 0–0.04)
IMM GRANULOCYTES # BLD AUTO: 0.07 K/UL (ref 0–0.04)
IMM GRANULOCYTES # BLD AUTO: 0.08 K/UL (ref 0–0.04)
IMM GRANULOCYTES # BLD AUTO: 0.13 K/UL (ref 0–0.04)
IMM GRANULOCYTES NFR BLD AUTO: 0.3 % (ref 0–0.5)
IMM GRANULOCYTES NFR BLD AUTO: 0.4 % (ref 0–0.5)
IMM GRANULOCYTES NFR BLD AUTO: 0.6 % (ref 0–0.5)
IMM GRANULOCYTES NFR BLD AUTO: 0.6 % (ref 0–0.5)
IMM GRANULOCYTES NFR BLD AUTO: 0.7 % (ref 0–0.5)
IMM GRANULOCYTES NFR BLD AUTO: 1 % (ref 0–0.5)
INR PPP: 0.9 (ref 0.8–1.2)
KETONES UR QL STRIP: NEGATIVE
KETONES UR QL STRIP: NEGATIVE
LEUKOCYTE ESTERASE UR QL STRIP: ABNORMAL
LEUKOCYTE ESTERASE UR QL STRIP: NEGATIVE
LIPASE SERPL-CCNC: 33 U/L (ref 4–60)
LYMPHOCYTES # BLD AUTO: 0.4 K/UL (ref 1–4.8)
LYMPHOCYTES # BLD AUTO: 0.4 K/UL (ref 1–4.8)
LYMPHOCYTES # BLD AUTO: 0.7 K/UL (ref 1–4.8)
LYMPHOCYTES # BLD AUTO: 0.8 K/UL (ref 1–4.8)
LYMPHOCYTES # BLD AUTO: 1 K/UL (ref 1–4.8)
LYMPHOCYTES # BLD AUTO: 1.2 K/UL (ref 1–4.8)
LYMPHOCYTES # BLD AUTO: 1.3 K/UL (ref 1–4.8)
LYMPHOCYTES # BLD AUTO: 1.4 K/UL (ref 1–4.8)
LYMPHOCYTES NFR BLD: 11.6 % (ref 18–48)
LYMPHOCYTES NFR BLD: 12.9 % (ref 18–48)
LYMPHOCYTES NFR BLD: 15.7 % (ref 18–48)
LYMPHOCYTES NFR BLD: 3 % (ref 18–48)
LYMPHOCYTES NFR BLD: 4.3 % (ref 18–48)
LYMPHOCYTES NFR BLD: 5 % (ref 18–48)
LYMPHOCYTES NFR BLD: 7.2 % (ref 18–48)
LYMPHOCYTES NFR BLD: 7.3 % (ref 18–48)
Lab: NORMAL
MAGNESIUM SERPL-MCNC: 1 MG/DL (ref 1.6–2.6)
MAGNESIUM SERPL-MCNC: 1.1 MG/DL (ref 1.6–2.6)
MAGNESIUM SERPL-MCNC: 1.7 MG/DL (ref 1.6–2.6)
MAGNESIUM SERPL-MCNC: 1.8 MG/DL (ref 1.6–2.6)
MCH RBC QN AUTO: 24.8 PG (ref 27–31)
MCH RBC QN AUTO: 24.9 PG (ref 27–31)
MCH RBC QN AUTO: 25 PG (ref 27–31)
MCH RBC QN AUTO: 25.2 PG (ref 27–31)
MCH RBC QN AUTO: 25.3 PG (ref 27–31)
MCH RBC QN AUTO: 25.4 PG (ref 27–31)
MCH RBC QN AUTO: 25.6 PG (ref 27–31)
MCH RBC QN AUTO: 25.7 PG (ref 27–31)
MCH RBC QN AUTO: 26.2 PG (ref 27–31)
MCHC RBC AUTO-ENTMCNC: 28.9 G/DL (ref 32–36)
MCHC RBC AUTO-ENTMCNC: 29.3 G/DL (ref 32–36)
MCHC RBC AUTO-ENTMCNC: 30.3 G/DL (ref 32–36)
MCHC RBC AUTO-ENTMCNC: 30.7 G/DL (ref 32–36)
MCHC RBC AUTO-ENTMCNC: 30.8 G/DL (ref 32–36)
MCHC RBC AUTO-ENTMCNC: 31.6 G/DL (ref 32–36)
MCHC RBC AUTO-ENTMCNC: 31.6 G/DL (ref 32–36)
MCHC RBC AUTO-ENTMCNC: 31.7 G/DL (ref 32–36)
MCHC RBC AUTO-ENTMCNC: 31.8 G/DL (ref 32–36)
MCV RBC AUTO: 78 FL (ref 82–98)
MCV RBC AUTO: 80 FL (ref 82–98)
MCV RBC AUTO: 81 FL (ref 82–98)
MCV RBC AUTO: 84 FL (ref 82–98)
MCV RBC AUTO: 86 FL (ref 82–98)
MICROSCOPIC COMMENT: ABNORMAL
MICROSCOPIC COMMENT: ABNORMAL
MICROSCOPIC EXAM: NORMAL
MONOCYTES # BLD AUTO: 0.3 K/UL (ref 0.3–1)
MONOCYTES # BLD AUTO: 0.5 K/UL (ref 0.3–1)
MONOCYTES # BLD AUTO: 0.6 K/UL (ref 0.3–1)
MONOCYTES # BLD AUTO: 0.7 K/UL (ref 0.3–1)
MONOCYTES # BLD AUTO: 0.7 K/UL (ref 0.3–1)
MONOCYTES # BLD AUTO: 0.8 K/UL (ref 0.3–1)
MONOCYTES NFR BLD: 2.6 % (ref 4–15)
MONOCYTES NFR BLD: 4.3 % (ref 4–15)
MONOCYTES NFR BLD: 4.7 % (ref 4–15)
MONOCYTES NFR BLD: 4.8 % (ref 4–15)
MONOCYTES NFR BLD: 5.7 % (ref 4–15)
MONOCYTES NFR BLD: 6.6 % (ref 4–15)
MONOCYTES NFR BLD: 6.9 % (ref 4–15)
MONOCYTES NFR BLD: 7.5 % (ref 4–15)
NEUTROPHILS # BLD AUTO: 10.9 K/UL (ref 1.8–7.7)
NEUTROPHILS # BLD AUTO: 11.7 K/UL (ref 1.8–7.7)
NEUTROPHILS # BLD AUTO: 12.1 K/UL (ref 1.8–7.7)
NEUTROPHILS # BLD AUTO: 5.7 K/UL (ref 1.8–7.7)
NEUTROPHILS # BLD AUTO: 7.2 K/UL (ref 1.8–7.7)
NEUTROPHILS # BLD AUTO: 8.8 K/UL (ref 1.8–7.7)
NEUTROPHILS # BLD AUTO: 9.4 K/UL (ref 1.8–7.7)
NEUTROPHILS # BLD AUTO: 9.6 K/UL (ref 1.8–7.7)
NEUTROPHILS NFR BLD: 71.2 % (ref 38–73)
NEUTROPHILS NFR BLD: 76.4 % (ref 38–73)
NEUTROPHILS NFR BLD: 79.7 % (ref 38–73)
NEUTROPHILS NFR BLD: 86.1 % (ref 38–73)
NEUTROPHILS NFR BLD: 87 % (ref 38–73)
NEUTROPHILS NFR BLD: 89 % (ref 38–73)
NEUTROPHILS NFR BLD: 89.2 % (ref 38–73)
NEUTROPHILS NFR BLD: 92.5 % (ref 38–73)
NITRITE UR QL STRIP: NEGATIVE
NITRITE UR QL STRIP: POSITIVE
NRBC BLD-RTO: 0 /100 WBC
NUM UNITS TRANS PACKED RBC: NORMAL
PH UR STRIP: 6 [PH] (ref 5–8)
PH UR STRIP: 7 [PH] (ref 5–8)
PHOSPHATE SERPL-MCNC: 2.5 MG/DL (ref 2.7–4.5)
PHOSPHATE SERPL-MCNC: 2.7 MG/DL (ref 2.7–4.5)
PHOSPHATE SERPL-MCNC: 2.8 MG/DL (ref 2.7–4.5)
PLATELET # BLD AUTO: 193 K/UL (ref 150–450)
PLATELET # BLD AUTO: 219 K/UL (ref 150–450)
PLATELET # BLD AUTO: 241 K/UL (ref 150–450)
PLATELET # BLD AUTO: 284 K/UL (ref 150–450)
PLATELET # BLD AUTO: 293 K/UL (ref 150–450)
PLATELET # BLD AUTO: 319 K/UL (ref 150–450)
PLATELET # BLD AUTO: 350 K/UL (ref 150–450)
PLATELET # BLD AUTO: 427 K/UL (ref 150–450)
PLATELET # BLD AUTO: 494 K/UL (ref 150–450)
PLATELET BLD QL SMEAR: ABNORMAL
PMV BLD AUTO: 8.8 FL (ref 9.2–12.9)
PMV BLD AUTO: 9.2 FL (ref 9.2–12.9)
PMV BLD AUTO: 9.2 FL (ref 9.2–12.9)
PMV BLD AUTO: 9.3 FL (ref 9.2–12.9)
PMV BLD AUTO: 9.4 FL (ref 9.2–12.9)
PMV BLD AUTO: 9.5 FL (ref 9.2–12.9)
PMV BLD AUTO: 9.5 FL (ref 9.2–12.9)
PMV BLD AUTO: 9.6 FL (ref 9.2–12.9)
PMV BLD AUTO: 9.7 FL (ref 9.2–12.9)
POCT GLUCOSE: 105 MG/DL (ref 70–110)
POCT GLUCOSE: 109 MG/DL (ref 70–110)
POCT GLUCOSE: 111 MG/DL (ref 70–110)
POCT GLUCOSE: 116 MG/DL (ref 70–110)
POCT GLUCOSE: 120 MG/DL (ref 70–110)
POCT GLUCOSE: 128 MG/DL (ref 70–110)
POCT GLUCOSE: 134 MG/DL (ref 70–110)
POCT GLUCOSE: 136 MG/DL (ref 70–110)
POCT GLUCOSE: 140 MG/DL (ref 70–110)
POCT GLUCOSE: 160 MG/DL (ref 70–110)
POCT GLUCOSE: 192 MG/DL (ref 70–110)
POCT GLUCOSE: 64 MG/DL (ref 70–110)
POCT GLUCOSE: 66 MG/DL (ref 70–110)
POCT GLUCOSE: 69 MG/DL (ref 70–110)
POCT GLUCOSE: 73 MG/DL (ref 70–110)
POCT GLUCOSE: 74 MG/DL (ref 70–110)
POCT GLUCOSE: 74 MG/DL (ref 70–110)
POCT GLUCOSE: 85 MG/DL (ref 70–110)
POCT GLUCOSE: 85 MG/DL (ref 70–110)
POCT GLUCOSE: 86 MG/DL (ref 70–110)
POCT GLUCOSE: 91 MG/DL (ref 70–110)
POTASSIUM SERPL-SCNC: 2.7 MMOL/L (ref 3.5–5.1)
POTASSIUM SERPL-SCNC: 2.7 MMOL/L (ref 3.5–5.1)
POTASSIUM SERPL-SCNC: 2.8 MMOL/L (ref 3.5–5.1)
POTASSIUM SERPL-SCNC: 3.3 MMOL/L (ref 3.5–5.1)
POTASSIUM SERPL-SCNC: 3.3 MMOL/L (ref 3.5–5.1)
POTASSIUM SERPL-SCNC: 3.5 MMOL/L (ref 3.5–5.1)
POTASSIUM SERPL-SCNC: 3.7 MMOL/L (ref 3.5–5.1)
POTASSIUM SERPL-SCNC: 3.8 MMOL/L (ref 3.5–5.1)
POTASSIUM SERPL-SCNC: 3.8 MMOL/L (ref 3.5–5.1)
POTASSIUM SERPL-SCNC: 3.9 MMOL/L (ref 3.5–5.1)
POTASSIUM SERPL-SCNC: 4 MMOL/L (ref 3.5–5.1)
POTASSIUM SERPL-SCNC: 4.1 MMOL/L (ref 3.5–5.1)
POTASSIUM SERPL-SCNC: 4.1 MMOL/L (ref 3.5–5.1)
POTASSIUM SERPL-SCNC: 4.5 MMOL/L (ref 3.5–5.1)
PROT SERPL-MCNC: 5.1 G/DL (ref 6–8.4)
PROT SERPL-MCNC: 6.9 G/DL (ref 6–8.4)
PROT SERPL-MCNC: 7.6 G/DL (ref 6–8.4)
PROT UR QL STRIP: ABNORMAL
PROT UR QL STRIP: ABNORMAL
PROTHROMBIN TIME: 10.6 SEC (ref 9–12.5)
RBC # BLD AUTO: 2.81 M/UL (ref 4–5.4)
RBC # BLD AUTO: 2.88 M/UL (ref 4–5.4)
RBC # BLD AUTO: 3.26 M/UL (ref 4–5.4)
RBC # BLD AUTO: 3.55 M/UL (ref 4–5.4)
RBC # BLD AUTO: 3.62 M/UL (ref 4–5.4)
RBC # BLD AUTO: 3.83 M/UL (ref 4–5.4)
RBC # BLD AUTO: 4.08 M/UL (ref 4–5.4)
RBC # BLD AUTO: 4.13 M/UL (ref 4–5.4)
RBC # BLD AUTO: 4.23 M/UL (ref 4–5.4)
RBC #/AREA URNS HPF: 10 /HPF (ref 0–4)
RBC #/AREA URNS HPF: >100 /HPF (ref 0–4)
SARS-COV-2 AG RESP QL IA.RAPID: NEGATIVE
SARS-COV-2 RDRP RESP QL NAA+PROBE: NEGATIVE
SODIUM SERPL-SCNC: 132 MMOL/L (ref 136–145)
SODIUM SERPL-SCNC: 135 MMOL/L (ref 136–145)
SODIUM SERPL-SCNC: 136 MMOL/L (ref 136–145)
SODIUM SERPL-SCNC: 137 MMOL/L (ref 136–145)
SODIUM SERPL-SCNC: 138 MMOL/L (ref 136–145)
SODIUM SERPL-SCNC: 138 MMOL/L (ref 136–145)
SODIUM SERPL-SCNC: 139 MMOL/L (ref 136–145)
SODIUM SERPL-SCNC: 139 MMOL/L (ref 136–145)
SODIUM SERPL-SCNC: 140 MMOL/L (ref 136–145)
SODIUM SERPL-SCNC: 142 MMOL/L (ref 136–145)
SP GR UR STRIP: 1.01 (ref 1–1.03)
SP GR UR STRIP: 1.02 (ref 1–1.03)
SQUAMOUS #/AREA URNS HPF: 2 /HPF
URN SPEC COLLECT METH UR: ABNORMAL
URN SPEC COLLECT METH UR: ABNORMAL
UROBILINOGEN UR STRIP-ACNC: 1 EU/DL
UROBILINOGEN UR STRIP-ACNC: NEGATIVE EU/DL
WBC # BLD AUTO: 10.89 K/UL (ref 3.9–12.7)
WBC # BLD AUTO: 12.05 K/UL (ref 3.9–12.7)
WBC # BLD AUTO: 12.19 K/UL (ref 3.9–12.7)
WBC # BLD AUTO: 12.68 K/UL (ref 3.9–12.7)
WBC # BLD AUTO: 13.13 K/UL (ref 3.9–12.7)
WBC # BLD AUTO: 13.92 K/UL (ref 3.9–12.7)
WBC # BLD AUTO: 7.98 K/UL (ref 3.9–12.7)
WBC # BLD AUTO: 9.38 K/UL (ref 3.9–12.7)
WBC # BLD AUTO: 9.47 K/UL (ref 3.9–12.7)
WBC #/AREA URNS HPF: 20 /HPF (ref 0–5)
WBC #/AREA URNS HPF: 3 /HPF (ref 0–5)
WBC CLUMPS URNS QL MICRO: ABNORMAL

## 2022-01-01 PROCEDURE — 99232 PR SUBSEQUENT HOSPITAL CARE,LEVL II: ICD-10-PCS | Mod: ,,, | Performed by: INTERNAL MEDICINE

## 2022-01-01 PROCEDURE — 63600175 PHARM REV CODE 636 W HCPCS: Performed by: INTERNAL MEDICINE

## 2022-01-01 PROCEDURE — 99214 PR OFFICE/OUTPT VISIT, EST, LEVL IV, 30-39 MIN: ICD-10-PCS | Mod: ,,, | Performed by: UROLOGY

## 2022-01-01 PROCEDURE — 36415 COLL VENOUS BLD VENIPUNCTURE: CPT | Performed by: NURSE PRACTITIONER

## 2022-01-01 PROCEDURE — 80048 BASIC METABOLIC PNL TOTAL CA: CPT | Performed by: NURSE PRACTITIONER

## 2022-01-01 PROCEDURE — 25000003 PHARM REV CODE 250: Performed by: NURSE PRACTITIONER

## 2022-01-01 PROCEDURE — 63600175 PHARM REV CODE 636 W HCPCS: Performed by: NURSE PRACTITIONER

## 2022-01-01 PROCEDURE — 27000207 HC ISOLATION

## 2022-01-01 PROCEDURE — 99214 OFFICE O/P EST MOD 30 MIN: CPT | Mod: ,,, | Performed by: UROLOGY

## 2022-01-01 PROCEDURE — 99215 OFFICE O/P EST HI 40 MIN: CPT | Mod: S$PBB,,, | Performed by: INTERNAL MEDICINE

## 2022-01-01 PROCEDURE — 37000008 HC ANESTHESIA 1ST 15 MINUTES: Performed by: UROLOGY

## 2022-01-01 PROCEDURE — 86901 BLOOD TYPING SEROLOGIC RH(D): CPT | Performed by: NURSE PRACTITIONER

## 2022-01-01 PROCEDURE — 37000009 HC ANESTHESIA EA ADD 15 MINS: Performed by: UROLOGY

## 2022-01-01 PROCEDURE — 25500020 PHARM REV CODE 255: Performed by: INTERNAL MEDICINE

## 2022-01-01 PROCEDURE — 99285 EMERGENCY DEPT VISIT HI MDM: CPT | Mod: 25

## 2022-01-01 PROCEDURE — 94761 N-INVAS EAR/PLS OXIMETRY MLT: CPT

## 2022-01-01 PROCEDURE — 11000001 HC ACUTE MED/SURG PRIVATE ROOM

## 2022-01-01 PROCEDURE — 25000003 PHARM REV CODE 250: Performed by: RADIOLOGY

## 2022-01-01 PROCEDURE — 85025 COMPLETE CBC W/AUTO DIFF WBC: CPT | Performed by: NURSE PRACTITIONER

## 2022-01-01 PROCEDURE — 25000003 PHARM REV CODE 250: Performed by: COLON & RECTAL SURGERY

## 2022-01-01 PROCEDURE — C1769 GUIDE WIRE: HCPCS

## 2022-01-01 PROCEDURE — 21400001 HC TELEMETRY ROOM

## 2022-01-01 PROCEDURE — 99497 ADVNCD CARE PLAN 30 MIN: CPT | Mod: 25,,, | Performed by: PHYSICIAN ASSISTANT

## 2022-01-01 PROCEDURE — 84100 ASSAY OF PHOSPHORUS: CPT | Performed by: NURSE PRACTITIONER

## 2022-01-01 PROCEDURE — 99222 PR INITIAL HOSPITAL CARE,LEVL II: ICD-10-PCS | Mod: ,,, | Performed by: INTERNAL MEDICINE

## 2022-01-01 PROCEDURE — 97166 OT EVAL MOD COMPLEX 45 MIN: CPT | Performed by: PHYSICAL THERAPIST

## 2022-01-01 PROCEDURE — 43246 EGD PLACE GASTROSTOMY TUBE: CPT | Mod: ,,, | Performed by: INTERNAL MEDICINE

## 2022-01-01 PROCEDURE — 87389 HIV-1 AG W/HIV-1&-2 AB AG IA: CPT | Performed by: EMERGENCY MEDICINE

## 2022-01-01 PROCEDURE — 83735 ASSAY OF MAGNESIUM: CPT | Performed by: NURSE PRACTITIONER

## 2022-01-01 PROCEDURE — 99233 SBSQ HOSP IP/OBS HIGH 50: CPT | Mod: ,,, | Performed by: PHYSICIAN ASSISTANT

## 2022-01-01 PROCEDURE — 99232 SBSQ HOSP IP/OBS MODERATE 35: CPT | Mod: ,,, | Performed by: COLON & RECTAL SURGERY

## 2022-01-01 PROCEDURE — 80048 BASIC METABOLIC PNL TOTAL CA: CPT | Performed by: RADIOLOGY

## 2022-01-01 PROCEDURE — 0004A COVID-19, MRNA, LNP-S, PF, 30 MCG/0.3 ML DOSE VACCINE: CPT | Mod: CV19,PBBFAC | Performed by: FAMILY MEDICINE

## 2022-01-01 PROCEDURE — 99232 SBSQ HOSP IP/OBS MODERATE 35: CPT | Mod: ,,, | Performed by: PHYSICIAN ASSISTANT

## 2022-01-01 PROCEDURE — 99232 PR SUBSEQUENT HOSPITAL CARE,LEVL II: ICD-10-PCS | Mod: ,,, | Performed by: COLON & RECTAL SURGERY

## 2022-01-01 PROCEDURE — 99233 PR SUBSEQUENT HOSPITAL CARE,LEVL III: ICD-10-PCS | Mod: ,,, | Performed by: INTERNAL MEDICINE

## 2022-01-01 PROCEDURE — 87040 BLOOD CULTURE FOR BACTERIA: CPT | Mod: 59 | Performed by: NURSE PRACTITIONER

## 2022-01-01 PROCEDURE — 80053 COMPREHEN METABOLIC PANEL: CPT | Performed by: NURSE PRACTITIONER

## 2022-01-01 PROCEDURE — 93005 ELECTROCARDIOGRAM TRACING: CPT

## 2022-01-01 PROCEDURE — 63600175 PHARM REV CODE 636 W HCPCS: Performed by: NURSE ANESTHETIST, CERTIFIED REGISTERED

## 2022-01-01 PROCEDURE — 71000033 HC RECOVERY, INTIAL HOUR: Performed by: UROLOGY

## 2022-01-01 PROCEDURE — 99498 ADVNCD CARE PLAN ADDL 30 MIN: CPT | Mod: ,,, | Performed by: PHYSICIAN ASSISTANT

## 2022-01-01 PROCEDURE — 87186 SC STD MICRODIL/AGAR DIL: CPT | Performed by: NURSE PRACTITIONER

## 2022-01-01 PROCEDURE — U0002 COVID-19 LAB TEST NON-CDC: HCPCS | Performed by: EMERGENCY MEDICINE

## 2022-01-01 PROCEDURE — 63600175 PHARM REV CODE 636 W HCPCS: Performed by: PHYSICIAN ASSISTANT

## 2022-01-01 PROCEDURE — 99215 OFFICE O/P EST HI 40 MIN: CPT | Mod: PBBFAC | Performed by: INTERNAL MEDICINE

## 2022-01-01 PROCEDURE — 99221 1ST HOSP IP/OBS SF/LOW 40: CPT | Mod: ,,, | Performed by: INTERNAL MEDICINE

## 2022-01-01 PROCEDURE — 36430 TRANSFUSION BLD/BLD COMPNT: CPT

## 2022-01-01 PROCEDURE — 74420 UROGRAPHY RTRGR +-KUB: CPT | Mod: 26,,, | Performed by: UROLOGY

## 2022-01-01 PROCEDURE — 99233 PR SUBSEQUENT HOSPITAL CARE,LEVL III: ICD-10-PCS | Mod: ,,, | Performed by: PHYSICIAN ASSISTANT

## 2022-01-01 PROCEDURE — G0378 HOSPITAL OBSERVATION PER HR: HCPCS

## 2022-01-01 PROCEDURE — 99231 PR SUBSEQUENT HOSPITAL CARE,LEVL I: ICD-10-PCS | Mod: ,,, | Performed by: INTERNAL MEDICINE

## 2022-01-01 PROCEDURE — 99223 PR INITIAL HOSPITAL CARE,LEVL III: ICD-10-PCS | Mod: ,,, | Performed by: PHYSICIAN ASSISTANT

## 2022-01-01 PROCEDURE — 92507 TX SP LANG VOICE COMM INDIV: CPT

## 2022-01-01 PROCEDURE — 99233 SBSQ HOSP IP/OBS HIGH 50: CPT | Mod: ,,, | Performed by: INTERNAL MEDICINE

## 2022-01-01 PROCEDURE — 36415 COLL VENOUS BLD VENIPUNCTURE: CPT | Performed by: INTERNAL MEDICINE

## 2022-01-01 PROCEDURE — 99497 ADVNCD CARE PLAN 30 MIN: CPT | Mod: ,,, | Performed by: PHYSICIAN ASSISTANT

## 2022-01-01 PROCEDURE — 99233 SBSQ HOSP IP/OBS HIGH 50: CPT | Mod: ,,, | Performed by: SURGERY

## 2022-01-01 PROCEDURE — 99223 1ST HOSP IP/OBS HIGH 75: CPT | Mod: ,,, | Performed by: INTERNAL MEDICINE

## 2022-01-01 PROCEDURE — 52332 CYSTOSCOPY AND TREATMENT: CPT | Mod: 50,,, | Performed by: UROLOGY

## 2022-01-01 PROCEDURE — 81000 URINALYSIS NONAUTO W/SCOPE: CPT | Performed by: NURSE PRACTITIONER

## 2022-01-01 PROCEDURE — 99356 PR PROLONGED SERV,INPATIENT,1ST HR: ICD-10-PCS | Mod: ,,, | Performed by: PHYSICIAN ASSISTANT

## 2022-01-01 PROCEDURE — 99223 PR INITIAL HOSPITAL CARE,LEVL III: ICD-10-PCS | Mod: ICN,CMP,, | Performed by: NURSE PRACTITIONER

## 2022-01-01 PROCEDURE — 99222 1ST HOSP IP/OBS MODERATE 55: CPT | Mod: ,,, | Performed by: INTERNAL MEDICINE

## 2022-01-01 PROCEDURE — 99232 SBSQ HOSP IP/OBS MODERATE 35: CPT | Mod: ,,, | Performed by: INTERNAL MEDICINE

## 2022-01-01 PROCEDURE — 36005 INJECTION EXT VENOGRAPHY: CPT | Mod: LT | Performed by: RADIOLOGY

## 2022-01-01 PROCEDURE — 93010 ELECTROCARDIOGRAM REPORT: CPT | Mod: ,,, | Performed by: INTERNAL MEDICINE

## 2022-01-01 PROCEDURE — 99497 PR ADVNCD CARE PLAN 30 MIN: ICD-10-PCS | Mod: 25,,, | Performed by: PHYSICIAN ASSISTANT

## 2022-01-01 PROCEDURE — 99213 OFFICE O/P EST LOW 20 MIN: CPT | Mod: PBBFAC | Performed by: COLON & RECTAL SURGERY

## 2022-01-01 PROCEDURE — 96361 HYDRATE IV INFUSION ADD-ON: CPT

## 2022-01-01 PROCEDURE — 25000003 PHARM REV CODE 250: Performed by: PHYSICIAN ASSISTANT

## 2022-01-01 PROCEDURE — 96372 THER/PROPH/DIAG INJ SC/IM: CPT

## 2022-01-01 PROCEDURE — 93010 EKG 12-LEAD: ICD-10-PCS | Mod: ,,, | Performed by: INTERNAL MEDICINE

## 2022-01-01 PROCEDURE — 27201012 HC FORCEPS, HOT/COLD, DISP: Performed by: INTERNAL MEDICINE

## 2022-01-01 PROCEDURE — 83735 ASSAY OF MAGNESIUM: CPT | Performed by: INTERNAL MEDICINE

## 2022-01-01 PROCEDURE — 99999 PR PBB SHADOW E&M-EST. PATIENT-LVL III: CPT | Mod: PBBFAC,,, | Performed by: COLON & RECTAL SURGERY

## 2022-01-01 PROCEDURE — 52332 PR CYSTOSCOPY,INSERT URETERAL STENT: ICD-10-PCS | Mod: 50,,, | Performed by: UROLOGY

## 2022-01-01 PROCEDURE — 99497 PR ADVNCD CARE PLAN 30 MIN: ICD-10-PCS | Mod: ,,, | Performed by: PHYSICIAN ASSISTANT

## 2022-01-01 PROCEDURE — 94760 N-INVAS EAR/PLS OXIMETRY 1: CPT

## 2022-01-01 PROCEDURE — 85025 COMPLETE CBC W/AUTO DIFF WBC: CPT | Performed by: INTERNAL MEDICINE

## 2022-01-01 PROCEDURE — 85610 PROTHROMBIN TIME: CPT | Performed by: RADIOLOGY

## 2022-01-01 PROCEDURE — 99499 UNLISTED E&M SERVICE: CPT | Mod: ,,, | Performed by: INTERNAL MEDICINE

## 2022-01-01 PROCEDURE — 37000008 HC ANESTHESIA 1ST 15 MINUTES: Performed by: INTERNAL MEDICINE

## 2022-01-01 PROCEDURE — 99498 PR ADVNCD CARE PLAN ADDL 30 MIN: ICD-10-PCS | Mod: ,,, | Performed by: PHYSICIAN ASSISTANT

## 2022-01-01 PROCEDURE — 99223 1ST HOSP IP/OBS HIGH 75: CPT | Mod: ICN,CMP,, | Performed by: NURSE PRACTITIONER

## 2022-01-01 PROCEDURE — 25500020 PHARM REV CODE 255: Performed by: PHYSICIAN ASSISTANT

## 2022-01-01 PROCEDURE — 99214 OFFICE O/P EST MOD 30 MIN: CPT | Mod: 95,,, | Performed by: OBSTETRICS & GYNECOLOGY

## 2022-01-01 PROCEDURE — 37000009 HC ANESTHESIA EA ADD 15 MINS: Performed by: INTERNAL MEDICINE

## 2022-01-01 PROCEDURE — 99214 PR OFFICE/OUTPT VISIT, EST, LEVL IV, 30-39 MIN: ICD-10-PCS | Mod: 95,,, | Performed by: OBSTETRICS & GYNECOLOGY

## 2022-01-01 PROCEDURE — 25000003 PHARM REV CODE 250: Performed by: EMERGENCY MEDICINE

## 2022-01-01 PROCEDURE — 99215 PR OFFICE/OUTPT VISIT, EST, LEVL V, 40-54 MIN: ICD-10-PCS | Mod: S$PBB,,, | Performed by: INTERNAL MEDICINE

## 2022-01-01 PROCEDURE — 97161 PT EVAL LOW COMPLEX 20 MIN: CPT

## 2022-01-01 PROCEDURE — 85027 COMPLETE CBC AUTOMATED: CPT | Performed by: NURSE PRACTITIONER

## 2022-01-01 PROCEDURE — 25000003 PHARM REV CODE 250: Performed by: NURSE ANESTHETIST, CERTIFIED REGISTERED

## 2022-01-01 PROCEDURE — 25500020 PHARM REV CODE 255: Performed by: UROLOGY

## 2022-01-01 PROCEDURE — 74420 PR  X-RAY RETROGRADE PYELOGRAM: ICD-10-PCS | Mod: 26,,, | Performed by: UROLOGY

## 2022-01-01 PROCEDURE — 83690 ASSAY OF LIPASE: CPT | Performed by: NURSE PRACTITIONER

## 2022-01-01 PROCEDURE — 99499 NO LOS: ICD-10-PCS | Mod: ,,, | Performed by: INTERNAL MEDICINE

## 2022-01-01 PROCEDURE — 99223 1ST HOSP IP/OBS HIGH 75: CPT | Mod: ,,, | Performed by: PHYSICIAN ASSISTANT

## 2022-01-01 PROCEDURE — 99215 OFFICE O/P EST HI 40 MIN: CPT | Mod: S$PBB,,, | Performed by: COLON & RECTAL SURGERY

## 2022-01-01 PROCEDURE — 49440 PLACE GASTROSTOMY TUBE PERC: CPT | Performed by: RADIOLOGY

## 2022-01-01 PROCEDURE — 63600175 PHARM REV CODE 636 W HCPCS: Performed by: RADIOLOGY

## 2022-01-01 PROCEDURE — 99223 PR INITIAL HOSPITAL CARE,LEVL III: ICD-10-PCS | Mod: ,,, | Performed by: INTERNAL MEDICINE

## 2022-01-01 PROCEDURE — 80069 RENAL FUNCTION PANEL: CPT | Performed by: INTERNAL MEDICINE

## 2022-01-01 PROCEDURE — 43246 EGD PLACE GASTROSTOMY TUBE: CPT

## 2022-01-01 PROCEDURE — 63600175 PHARM REV CODE 636 W HCPCS: Performed by: ANESTHESIOLOGY

## 2022-01-01 PROCEDURE — 99233 PR SUBSEQUENT HOSPITAL CARE,LEVL III: ICD-10-PCS | Mod: ICN,CMP,, | Performed by: NURSE PRACTITIONER

## 2022-01-01 PROCEDURE — 63700000 PHARM REV CODE 250 ALT 637 W/O HCPCS: Performed by: NURSE PRACTITIONER

## 2022-01-01 PROCEDURE — 99231 SBSQ HOSP IP/OBS SF/LOW 25: CPT | Mod: ,,, | Performed by: INTERNAL MEDICINE

## 2022-01-01 PROCEDURE — 86803 HEPATITIS C AB TEST: CPT | Performed by: EMERGENCY MEDICINE

## 2022-01-01 PROCEDURE — 99221 PR INITIAL HOSPITAL CARE,LEVL I: ICD-10-PCS | Mod: ,,, | Performed by: INTERNAL MEDICINE

## 2022-01-01 PROCEDURE — 87086 URINE CULTURE/COLONY COUNT: CPT | Performed by: NURSE PRACTITIONER

## 2022-01-01 PROCEDURE — 99232 PR SUBSEQUENT HOSPITAL CARE,LEVL II: ICD-10-PCS | Mod: ,,, | Performed by: PHYSICIAN ASSISTANT

## 2022-01-01 PROCEDURE — 75822 VEIN X-RAY ARMS/LEGS: CPT | Mod: TC | Performed by: RADIOLOGY

## 2022-01-01 PROCEDURE — 86920 COMPATIBILITY TEST SPIN: CPT | Performed by: NURSE PRACTITIONER

## 2022-01-01 PROCEDURE — C1769 GUIDE WIRE: HCPCS | Performed by: UROLOGY

## 2022-01-01 PROCEDURE — 96374 THER/PROPH/DIAG INJ IV PUSH: CPT

## 2022-01-01 PROCEDURE — 99999 PR PBB SHADOW E&M-EST. PATIENT-LVL V: CPT | Mod: PBBFAC,,, | Performed by: INTERNAL MEDICINE

## 2022-01-01 PROCEDURE — 43246 PR EGD, FLEX, W/PLCMT, GASTROSTOMY TUBE: ICD-10-PCS | Mod: ,,, | Performed by: INTERNAL MEDICINE

## 2022-01-01 PROCEDURE — 99356 PR PROLONGED SERV,INPATIENT,1ST HR: CPT | Mod: ,,, | Performed by: PHYSICIAN ASSISTANT

## 2022-01-01 PROCEDURE — 36000707: Performed by: UROLOGY

## 2022-01-01 PROCEDURE — 63600175 PHARM REV CODE 636 W HCPCS: Performed by: FAMILY MEDICINE

## 2022-01-01 PROCEDURE — 87077 CULTURE AEROBIC IDENTIFY: CPT | Mod: 59 | Performed by: NURSE PRACTITIONER

## 2022-01-01 PROCEDURE — 99233 PR SUBSEQUENT HOSPITAL CARE,LEVL III: ICD-10-PCS | Mod: ,,, | Performed by: SURGERY

## 2022-01-01 PROCEDURE — C2617 STENT, NON-COR, TEM W/O DEL: HCPCS | Performed by: UROLOGY

## 2022-01-01 PROCEDURE — P9016 RBC LEUKOCYTES REDUCED: HCPCS | Performed by: NURSE PRACTITIONER

## 2022-01-01 PROCEDURE — 36000706: Performed by: UROLOGY

## 2022-01-01 PROCEDURE — 99215 PR OFFICE/OUTPT VISIT, EST, LEVL V, 40-54 MIN: ICD-10-PCS | Mod: S$PBB,,, | Performed by: COLON & RECTAL SURGERY

## 2022-01-01 PROCEDURE — 25500020 PHARM REV CODE 255: Performed by: FAMILY MEDICINE

## 2022-01-01 PROCEDURE — 25000003 PHARM REV CODE 250: Performed by: INTERNAL MEDICINE

## 2022-01-01 PROCEDURE — 85025 COMPLETE CBC W/AUTO DIFF WBC: CPT | Performed by: RADIOLOGY

## 2022-01-01 PROCEDURE — 99233 SBSQ HOSP IP/OBS HIGH 50: CPT | Mod: ICN,CMP,, | Performed by: NURSE PRACTITIONER

## 2022-01-01 PROCEDURE — C1758 CATHETER, URETERAL: HCPCS | Performed by: UROLOGY

## 2022-01-01 PROCEDURE — 99999 PR PBB SHADOW E&M-EST. PATIENT-LVL III: ICD-10-PCS | Mod: PBBFAC,,, | Performed by: COLON & RECTAL SURGERY

## 2022-01-01 PROCEDURE — 99999 PR PBB SHADOW E&M-EST. PATIENT-LVL V: ICD-10-PCS | Mod: PBBFAC,,, | Performed by: INTERNAL MEDICINE

## 2022-01-01 DEVICE — STENT URETERAL UNIV 7FR 24CM: Type: IMPLANTABLE DEVICE | Site: URETER | Status: FUNCTIONAL

## 2022-01-01 RX ORDER — ENOXAPARIN SODIUM 100 MG/ML
30 INJECTION SUBCUTANEOUS EVERY 24 HOURS
Status: DISCONTINUED | OUTPATIENT
Start: 2022-01-01 | End: 2022-01-01

## 2022-01-01 RX ORDER — LIDOCAINE HYDROCHLORIDE 10 MG/ML
INJECTION, SOLUTION EPIDURAL; INFILTRATION; INTRACAUDAL; PERINEURAL
Status: DISCONTINUED | OUTPATIENT
Start: 2022-01-01 | End: 2022-01-01

## 2022-01-01 RX ORDER — SODIUM CHLORIDE, SODIUM LACTATE, POTASSIUM CHLORIDE, CALCIUM CHLORIDE 600; 310; 30; 20 MG/100ML; MG/100ML; MG/100ML; MG/100ML
INJECTION, SOLUTION INTRAVENOUS CONTINUOUS
Status: DISCONTINUED | OUTPATIENT
Start: 2022-01-01 | End: 2022-01-01 | Stop reason: HOSPADM

## 2022-01-01 RX ORDER — ONDANSETRON 2 MG/ML
INJECTION INTRAMUSCULAR; INTRAVENOUS
Status: DISCONTINUED | OUTPATIENT
Start: 2022-01-01 | End: 2022-01-01

## 2022-01-01 RX ORDER — LORAZEPAM 2 MG/ML
1 INJECTION INTRAMUSCULAR ONCE
Status: COMPLETED | OUTPATIENT
Start: 2022-01-01 | End: 2022-01-01

## 2022-01-01 RX ORDER — SODIUM CHLORIDE 9 MG/ML
INJECTION, SOLUTION INTRAVENOUS
Status: COMPLETED | OUTPATIENT
Start: 2022-01-01 | End: 2022-01-01

## 2022-01-01 RX ORDER — DIAZEPAM 10 MG/2ML
2.5 INJECTION INTRAMUSCULAR EVERY 6 HOURS PRN
Status: DISCONTINUED | OUTPATIENT
Start: 2022-01-01 | End: 2022-01-01 | Stop reason: HOSPADM

## 2022-01-01 RX ORDER — SODIUM,POTASSIUM PHOSPHATES 280-250MG
2 POWDER IN PACKET (EA) ORAL
Status: DISCONTINUED | OUTPATIENT
Start: 2022-01-01 | End: 2022-01-01

## 2022-01-01 RX ORDER — IBUPROFEN 200 MG
24 TABLET ORAL
Status: DISCONTINUED | OUTPATIENT
Start: 2022-01-01 | End: 2022-01-01

## 2022-01-01 RX ORDER — MAGNESIUM SULFATE HEPTAHYDRATE 40 MG/ML
2 INJECTION, SOLUTION INTRAVENOUS
Status: COMPLETED | OUTPATIENT
Start: 2022-01-01 | End: 2022-01-01

## 2022-01-01 RX ORDER — AMOXICILLIN 250 MG
1 CAPSULE ORAL 2 TIMES DAILY
Status: DISCONTINUED | OUTPATIENT
Start: 2022-01-01 | End: 2022-01-01 | Stop reason: HOSPADM

## 2022-01-01 RX ORDER — HYDROCODONE BITARTRATE AND ACETAMINOPHEN 500; 5 MG/1; MG/1
TABLET ORAL
Status: DISCONTINUED | OUTPATIENT
Start: 2022-01-01 | End: 2022-01-01 | Stop reason: HOSPADM

## 2022-01-01 RX ORDER — SYRING-NEEDL,DISP,INSUL,0.3 ML 29 G X1/2"
296 SYRINGE, EMPTY DISPOSABLE MISCELLANEOUS ONCE
Status: COMPLETED | OUTPATIENT
Start: 2022-01-01 | End: 2022-01-01

## 2022-01-01 RX ORDER — PROPOFOL 10 MG/ML
VIAL (ML) INTRAVENOUS CONTINUOUS PRN
Status: DISCONTINUED | OUTPATIENT
Start: 2022-01-01 | End: 2022-01-01

## 2022-01-01 RX ORDER — NALOXEGOL OXALATE 25 MG/1
25 TABLET, FILM COATED ORAL DAILY
Qty: 60 TABLET | Refills: 3 | Status: ON HOLD | OUTPATIENT
Start: 2022-01-01 | End: 2022-01-01 | Stop reason: HOSPADM

## 2022-01-01 RX ORDER — HYDROMORPHONE HYDROCHLORIDE 1 MG/ML
1 INJECTION, SOLUTION INTRAMUSCULAR; INTRAVENOUS; SUBCUTANEOUS EVERY 4 HOURS PRN
Status: DISCONTINUED | OUTPATIENT
Start: 2022-01-01 | End: 2022-01-01

## 2022-01-01 RX ORDER — POTASSIUM CHLORIDE 7.45 MG/ML
10 INJECTION INTRAVENOUS
Status: COMPLETED | OUTPATIENT
Start: 2022-01-01 | End: 2022-01-01

## 2022-01-01 RX ORDER — MAG HYDROX/ALUMINUM HYD/SIMETH 200-200-20
30 SUSPENSION, ORAL (FINAL DOSE FORM) ORAL 4 TIMES DAILY PRN
Status: DISCONTINUED | OUTPATIENT
Start: 2022-01-01 | End: 2022-01-01 | Stop reason: HOSPADM

## 2022-01-01 RX ORDER — FENTANYL 100 UG/H
1 PATCH TRANSDERMAL
Status: DISCONTINUED | OUTPATIENT
Start: 2022-01-01 | End: 2022-01-01

## 2022-01-01 RX ORDER — MAG HYDROX/ALUMINUM HYD/SIMETH 200-200-20
30 SUSPENSION, ORAL (FINAL DOSE FORM) ORAL 4 TIMES DAILY PRN
Status: DISCONTINUED | OUTPATIENT
Start: 2022-01-01 | End: 2022-01-01

## 2022-01-01 RX ORDER — HYDROMORPHONE HYDROCHLORIDE 1 MG/ML
1 INJECTION, SOLUTION INTRAMUSCULAR; INTRAVENOUS; SUBCUTANEOUS EVERY 6 HOURS PRN
Status: DISCONTINUED | OUTPATIENT
Start: 2022-01-01 | End: 2022-01-01

## 2022-01-01 RX ORDER — MORPHINE SULFATE 4 MG/ML
2 INJECTION, SOLUTION INTRAMUSCULAR; INTRAVENOUS
Status: DISCONTINUED | OUTPATIENT
Start: 2022-01-01 | End: 2022-01-01

## 2022-01-01 RX ORDER — PROMETHAZINE HYDROCHLORIDE 12.5 MG/1
12.5 SUPPOSITORY RECTAL EVERY 6 HOURS PRN
Qty: 6 SUPPOSITORY | Refills: 0 | Status: SHIPPED | OUTPATIENT
Start: 2022-01-01

## 2022-01-01 RX ORDER — BISACODYL 5 MG
10 TABLET, DELAYED RELEASE (ENTERIC COATED) ORAL ONCE
Status: COMPLETED | OUTPATIENT
Start: 2022-01-01 | End: 2022-01-01

## 2022-01-01 RX ORDER — LANOLIN ALCOHOL/MO/W.PET/CERES
800 CREAM (GRAM) TOPICAL
Status: DISCONTINUED | OUTPATIENT
Start: 2022-01-01 | End: 2022-01-01

## 2022-01-01 RX ORDER — POLYETHYLENE GLYCOL 3350 17 G/17G
17 POWDER, FOR SOLUTION ORAL 2 TIMES DAILY
Status: DISCONTINUED | OUTPATIENT
Start: 2022-01-01 | End: 2022-01-01

## 2022-01-01 RX ORDER — SODIUM CHLORIDE 0.9 % (FLUSH) 0.9 %
10 SYRINGE (ML) INJECTION EVERY 8 HOURS PRN
Status: DISCONTINUED | OUTPATIENT
Start: 2022-01-01 | End: 2022-01-01

## 2022-01-01 RX ORDER — HYDROMORPHONE HYDROCHLORIDE 2 MG/ML
1.5 INJECTION, SOLUTION INTRAMUSCULAR; INTRAVENOUS; SUBCUTANEOUS
Status: DISCONTINUED | OUTPATIENT
Start: 2022-01-01 | End: 2022-01-01 | Stop reason: HOSPADM

## 2022-01-01 RX ORDER — FENTANYL CITRATE 50 UG/ML
INJECTION, SOLUTION INTRAMUSCULAR; INTRAVENOUS CODE/TRAUMA/SEDATION MEDICATION
Status: COMPLETED | OUTPATIENT
Start: 2022-01-01 | End: 2022-01-01

## 2022-01-01 RX ORDER — FENTANYL 100 UG/H
1 PATCH TRANSDERMAL
Status: DISCONTINUED | OUTPATIENT
Start: 2022-01-01 | End: 2022-01-01 | Stop reason: HOSPADM

## 2022-01-01 RX ORDER — AMOXICILLIN 250 MG
1 CAPSULE ORAL 2 TIMES DAILY
Status: DISCONTINUED | OUTPATIENT
Start: 2022-01-01 | End: 2022-01-01

## 2022-01-01 RX ORDER — FENTANYL 25 UG/1
1 PATCH TRANSDERMAL
Status: DISCONTINUED | OUTPATIENT
Start: 2022-01-01 | End: 2022-01-01 | Stop reason: HOSPADM

## 2022-01-01 RX ORDER — PROPOFOL 10 MG/ML
VIAL (ML) INTRAVENOUS
Status: DISCONTINUED | OUTPATIENT
Start: 2022-01-01 | End: 2022-01-01

## 2022-01-01 RX ORDER — GLUCAGON 1 MG
1 KIT INJECTION
Status: DISCONTINUED | OUTPATIENT
Start: 2022-01-01 | End: 2022-01-01

## 2022-01-01 RX ORDER — ONDANSETRON 2 MG/ML
4 INJECTION INTRAMUSCULAR; INTRAVENOUS ONCE AS NEEDED
Status: COMPLETED | OUTPATIENT
Start: 2022-01-01 | End: 2022-01-01

## 2022-01-01 RX ORDER — ONDANSETRON 2 MG/ML
8 INJECTION INTRAMUSCULAR; INTRAVENOUS
Status: COMPLETED | OUTPATIENT
Start: 2022-01-01 | End: 2022-01-01

## 2022-01-01 RX ORDER — CEFAZOLIN SODIUM 1 G/3ML
INJECTION, POWDER, FOR SOLUTION INTRAMUSCULAR; INTRAVENOUS
Status: DISCONTINUED | OUTPATIENT
Start: 2022-01-01 | End: 2022-01-01

## 2022-01-01 RX ORDER — PROCHLORPERAZINE EDISYLATE 5 MG/ML
5 INJECTION INTRAMUSCULAR; INTRAVENOUS EVERY 6 HOURS PRN
Status: DISCONTINUED | OUTPATIENT
Start: 2022-01-01 | End: 2022-01-01 | Stop reason: HOSPADM

## 2022-01-01 RX ORDER — PSEUDOEPHEDRINE/ACETAMINOPHEN 30MG-500MG
100 TABLET ORAL
Status: COMPLETED | OUTPATIENT
Start: 2022-01-01 | End: 2022-01-01

## 2022-01-01 RX ORDER — OXYBUTYNIN CHLORIDE 5 MG/1
5 TABLET ORAL 2 TIMES DAILY PRN
Status: DISCONTINUED | OUTPATIENT
Start: 2022-01-01 | End: 2022-01-01 | Stop reason: HOSPADM

## 2022-01-01 RX ORDER — METOCLOPRAMIDE 5 MG/1
5 TABLET ORAL
Status: DISCONTINUED | OUTPATIENT
Start: 2022-01-01 | End: 2022-01-01

## 2022-01-01 RX ORDER — HYDROCODONE BITARTRATE AND ACETAMINOPHEN 5; 325 MG/1; MG/1
1 TABLET ORAL
Status: DISCONTINUED | OUTPATIENT
Start: 2022-01-01 | End: 2022-01-01

## 2022-01-01 RX ORDER — PROCHLORPERAZINE EDISYLATE 5 MG/ML
5 INJECTION INTRAMUSCULAR; INTRAVENOUS EVERY 6 HOURS PRN
Status: DISCONTINUED | OUTPATIENT
Start: 2022-01-01 | End: 2022-01-01

## 2022-01-01 RX ORDER — MIDAZOLAM HYDROCHLORIDE 1 MG/ML
INJECTION INTRAMUSCULAR; INTRAVENOUS CODE/TRAUMA/SEDATION MEDICATION
Status: COMPLETED | OUTPATIENT
Start: 2022-01-01 | End: 2022-01-01

## 2022-01-01 RX ORDER — METOCLOPRAMIDE 10 MG/1
10 TABLET ORAL
Status: DISCONTINUED | OUTPATIENT
Start: 2022-01-01 | End: 2022-01-01

## 2022-01-01 RX ORDER — SYRING-NEEDL,DISP,INSUL,0.3 ML 29 G X1/2"
296 SYRINGE, EMPTY DISPOSABLE MISCELLANEOUS
Status: COMPLETED | OUTPATIENT
Start: 2022-01-01 | End: 2022-01-01

## 2022-01-01 RX ORDER — ONDANSETRON 2 MG/ML
INJECTION INTRAMUSCULAR; INTRAVENOUS CODE/TRAUMA/SEDATION MEDICATION
Status: COMPLETED | OUTPATIENT
Start: 2022-01-01 | End: 2022-01-01

## 2022-01-01 RX ORDER — ONDANSETRON 2 MG/ML
4 INJECTION INTRAMUSCULAR; INTRAVENOUS EVERY 6 HOURS PRN
Status: DISCONTINUED | OUTPATIENT
Start: 2022-01-01 | End: 2022-01-01

## 2022-01-01 RX ORDER — ONDANSETRON 2 MG/ML
8 INJECTION INTRAMUSCULAR; INTRAVENOUS EVERY 6 HOURS
Status: DISCONTINUED | OUTPATIENT
Start: 2022-01-01 | End: 2022-01-01 | Stop reason: HOSPADM

## 2022-01-01 RX ORDER — KETOROLAC TROMETHAMINE 30 MG/ML
15 INJECTION, SOLUTION INTRAMUSCULAR; INTRAVENOUS ONCE
Status: COMPLETED | OUTPATIENT
Start: 2022-01-01 | End: 2022-01-01

## 2022-01-01 RX ORDER — OXYCODONE AND ACETAMINOPHEN 10; 325 MG/1; MG/1
1 TABLET ORAL EVERY 6 HOURS PRN
Status: DISCONTINUED | OUTPATIENT
Start: 2022-01-01 | End: 2022-01-01

## 2022-01-01 RX ORDER — BISACODYL 10 MG
10 SUPPOSITORY, RECTAL RECTAL ONCE
Status: COMPLETED | OUTPATIENT
Start: 2022-01-01 | End: 2022-01-01

## 2022-01-01 RX ORDER — METOCLOPRAMIDE HYDROCHLORIDE 5 MG/ML
10 INJECTION INTRAMUSCULAR; INTRAVENOUS EVERY 6 HOURS
Status: DISCONTINUED | OUTPATIENT
Start: 2022-01-01 | End: 2022-01-01

## 2022-01-01 RX ORDER — SODIUM CHLORIDE 0.9 % (FLUSH) 0.9 %
10 SYRINGE (ML) INJECTION EVERY 8 HOURS PRN
Status: DISCONTINUED | OUTPATIENT
Start: 2022-01-01 | End: 2022-01-01 | Stop reason: HOSPADM

## 2022-01-01 RX ORDER — METOCLOPRAMIDE HYDROCHLORIDE 5 MG/ML
5 INJECTION INTRAMUSCULAR; INTRAVENOUS
Status: DISCONTINUED | OUTPATIENT
Start: 2022-01-01 | End: 2022-01-01 | Stop reason: HOSPADM

## 2022-01-01 RX ORDER — SIMETHICONE 80 MG
1 TABLET,CHEWABLE ORAL 4 TIMES DAILY PRN
Status: DISCONTINUED | OUTPATIENT
Start: 2022-01-01 | End: 2022-01-01

## 2022-01-01 RX ORDER — ACETAMINOPHEN 325 MG/1
650 TABLET ORAL EVERY 4 HOURS PRN
Status: DISCONTINUED | OUTPATIENT
Start: 2022-01-01 | End: 2022-01-01

## 2022-01-01 RX ORDER — ONDANSETRON 2 MG/ML
4 INJECTION INTRAMUSCULAR; INTRAVENOUS EVERY 8 HOURS PRN
Status: DISCONTINUED | OUTPATIENT
Start: 2022-01-01 | End: 2022-01-01 | Stop reason: HOSPADM

## 2022-01-01 RX ORDER — TALC
6 POWDER (GRAM) TOPICAL NIGHTLY PRN
Status: DISCONTINUED | OUTPATIENT
Start: 2022-01-01 | End: 2022-01-01 | Stop reason: HOSPADM

## 2022-01-01 RX ORDER — DIPHENHYDRAMINE HYDROCHLORIDE 50 MG/ML
25 INJECTION INTRAMUSCULAR; INTRAVENOUS EVERY 6 HOURS PRN
Status: DISCONTINUED | OUTPATIENT
Start: 2022-01-01 | End: 2022-01-01

## 2022-01-01 RX ORDER — DEXTROSE, SODIUM CHLORIDE, SODIUM LACTATE, POTASSIUM CHLORIDE, AND CALCIUM CHLORIDE 5; .6; .31; .03; .02 G/100ML; G/100ML; G/100ML; G/100ML; G/100ML
INJECTION, SOLUTION INTRAVENOUS CONTINUOUS
Status: DISCONTINUED | OUTPATIENT
Start: 2022-01-01 | End: 2022-01-01

## 2022-01-01 RX ORDER — MIDAZOLAM HYDROCHLORIDE 1 MG/ML
INJECTION, SOLUTION INTRAMUSCULAR; INTRAVENOUS
Status: DISCONTINUED | OUTPATIENT
Start: 2022-01-01 | End: 2022-01-01

## 2022-01-01 RX ORDER — MEPERIDINE HYDROCHLORIDE 25 MG/ML
12.5 INJECTION INTRAMUSCULAR; INTRAVENOUS; SUBCUTANEOUS ONCE
Status: ACTIVE | OUTPATIENT
Start: 2022-01-01 | End: 2022-01-01

## 2022-01-01 RX ORDER — METOCLOPRAMIDE 5 MG/1
10 TABLET ORAL
Qty: 240 TABLET | Refills: 0 | Status: ON HOLD | OUTPATIENT
Start: 2022-01-01 | End: 2022-01-01 | Stop reason: HOSPADM

## 2022-01-01 RX ORDER — IBUPROFEN 200 MG
16 TABLET ORAL
Status: DISCONTINUED | OUTPATIENT
Start: 2022-01-01 | End: 2022-01-01

## 2022-01-01 RX ORDER — FENTANYL CITRATE 50 UG/ML
INJECTION, SOLUTION INTRAMUSCULAR; INTRAVENOUS
Status: DISCONTINUED | OUTPATIENT
Start: 2022-01-01 | End: 2022-01-01

## 2022-01-01 RX ORDER — POTASSIUM CHLORIDE 20 MEQ/15ML
40 SOLUTION ORAL ONCE
Status: COMPLETED | OUTPATIENT
Start: 2022-01-01 | End: 2022-01-01

## 2022-01-01 RX ORDER — FENTANYL CITRATE 50 UG/ML
25 INJECTION, SOLUTION INTRAMUSCULAR; INTRAVENOUS EVERY 5 MIN PRN
Status: DISCONTINUED | OUTPATIENT
Start: 2022-01-01 | End: 2022-01-01

## 2022-01-01 RX ORDER — ZOLPIDEM TARTRATE 5 MG/1
5 TABLET ORAL NIGHTLY PRN
Status: DISCONTINUED | OUTPATIENT
Start: 2022-01-01 | End: 2022-01-01

## 2022-01-01 RX ORDER — HYDROMORPHONE HYDROCHLORIDE 1 MG/ML
1 INJECTION, SOLUTION INTRAMUSCULAR; INTRAVENOUS; SUBCUTANEOUS
Status: DISCONTINUED | OUTPATIENT
Start: 2022-01-01 | End: 2022-01-01

## 2022-01-01 RX ORDER — ONDANSETRON 2 MG/ML
4 INJECTION INTRAMUSCULAR; INTRAVENOUS EVERY 6 HOURS
Status: DISCONTINUED | OUTPATIENT
Start: 2022-01-01 | End: 2022-01-01

## 2022-01-01 RX ORDER — METOCLOPRAMIDE HYDROCHLORIDE 5 MG/ML
5 INJECTION INTRAMUSCULAR; INTRAVENOUS EVERY 6 HOURS
Status: DISCONTINUED | OUTPATIENT
Start: 2022-01-01 | End: 2022-01-01 | Stop reason: HOSPADM

## 2022-01-01 RX ORDER — ENOXAPARIN SODIUM 100 MG/ML
40 INJECTION SUBCUTANEOUS EVERY 24 HOURS
Status: DISCONTINUED | OUTPATIENT
Start: 2022-01-01 | End: 2022-01-01

## 2022-01-01 RX ORDER — PHENAZOPYRIDINE HYDROCHLORIDE 100 MG/1
100 TABLET, FILM COATED ORAL 3 TIMES DAILY PRN
Status: DISCONTINUED | OUTPATIENT
Start: 2022-01-01 | End: 2022-01-01

## 2022-01-01 RX ORDER — SODIUM CHLORIDE 9 MG/ML
INJECTION, SOLUTION INTRAVENOUS CONTINUOUS
Status: DISCONTINUED | OUTPATIENT
Start: 2022-01-01 | End: 2022-01-01

## 2022-01-01 RX ORDER — MUPIROCIN 20 MG/G
OINTMENT TOPICAL 2 TIMES DAILY
Status: COMPLETED | OUTPATIENT
Start: 2022-01-01 | End: 2022-01-01

## 2022-01-01 RX ORDER — POTASSIUM CHLORIDE 14.9 MG/ML
40 INJECTION INTRAVENOUS ONCE
Status: DISCONTINUED | OUTPATIENT
Start: 2022-01-01 | End: 2022-01-01

## 2022-01-01 RX ORDER — NALOXONE HCL 0.4 MG/ML
0.02 VIAL (ML) INJECTION
Status: DISCONTINUED | OUTPATIENT
Start: 2022-01-01 | End: 2022-01-01

## 2022-01-01 RX ORDER — ENOXAPARIN SODIUM 100 MG/ML
40 INJECTION SUBCUTANEOUS EVERY 24 HOURS
Status: DISCONTINUED | OUTPATIENT
Start: 2022-01-01 | End: 2022-01-01 | Stop reason: DRUGHIGH

## 2022-01-01 RX ORDER — FAMOTIDINE 20 MG/1
20 TABLET, FILM COATED ORAL DAILY
Status: DISCONTINUED | OUTPATIENT
Start: 2022-01-01 | End: 2022-01-01

## 2022-01-01 RX ORDER — MINERAL OIL
30 OIL (ML) ORAL DAILY
Status: DISCONTINUED | OUTPATIENT
Start: 2022-01-01 | End: 2022-01-01

## 2022-01-01 RX ORDER — SIMETHICONE 80 MG
1 TABLET,CHEWABLE ORAL 4 TIMES DAILY PRN
Status: DISCONTINUED | OUTPATIENT
Start: 2022-01-01 | End: 2022-01-01 | Stop reason: HOSPADM

## 2022-01-01 RX ORDER — SENNOSIDES 8.6 MG/1
8.6 TABLET ORAL DAILY
Status: DISCONTINUED | OUTPATIENT
Start: 2022-01-01 | End: 2022-01-01

## 2022-01-01 RX ORDER — OXYCODONE HYDROCHLORIDE 30 MG/1
30 TABLET ORAL EVERY 6 HOURS PRN
Status: DISCONTINUED | OUTPATIENT
Start: 2022-01-01 | End: 2022-01-01

## 2022-01-01 RX ORDER — BISACODYL 10 MG
10 SUPPOSITORY, RECTAL RECTAL DAILY PRN
Status: DISCONTINUED | OUTPATIENT
Start: 2022-01-01 | End: 2022-01-01 | Stop reason: HOSPADM

## 2022-01-01 RX ORDER — IPRATROPIUM BROMIDE AND ALBUTEROL SULFATE 2.5; .5 MG/3ML; MG/3ML
3 SOLUTION RESPIRATORY (INHALATION) EVERY 6 HOURS PRN
Status: DISCONTINUED | OUTPATIENT
Start: 2022-01-01 | End: 2022-01-01

## 2022-01-01 RX ORDER — MORPHINE SULFATE 4 MG/ML
8 INJECTION, SOLUTION INTRAMUSCULAR; INTRAVENOUS
Status: DISCONTINUED | OUTPATIENT
Start: 2022-01-01 | End: 2022-01-01 | Stop reason: HOSPADM

## 2022-01-01 RX ORDER — IPRATROPIUM BROMIDE AND ALBUTEROL SULFATE 2.5; .5 MG/3ML; MG/3ML
3 SOLUTION RESPIRATORY (INHALATION) EVERY 4 HOURS PRN
Status: DISCONTINUED | OUTPATIENT
Start: 2022-01-01 | End: 2022-01-01 | Stop reason: HOSPADM

## 2022-01-01 RX ORDER — HYOSCYAMINE SULFATE 0.12 MG/1
0.12 TABLET SUBLINGUAL EVERY 4 HOURS PRN
Status: DISCONTINUED | OUTPATIENT
Start: 2022-01-01 | End: 2022-01-01 | Stop reason: HOSPADM

## 2022-01-01 RX ADMIN — FAMOTIDINE 20 MG: 20 TABLET ORAL at 10:01

## 2022-01-01 RX ADMIN — POLYETHYLENE GLYCOL 3350 17 G: 17 POWDER, FOR SOLUTION ORAL at 10:01

## 2022-01-01 RX ADMIN — METHYLNALTREXONE BROMIDE 8 MG: 8 INJECTION, SOLUTION SUBCUTANEOUS at 09:01

## 2022-01-01 RX ADMIN — METOCLOPRAMIDE HYDROCHLORIDE 5 MG: 5 TABLET ORAL at 05:01

## 2022-01-01 RX ADMIN — METOCLOPRAMIDE 5 MG: 5 INJECTION, SOLUTION INTRAMUSCULAR; INTRAVENOUS at 11:01

## 2022-01-01 RX ADMIN — FENTANYL 1 PATCH: 25 PATCH, EXTENDED RELEASE TRANSDERMAL at 11:01

## 2022-01-01 RX ADMIN — HYDROMORPHONE HYDROCHLORIDE 1 MG: 1 INJECTION, SOLUTION INTRAMUSCULAR; INTRAVENOUS; SUBCUTANEOUS at 07:01

## 2022-01-01 RX ADMIN — ONDANSETRON 8 MG: 2 INJECTION INTRAMUSCULAR; INTRAVENOUS at 06:02

## 2022-01-01 RX ADMIN — SODIUM CHLORIDE: 0.9 INJECTION, SOLUTION INTRAVENOUS at 04:01

## 2022-01-01 RX ADMIN — SODIUM CHLORIDE: 0.9 INJECTION, SOLUTION INTRAVENOUS at 05:01

## 2022-01-01 RX ADMIN — SODIUM CHLORIDE, SODIUM LACTATE, POTASSIUM CHLORIDE, AND CALCIUM CHLORIDE: .6; .31; .03; .02 INJECTION, SOLUTION INTRAVENOUS at 08:01

## 2022-01-01 RX ADMIN — HYDROMORPHONE HYDROCHLORIDE 1 MG: 1 INJECTION, SOLUTION INTRAMUSCULAR; INTRAVENOUS; SUBCUTANEOUS at 11:01

## 2022-01-01 RX ADMIN — BISACODYL 10 MG: 10 SUPPOSITORY RECTAL at 03:01

## 2022-01-01 RX ADMIN — LORAZEPAM 0.5 MG: 2 INJECTION INTRAMUSCULAR; INTRAVENOUS at 09:01

## 2022-01-01 RX ADMIN — ONDANSETRON 4 MG: 2 INJECTION INTRAMUSCULAR; INTRAVENOUS at 02:01

## 2022-01-01 RX ADMIN — HYDROMORPHONE HYDROCHLORIDE 1 MG: 1 INJECTION, SOLUTION INTRAMUSCULAR; INTRAVENOUS; SUBCUTANEOUS at 08:01

## 2022-01-01 RX ADMIN — METOCLOPRAMIDE 5 MG: 5 INJECTION, SOLUTION INTRAMUSCULAR; INTRAVENOUS at 06:01

## 2022-01-01 RX ADMIN — ONDANSETRON 4 MG: 2 INJECTION INTRAMUSCULAR; INTRAVENOUS at 05:01

## 2022-01-01 RX ADMIN — ONDANSETRON 8 MG: 2 INJECTION INTRAMUSCULAR; INTRAVENOUS at 11:02

## 2022-01-01 RX ADMIN — SENNOSIDES 8.6 MG: 8.6 TABLET, FILM COATED ORAL at 08:01

## 2022-01-01 RX ADMIN — ONDANSETRON 8 MG: 2 INJECTION INTRAMUSCULAR; INTRAVENOUS at 12:02

## 2022-01-01 RX ADMIN — ONDANSETRON 4 MG: 2 INJECTION INTRAMUSCULAR; INTRAVENOUS at 12:01

## 2022-01-01 RX ADMIN — METOCLOPRAMIDE HYDROCHLORIDE 5 MG: 5 TABLET ORAL at 10:01

## 2022-01-01 RX ADMIN — METOCLOPRAMIDE 5 MG: 5 INJECTION, SOLUTION INTRAMUSCULAR; INTRAVENOUS at 08:01

## 2022-01-01 RX ADMIN — PIPERACILLIN SODIUM AND TAZOBACTAM SODIUM 4.5 G: 4; .5 INJECTION, POWDER, FOR SOLUTION INTRAVENOUS at 05:01

## 2022-01-01 RX ADMIN — IOHEXOL 40 ML: 240 INJECTION, SOLUTION INTRATHECAL; INTRAVASCULAR; INTRAVENOUS; ORAL at 02:01

## 2022-01-01 RX ADMIN — METOCLOPRAMIDE 5 MG: 5 INJECTION, SOLUTION INTRAMUSCULAR; INTRAVENOUS at 10:01

## 2022-01-01 RX ADMIN — DIAZEPAM 2.5 MG: 10 INJECTION, SOLUTION INTRAMUSCULAR; INTRAVENOUS at 10:01

## 2022-01-01 RX ADMIN — SODIUM CHLORIDE, SODIUM LACTATE, POTASSIUM CHLORIDE, AND CALCIUM CHLORIDE: .6; .31; .03; .02 INJECTION, SOLUTION INTRAVENOUS at 04:01

## 2022-01-01 RX ADMIN — SODIUM CHLORIDE, POTASSIUM CHLORIDE, SODIUM LACTATE AND CALCIUM CHLORIDE: 600; 310; 30; 20 INJECTION, SOLUTION INTRAVENOUS at 01:01

## 2022-01-01 RX ADMIN — MUPIROCIN: 20 OINTMENT TOPICAL at 08:01

## 2022-01-01 RX ADMIN — HYDROMORPHONE HYDROCHLORIDE 1 MG: 1 INJECTION, SOLUTION INTRAMUSCULAR; INTRAVENOUS; SUBCUTANEOUS at 12:01

## 2022-01-01 RX ADMIN — DOCUSATE SODIUM AND SENNOSIDES 1 TABLET: 8.6; 5 TABLET, FILM COATED ORAL at 08:01

## 2022-01-01 RX ADMIN — FOLIC ACID: 5 INJECTION, SOLUTION INTRAMUSCULAR; INTRAVENOUS; SUBCUTANEOUS at 03:01

## 2022-01-01 RX ADMIN — HYDROMORPHONE HYDROCHLORIDE 1 MG: 1 INJECTION, SOLUTION INTRAMUSCULAR; INTRAVENOUS; SUBCUTANEOUS at 05:01

## 2022-01-01 RX ADMIN — METOCLOPRAMIDE HYDROCHLORIDE 5 MG: 5 TABLET ORAL at 04:01

## 2022-01-01 RX ADMIN — SENNOSIDES 8.6 MG: 8.6 TABLET, FILM COATED ORAL at 09:01

## 2022-01-01 RX ADMIN — ONDANSETRON 8 MG: 2 INJECTION INTRAMUSCULAR; INTRAVENOUS at 11:01

## 2022-01-01 RX ADMIN — BISACODYL 10 MG: 5 TABLET, COATED ORAL at 10:01

## 2022-01-01 RX ADMIN — SODIUM CHLORIDE, SODIUM LACTATE, POTASSIUM CHLORIDE, AND CALCIUM CHLORIDE 500 ML: .6; .31; .03; .02 INJECTION, SOLUTION INTRAVENOUS at 01:01

## 2022-01-01 RX ADMIN — ONDANSETRON 8 MG: 2 INJECTION INTRAMUSCULAR; INTRAVENOUS at 05:01

## 2022-01-01 RX ADMIN — HYDROMORPHONE HYDROCHLORIDE 1.5 MG: 2 INJECTION INTRAMUSCULAR; INTRAVENOUS; SUBCUTANEOUS at 11:01

## 2022-01-01 RX ADMIN — ONDANSETRON 4 MG: 2 INJECTION INTRAMUSCULAR; INTRAVENOUS at 06:01

## 2022-01-01 RX ADMIN — POTASSIUM CHLORIDE 10 MEQ: 7.46 INJECTION, SOLUTION INTRAVENOUS at 08:01

## 2022-01-01 RX ADMIN — METOCLOPRAMIDE 10 MG: 5 INJECTION, SOLUTION INTRAMUSCULAR; INTRAVENOUS at 01:01

## 2022-01-01 RX ADMIN — HYDROMORPHONE HYDROCHLORIDE 1 MG: 1 INJECTION, SOLUTION INTRAMUSCULAR; INTRAVENOUS; SUBCUTANEOUS at 06:01

## 2022-01-01 RX ADMIN — METOCLOPRAMIDE 5 MG: 5 INJECTION, SOLUTION INTRAMUSCULAR; INTRAVENOUS at 05:01

## 2022-01-01 RX ADMIN — SODIUM CHLORIDE: 0.9 INJECTION, SOLUTION INTRAVENOUS at 03:01

## 2022-01-01 RX ADMIN — HYDROMORPHONE HYDROCHLORIDE 1 MG: 1 INJECTION, SOLUTION INTRAMUSCULAR; INTRAVENOUS; SUBCUTANEOUS at 04:01

## 2022-01-01 RX ADMIN — SENNOSIDES AND DOCUSATE SODIUM 1 TABLET: 50; 8.6 TABLET ORAL at 09:01

## 2022-01-01 RX ADMIN — MIDAZOLAM 2 MG: 1 INJECTION INTRAMUSCULAR; INTRAVENOUS at 02:01

## 2022-01-01 RX ADMIN — HYDROMORPHONE HYDROCHLORIDE 1.5 MG: 2 INJECTION INTRAMUSCULAR; INTRAVENOUS; SUBCUTANEOUS at 05:02

## 2022-01-01 RX ADMIN — HYDROMORPHONE HYDROCHLORIDE 1.5 MG: 2 INJECTION INTRAMUSCULAR; INTRAVENOUS; SUBCUTANEOUS at 02:02

## 2022-01-01 RX ADMIN — POTASSIUM CHLORIDE 10 MEQ: 7.46 INJECTION, SOLUTION INTRAVENOUS at 09:01

## 2022-01-01 RX ADMIN — SODIUM CHLORIDE, SODIUM LACTATE, POTASSIUM CHLORIDE, AND CALCIUM CHLORIDE: .6; .31; .03; .02 INJECTION, SOLUTION INTRAVENOUS at 12:01

## 2022-01-01 RX ADMIN — POTASSIUM CHLORIDE 10 MEQ: 7.46 INJECTION, SOLUTION INTRAVENOUS at 01:01

## 2022-01-01 RX ADMIN — ERTAPENEM 1 G: 1 INJECTION INTRAMUSCULAR; INTRAVENOUS at 03:01

## 2022-01-01 RX ADMIN — METHYLNALTREXONE BROMIDE 8 MG: 8 INJECTION, SOLUTION SUBCUTANEOUS at 10:01

## 2022-01-01 RX ADMIN — IOHEXOL 100 ML: 350 INJECTION, SOLUTION INTRAVENOUS at 03:01

## 2022-01-01 RX ADMIN — DEXTROSE, SODIUM CHLORIDE, SODIUM LACTATE, POTASSIUM CHLORIDE, AND CALCIUM CHLORIDE: 5; .6; .31; .03; .02 INJECTION, SOLUTION INTRAVENOUS at 08:01

## 2022-01-01 RX ADMIN — HYDROMORPHONE HYDROCHLORIDE 1 MG: 1 INJECTION, SOLUTION INTRAMUSCULAR; INTRAVENOUS; SUBCUTANEOUS at 02:01

## 2022-01-01 RX ADMIN — ONDANSETRON 4 MG: 2 INJECTION INTRAMUSCULAR; INTRAVENOUS at 11:01

## 2022-01-01 RX ADMIN — METOCLOPRAMIDE HYDROCHLORIDE 5 MG: 5 TABLET ORAL at 11:01

## 2022-01-01 RX ADMIN — SODIUM CHLORIDE, SODIUM LACTATE, POTASSIUM CHLORIDE, AND CALCIUM CHLORIDE: .6; .31; .03; .02 INJECTION, SOLUTION INTRAVENOUS at 07:01

## 2022-01-01 RX ADMIN — SENNOSIDES AND DOCUSATE SODIUM 1 TABLET: 50; 8.6 TABLET ORAL at 04:01

## 2022-01-01 RX ADMIN — POTASSIUM CHLORIDE 40 MEQ: 20 SOLUTION ORAL at 08:01

## 2022-01-01 RX ADMIN — SODIUM CHLORIDE, SODIUM LACTATE, POTASSIUM CHLORIDE, AND CALCIUM CHLORIDE: .6; .31; .03; .02 INJECTION, SOLUTION INTRAVENOUS at 02:01

## 2022-01-01 RX ADMIN — ENOXAPARIN SODIUM 30 MG: 30 INJECTION, SOLUTION INTRAVENOUS; SUBCUTANEOUS at 05:01

## 2022-01-01 RX ADMIN — POTASSIUM CHLORIDE 10 MEQ: 7.46 INJECTION, SOLUTION INTRAVENOUS at 12:01

## 2022-01-01 RX ADMIN — MUPIROCIN: 20 OINTMENT TOPICAL at 09:01

## 2022-01-01 RX ADMIN — DIAZEPAM 2.5 MG: 10 INJECTION, SOLUTION INTRAMUSCULAR; INTRAVENOUS at 09:01

## 2022-01-01 RX ADMIN — METOCLOPRAMIDE HYDROCHLORIDE 5 MG: 5 TABLET ORAL at 06:01

## 2022-01-01 RX ADMIN — FENTANYL 1 PATCH: 100 PATCH, EXTENDED RELEASE TRANSDERMAL at 09:01

## 2022-01-01 RX ADMIN — POTASSIUM CHLORIDE 10 MEQ: 7.46 INJECTION, SOLUTION INTRAVENOUS at 07:01

## 2022-01-01 RX ADMIN — HYDROMORPHONE HYDROCHLORIDE 1 MG: 1 INJECTION, SOLUTION INTRAMUSCULAR; INTRAVENOUS; SUBCUTANEOUS at 01:01

## 2022-01-01 RX ADMIN — DIAZEPAM 2.5 MG: 10 INJECTION, SOLUTION INTRAMUSCULAR; INTRAVENOUS at 11:02

## 2022-01-01 RX ADMIN — HYDROMORPHONE HYDROCHLORIDE 1.5 MG: 2 INJECTION INTRAMUSCULAR; INTRAVENOUS; SUBCUTANEOUS at 06:02

## 2022-01-01 RX ADMIN — DEXTROSE, SODIUM CHLORIDE, SODIUM LACTATE, POTASSIUM CHLORIDE, AND CALCIUM CHLORIDE: 5; .6; .31; .03; .02 INJECTION, SOLUTION INTRAVENOUS at 11:01

## 2022-01-01 RX ADMIN — DEXTROSE MONOHYDRATE 12.5 G: 25 INJECTION, SOLUTION INTRAVENOUS at 10:01

## 2022-01-01 RX ADMIN — SODIUM CHLORIDE: 0.9 INJECTION, SOLUTION INTRAVENOUS at 12:01

## 2022-01-01 RX ADMIN — ERTAPENEM 1 G: 1 INJECTION INTRAMUSCULAR; INTRAVENOUS at 04:01

## 2022-01-01 RX ADMIN — METOCLOPRAMIDE 5 MG: 5 INJECTION, SOLUTION INTRAMUSCULAR; INTRAVENOUS at 12:01

## 2022-01-01 RX ADMIN — PROPOFOL 85 MCG/KG/MIN: 10 INJECTION, EMULSION INTRAVENOUS at 02:01

## 2022-01-01 RX ADMIN — POTASSIUM CHLORIDE 10 MEQ: 7.46 INJECTION, SOLUTION INTRAVENOUS at 02:01

## 2022-01-01 RX ADMIN — PROPOFOL 40 MG: 10 INJECTION, EMULSION INTRAVENOUS at 02:01

## 2022-01-01 RX ADMIN — LORAZEPAM 0.5 MG: 2 INJECTION INTRAMUSCULAR; INTRAVENOUS at 04:01

## 2022-01-01 RX ADMIN — POLYETHYLENE GLYCOL 3350 17 G: 17 POWDER, FOR SOLUTION ORAL at 08:01

## 2022-01-01 RX ADMIN — ONDANSETRON 4 MG: 2 INJECTION INTRAMUSCULAR; INTRAVENOUS at 08:01

## 2022-01-01 RX ADMIN — POTASSIUM CHLORIDE 10 MEQ: 7.46 INJECTION, SOLUTION INTRAVENOUS at 11:01

## 2022-01-01 RX ADMIN — PROMETHAZINE HYDROCHLORIDE 6.25 MG: 25 INJECTION INTRAMUSCULAR; INTRAVENOUS at 10:01

## 2022-01-01 RX ADMIN — DEXTROSE, SODIUM CHLORIDE, SODIUM LACTATE, POTASSIUM CHLORIDE, AND CALCIUM CHLORIDE: 5; .6; .31; .03; .02 INJECTION, SOLUTION INTRAVENOUS at 05:01

## 2022-01-01 RX ADMIN — ACETAMINOPHEN 650 MG: 325 TABLET ORAL at 04:01

## 2022-01-01 RX ADMIN — MAGNESIUM CITRATE 296 ML: 1.75 LIQUID ORAL at 08:01

## 2022-01-01 RX ADMIN — POLYETHYLENE GLYCOL 3350 17 G: 17 POWDER, FOR SOLUTION ORAL at 09:01

## 2022-01-01 RX ADMIN — MUPIROCIN: 20 OINTMENT TOPICAL at 10:01

## 2022-01-01 RX ADMIN — METOCLOPRAMIDE 5 MG: 5 INJECTION, SOLUTION INTRAMUSCULAR; INTRAVENOUS at 03:01

## 2022-01-01 RX ADMIN — KETOROLAC TROMETHAMINE 15 MG: 30 INJECTION, SOLUTION INTRAMUSCULAR at 02:01

## 2022-01-01 RX ADMIN — FAMOTIDINE 20 MG: 20 TABLET ORAL at 08:01

## 2022-01-01 RX ADMIN — SENNOSIDES AND DOCUSATE SODIUM 1 TABLET: 50; 8.6 TABLET ORAL at 08:01

## 2022-01-01 RX ADMIN — HYDROMORPHONE HYDROCHLORIDE 1 MG: 1 INJECTION, SOLUTION INTRAMUSCULAR; INTRAVENOUS; SUBCUTANEOUS at 09:01

## 2022-01-01 RX ADMIN — LORAZEPAM 1 MG: 2 INJECTION INTRAMUSCULAR; INTRAVENOUS at 03:01

## 2022-01-01 RX ADMIN — ACETAMINOPHEN 650 MG: 325 TABLET ORAL at 08:01

## 2022-01-01 RX ADMIN — PROPOFOL 40 MG: 10 INJECTION, EMULSION INTRAVENOUS at 01:01

## 2022-01-01 RX ADMIN — SODIUM CHLORIDE: 0.9 INJECTION, SOLUTION INTRAVENOUS at 07:01

## 2022-01-01 RX ADMIN — POTASSIUM BICARBONATE 25 MEQ: 978 TABLET, EFFERVESCENT ORAL at 05:01

## 2022-01-01 RX ADMIN — PIPERACILLIN SODIUM AND TAZOBACTAM SODIUM 4.5 G: 4; .5 INJECTION, POWDER, FOR SOLUTION INTRAVENOUS at 09:01

## 2022-01-01 RX ADMIN — HYDROMORPHONE HYDROCHLORIDE 1 MG: 1 INJECTION, SOLUTION INTRAMUSCULAR; INTRAVENOUS; SUBCUTANEOUS at 10:01

## 2022-01-01 RX ADMIN — ONDANSETRON 8 MG: 2 INJECTION INTRAMUSCULAR; INTRAVENOUS at 05:02

## 2022-01-01 RX ADMIN — DEXTROSE, SODIUM CHLORIDE, SODIUM LACTATE, POTASSIUM CHLORIDE, AND CALCIUM CHLORIDE: 5; .6; .31; .03; .02 INJECTION, SOLUTION INTRAVENOUS at 04:01

## 2022-01-01 RX ADMIN — ERTAPENEM 1 G: 1 INJECTION INTRAMUSCULAR; INTRAVENOUS at 04:02

## 2022-01-01 RX ADMIN — METOCLOPRAMIDE HYDROCHLORIDE 5 MG: 5 TABLET ORAL at 09:01

## 2022-01-01 RX ADMIN — Medication 296 ML: at 04:01

## 2022-01-01 RX ADMIN — MUPIROCIN: 20 OINTMENT TOPICAL at 11:01

## 2022-01-01 RX ADMIN — LORAZEPAM 0.5 MG: 2 INJECTION INTRAMUSCULAR; INTRAVENOUS at 10:01

## 2022-01-01 RX ADMIN — FAMOTIDINE 20 MG: 20 TABLET ORAL at 09:01

## 2022-01-01 RX ADMIN — SENNOSIDES AND DOCUSATE SODIUM 1 TABLET: 50; 8.6 TABLET ORAL at 10:01

## 2022-01-01 RX ADMIN — METOCLOPRAMIDE HYDROCHLORIDE 5 MG: 5 TABLET ORAL at 08:01

## 2022-01-01 RX ADMIN — PIPERACILLIN SODIUM AND TAZOBACTAM SODIUM 4.5 G: 4; .5 INJECTION, POWDER, FOR SOLUTION INTRAVENOUS at 12:01

## 2022-01-01 RX ADMIN — METOCLOPRAMIDE 5 MG: 5 INJECTION, SOLUTION INTRAMUSCULAR; INTRAVENOUS at 04:01

## 2022-01-01 RX ADMIN — ONDANSETRON 4 MG: 2 INJECTION INTRAMUSCULAR; INTRAVENOUS at 01:01

## 2022-01-01 RX ADMIN — ACETAMINOPHEN 650 MG: 325 TABLET ORAL at 05:01

## 2022-01-01 RX ADMIN — ENOXAPARIN SODIUM 40 MG: 40 INJECTION SUBCUTANEOUS at 04:01

## 2022-01-01 RX ADMIN — POTASSIUM CHLORIDE 10 MEQ: 7.46 INJECTION, SOLUTION INTRAVENOUS at 05:01

## 2022-01-01 RX ADMIN — SODIUM CHLORIDE: 0.9 INJECTION, SOLUTION INTRAVENOUS at 11:01

## 2022-01-01 RX ADMIN — FENTANYL CITRATE 50 MCG: 50 INJECTION, SOLUTION INTRAMUSCULAR; INTRAVENOUS at 12:01

## 2022-01-01 RX ADMIN — HYDROMORPHONE HYDROCHLORIDE 1.5 MG: 2 INJECTION INTRAMUSCULAR; INTRAVENOUS; SUBCUTANEOUS at 03:01

## 2022-01-01 RX ADMIN — FENTANYL CITRATE 100 MCG: 50 INJECTION, SOLUTION INTRAMUSCULAR; INTRAVENOUS at 02:01

## 2022-01-01 RX ADMIN — SODIUM CHLORIDE, SODIUM LACTATE, POTASSIUM CHLORIDE, AND CALCIUM CHLORIDE: .6; .31; .03; .02 INJECTION, SOLUTION INTRAVENOUS at 03:01

## 2022-01-01 RX ADMIN — METOCLOPRAMIDE: 5 INJECTION, SOLUTION INTRAMUSCULAR; INTRAVENOUS at 06:02

## 2022-01-01 RX ADMIN — SODIUM CHLORIDE 500 ML: 0.9 INJECTION, SOLUTION INTRAVENOUS at 05:01

## 2022-01-01 RX ADMIN — SODIUM CHLORIDE 500 ML: 0.9 INJECTION, SOLUTION INTRAVENOUS at 08:01

## 2022-01-01 RX ADMIN — HYDROMORPHONE HYDROCHLORIDE 1 MG: 1 INJECTION, SOLUTION INTRAMUSCULAR; INTRAVENOUS; SUBCUTANEOUS at 03:01

## 2022-01-01 RX ADMIN — ONDANSETRON 4 MG: 2 INJECTION, SOLUTION INTRAMUSCULAR; INTRAVENOUS at 12:01

## 2022-01-01 RX ADMIN — METOCLOPRAMIDE 5 MG: 5 INJECTION, SOLUTION INTRAMUSCULAR; INTRAVENOUS at 10:02

## 2022-01-01 RX ADMIN — SENNOSIDES AND DOCUSATE SODIUM 1 TABLET: 50; 8.6 TABLET ORAL at 09:02

## 2022-01-01 RX ADMIN — METHYLNALTREXONE BROMIDE 8 MG: 8 INJECTION, SOLUTION SUBCUTANEOUS at 08:01

## 2022-01-01 RX ADMIN — FENTANYL 1 PATCH: 100 PATCH, EXTENDED RELEASE TRANSDERMAL at 10:02

## 2022-01-01 RX ADMIN — PIPERACILLIN SODIUM AND TAZOBACTAM SODIUM 4.5 G: 4; .5 INJECTION, POWDER, FOR SOLUTION INTRAVENOUS at 01:01

## 2022-01-01 RX ADMIN — HYDROMORPHONE HYDROCHLORIDE 1.5 MG: 2 INJECTION INTRAMUSCULAR; INTRAVENOUS; SUBCUTANEOUS at 08:02

## 2022-01-01 RX ADMIN — MAGNESIUM SULFATE 2 G: 2 INJECTION INTRAVENOUS at 05:01

## 2022-01-01 RX ADMIN — LIDOCAINE HYDROCHLORIDE 20 MG: 10 INJECTION, SOLUTION EPIDURAL; INFILTRATION; INTRACAUDAL; PERINEURAL at 01:01

## 2022-01-01 RX ADMIN — DIAZEPAM 2.5 MG: 10 INJECTION, SOLUTION INTRAMUSCULAR; INTRAVENOUS at 05:01

## 2022-01-01 RX ADMIN — IOHEXOL 30 ML: 300 INJECTION, SOLUTION INTRAVENOUS at 02:01

## 2022-01-01 RX ADMIN — FENTANYL 1 PATCH: 100 PATCH TRANSDERMAL at 09:01

## 2022-01-01 RX ADMIN — MAGNESIUM SULFATE 2 G: 2 INJECTION INTRAVENOUS at 07:01

## 2022-01-01 RX ADMIN — DEXTROSE MONOHYDRATE 12.5 G: 25 INJECTION, SOLUTION INTRAVENOUS at 12:01

## 2022-01-01 RX ADMIN — DIAZEPAM 2.5 MG: 10 INJECTION, SOLUTION INTRAMUSCULAR; INTRAVENOUS at 07:01

## 2022-01-01 RX ADMIN — LORAZEPAM 0.5 MG: 2 INJECTION INTRAMUSCULAR; INTRAVENOUS at 03:01

## 2022-01-01 RX ADMIN — LIDOCAINE HYDROCHLORIDE 40 MG: 10 INJECTION, SOLUTION EPIDURAL; INFILTRATION; INTRACAUDAL; PERINEURAL at 01:01

## 2022-01-01 RX ADMIN — SENNOSIDES 8.6 MG: 8.6 TABLET, FILM COATED ORAL at 10:01

## 2022-01-01 RX ADMIN — ZOLPIDEM TARTRATE 5 MG: 5 TABLET, COATED ORAL at 09:01

## 2022-01-01 RX ADMIN — ERTAPENEM 1 G: 1 INJECTION INTRAMUSCULAR; INTRAVENOUS at 02:01

## 2022-01-01 RX ADMIN — HYDROMORPHONE HYDROCHLORIDE 1.5 MG: 2 INJECTION INTRAMUSCULAR; INTRAVENOUS; SUBCUTANEOUS at 08:01

## 2022-01-01 RX ADMIN — SIMETHICONE 80 MG: 80 TABLET, CHEWABLE ORAL at 04:01

## 2022-01-01 RX ADMIN — ONDANSETRON 8 MG: 2 INJECTION INTRAMUSCULAR; INTRAVENOUS at 04:01

## 2022-01-01 RX ADMIN — FENTANYL 1 PATCH: 100 PATCH, EXTENDED RELEASE TRANSDERMAL at 10:01

## 2022-01-01 RX ADMIN — POTASSIUM CHLORIDE 10 MEQ: 7.46 INJECTION, SOLUTION INTRAVENOUS at 06:01

## 2022-01-01 RX ADMIN — ONDANSETRON 4 MG: 2 INJECTION, SOLUTION INTRAMUSCULAR; INTRAVENOUS at 02:01

## 2022-01-01 RX ADMIN — MIDAZOLAM HYDROCHLORIDE 1 MG: 1 INJECTION INTRAMUSCULAR; INTRAVENOUS at 12:01

## 2022-01-01 RX ADMIN — DOCUSATE SODIUM AND SENNOSIDES 1 TABLET: 8.6; 5 TABLET, FILM COATED ORAL at 09:01

## 2022-01-01 RX ADMIN — DIATRIZOATE MEGLUMINE AND DIATRIZOATE SODIUM 120 ML: 660; 100 LIQUID ORAL; RECTAL at 11:01

## 2022-01-01 RX ADMIN — CEFAZOLIN 1 G: 1 INJECTION, POWDER, FOR SOLUTION INTRAMUSCULAR; INTRAVENOUS at 02:01

## 2022-01-01 RX ADMIN — DIATRIZOATE MEGLUMINE AND DIATRIZOATE SODIUM 240 ML: 660; 100 LIQUID ORAL; RECTAL at 01:01

## 2022-01-01 RX ADMIN — SODIUM CHLORIDE, SODIUM LACTATE, POTASSIUM CHLORIDE, AND CALCIUM CHLORIDE: .6; .31; .03; .02 INJECTION, SOLUTION INTRAVENOUS at 05:01

## 2022-01-01 RX ADMIN — Medication 100 ML: at 08:01

## 2022-01-01 RX ADMIN — METOCLOPRAMIDE 5 MG: 5 INJECTION, SOLUTION INTRAMUSCULAR; INTRAVENOUS at 09:01

## 2022-01-01 RX ADMIN — SODIUM CHLORIDE 1000 ML: 0.9 INJECTION, SOLUTION INTRAVENOUS at 07:01

## 2022-01-01 RX ADMIN — DEXTROSE, SODIUM CHLORIDE, SODIUM LACTATE, POTASSIUM CHLORIDE, AND CALCIUM CHLORIDE: 5; .6; .31; .03; .02 INJECTION, SOLUTION INTRAVENOUS at 06:01

## 2022-01-01 RX ADMIN — PROMETHAZINE HYDROCHLORIDE 6.25 MG: 25 INJECTION INTRAMUSCULAR; INTRAVENOUS at 08:01

## 2022-01-03 PROBLEM — R11.2 INTRACTABLE NAUSEA AND VOMITING: Status: ACTIVE | Noted: 2022-01-01

## 2022-01-03 NOTE — ED PROVIDER NOTES
SCRIBE #1 NOTE: I, Rebecca Mojica, am scribing for, and in the presence of, Kierra Hensley Do, MD. I have scribed the entire note.      History      Chief Complaint   Patient presents with    Vomiting     X 5 days; last chemo tx was 2018       Review of patient's allergies indicates:  No Known Allergies     HPI   HPI    1/3/2022, 4:47 PM   History obtained from the patient      History of Present Illness: Madeline Warner is a 56 y.o. female patient with PMHx of metastatic appendic cancer who presents to the Emergency Department for vomiting which onset suddenly 5 days ago. Pt states vomiting occurs randomly and is not coupled with nausea. Pt also noticed a decrease in fecal output ion colostomy bag. Last output was yesterday evening. Symptoms are constant and moderate in severity. No mitigating or exacerbating factors reported. Patient denies any fever, chills, SOB, CP, abdominal pain, nausea, diarrhea, hematochezia, hematuria, cough, congestion, HA, numbness, weakness, and all other sxs at this time. Pt was originally dx with cancer in the appendix in 2017. 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 Pt has had multiple abdominal surgeries due to cancer. No further complaints or concerns at this time.        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    T4 N1bM1 B 3/85 nodes positive    Encounter for blood transfusion     PONV (postoperative nausea and vomiting)        Past Surgical History:  Past Surgical History:   Procedure Laterality Date    APPENDECTOMY      BILATERAL OOPHORECTOMY      CHOLECYSTECTOMY      with Cytoreduction and salpingectomy    COLOSTOMY      CYSTOSCOPY N/A 12/21/2021    Procedure: CYSTOSCOPY;  Surgeon: John Martínez MD;   Location: [facility];  Service: Urology;  Laterality: N/A;    CYTOREDUCTION      ENDOSCOPIC ULTRASOUND OF UPPER GASTROINTESTINAL TRACT N/A 6/11/2021    Procedure: ULTRASOUND, UPPER GI TRACT, ENDOSCOPIC;  Surgeon: Rosaura Lakhani MD;  Location: [facility];  Service: Endoscopy;  Laterality: N/A;  Linear scope    ERCP N/A 6/11/2021    Procedure: ERCP (ENDOSCOPIC RETROGRADE CHOLANGIOPANCREATOGRAPHY);  Surgeon: Rosaura Lakhani MD;  Location: [facility];  Service: Endoscopy;  Laterality: N/A;    ESOPHAGOGASTRODUODENOSCOPY N/A 5/6/2021    Procedure: EGD (ESOPHAGOGASTRODUODENOSCOPY);  Surgeon: Brandon Rosen MD;  Location: [facility];  Service: Gastroenterology;  Laterality: N/A;    ESOPHAGOGASTRODUODENOSCOPY  06/11/2021    EXCISION OF LESION  10/20/2020    Procedure: EXCISION, LESION COLOSTOMY;  Surgeon: Layton Anderson MD;  Location: [facility];  Service: General;;    LAPAROTOMY, EXPLORATORY  02/12/2020    LYSIS OF ADHESION, COLOSTOMY    REVISION COLOSTOMY N/A 10/20/2020    Procedure: REVISION, COLOSTOMY;  Surgeon: Layton Anderson MD;  Location: [facility];  Service: General;  Laterality: N/A;  Ostomy bag placed    RIGHT COLECTOMY           Family History:  No family history on file.    Social History:  Social History     Tobacco Use    Smoking status: Never Smoker    Smokeless tobacco: Never Used   Substance and Sexual Activity    Alcohol use: Never    Drug use: Never    Sexual activity: Not on file       ROS   Review of Systems   Constitutional: Negative for chills, diaphoresis and fever.   HENT: Negative for congestion and sore throat.    Eyes: Negative for visual disturbance.   Respiratory: Negative for cough and shortness of breath.    Cardiovascular: Negative for chest pain.   Gastrointestinal: Positive for constipation and vomiting. Negative for abdominal pain, blood in stool, diarrhea and nausea.   Genitourinary: Negative for dysuria, flank pain and hematuria.   Musculoskeletal:  "Negative for back pain and neck pain.   Skin: Negative for rash.   Neurological: Negative for dizziness, weakness, numbness and headaches.   Hematological: Does not bruise/bleed easily.   All other systems reviewed and are negative.      Physical Exam      Initial Vitals [01/03/22 1416]   BP Pulse Resp Temp SpO2   108/68 76 20 97.8 °F (36.6 °C) 98 %      MAP       --          Physical Exam  Nursing Notes and Vital Signs Reviewed.  Constitutional: Patient is in no acute distress. Appears weak.  Head: Atraumatic. Normocephalic.  Eyes: PERRL. EOM intact. Conjunctivae are not pale. No scleral icterus.  ENT: Mucous membranes are moist. Oropharynx is clear and symmetric.    Neck: Supple. Full ROM. No lymphadenopathy.  Cardiovascular: Regular rate. Regular rhythm. No murmurs, rubs, or gallops. Distal pulses are 2+ and symmetric.  Pulmonary/Chest: No respiratory distress. Clear to auscultation bilaterally. No wheezing or rales.  Abdominal: Rigid and mildly distended. There is no tenderness.  Good bowel sounds. Colostomy bag present with no stool in bad. There are no signs of infection.  Genitourinary: No CVA tenderness  Musculoskeletal: Moves all extremities. No obvious deformities. No edema. No calf tenderness.  Skin: Warm and dry.  Neurological:  Alert, awake, and appropriate.  Normal speech.  No acute focal neurological deficits are appreciated.  Psychiatric: Normal affect. Good eye contact. Appropriate in content.    ED Course    Procedures  ED Vital Signs:  Vitals:    01/03/22 1416   BP: 108/68   Pulse: 76   Resp: 20   Temp: 97.8 °F (36.6 °C)   TempSrc: Oral   SpO2: 98%   Weight: 39.6 kg (87 lb 4.8 oz)   Height: 5' 4" (1.626 m)       Abnormal Lab Results:  Labs Reviewed   CBC W/ AUTO DIFFERENTIAL - Abnormal; Notable for the following components:       Result Value    Hemoglobin 11.1 (*)     Hematocrit 36.2 (*)     MCH 26.2 (*)     MCHC 30.7 (*)     Lymph % 15.7 (*)     All other components within normal limits "   COMPREHENSIVE METABOLIC PANEL - Abnormal; Notable for the following components:    Glucose 119 (*)     BUN 27 (*)     eGFR if  53 (*)     eGFR if non  46 (*)     All other components within normal limits   LIPASE   HIV 1 / 2 ANTIBODY    Narrative:     Release to patient->Immediate   HEPATITIS C ANTIBODY    Narrative:     Release to patient->Immediate   HEP C VIRUS HOLD SPECIMEN    Narrative:     Release to patient->Immediate   SARS-COV-2 RDRP GENE    Narrative:     This test utilizes isothermal nucleic acid amplification   technology to detect the SARS-CoV-2 RdRp nucleic acid segment.   The analytical sensitivity (limit of detection) is 125 genome   equivalents/mL.   A POSITIVE result implies infection with the SARS-CoV-2 virus;   the patient is presumed to be contagious.     A NEGATIVE result means that SARS-CoV-2 nucleic acids are not   present above the limit of detection. A NEGATIVE result should be   treated as presumptive. It does not rule out the possibility of   COVID-19 and should not be the sole basis for treatment decisions.   If COVID-19 is strongly suspected based on clinical and exposure   history, re-testing using an alternate molecular assay should be   considered.   This test is only for use under the Food and Drug   Administration s Emergency Use Authorization (EUA).   Commercial kits are provided by [employer].   Performance characteristics of the EUA have been independently   verified by [facility] Department of   Pathology and Laboratory Medicine.   _________________________________________________________________   The authorized Fact Sheet for Healthcare Providers and the authorized Fact   Sheet for Patients of the ID NOW COVID-19 are available on the FDA   website:     https://www.fda.gov/media/491646/download  https://www.fda.gov/media/346678/download            All Lab Results:  Results for orders placed or performed during the hospital  encounter of 01/03/22   CBC auto differential   Result Value Ref Range    WBC 7.98 3.90 - 12.70 K/uL    RBC 4.23 4.00 - 5.40 M/uL    Hemoglobin 11.1 (L) 12.0 - 16.0 g/dL    Hematocrit 36.2 (L) 37.0 - 48.5 %    MCV 86 82 - 98 fL    MCH 26.2 (L) 27.0 - 31.0 pg    MCHC 30.7 (L) 32.0 - 36.0 g/dL    RDW 14.4 11.5 - 14.5 %    Platelets 293 150 - 450 K/uL    MPV 9.4 9.2 - 12.9 fL    Immature Granulocytes 0.3 0.0 - 0.5 %    Gran # (ANC) 5.7 1.8 - 7.7 K/uL    Immature Grans (Abs) 0.02 0.00 - 0.04 K/uL    Lymph # 1.3 1.0 - 4.8 K/uL    Mono # 0.6 0.3 - 1.0 K/uL    Eos # 0.4 0.0 - 0.5 K/uL    Baso # 0.02 0.00 - 0.20 K/uL    nRBC 0 0 /100 WBC    Gran % 71.2 38.0 - 73.0 %    Lymph % 15.7 (L) 18.0 - 48.0 %    Mono % 7.5 4.0 - 15.0 %    Eosinophil % 5.0 0.0 - 8.0 %    Basophil % 0.3 0.0 - 1.9 %    Differential Method Automated    Comprehensive metabolic panel   Result Value Ref Range    Sodium 137 136 - 145 mmol/L    Potassium 3.9 3.5 - 5.1 mmol/L    Chloride 97 95 - 110 mmol/L    CO2 29 23 - 29 mmol/L    Glucose 119 (H) 70 - 110 mg/dL    BUN 27 (H) 6 - 20 mg/dL    Creatinine 1.3 0.5 - 1.4 mg/dL    Calcium 10.0 8.7 - 10.5 mg/dL    Total Protein 7.6 6.0 - 8.4 g/dL    Albumin 4.4 3.5 - 5.2 g/dL    Total Bilirubin 0.4 0.1 - 1.0 mg/dL    Alkaline Phosphatase 90 55 - 135 U/L    AST 18 10 - 40 U/L    ALT 11 10 - 44 U/L    Anion Gap 11 8 - 16 mmol/L    eGFR if African American 53 (A) >60 mL/min/1.73 m^2    eGFR if non African American 46 (A) >60 mL/min/1.73 m^2   Lipase   Result Value Ref Range    Lipase 33 4 - 60 U/L   HIV 1/2 Ag/Ab (4th Gen)   Result Value Ref Range    HIV 1/2 Ag/Ab Negative Negative   Hepatitis C Antibody   Result Value Ref Range    Hepatitis C Ab Negative Negative   HCV Virus Hold Specimen   Result Value Ref Range    HEP C Virus Hold Specimen Hold for HCV sendout    POCT COVID-19 Rapid Screening   Result Value Ref Range    POC Rapid COVID Negative Negative     Acceptable Yes        Imaging  Results:  Imaging Results          CT Abdomen Pelvis  Without Contrast (Final result)  Result time 01/03/22 18:06:55    Final result by Gris Uriostegui MD (01/03/22 18:06:55)                 Impression:      Moderate amount of stool in the colon suggestive of constipation.  Postsurgical changes in the right lower quadrant and rectosigmoid region    Suggestion of right-sided hydronephrosis.  No obstructing renal calculi is identified.    All CT scans   are performed using dose optimization techniques including the following: automated exposure control; adjustment of the mA and/or kV; use of iterative reconstruction technique.  Dose modulation was employed for ALARA by means of: Automated exposure control; adjustment of the mA and/or kV according to patient size (this includes techniques or standardized protocols for targeted exams where dose is matched to indication/reason for exam; i.e. extremities or head); and/or use of iterative reconstructive technique.      Electronically signed by: Moody Landry  Date:    01/03/2022  Time:    18:06             Narrative:    EXAMINATION:  CT ABDOMEN PELVIS WITHOUT CONTRAST    CLINICAL HISTORY:  Nausea/vomiting;    TECHNIQUE:  Low dose axial images, sagittal and coronal reformations were obtained from the lung bases to the pubic symphysis, Oral contrast was not administered.    COMPARISON:  Prior 06/13/2021    FINDINGS:  Evaluation limited by lack of soft tissue contrast.There is interval resolution of the left basilar opacities.  Status post cholecystectomy.  Moderate amount of stool in the colon.  Postsurgical changes in the right lower quadrant.  Postsurgical changes in the region of the rectosigmoid colon.  Suggestion of right-sided hydronephrosis.  No evidence for obstructing right renal calculi.  The course of the right ureter is not well evaluated.  Persistent fat stranding in the rectosigmoid region.  Stomach is filled with debris.                                             The Emergency Provider reviewed the vital signs and test results, which are outlined above.    ED Discussion     7:10 PM: Re-evaluated pt. Discussed possible discharged. Pt is unspet with plan states she was referred to the ED by Dr. Padilla( Gynecologic oncology) for admission due to losing 7lbs in 3 days.    8:05 PM  Dr. Ayers will admit patient for IV fluids since she has lost 7 lb in the past few days and still has nausea vomiting and has had mild renal insufficiency           ED Medication(s):  Medications   sodium chloride 0.9% bolus 1,000 mL (1,000 mLs Intravenous New Bag 1/3/22 1922)   0.9%  NaCl infusion (0 mL/hr Intravenous Stopped 1/3/22 1922)   ondansetron injection 8 mg (8 mg Intravenous Given 1/3/22 1637)     New Prescriptions    No medications on file            Medical Decision Making    Medical Decision Making:   Clinical Tests:   Lab Tests: Ordered and Reviewed  Radiological Study: Ordered and Reviewed           Scribe Attestation:   Scribe #1: I performed the above scribed service and the documentation accurately describes the services I performed. I attest to the accuracy of the note.    Attending:   Physician Attestation Statement for Scribe #1: I, Kierra Hensley Do, MD, personally performed the services described in this documentation, as scribed by Rebecca Mojica, in my presence, and it is both accurate and complete.          Clinical Impression       ICD-10-CM ICD-9-CM   1. Chronic pain due to neoplasm  G89.3 338.3   2. Intractable vomiting  R11.10 536.2       Disposition:   Disposition: Discharged  Condition: Stable       Kierra Hensley Do, MD  01/03/22 2006

## 2022-01-03 NOTE — PROGRESS NOTES
The patient location is: home  The chief complaint leading to consultation is: review results of biopsy    Visit type: audiovisual    Face to Face time with patient: 10  15 minutes of total time spent on the encounter, which includes face to face time and non-face to face time preparing to see the patient (eg, review of tests), Obtaining and/or reviewing separately obtained history, Documenting clinical information in the electronic or other health record, Independently interpreting results (not separately reported) and communicating results to the patient/family/caregiver, or Care coordination (not separately reported).         Each patient to whom he or she provides medical services by telemedicine is:  (1) informed of the relationship between the physician and patient and the respective role of any other health care provider with respect to management of the patient; and (2) notified that he or she may decline to receive medical services by telemedicine and may withdraw from such care at any time.    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 who presents in consultation at for an opinion regarding a pelvic mass.      -PO x 5 days.  Persistent N/V. Intermittent flatus with decreased colostomy output.  -Pelvic or abdominal pain. +Pelvic pressure.  Associated with dysuria or hematuria. -VB, VD, passage of stool or gas from the vagina.  -Passage of urine from the vagina.  .      REVIEW OF SYSTEMS  All systems reviewed and negative except as noted in HPI.    OBJECTIVE   There were no vitals filed for this visit.   There is no height or weight on file to calculate BMI.      1. General: Cachectic with active belching  2. Lymph: Neck symmetric without cervical or supraclavicular adenopathy or mass.  3. Lungs: Normal respiratory rate, no accessory  muscle use.  4. Cardiac: Normal rate  5. Psych: Normal affect.    ECOG status: 1    LABORATORY DATA  Lab data reviewed.    RADIOLOGICAL DATA  Radiology data reviewed.    PATHOLOGY DATA  Pathology data reviewed.    ASSESSMENT / PLAN     1. Metastatic signet ring cell carcinoma         12/13/21: Cystoscopy: mucosal invasion in the left bladder  12/14/21: CT Chest w/:  12/6/21: MRI pelvis w/ w/o:   10/31/21: CT A/P w/:  6/31/21: CT A/P w/:    To the ED for evaluation of N/V.  I messaged Dr. Peña with an update on the patient's clinical status.  I will wait to hear back from Urology/[facility] about the results of the biopsy.    PATIENT EDUCATION  Ready to learn, no apparent learning barriers were identified; learning preferences include listening.  Explained diagnosis and treatment plan; patient expressed understanding of the content.    INFORMED CONSENT  Discussed the risks, benefits, and alternatives of the procedure and of possible blood transfusion.  Discussed the necessity of other members of the healthcare team participating in the procedure.  All questions answered and consent given.    Jim Padilla

## 2022-01-03 NOTE — ED NOTES
Patient placed on continuous pulse ox, blood pressure, and cardiac monitor. Call light within reach of patient.

## 2022-01-03 NOTE — FIRST PROVIDER EVALUATION
"Medical screening exam completed.  I have conducted a focused provider triage encounter, findings are as follows:    56 year old female with complaint of vomiting X 5 days. Reports unable to tolerate food or fluids.     Vitals:    01/03/22 1416   BP: 108/68   BP Location: Right arm   Patient Position: Sitting   Pulse: 76   Resp: 20   Temp: 97.8 °F (36.6 °C)   TempSrc: Oral   SpO2: 98%   Weight: 39.6 kg (87 lb 4.8 oz)   Height: 5' 4" (1.626 m)         "

## 2022-01-04 NOTE — ED NOTES
Pt. In bed with eyes open.  Pt. Relates that she has been vomiting for the last 5 days, and has not been able to hold anything down.  Pt. Denies any nausea, cp or sob.  Pt. Is A&0X4, and able to make needs known.  Pt. States she does cancer in her appendix, and states her PCP instructed her to come to ED for hydration, and nutrition.  Pt. In NAD.

## 2022-01-04 NOTE — HPI
Madeline Warner is a 55 yo female patient referred to ED by gyn-onc with PMHx of metastatic appendiceal carcinoma who presents to the Emergency Department for vomiting. Patient reports acute onset of vomiting 5 days ago.Pt states vomiting occurs randomly and is not coupled with nausea. Pt also noticed a decrease in fecal output in colostomy bag. Last output was yesterday evening. Symptoms are constant and moderate in severity. No mitigating or exacerbating factors reported. Patient denies any fever, chills, SOB, chest pain, abdominal pain, diarrhea, hematochezia, hematuria, cough, congestion, HA, numbness and weakness. Pt was originally dx with cancer in the appendix in 2017. She denies current chemo treatment reports resumption of treatment pending recent biopsy results. 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 Pt has had multiple abdominal surgeries due to cancer.     ED evaluation: CT abdomen negative for acute findings, no evidence of SBO. Labs remarkable for Cr 1.3, baseline 0.8. COVID 19 negative. VSS. Patient received 2 liters IVF. Patient placed in observation for further care.

## 2022-01-04 NOTE — PLAN OF CARE
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]    Admission Date: 1/3/2022  Expected Discharge Date:     Discharge Barriers Identified: None    Payor: MEDICARE / Plan: MEDICARE PART A & B / Product Type: Government /     Extended Emergency Contact Information  Primary Emergency Contact: NANCY SU  Home Phone: [phone]  Mobile Phone: [phone]  Relation: Mother    Discharge Plan A: Home with family  Discharge Plan B: Home with family      [facility]  [address]  [place] [address]  Phone: [phone] Fax: [phone]    [facility] [place] [address] [address]  [facility] [address]  Phone: [phone] Fax: [phone]      Initial Assessment (most recent)     Adult Discharge Assessment - 01/04/22 1208        Discharge Assessment    Assessment Type Discharge Planning Assessment     Confirmed/corrected address, phone number and insurance Yes     Confirmed Demographics Correct on Facesheet     Source of Information patient     When was your last doctors appointment? 01/03/22     Communicated VICTOR MANUEL with patient/caregiver Yes     Reason For Admission Nausea/Vomiting     Lives With parent(s)     Facility Arrived From: Home     Do you expect to return to your current living situation? Yes     Do you have help at home or someone to help you manage your care at home? Yes     Who are your caregiver(s) and their phone number(s)? Nancy Su (mother) [phone]     Prior to hospitilization cognitive status: Alert/Oriented     Current cognitive status: Alert/Oriented     Walking or Climbing Stairs Difficulty none     Dressing/Bathing Difficulty none     Equipment Currently Used at Home colostomy/ostomy supplies     Readmission within 30 days? No      Patient currently being followed by outpatient case management? --   Referred to [facility] outpatient CM    Do you currently have service(s) that help you manage your care at home? No     Do you take prescription medications? Yes     Do you have prescription coverage? Yes     Do you have any problems affording any of your prescribed medications? No     Is the patient taking medications as prescribed? yes     Who is going to help you get home at discharge? Nancy     How do you get to doctors appointments? family or friend will provide;car, drives self     Are you on dialysis? No     Do you take coumadin? No     Discharge Plan A Home with family     Discharge Plan B Home with family     DME Needed Upon Discharge  none     Discharge Plan discussed with: Patient     Discharge Barriers Identified None        Relationship/Environment    Name(s) of Who Lives With Patient Nancy Su               CM met with patient at the bedside to assess for discharge needs.  Patient lives at home with her mother and she is independent with needs.  Patient does not use any medical equipment or have home health services.  Patient has no anticipated discharge needs at this time.  CM provided a transitional care folder, information on advanced directives, information on pharmacy bedside delivery, and discharge planning begins on admission with contact information for any needs/questions.

## 2022-01-04 NOTE — HOSPITAL COURSE
"Patient was kept on OBS for intractible N/V under the care of hospital medicine. She was started on IVF's.  01/04: Patient ate grits this AM and is c/o increased belching and nausea. Diet changed to NPO. Still no stool output - PO dulcolax ordered. Continue IVF's.  01/05: Heme/onc, palliative care, and GI consulted this AM. Pt very upset that her physicians did not see her while in hospital. I explained that we use an "on call system". KUB shows no obstruction, moderate constipation. Relistor injection given this AM. Discussed with surgery who recommended small bowel follow through, which showed normal gastrocolic transit time. Nurse reported pt put on her own fentanyl patch last night. Pt had multiple bowel movements through colostomy this afternoon, needing to empty bag 3 times. Will give banana bag and dc home in AM if able to tolerate diet. Pt sees palliative care and was referred for medical marijuana. Pt has long hx of n/v and failure to thrive/ weight loss. But pt also told me that she induces vomiting when her abd is too distended. Strongly recommended psych care to palliative  1/6: Still endorses nausea. Able to keep broth down this morning. Labs and vitals reviewed. Patient is stable for discharge. Will send home with rectal phenergan. Discussed need to f/u with hem onc within 1 week.   "

## 2022-01-04 NOTE — SUBJECTIVE & OBJECTIVE
Interval History: Patient ate grits this AM and is c/o increased belching and nausea. Diet changed to NPO. Still no stool output - PO dulcolax ordered. Continue IVF's.    Review of Systems   Constitutional: Positive for appetite change and fatigue. Negative for chills, diaphoresis and fever.   Eyes: Negative for photophobia.   Respiratory: Negative for cough, chest tightness, shortness of breath and wheezing.    Cardiovascular: Negative for chest pain, palpitations and leg swelling.   Gastrointestinal: Positive for nausea and vomiting. Negative for abdominal pain and diarrhea.   Genitourinary: Negative for dysuria, flank pain, frequency and hematuria.   Musculoskeletal: Negative for arthralgias, back pain and myalgias.   Allergic/Immunologic: Positive for immunocompromised state.   Neurological: Negative for dizziness, syncope, light-headedness and headaches.   Hematological: Negative.    Psychiatric/Behavioral: Negative for agitation, behavioral problems and confusion.     Objective:     Vital Signs (Most Recent):  Temp: 97.5 °F (36.4 °C) (01/04/22 0754)  Pulse: 80 (01/04/22 0754)  Resp: 16 (01/04/22 0754)  BP: (!) 158/69 (01/04/22 0754)  SpO2: 99 % (01/04/22 0754) Vital Signs (24h Range):  Temp:  [97.5 °F (36.4 °C)-98.7 °F (37.1 °C)] 97.5 °F (36.4 °C)  Pulse:  [63-80] 80  Resp:  [16-20] 16  SpO2:  [98 %-100 %] 99 %  BP: (102-158)/(51-71) 158/69     Weight: 45.7 kg (100 lb 12 oz)  Body mass index is 17.29 kg/m².    Intake/Output Summary (Last 24 hours) at 1/4/2022 1027  Last data filed at 1/4/2022 0958  Gross per 24 hour   Intake 122.03 ml   Output --   Net 122.03 ml      Physical Exam  Vitals and nursing note reviewed.   Constitutional:       Comments: Cachexia    HENT:      Head: Normocephalic and atraumatic.      Mouth/Throat:      Mouth: Mucous membranes are dry.   Eyes:      Conjunctiva/sclera: Conjunctivae normal.      Pupils: Pupils are equal, round, and reactive to light.   Cardiovascular:      Rate and  Rhythm: Normal rate.      Pulses: Normal pulses.   Pulmonary:      Effort: Pulmonary effort is normal. No respiratory distress.      Breath sounds: Normal breath sounds.   Abdominal:      General: Bowel sounds are normal.      Tenderness: There is no abdominal tenderness. There is no guarding.      Comments: Colostomy in place, no output noted    Genitourinary:     Comments: deferred  Musculoskeletal:         General: Normal range of motion.      Cervical back: Normal range of motion.   Skin:     General: Skin is warm and dry.      Capillary Refill: Capillary refill takes less than 2 seconds.   Neurological:      General: No focal deficit present.      Mental Status: She is alert and oriented to person, place, and time.   Psychiatric:         Mood and Affect: Mood normal.         Significant Labs:   All pertinent labs within the past 24 hours have been reviewed.  Recent Lab Results       01/04/22  0755   01/04/22  0550   01/03/22  1930   01/03/22  1636   01/03/22  1505        Albumin         4.4       Alkaline Phosphatase         90       ALT         11       Anion Gap   10       11       Appearance, UA Clear               AST         18       Bacteria, UA Few               Baso #         0.02       Basophil %         0.3       Bilirubin (UA) Negative               BILIRUBIN TOTAL         0.4  Comment: For infants and newborns, interpretation of results should be based  on gestational age, weight and in agreement with clinical  observations.    Premature Infant recommended reference ranges:  Up to 24 hours.............<8.0 mg/dL  Up to 48 hours............<12.0 mg/dL  3-5 days..................<15.0 mg/dL  6-29 days.................<15.0 mg/dL         BUN   22       27       Calcium   8.3       10.0       Chloride   103       97       CO2   23       29       Color, UA Yellow               Creatinine   0.9       1.3       Differential Method         Automated       eGFR if    >60       53       eGFR  if non    >60  Comment: Calculation used to obtain the estimated glomerular filtration  rate (eGFR) is the CKD-EPI equation.          46  Comment: Calculation used to obtain the estimated glomerular filtration  rate (eGFR) is the CKD-EPI equation.          Eos #         0.4       Eosinophil %         5.0       Glucose   81       119       Glucose, UA Negative               Gran # (ANC)         5.7       Gran %         71.2       Hematocrit         36.2       Hemoglobin         11.1       Hepatitis C Ab       Negative         HEP C Virus Hold Specimen       Hold for HCV sendout         HIV 1/2 Ag/Ab       Negative         Hyaline Casts, UA 0               Immature Grans (Abs)         0.02  Comment: Mild elevation in immature granulocytes is non specific and   can be seen in a variety of conditions including stress response,   acute inflammation, trauma and pregnancy. Correlation with other   laboratory and clinical findings is essential.         Immature Granulocytes         0.3       Ketones, UA Negative               Leukocytes, UA Negative               Lipase         33       Lymph #         1.3       Lymph %         15.7       MCH         26.2       MCHC         30.7       MCV         86       Microscopic Comment SEE COMMENT  Comment: Other formed elements not mentioned in the report are not   present in the microscopic examination.                  Mono #         0.6       Mono %         7.5       MPV         9.4       NITRITE UA Positive               nRBC         0       Occult Blood UA 3+               pH, UA 6.0               Platelets         293       Potassium   4.1       3.9       PROTEIN TOTAL         7.6       Protein, UA 1+  Comment: Recommend a 24 hour urine protein or a urine   protein/creatinine ratio if globulin induced proteinuria is  clinically suspected.                  Acceptable     Yes           RBC         4.23       RBC, UA 10               RDW          14.4       SARS-CoV-2 RNA, Amplification, Qual     Negative           Sodium   136       137       Specific [place], UA 1.025               Specimen UA Urine, Clean Catch               UROBILINOGEN UA 1.0               WBC, UA 3               WBC         7.98                            Significant Imaging: I have reviewed all pertinent imaging results/findings within the past 24 hours.

## 2022-01-04 NOTE — ASSESSMENT & PLAN NOTE
- s/p 2 liters IVF in ED   - continue supportive management (antiemetics, IVF)   - initiate clear liquid diet, advanced as tolerated

## 2022-01-04 NOTE — ASSESSMENT & PLAN NOTE
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

## 2022-01-04 NOTE — H&P
[facility]  [facility] Medicine  History & Physical    Patient Name: Madeline Warner  MRN: [id number]  Patient Class: OP- Observation  Admission Date: 1/3/2022  Attending Physician: No att. providers found   Primary Care Provider: Mariam Peña MD         Patient information was obtained from patient, past medical records and ER records.     Subjective:     Principal Problem:Intractable nausea and vomiting    Chief Complaint:   Chief Complaint   Patient presents with    Vomiting     X 5 days; last chemo tx was 2018        HPI: Madeline Warner is a 57 yo female patient referred to ED by gyn-onc with PMHx of metastatic appendiceal carcinoma who presents to the Emergency Department for vomiting. Patient reports acute onset of vomiting 5 days ago.Pt states vomiting occurs randomly and is not coupled with nausea. Pt also noticed a decrease in fecal output in colostomy bag. Last output was yesterday evening. Symptoms are constant and moderate in severity. No mitigating or exacerbating factors reported. Patient denies any fever, chills, SOB, chest pain, abdominal pain, diarrhea, hematochezia, hematuria, cough, congestion, HA, numbness and weakness. Pt was originally dx with cancer in the appendix in 2017. She denies current chemo treatment reports resumption of treatment pending recent biopsy results. 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 Pt has had multiple abdominal surgeries due to cancer.     ED evaluation: CT abdomen negative for acute findings, no evidence of SBO. Labs remarkable for Cr 1.3, baseline 0.8. COVID 19 negative. VSS. Patient received 2 liters IVF. Patient placed in observation for further care.       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    T4 N1bM1 B 3/85 nodes positive    Encounter for blood transfusion      PONV (postoperative nausea and vomiting)        Past Surgical History:   Procedure Laterality Date    APPENDECTOMY      BILATERAL OOPHORECTOMY      CHOLECYSTECTOMY      with Cytoreduction and salpingectomy    COLOSTOMY      CYSTOSCOPY N/A 12/21/2021    Procedure: CYSTOSCOPY;  Surgeon: John Martínez MD;  Location: [facility];  Service: Urology;  Laterality: N/A;    CYTOREDUCTION      ENDOSCOPIC ULTRASOUND OF UPPER GASTROINTESTINAL TRACT N/A 6/11/2021    Procedure: ULTRASOUND, UPPER GI TRACT, ENDOSCOPIC;  Surgeon: Rosaura Lakhani MD;  Location: [facility];  Service: Endoscopy;  Laterality: N/A;  Linear scope    ERCP N/A 6/11/2021    Procedure: ERCP (ENDOSCOPIC RETROGRADE CHOLANGIOPANCREATOGRAPHY);  Surgeon: Rosaura Lakhani MD;  Location: [facility];  Service: Endoscopy;  Laterality: N/A;    ESOPHAGOGASTRODUODENOSCOPY N/A 5/6/2021    Procedure: EGD (ESOPHAGOGASTRODUODENOSCOPY);  Surgeon: Brandon Rosen MD;  Location: [facility];  Service: Gastroenterology;  Laterality: N/A;    ESOPHAGOGASTRODUODENOSCOPY  06/11/2021    EXCISION OF LESION  10/20/2020    Procedure: EXCISION, LESION COLOSTOMY;  Surgeon: Layton Anderson MD;  Location: [facility];  Service: General;;    LAPAROTOMY, EXPLORATORY  02/12/2020    LYSIS OF ADHESION, COLOSTOMY    REVISION COLOSTOMY N/A 10/20/2020    Procedure: REVISION, COLOSTOMY;  Surgeon: Layton Anderson MD;  Location: [facility];  Service: General;  Laterality: N/A;  Ostomy bag placed    RIGHT COLECTOMY         Review of patient's allergies indicates:  No Known Allergies    No current facility-administered medications on file prior to encounter.     Current Outpatient Medications on File Prior to Encounter   Medication Sig    fentaNYL (DURAGESIC) 100 mcg/hr Place 1 patch onto the skin every 72 hours.    metoclopramide HCl (REGLAN) 5 MG tablet Take 10 mg by mouth 4 (four) times daily before meals and nightly.    omeprazole (PRILOSEC) 40 MG capsule Take 1  capsule by mouth once daily.    ondansetron (ZOFRAN-ODT) 8 MG TbDL Take 1 tablet (8 mg total) by mouth every 12 (twelve) hours as needed.    oxyCODONE (ROXICODONE) 30 MG Tab Take 1 tablet (30 mg total) by mouth every 6 (six) hours as needed (pain).    phenazopyridine (PYRIDIUM) 200 MG tablet Take 1 tablet (200 mg total) by mouth 3 (three) times daily as needed (burning).    promethazine (PHENERGAN) 25 MG tablet Take 0.5 tablets (12.5 mg total) by mouth every 6 (six) hours as needed for Nausea.    solifenacin (VESICARE) 10 MG tablet Take 1 tablet (10 mg total) by mouth once daily.    zolpidem (AMBIEN) 5 MG Tab Take 1 tablet (5 mg total) by mouth nightly as needed (insomnia).     Family History    None       Tobacco Use    Smoking status: Never Smoker    Smokeless tobacco: Never Used   Substance and Sexual Activity    Alcohol use: Never    Drug use: Never    Sexual activity: Not on file     Review of Systems   Constitutional: Positive for appetite change and fatigue. Negative for chills, diaphoresis and fever.   Eyes: Negative for photophobia.   Respiratory: Negative for cough, chest tightness, shortness of breath and wheezing.    Cardiovascular: Negative for chest pain, palpitations and leg swelling.   Gastrointestinal: Positive for nausea and vomiting. Negative for abdominal pain and diarrhea.   Genitourinary: Negative for dysuria, flank pain, frequency and hematuria.   Musculoskeletal: Negative for back pain and myalgias.   Neurological: Negative for dizziness, syncope, light-headedness and headaches.   Psychiatric/Behavioral: Negative for confusion.     Objective:     Vital Signs (Most Recent):  Temp: 97.8 °F (36.6 °C) (01/03/22 1416)  Pulse: 65 (01/03/22 2100)  Resp: 19 (01/03/22 2100)  BP: 104/67 (01/03/22 2100)  SpO2: 99 % (01/03/22 2100) Vital Signs (24h Range):  Temp:  [97.8 °F (36.6 °C)] 97.8 °F (36.6 °C)  Pulse:  [65-76] 65  Resp:  [19-20] 19  SpO2:  [98 %-99 %] 99 %  BP: (104-108)/(67-68) 104/67      Weight: 39.6 kg (87 lb 4.8 oz)  Body mass index is 14.99 kg/m².    Physical Exam  Vitals and nursing note reviewed.   Constitutional:       Comments: Cachexia    HENT:      Head: Normocephalic and atraumatic.      Mouth/Throat:      Mouth: Mucous membranes are dry.   Eyes:      Conjunctiva/sclera: Conjunctivae normal.      Pupils: Pupils are equal, round, and reactive to light.   Cardiovascular:      Rate and Rhythm: Normal rate.      Pulses: Normal pulses.   Pulmonary:      Effort: Pulmonary effort is normal. No respiratory distress.      Breath sounds: Normal breath sounds.   Abdominal:      General: Bowel sounds are normal.      Tenderness: There is no abdominal tenderness. There is no guarding.      Comments: Colostomy in place, no output noted    Musculoskeletal:         General: Normal range of motion.      Cervical back: Normal range of motion.   Skin:     General: Skin is warm and dry.      Capillary Refill: Capillary refill takes less than 2 seconds.   Neurological:      General: No focal deficit present.      Mental Status: She is alert and oriented to person, place, and time.   Psychiatric:         Mood and Affect: Mood normal.           CRANIAL NERVES     CN III, IV, VI   Pupils are equal, round, and reactive to light.       Significant Labs:   All pertinent labs within the past 24 hours have been reviewed.  CBC:   Recent Labs   Lab 01/03/22  1505   WBC 7.98   HGB 11.1*   HCT 36.2*        CMP:   Recent Labs   Lab 01/03/22  1505      K 3.9   CL 97   CO2 29   *   BUN 27*   CREATININE 1.3   CALCIUM 10.0   PROT 7.6   ALBUMIN 4.4   BILITOT 0.4   ALKPHOS 90   AST 18   ALT 11   ANIONGAP 11   EGFRNONAA 46*       Significant Imaging: I have reviewed all pertinent imaging results/findings within the past 24 hours.    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

## 2022-01-04 NOTE — SUBJECTIVE & OBJECTIVE
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;  Service: Urology;  Laterality: N/A;    CYTOREDUCTION      ENDOSCOPIC ULTRASOUND OF UPPER GASTROINTESTINAL TRACT N/A 6/11/2021    Procedure: ULTRASOUND, UPPER GI TRACT, ENDOSCOPIC;  Surgeon: Rosaura Lakhani MD;  Location: [facility];  Service: Endoscopy;  Laterality: N/A;  Linear scope    ERCP N/A 6/11/2021    Procedure: ERCP (ENDOSCOPIC RETROGRADE CHOLANGIOPANCREATOGRAPHY);  Surgeon: Rosaura Lakhani MD;  Location: [facility];  Service: Endoscopy;  Laterality: N/A;    ESOPHAGOGASTRODUODENOSCOPY N/A 5/6/2021    Procedure: EGD (ESOPHAGOGASTRODUODENOSCOPY);  Surgeon: Brandon Rosen MD;  [facility];  Service: Gastroenterology;  Laterality: N/A;    ESOPHAGOGASTRODUODENOSCOPY  06/11/2021    EXCISION OF LESION  10/20/2020    Procedure: EXCISION, LESION COLOSTOMY;  Surgeon: Layton Anderson MD;  Location: [facility];  Service: General;;    LAPAROTOMY, EXPLORATORY  02/12/2020    LYSIS OF ADHESION, COLOSTOMY    REVISION COLOSTOMY N/A 10/20/2020    Procedure: REVISION, COLOSTOMY;  Surgeon: Layton Anderson MD;  Location: [facility];  Service: General;  Laterality: N/A;  Ostomy bag placed    RIGHT COLECTOMY         Review of patient's allergies indicates:  No Known Allergies    No current facility-administered medications on file prior to encounter.     Current Outpatient Medications on File Prior to Encounter   Medication Sig    fentaNYL (DURAGESIC) 100 mcg/hr Place 1  patch onto the skin every 72 hours.    metoclopramide HCl (REGLAN) 5 MG tablet Take 10 mg by mouth 4 (four) times daily before meals and nightly.    omeprazole (PRILOSEC) 40 MG capsule Take 1 capsule by mouth once daily.    ondansetron (ZOFRAN-ODT) 8 MG TbDL Take 1 tablet (8 mg total) by mouth every 12 (twelve) hours as needed.    oxyCODONE (ROXICODONE) 30 MG Tab Take 1 tablet (30 mg total) by mouth every 6 (six) hours as needed (pain).    phenazopyridine (PYRIDIUM) 200 MG tablet Take 1 tablet (200 mg total) by mouth 3 (three) times daily as needed (burning).    promethazine (PHENERGAN) 25 MG tablet Take 0.5 tablets (12.5 mg total) by mouth every 6 (six) hours as needed for Nausea.    solifenacin (VESICARE) 10 MG tablet Take 1 tablet (10 mg total) by mouth once daily.    zolpidem (AMBIEN) 5 MG Tab Take 1 tablet (5 mg total) by mouth nightly as needed (insomnia).     Family History    None       Tobacco Use    Smoking status: Never Smoker    Smokeless tobacco: Never Used   Substance and Sexual Activity    Alcohol use: Never    Drug use: Never    Sexual activity: Not on file     Review of Systems   Constitutional: Positive for appetite change and fatigue. Negative for chills, diaphoresis and fever.   Eyes: Negative for photophobia.   Respiratory: Negative for cough, chest tightness, shortness of breath and wheezing.    Cardiovascular: Negative for chest pain, palpitations and leg swelling.   Gastrointestinal: Positive for nausea and vomiting. Negative for abdominal pain and diarrhea.   Genitourinary: Negative for dysuria, flank pain, frequency and hematuria.   Musculoskeletal: Negative for back pain and myalgias.   Neurological: Negative for dizziness, syncope, light-headedness and headaches.   Psychiatric/Behavioral: Negative for confusion.     Objective:     Vital Signs (Most Recent):  Temp: 97.8 °F (36.6 °C) (01/03/22 1416)  Pulse: 65 (01/03/22 2100)  Resp: 19 (01/03/22 2100)  BP: 104/67 (01/03/22  2100)  SpO2: 99 % (01/03/22 2100) Vital Signs (24h Range):  Temp:  [97.8 °F (36.6 °C)] 97.8 °F (36.6 °C)  Pulse:  [65-76] 65  Resp:  [19-20] 19  SpO2:  [98 %-99 %] 99 %  BP: (104-108)/(67-68) 104/67     Weight: 39.6 kg (87 lb 4.8 oz)  Body mass index is 14.99 kg/m².    Physical Exam  Vitals and nursing note reviewed.   Constitutional:       Comments: Cachexia    HENT:      Head: Normocephalic and atraumatic.      Mouth/Throat:      Mouth: Mucous membranes are dry.   Eyes:      Conjunctiva/sclera: Conjunctivae normal.      Pupils: Pupils are equal, round, and reactive to light.   Cardiovascular:      Rate and Rhythm: Normal rate.      Pulses: Normal pulses.   Pulmonary:      Effort: Pulmonary effort is normal. No respiratory distress.      Breath sounds: Normal breath sounds.   Abdominal:      General: Bowel sounds are normal.      Tenderness: There is no abdominal tenderness. There is no guarding.      Comments: Colostomy in place, no output noted    Musculoskeletal:         General: Normal range of motion.      Cervical back: Normal range of motion.   Skin:     General: Skin is warm and dry.      Capillary Refill: Capillary refill takes less than 2 seconds.   Neurological:      General: No focal deficit present.      Mental Status: She is alert and oriented to person, place, and time.   Psychiatric:         Mood and Affect: Mood normal.           CRANIAL NERVES     CN III, IV, VI   Pupils are equal, round, and reactive to light.       Significant Labs:   All pertinent labs within the past 24 hours have been reviewed.  CBC:   Recent Labs   Lab 01/03/22  1505   WBC 7.98   HGB 11.1*   HCT 36.2*        CMP:   Recent Labs   Lab 01/03/22  1505      K 3.9   CL 97   CO2 29   *   BUN 27*   CREATININE 1.3   CALCIUM 10.0   PROT 7.6   ALBUMIN 4.4   BILITOT 0.4   ALKPHOS 90   AST 18   ALT 11   ANIONGAP 11   EGFRNONAA 46*       Significant Imaging: I have reviewed all pertinent imaging results/findings within  the past 24 hours.

## 2022-01-04 NOTE — PLAN OF CARE
Pt AAOx4, NPO, LR@75mL tolerating well, remains free from falls/injuries this shift. Safety precautions maintained. Pain managed with pain medication. No s/s of acute distress noted. Will continue to monitor. Chart check completed.

## 2022-01-04 NOTE — PROGRESS NOTES
[facility] [place] [facility]  Progress Note    Patient Name: Madeline Warner  MRN: [id number]  Patient Class: OP- Observation   Admission Date: 1/3/2022  Length of Stay: 0 days  Attending Physician: Tyrone Casey MD  Primary Care Provider: Mariam Peña MD        Subjective:     Principal Problem:Intractable nausea and vomiting        HPI:  Madeline Warner is a 55 yo female patient referred to ED by gyn-onc with PMHx of metastatic appendiceal carcinoma who presents to the Emergency Department for vomiting. Patient reports acute onset of vomiting 5 days ago.Pt states vomiting occurs randomly and is not coupled with nausea. Pt also noticed a decrease in fecal output in colostomy bag. Last output was yesterday evening. Symptoms are constant and moderate in severity. No mitigating or exacerbating factors reported. Patient denies any fever, chills, SOB, chest pain, abdominal pain, diarrhea, hematochezia, hematuria, cough, congestion, HA, numbness and weakness. Pt was originally dx with cancer in the appendix in 2017. She denies current chemo treatment reports resumption of treatment pending recent biopsy results. 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 Pt has had multiple abdominal surgeries due to cancer.     ED evaluation: CT abdomen negative for acute findings, no evidence of SBO. Labs remarkable for Cr 1.3, baseline 0.8. COVID 19 negative. VSS. Patient received 2 liters IVF. Patient placed in observation for further care.       Overview/Hospital Course:  Patient was kept on OBS for intractible N/V under the care of hospital medicine. She was started on IVF's.      Interval History: Patient ate grits this AM and is c/o increased belching and nausea. Diet changed to NPO. Still no stool output - PO dulcolax ordered. Continue IVF's.    Review of Systems   Constitutional: Positive for appetite change and  fatigue. Negative for chills, diaphoresis and fever.   Eyes: Negative for photophobia.   Respiratory: Negative for cough, chest tightness, shortness of breath and wheezing.    Cardiovascular: Negative for chest pain, palpitations and leg swelling.   Gastrointestinal: Positive for nausea and vomiting. Negative for abdominal pain and diarrhea.   Genitourinary: Negative for dysuria, flank pain, frequency and hematuria.   Musculoskeletal: Negative for arthralgias, back pain and myalgias.   Allergic/Immunologic: Positive for immunocompromised state.   Neurological: Negative for dizziness, syncope, light-headedness and headaches.   Hematological: Negative.    Psychiatric/Behavioral: Negative for agitation, behavioral problems and confusion.     Objective:     Vital Signs (Most Recent):  Temp: 97.5 °F (36.4 °C) (01/04/22 0754)  Pulse: 80 (01/04/22 0754)  Resp: 16 (01/04/22 0754)  BP: (!) 158/69 (01/04/22 0754)  SpO2: 99 % (01/04/22 0754) Vital Signs (24h Range):  Temp:  [97.5 °F (36.4 °C)-98.7 °F (37.1 °C)] 97.5 °F (36.4 °C)  Pulse:  [63-80] 80  Resp:  [16-20] 16  SpO2:  [98 %-100 %] 99 %  BP: (102-158)/(51-71) 158/69     Weight: 45.7 kg (100 lb 12 oz)  Body mass index is 17.29 kg/m².    Intake/Output Summary (Last 24 hours) at 1/4/2022 1027  Last data filed at 1/4/2022 0958  Gross per 24 hour   Intake 122.03 ml   Output --   Net 122.03 ml      Physical Exam  Vitals and nursing note reviewed.   Constitutional:       Comments: Cachexia    HENT:      Head: Normocephalic and atraumatic.      Mouth/Throat:      Mouth: Mucous membranes are dry.   Eyes:      Conjunctiva/sclera: Conjunctivae normal.      Pupils: Pupils are equal, round, and reactive to light.   Cardiovascular:      Rate and Rhythm: Normal rate.      Pulses: Normal pulses.   Pulmonary:      Effort: Pulmonary effort is normal. No respiratory distress.      Breath sounds: Normal breath sounds.   Abdominal:      General: Bowel sounds are normal.      Tenderness:  There is no abdominal tenderness. There is no guarding.      Comments: Colostomy in place, no output noted    Genitourinary:     Comments: deferred  Musculoskeletal:         General: Normal range of motion.      Cervical back: Normal range of motion.   Skin:     General: Skin is warm and dry.      Capillary Refill: Capillary refill takes less than 2 seconds.   Neurological:      General: No focal deficit present.      Mental Status: She is alert and oriented to person, place, and time.   Psychiatric:         Mood and Affect: Mood normal.         Significant Labs:   All pertinent labs within the past 24 hours have been reviewed.  Recent Lab Results       01/04/22  0755   01/04/22  0550   01/03/22  1930   01/03/22  1636   01/03/22  1505        Albumin         4.4       Alkaline Phosphatase         90       ALT         11       Anion Gap   10       11       Appearance, UA Clear               AST         18       Bacteria, UA Few               Baso #         0.02       Basophil %         0.3       Bilirubin (UA) Negative               BILIRUBIN TOTAL         0.4  Comment: For infants and newborns, interpretation of results should be based  on gestational age, weight and in agreement with clinical  observations.    Premature Infant recommended reference ranges:  Up to 24 hours.............<8.0 mg/dL  Up to 48 hours............<12.0 mg/dL  3-5 days..................<15.0 mg/dL  6-29 days.................<15.0 mg/dL         BUN   22       27       Calcium   8.3       10.0       Chloride   103       97       CO2   23       29       Color, UA Yellow               Creatinine   0.9       1.3       Differential Method         Automated       eGFR if    >60       53       eGFR if non    >60  Comment: Calculation used to obtain the estimated glomerular filtration  rate (eGFR) is the CKD-EPI equation.          46  Comment: Calculation used to obtain the estimated glomerular filtration  rate (eGFR)  is the CKD-EPI equation.          Eos #         0.4       Eosinophil %         5.0       Glucose   81       119       Glucose, UA Negative               Gran # (ANC)         5.7       Gran %         71.2       Hematocrit         36.2       Hemoglobin         11.1       Hepatitis C Ab       Negative         HEP C Virus Hold Specimen       Hold for HCV sendout         HIV 1/2 Ag/Ab       Negative         Hyaline Casts, UA 0               Immature Grans (Abs)         0.02  Comment: Mild elevation in immature granulocytes is non specific and   can be seen in a variety of conditions including stress response,   acute inflammation, trauma and pregnancy. Correlation with other   laboratory and clinical findings is essential.         Immature Granulocytes         0.3       Ketones, UA Negative               Leukocytes, UA Negative               Lipase         33       Lymph #         1.3       Lymph %         15.7       MCH         26.2       MCHC         30.7       MCV         86       Microscopic Comment SEE COMMENT  Comment: Other formed elements not mentioned in the report are not   present in the microscopic examination.                  Mono #         0.6       Mono %         7.5       MPV         9.4       NITRITE UA Positive               nRBC         0       Occult Blood UA 3+               pH, UA 6.0               Platelets         293       Potassium   4.1       3.9       PROTEIN TOTAL         7.6       Protein, UA 1+  Comment: Recommend a 24 hour urine protein or a urine   protein/creatinine ratio if globulin induced proteinuria is  clinically suspected.                  Acceptable     Yes           RBC         4.23       RBC, UA 10               RDW         14.4       SARS-CoV-2 RNA, Amplification, Qual     Negative           Sodium   136       137       Specific [place], UA 1.025               Specimen UA Urine, Clean Catch               UROBILINOGEN UA 1.0               WBC, UA 3                WBC         7.98                            Significant Imaging: I have reviewed all pertinent imaging results/findings within the past 24 hours.      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  -patient reports fentanyl patch and oxycodone for pain management at home         VTE Risk Mitigation (From admission, onward)         Ordered     Reason for No Pharmacological VTE Prophylaxis  Once        Question:  Reasons:  Answer:  Physician Provided (leave comment)    01/03/22 2008     IP VTE HIGH RISK PATIENT  Once         01/03/22 2008     Place sequential compression device  Until discontinued         01/03/22 2008                Discharge Planning   VICTOR MANUEL:      Code Status: Full Code   Is the patient medically ready for discharge?:     Reason for patient still in hospital (select all that apply): Patient trending condition and Treatment                     BULMARO Tovar-C  Department of Hospital Medicine   [facility] Med [facility]

## 2022-01-05 PROBLEM — Z51.5 PALLIATIVE CARE ENCOUNTER: Status: ACTIVE | Noted: 2022-01-01

## 2022-01-05 NOTE — CONSULTS
Advance Care Planning    Consult Note  Palliative Medicine      Consult Requested By: Roxanna Joya NP  Reason for Consult: Sx mgmt    SUBJECTIVE:     History of Present Illness:  Madeline Warner is a 56 y.o. 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 She is followed in the Palliative Medicine clinic for pain and symptom management and we were consulted to assist while admitted. Her home pain regimen is Fentanyl 100mcg/hr patch and oxycodone 30mg QID PRN. Nausea, vomiting, weight loss, abdominal pain, and malnutrition are chronic complaints and have been evaluated by GI in the past and symptoms are resistant to PPI, antihistamines, and Reglan. Imaging on admission were negative for SBO but did note moderate amount of retained stool. At the time I saw her she had received Relistor with large volume ostomy output (4 bag changes). She reports feeling much better with each output and was not in pain. She has her Fentanyl patch in place and understands she has morphine ordered for breakthrough pain. She was not taking anything to prevent constipation at home which was related to solifenacin and opioids. We will provide her with OIC resources tomorrow but discussed bowel regimen options today. She would prefer tablets instead of drinking Miralax daily due to chronic nausea.     I clarified her wishes regarding code status as it relates to her living will on file. She wishes to remain FULL code but clarifies that she does not want to be on life support/ machines for weeks.     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    T4 N1bM1 B 3/85 nodes positive    Encounter for blood  transfusion     PONV (postoperative nausea and vomiting)      Past Surgical History:   Procedure Laterality Date    APPENDECTOMY      BILATERAL OOPHORECTOMY      CHOLECYSTECTOMY      with Cytoreduction and salpingectomy    COLOSTOMY      CYSTOSCOPY N/A 12/21/2021    Procedure: CYSTOSCOPY;  Surgeon: John Martínez MD;  Location: [facility];  Service: Urology;  Laterality: N/A;    CYTOREDUCTION      ENDOSCOPIC ULTRASOUND OF UPPER GASTROINTESTINAL TRACT N/A 6/11/2021    Procedure: ULTRASOUND, UPPER GI TRACT, ENDOSCOPIC;  Surgeon: Rosaura Lakhani MD;  Location: [facility];  Service: Endoscopy;  Laterality: N/A;  Linear scope    ERCP N/A 6/11/2021    Procedure: ERCP (ENDOSCOPIC RETROGRADE CHOLANGIOPANCREATOGRAPHY);  Surgeon: Rosaura Lakhani MD;  Location: [facility];  Service: Endoscopy;  Laterality: N/A;    ESOPHAGOGASTRODUODENOSCOPY N/A 5/6/2021    Procedure: EGD (ESOPHAGOGASTRODUODENOSCOPY);  Surgeon: Brandon Rosen MD;  Location: [facility];  Service: Gastroenterology;  Laterality: N/A;    ESOPHAGOGASTRODUODENOSCOPY  06/11/2021    EXCISION OF LESION  10/20/2020    Procedure: EXCISION, LESION COLOSTOMY;  Surgeon: Layton Anderson MD;  Location: [facility];  Service: General;;    LAPAROTOMY, EXPLORATORY  02/12/2020    LYSIS OF ADHESION, COLOSTOMY    REVISION COLOSTOMY N/A 10/20/2020    Procedure: REVISION, COLOSTOMY;  Surgeon: Layton Anderson MD;  Location: [facility];  Service: General;  Laterality: N/A;  Ostomy bag placed    RIGHT COLECTOMY       No family history on file.  Social History     Socioeconomic History    Marital status:    Tobacco Use    Smoking status: Never Smoker    Smokeless tobacco: Never Used   Substance and Sexual Activity    Alcohol use: Never    Drug use: Never      Review of patient's allergies indicates:  No Known Allergies    Medications:    Current Facility-Administered Medications:     albuterol-ipratropium 2.5 mg-0.5 mg/3 mL nebulizer  solution 3 mL, 3 mL, Nebulization, Q4H PRN, Cathy Agudelo NP    aluminum-magnesium hydroxide-simethicone 200-200-20 mg/5 mL suspension 30 mL, 30 mL, Oral, QID PRN, Cathy Agudelo NP    fentaNYL 100 mcg/hr 1 patch, 1 patch, Transdermal, Q72H, Vicky Joya, FNP-C    lactated ringers infusion, , Intravenous, Continuous, Cathy Agudelo NP, Last Rate: 75 mL/hr at 01/05/22 0709, New Bag at 01/05/22 0709    melatonin tablet 6 mg, 6 mg, Oral, Nightly PRN, Cathy Agudelo NP    metoclopramide HCl injection 5 mg, 5 mg, Intravenous, Q6H, Tyrone Casey MD    morphine injection 2 mg, 2 mg, Intravenous, Q3H PRN, Vicky Joya, BULMARO-C    ondansetron injection 4 mg, 4 mg, Intravenous, Q8H PRN, Cathy Agudelo NP, 4 mg at 01/05/22 0212    oxybutynin tablet 5 mg, 5 mg, Oral, BID PRN, John Martínez MD    promethazine (PHENERGAN) 12.5 mg in dextrose 5 % 50 mL IVPB, 12.5 mg, Intravenous, Q6H PRN, Angely Nolen NP    simethicone chewable tablet 80 mg, 1 tablet, Oral, QID PRN, Cathy Agudelo NP, 80 mg at 01/04/22 0414    sodium chloride 0.9% flush 10 mL, 10 mL, Intravenous, Q8H PRN, Cathy Agudelo NP    ROS:  Review of Systems   Constitutional: Negative for appetite change, chills and fever.   HENT: Negative for mouth sores, sore throat and trouble swallowing.    Respiratory: Negative for cough and shortness of breath.    Gastrointestinal: Negative for abdominal pain, constipation, nausea and vomiting.   Genitourinary: Negative for dysuria.   Musculoskeletal: Negative for back pain and myalgias.   Skin: Negative for rash and wound.   Allergic/Immunologic: Negative for immunocompromised state.   Neurological: Negative for weakness and headaches.   Psychiatric/Behavioral: Negative for confusion and hallucinations.       OBJECTIVE:     Physical Exam:  Vitals: Temp: 98.3 °F (36.8 °C) (01/05/22 1516)  Pulse: 72 (01/05/22 1516)  Resp: 18 (01/05/22 1516)  BP: (!)  96/56 (01/05/22 1516)  SpO2: 96 % (01/05/22 1516)    Physical Exam  Vitals reviewed.   Constitutional:       General: She is not in acute distress.     Appearance: Normal appearance. She is well-developed. She is cachectic. She is not ill-appearing.   HENT:      Head: Normocephalic and atraumatic.   Eyes:      Conjunctiva/sclera: Conjunctivae normal.   Cardiovascular:      Rate and Rhythm: Normal rate and regular rhythm.      Heart sounds: Normal heart sounds. No murmur heard.  No friction rub.   Pulmonary:      Effort: Pulmonary effort is normal. No respiratory distress.      Breath sounds: Normal breath sounds. No wheezing or rales.   Abdominal:      General: Bowel sounds are increased. There is no distension.      Palpations: Abdomen is soft.      Tenderness: There is no abdominal tenderness.   Musculoskeletal:         General: No deformity. Normal range of motion.      Cervical back: Normal range of motion.      Right lower leg: No edema.      Left lower leg: No edema.   Skin:     General: Skin is warm and dry.      Findings: No rash.   Neurological:      Mental Status: She is alert and oriented to person, place, and time.      Cranial Nerves: No cranial nerve deficit.   Psychiatric:         Mood and Affect: Mood normal.         Behavior: Behavior normal.         Thought Content: Thought content normal.         Judgment: Judgment normal.           Review of Symptoms    Symptom Assessment (ESAS 0-10 Scale)  Pain:  0  Dyspnea:  0  Anxiety:  0  Nausea:  5  Depression:  0  Anorexia:  0  Fatigue:  0  Insomnia:  0  Restlessness:  0  Agitation:  0         ECOG Performance Status rdGrdrrdarddrderd:rd rd3rd Living Arrangements:  Lives with family      Advance Directives:   Living Will: Yes        Copy on chart: Yes    LaPOST: No    Do Not Resuscitate Status: No    Medical Power of : Yes    Agent's Name:  1: mother Nancy Su, 2: Carolyn Ramirez    Decision Making:  Patient answered questions      Labs:  WBC   Date Value  Ref Range Status   01/03/2022 7.98 3.90 - 12.70 K/uL Final     Hemoglobin   Date Value Ref Range Status   01/03/2022 11.1 (L) 12.0 - 16.0 g/dL Final     Hematocrit   Date Value Ref Range Status   01/03/2022 36.2 (L) 37.0 - 48.5 % Final     MCV   Date Value Ref Range Status   01/03/2022 86 82 - 98 fL Final     Platelets   Date Value Ref Range Status   01/03/2022 293 150 - 450 K/uL Final       BMP  Lab Results   Component Value Date     (L) 01/05/2022    K 4.0 01/05/2022     01/05/2022    CO2 23 01/05/2022    BUN 18 01/05/2022    CREATININE 0.9 01/05/2022    CALCIUM 8.5 (L) 01/05/2022    ANIONGAP 9 01/05/2022    ESTGFRAFRICA >60 01/05/2022    EGFRNONAA >60 01/05/2022       Lab Results   Component Value Date    AST 18 01/03/2022    ALKPHOS 90 01/03/2022    BILITOT 0.4 01/03/2022       Albumin   Date Value Ref Range Status   01/03/2022 4.4 3.5 - 5.2 g/dL Final       Radiology:I have reviewed all pertinent imaging results/findings within the past 24 hours.  SBFT reviewed 1/5/22    ASSESSMENT   Madeline Warner is a 56 y.o. 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 She is followed in the Palliative Medicine clinic for pain and symptom management and we were consulted to assist while admitted.    PLAN   1. Neoplasm related pain  - Continue Fentanyl 100mcg/ hr q72 hr patch  - Increase morphine to more equivalent dosing to home regimen (oxycodone 30mg). Decreased slightly for cross tolerance and have ordered 8mg IV q3hr PRN  -  reviewed  - Plan to resume home regimen at discharge    2. Constipation  - Due to solifenacin and opioids not on a preventative regimen.  - She elects Senna over Miralax due to volume  - OIC resources to be  provided tomorrow  - Great response to Relistor. She might benefit from outpatient script    3. Metastatic signet ring cell adenocarcinoma  - Recent bladder infiltration, awaiting pathology to guide treatment options  - Plan to follow up with primary oncologist outpatient    4. Encounter for Palliative Care  - Code status: FULL- clarified today  - HCPOA on file  - She is established in the PM clinic     5. Chronic nausea and vomiting  - Follow up with GI outpatient       Thank you for allowing Palliative Medicine to be involved in the care of Madeline Warner.         Medical decision making: HIGH based on high risk of death, untreated symptoms, high risk medications, poor prognosis, deescalating treatments, management of more than one chronic illness in exacerbation or progression of disease, managing side effects of medications or polypharmacy, parenteral opioids     Plan required increased review of medication choice, interaction, dosing, frequency, and route due to patient complexity. Patient complexity increased by: Presence of malnutrition    16 min ACP time spent discussing: code status, assessed patient specific goals and addressed the best way to achieve them, coordination of care and emotional support, formulating and communicating prognosis, exploring burden/ benefit of various approaches of treatment, clarifying existing advance directive documents.      Remedios Rossi PA-C  Palliative Medicine

## 2022-01-05 NOTE — HPI
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 FOLFOX x 12 q2wk cycles, Cytoreduction, HIPEC, 09/19/2018 Exploratory laparotomy, lysis of adhesion, colostomy, 2/12/20.  Currently under surveillance under the care of Dr. Peña. Patient recently discovered to have pelvic mass infiltrating urinary system recommendation s/p biopsy with pending pathology.    Patient presented to [facility] ER for evaluation of intractable nausea/vomiting. Associated symptoms include decreased output from Colostomy. ER workup significant for UA pos for infection. Abdominal imaging obtained findings consistent with constipation.No evidence of bowel obstruction. Patient s/p oral dulcolax, suppository via Colostomy, Relistor injection without relief. Planned small bowel follow through today.   ?

## 2022-01-05 NOTE — SUBJECTIVE & OBJECTIVE
"Interval History: Heme/onc, palliative care, and GI consulted this AM. Pt very upset that her physicians did not see her while in hospital. I explained that we use an "on call system". KUB shows no obstruction, moderate constipation. Relistor injection given this AM. Discussed with surgery who recommended small bowel follow through, which showed normal gastrocolic transit time. Nurse reported pt put on her own fentanyl patch last night. Pt had multiple bowel movements through colostomy this afternoon, needing to empty bag 3 times. Will give banana bag and dc home in AM if able to tolerate diet. Pt sees palliative care and was referred for medical marijuana. Pt has long hx of n/v and failure to thrive/ weight loss. But pt also told me that she induces vomiting when her abd is too distended. Strongly recommended psych care to palliative.    Review of Systems   Constitutional: Positive for appetite change and fatigue. Negative for chills, diaphoresis and fever.   Eyes: Negative for photophobia.   Respiratory: Negative for cough, chest tightness, shortness of breath and wheezing.    Cardiovascular: Negative for chest pain, palpitations and leg swelling.   Gastrointestinal: Positive for nausea and vomiting. Negative for abdominal pain and diarrhea.   Genitourinary: Negative for dysuria, flank pain, frequency and hematuria.   Musculoskeletal: Negative for arthralgias, back pain and myalgias.   Allergic/Immunologic: Positive for immunocompromised state.   Neurological: Negative for dizziness, syncope, light-headedness and headaches.   Hematological: Negative.    Psychiatric/Behavioral: Negative for agitation, behavioral problems and confusion.     Objective:     Vital Signs (Most Recent):  Temp: 98.3 °F (36.8 °C) (01/05/22 1516)  Pulse: 72 (01/05/22 1516)  Resp: 18 (01/05/22 1516)  BP: (!) 96/56 (01/05/22 1516)  SpO2: 96 % (01/05/22 1516) Vital Signs (24h Range):  Temp:  [98.1 °F (36.7 °C)-99.1 °F (37.3 °C)] 98.3 °F (36.8 " °C)  Pulse:  [72-94] 72  Resp:  [16-20] 18  SpO2:  [96 %-98 %] 96 %  BP: ()/(56-72) 96/56     Weight: 44.7 kg (98 lb 8.7 oz)  Body mass index is 16.92 kg/m².    Intake/Output Summary (Last 24 hours) at 1/5/2022 1749  Last data filed at 1/5/2022 0800  Gross per 24 hour   Intake 1776.55 ml   Output --   Net 1776.55 ml      Physical Exam  Vitals and nursing note reviewed.   Constitutional:       Comments: Cachexia    HENT:      Head: Normocephalic and atraumatic.      Mouth/Throat:      Mouth: Mucous membranes are dry.   Eyes:      Conjunctiva/sclera: Conjunctivae normal.      Pupils: Pupils are equal, round, and reactive to light.   Cardiovascular:      Rate and Rhythm: Normal rate.      Pulses: Normal pulses.   Pulmonary:      Effort: Pulmonary effort is normal. No respiratory distress.      Breath sounds: Normal breath sounds.   Abdominal:      General: Bowel sounds are normal.      Tenderness: There is no abdominal tenderness. There is no guarding.      Comments: Colostomy in place, no output noted    Genitourinary:     Comments: deferred  Musculoskeletal:         General: Normal range of motion.      Cervical back: Normal range of motion.   Skin:     General: Skin is warm and dry.      Capillary Refill: Capillary refill takes less than 2 seconds.   Neurological:      General: No focal deficit present.      Mental Status: She is alert and oriented to person, place, and time.   Psychiatric:         Mood and Affect: Mood normal.         Significant Labs:   All pertinent labs within the past 24 hours have been reviewed.  Recent Lab Results       01/05/22  0701        Anion Gap 9       BUN 18       Calcium 8.5       Chloride 103       CO2 23       Creatinine 0.9       eGFR if  >60       eGFR if non  >60  Comment: Calculation used to obtain the estimated glomerular filtration  rate (eGFR) is the CKD-EPI equation.          Glucose 72       Potassium 4.0       Sodium 135              Significant Imaging: I have reviewed all pertinent imaging results/findings within the past 24 hours.

## 2022-01-05 NOTE — ASSESSMENT & PLAN NOTE
--patient without Colostomy output x 3 days  --recommend GI consult if no relief following small bowel follow through

## 2022-01-05 NOTE — CONSULTS
[facility] Consultation Note    Reason for Consult:  The primary encounter diagnosis was Chronic pain due to neoplasm. Diagnoses of Intractable vomiting and Chest pain were also pertinent to this visit.    PCP: Mariam Peña   No address on file    HPI:  Ms. Warner is a 56 year old female with metastatic appendiceal signet ring carcinoma admitted with vomiting, nausea and decreased output from the colostomy.  On admission CT scan did not show evidence of any bowel obstruction or any acute findings.  She this CT showed normocytic anemia within her baseline and chemistries today show any acute changes.  Currently on fentanyl patch and oxycodone for pain control.  She received a dose of Relistor today and has had 2 bowel movements by the time I saw her and her nausea and vomiting have resolved.  She has denied any fevers, chills, overt bleeding, diarrhea, jaundice, new medications.        ROS:  CONSTITUTIONAL: Denies weight change,  fatigue, fevers, chills, night sweats.  EYES: No changes in vision.   ENT: No oral lesions or sore throat.  HEMATOLOGICAL/Lymph: Denies bleeding tendency, bruising tendency. No swellings or enlarged lymph nodes.  CARDIOVASCULAR: Denies chest pain, shortness of breath, orthopnea and edema.  RESPIRATORY: Denies cough, hemoptysis, dyspnea, and wheezing.  GI: See HPI.  : Denies dysuria and hematuria  MUSCULOSKELETAL: Denies joint pain or swelling, back pain and muscle pain.  SKIN: Denies rashes.  NEUROLOGIC: Denies headaches, seizures and numbness.  PSYCHIATRIC: Denies depression or anxiety.  ENDOCRINE: Denies heat or cold intolerance and excessive thirst or urination.    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    T4 N1bM1 B 3/85 nodes positive    Encounter for blood transfusion     PONV (postoperative nausea and vomiting)        Surgical History:  Past Surgical History:    Procedure Laterality Date    APPENDECTOMY      BILATERAL OOPHORECTOMY      CHOLECYSTECTOMY      with Cytoreduction and salpingectomy    COLOSTOMY      CYSTOSCOPY N/A 12/21/2021    Procedure: CYSTOSCOPY;  Surgeon: John Martínez MD;  Location: [facility] OR;  Service: Urology;  Laterality: N/A;    CYTOREDUCTION      ENDOSCOPIC ULTRASOUND OF UPPER GASTROINTESTINAL TRACT N/A 6/11/2021    Procedure: ULTRASOUND, UPPER GI TRACT, ENDOSCOPIC;  Surgeon: Rosaura Lakhani MD;  Location: [facility];  Service: Endoscopy;  Laterality: N/A;  Linear scope    ERCP N/A 6/11/2021    Procedure: ERCP (ENDOSCOPIC RETROGRADE CHOLANGIOPANCREATOGRAPHY);  Surgeon: Rosaura Lakhani MD;  Location: [facility];  Service: Endoscopy;  Laterality: N/A;    ESOPHAGOGASTRODUODENOSCOPY N/A 5/6/2021    Procedure: EGD (ESOPHAGOGASTRODUODENOSCOPY);  Surgeon: Brandon Rosen MD;  Location[facility];  Service: Gastroenterology;  Laterality: N/A;    ESOPHAGOGASTRODUODENOSCOPY  06/11/2021    EXCISION OF LESION  10/20/2020    Procedure: EXCISION, LESION COLOSTOMY;  Surgeon: Layton Anderson MD;  Location: [facility];  Service: General;;    LAPAROTOMY, EXPLORATORY  02/12/2020    LYSIS OF ADHESION, COLOSTOMY    REVISION COLOSTOMY N/A 10/20/2020    Procedure: REVISION, COLOSTOMY;  Surgeon: Layton Anderson MD;  Location: [facility];  Service: General;  Laterality: N/A;  Ostomy bag placed    RIGHT COLECTOMY         Family History:   No family history on file.    Social History:   Social History     Tobacco Use    Smoking status: Never Smoker    Smokeless tobacco: Never Used   Substance Use Topics    Alcohol use: Never    Drug use: Never       Allergies: Reviewed    Home Medications:   Current Discharge Medication List      CONTINUE these medications which have NOT CHANGED    Details   fentaNYL (DURAGESIC) 100 mcg/hr Place 1 patch onto the skin every 72 hours.  Qty: 10 patch, Refills: 0    Comments: Quantity prescribed more  "than 7 day supply? Yes, quantity medically necessary  Associated Diagnoses: Pain, neoplasm-related      metoclopramide HCl (REGLAN) 5 MG tablet Take 10 mg by mouth 4 (four) times daily before meals and nightly.      omeprazole (PRILOSEC) 40 MG capsule Take 1 capsule by mouth once daily.      ondansetron (ZOFRAN-ODT) 8 MG TbDL Take 1 tablet (8 mg total) by mouth every 12 (twelve) hours as needed.  Qty: 30 tablet, Refills: 1    Associated Diagnoses: Nausea      oxyCODONE (ROXICODONE) 30 MG Tab Take 1 tablet (30 mg total) by mouth every 6 (six) hours as needed (pain).  Qty: 120 tablet, Refills: 0    Comments: Quantity prescribed more than 7 day supply? Yes, quantity medically necessary  Associated Diagnoses: Pain, neoplasm-related      phenazopyridine (PYRIDIUM) 200 MG tablet Take 1 tablet (200 mg total) by mouth 3 (three) times daily as needed (burning).  Qty: 15 tablet, Refills: 0      promethazine (PHENERGAN) 25 MG tablet Take 0.5 tablets (12.5 mg total) by mouth every 6 (six) hours as needed for Nausea.  Qty: 15 tablet, Refills: 0      solifenacin (VESICARE) 10 MG tablet Take 1 tablet (10 mg total) by mouth once daily.  Qty: 30 tablet, Refills: 11      zolpidem (AMBIEN) 5 MG Tab Take 1 tablet (5 mg total) by mouth nightly as needed (insomnia).  Qty: 60 tablet, Refills: 1               Physical Exam:  Vital Signs:  BP (!) 100/56 (BP Location: Left arm, Patient Position: Lying)   Pulse 94   Temp 99.1 °F (37.3 °C) (Oral)   Resp 20   Ht 5' 4" (1.626 m)   Wt 44.7 kg (98 lb 8.7 oz)   SpO2 98%   Breastfeeding No   BMI 16.92 kg/m²   Body mass index is 16.92 kg/m².      GENERAL: No acute distress, A&Ox3, cachectic  EYES: Anicteric, conjunctiva pallor is present  ENT: OP clear  NECK: Supple, no masses, no thyromegally.  CHEST: Equal breath sounds bilaterally, no wheezing.  CARDIOVASCULAR: Regular rate and rhythm. Murmurs, rubs and gallops absent.  ABDOMEN: soft, non-tender, non-distended, normal bowel sounds, no " hepatosplenomegal, colostomy bag with stool present  EXTREMITIES: No clubbing, cyanosis or edema.  SKIN: Without lesion or erythema.  LYMPH: No cervical, axillary lymphadenopathy palpable.   NEUROLOGICAL: Grossly normal, no asterixis present.    Labs: Pertinent labs reviewed.    Assessment and Plan:  Chronic pain due to neoplasm  -     Ambulatory referral/consult to Outpatient Case Management    Intractable vomiting    Chest pain  -     EKG 12-lead; Standing    Other orders  -     CBC auto differential; Standing  -     Comprehensive metabolic panel; Standing  -     Lipase; Standing  -     Saline lock IV; Standing  -     0.9%  NaCl infusion  -     ondansetron injection 8 mg  -     HIV 1/2 Ag/Ab (4th Gen); Standing  -     Hepatitis C Antibody; Standing  -     HCV Virus Hold Specimen; Standing  -     CT Abdomen Pelvis  Without Contrast; Standing  -     sodium chloride 0.9% bolus 1,000 mL  -     POCT COVID-19 Rapid Screening; Standing  -     Nursing communication; Standing  -     Cancel: Place in Observation; Standing  -     Urinalysis, Reflex to Urine Culture Urine, Clean Catch; Standing  -     Vital Signs; Standing  -     Weigh patient; Standing  -     Bladder scan; Standing  -     Straight Cath; Standing  -     Notify Physician; Standing  -     sodium chloride 0.9% flush 10 mL  -     Insert saline lock; Standing  -     IP VTE HIGH RISK PATIENT; Standing  -     Place sequential compression device; Standing  -     Discontinue: senna-docusate 8.6-50 mg per tablet 1 tablet  -     ondansetron injection 4 mg  -     albuterol-ipratropium 2.5 mg-0.5 mg/3 mL nebulizer solution 3 mL  -     Inhalation Treatment Q4H PRN; Standing  -     melatonin tablet 6 mg  -     simethicone chewable tablet 80 mg  -     aluminum-magnesium hydroxide-simethicone 200-200-20 mg/5 mL suspension 30 mL  -     Full code; Standing  -     Cancel: Diet clear liquid; Standing  -     Basic Metabolic Panel (BMP); Standing  -     Reason for No  Pharmacological VTE Prophylaxis; Standing  -     Discontinue: 0.9%  NaCl infusion  -     lactated ringers infusion  -     Cancel: Advance diet as tolerated; Standing  -     Cancel: Diet full liquid; Standing  -     Urinalysis Microscopic; Standing  -     Diet NPO; Standing  -     bisacodyL EC tablet 10 mg  -     Drug screen panel, in-house; Standing  -     bisacodyL suppository 10 mg  -     Admit to Inpatient; Standing  -     X-Ray Abdomen AP 1 View; Standing  -     promethazine (PHENERGAN) 12.5 mg in dextrose 5 % 50 mL IVPB  -     Discontinue: oxyCODONE-acetaminophen  mg per tablet 1 tablet  -     XR Small Bowel Follow Through; Standing  -     methylnaltrexone Syrg 8 mg  -     fentaNYL 100 mcg/hr 1 patch  -     metoclopramide HCl injection 10 mg  -     Inpatient consult to Hematology/Oncology; Standing  -     morphine injection 2 mg  -     Inpatient consult to Palliative Care; Standing  -     Inpatient consult to Gastroenterology; Standing  -     diatrizoate meglumineand-diatrizoate sodium (GASTROVIEW) solution 240 mL  -     oxybutynin tablet 5 mg  -     sodium chloride 0.9% 1,000 mL with mvi, adult no.4 with vit K 3,300 unit- 150 mcg/10 mL 10 mL, thiamine 100 mg, folic acid 1 mg infusion      For miss taking gal nausea and vomiting and decreased stool output this is most likely secondary to constipation from opioid use for pain control her symptoms have improved significantly since she has received Relistor.    CT showed no evidence of bowel obstruction in chemistry and CBC it did not show any acute findings.  She needs to be on a low residue diet, low fiber.  Over-the-counter Dulcolax at least daily help avoid opiate induced constipation.  We will prescribe a dose of oral naltrexone to take as needed if symptoms recur.    Thank you for the consult, will sign off given improvement in symptoms and resolution of nausea and vomiting.      Thank you so much for allowing me to participate in the care of Madeline  Leticia Lakhani MD  Gastroenterology  Director of Advanced Endoscopy at [facility]

## 2022-01-05 NOTE — ASSESSMENT & PLAN NOTE
--patient not on active treatment. Pelvis mass biopsy pending. F/u with Primary Oncologist upon discharge

## 2022-01-05 NOTE — TELEPHONE ENCOUNTER
Per dr Giron's request , I have spoken to Eli in pathology [facility] and received a status report on pt pathology from 12/21/21  Additional stains were added and should be in microtomy today , possible reading late today or tomorrow. Still pending.   I will follow and get pt back for follow up with dr. giron asap when returns.

## 2022-01-05 NOTE — PROGRESS NOTES
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  Pathology is still pending, patient does been having significant urgency with urge incontinence.  She is interested in possible medical management and contact our office to question if she could be seen while in-house.    Allergies:  Patient has no known allergies.    Medications:  See MAR    Review of Systems:  General: No fever, chills, fatigability, or weight loss.  Skin: No rashes, itching, or changes in color or texture of skin.  Chest: Denies AMBROSE, cyanosis, wheezing, cough, and sputum production.  Abdomen: Appetite fine. No weight loss. Denies diarrhea, abdominal pain, hematemesis, or blood in stool.  Musculoskeletal: No joint stiffness or swelling. Denies back pain.  : As above.  All other review of systems negative.    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    PSH:   has a past surgical history that includes Appendectomy; Right colectomy; Colostomy; CYTOREDUCTION; LAPAROTOMY, EXPLORATORY (02/12/2020); Bilateral oophorectomy; Cholecystectomy; Revision Colostomy (N/A, 10/20/2020); Excision of lesion (10/20/2020); Esophagogastroduodenoscopy (N/A, 5/6/2021); Esophagogastroduodenoscopy (06/11/2021); Endoscopic ultrasound of upper gastrointestinal tract (N/A, 6/11/2021); ERCP (N/A, 6/11/2021); and Cystoscopy (N/A, 12/21/2021).    FamHx: family history is not on file.    SocHx:  reports that she has never smoked. She has never used smokeless tobacco. She reports that she does not drink alcohol and does not use drugs.      Physical Exam:  Vitals:    01/05/22 0711   BP: (!) 100/56   Pulse: 94   Resp: 20   Temp: 99.1 °F (37.3 °C)     General: A&Ox3, no apparent distress, no deformities  Neck: No masses,  normal ROM  Lungs: normal inspiration, no use of accessory muscles  Heart: normal pulse, no arrhythmias  Abdomen: Soft, NT, ND, no masses, no hernias, no hepatosplenomegaly  Skin: The skin is warm and dry. No jaundice.  Ext: No c/c/e.    Labs/Studies:   None    Impression/Plan:     Urgency- most likely due to bladder malignancy and external compression.  Either way will attempt oxybutynin 5 mg b.i.d. p.r.n., at a low dose to try to help prevent any further bowel complications.  She has appointment to see me next week and we can re-evaluate at that time.  No further recommendations.

## 2022-01-05 NOTE — PLAN OF CARE
Remains free of falls and injuries. Patient complains of severe abdominal pain that is not relieved by any medications on MAR. Contacted NP and phenergan was ordered due to constant vomiting and not being able to tolerate PO. Patient refused medication. Still no BM or any output in colostomy bag. Zofran giving during shift. Will continue to monitor.

## 2022-01-05 NOTE — PROGRESS NOTES
Pharmacist Renal Dose Adjustment Note    Madeline Warner is a 56 y.o. female being treated with the medication Metoclopramide    Patient Data:    Vital Signs (Most Recent):  Temp: 99.1 °F (37.3 °C) (01/05/22 0711)  Pulse: 94 (01/05/22 0711)  Resp: 20 (01/05/22 0711)  BP: (!) 100/56 (01/05/22 0711)  SpO2: 98 % (01/05/22 0711)   Vital Signs (72h Range):  Temp:  [97.5 °F (36.4 °C)-99.1 °F (37.3 °C)]   Pulse:  [63-94]   Resp:  [16-20]   BP: (100-158)/(51-72)   SpO2:  [95 %-100 %]      Recent Labs   Lab 01/03/22  1505 01/04/22  0550 01/05/22  0701   CREATININE 1.3 0.9 0.9     Serum creatinine: 0.9 mg/dL 01/05/22 0701  Estimated creatinine clearance: 49.8 mL/min    Medication:Metoclopramide dose: 10mg frequency q6h will be changed to medication:Metoclopramide dose:5mg frequency:q6h    Pharmacist's Name: Cadence Hutton  Pharmacist's Extension: [phone]

## 2022-01-05 NOTE — PROGRESS NOTES
[facility] Medicine  Progress Note    Patient Name: Madeline Warner  MRN: [id number]  Patient Class: IP- Inpatient   Admission Date: 1/3/2022  Length of Stay: 1 days  Attending Physician: Tyrone Casey MD  Primary Care Provider: Mariam Peña MD        Subjective:     Principal Problem:Intractable nausea and vomiting        HPI:  Madeline Warner is a 57 yo female patient referred to ED by gyn-onc with PMHx of metastatic appendiceal carcinoma who presents to the Emergency Department for vomiting. Patient reports acute onset of vomiting 5 days ago.Pt states vomiting occurs randomly and is not coupled with nausea. Pt also noticed a decrease in fecal output in colostomy bag. Last output was yesterday evening. Symptoms are constant and moderate in severity. No mitigating or exacerbating factors reported. Patient denies any fever, chills, SOB, chest pain, abdominal pain, diarrhea, hematochezia, hematuria, cough, congestion, HA, numbness and weakness. Pt was originally dx with cancer in the appendix in 2017. She denies current chemo treatment reports resumption of treatment pending recent biopsy results. 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 Pt has had multiple abdominal surgeries due to cancer.     ED evaluation: CT abdomen negative for acute findings, no evidence of SBO. Labs remarkable for Cr 1.3, baseline 0.8. COVID 19 negative. VSS. Patient received 2 liters IVF. Patient placed in observation for further care.       Overview/Hospital Course:  Patient was kept on OBS for intractible N/V under the care of hospital medicine. She was started on IVF's.  01/04: Patient ate grits this AM and is c/o increased belching and nausea. Diet changed to NPO. Still no stool output - PO dulcolax ordered. Continue IVF's.      Interval History: Heme/onc, palliative care, and GI consulted this AM. Pt very upset  "that her physicians did not see her while in hospital. I explained that we use an "on call system". KUB shows no obstruction, moderate constipation. Relistor injection given this AM. Discussed with surgery who recommended small bowel follow through, which showed normal gastrocolic transit time. Nurse reported pt put on her own fentanyl patch last night. Pt had multiple bowel movements through colostomy this afternoon, needing to empty bag 3 times. Will give banana bag and dc home in AM if able to tolerate diet. Pt sees palliative care and was referred for medical marijuana. Pt has long hx of n/v and failure to thrive/ weight loss. But pt also told me that she induces vomiting when her abd is too distended. Strongly recommended psych care to palliative.    Review of Systems   Constitutional: Positive for appetite change and fatigue. Negative for chills, diaphoresis and fever.   Eyes: Negative for photophobia.   Respiratory: Negative for cough, chest tightness, shortness of breath and wheezing.    Cardiovascular: Negative for chest pain, palpitations and leg swelling.   Gastrointestinal: Positive for nausea and vomiting. Negative for abdominal pain and diarrhea.   Genitourinary: Negative for dysuria, flank pain, frequency and hematuria.   Musculoskeletal: Negative for arthralgias, back pain and myalgias.   Allergic/Immunologic: Positive for immunocompromised state.   Neurological: Negative for dizziness, syncope, light-headedness and headaches.   Hematological: Negative.    Psychiatric/Behavioral: Negative for agitation, behavioral problems and confusion.     Objective:     Vital Signs (Most Recent):  Temp: 98.3 °F (36.8 °C) (01/05/22 1516)  Pulse: 72 (01/05/22 1516)  Resp: 18 (01/05/22 1516)  BP: (!) 96/56 (01/05/22 1516)  SpO2: 96 % (01/05/22 1516) Vital Signs (24h Range):  Temp:  [98.1 °F (36.7 °C)-99.1 °F (37.3 °C)] 98.3 °F (36.8 °C)  Pulse:  [72-94] 72  Resp:  [16-20] 18  SpO2:  [96 %-98 %] 96 %  BP: " ()/(56-72) 96/56     Weight: 44.7 kg (98 lb 8.7 oz)  Body mass index is 16.92 kg/m².    Intake/Output Summary (Last 24 hours) at 1/5/2022 1749  Last data filed at 1/5/2022 0800  Gross per 24 hour   Intake 1776.55 ml   Output --   Net 1776.55 ml      Physical Exam  Vitals and nursing note reviewed.   Constitutional:       Comments: Cachexia    HENT:      Head: Normocephalic and atraumatic.      Mouth/Throat:      Mouth: Mucous membranes are dry.   Eyes:      Conjunctiva/sclera: Conjunctivae normal.      Pupils: Pupils are equal, round, and reactive to light.   Cardiovascular:      Rate and Rhythm: Normal rate.      Pulses: Normal pulses.   Pulmonary:      Effort: Pulmonary effort is normal. No respiratory distress.      Breath sounds: Normal breath sounds.   Abdominal:      General: Bowel sounds are normal.      Tenderness: There is no abdominal tenderness. There is no guarding.      Comments: Colostomy in place, no output noted    Genitourinary:     Comments: deferred  Musculoskeletal:         General: Normal range of motion.      Cervical back: Normal range of motion.   Skin:     General: Skin is warm and dry.      Capillary Refill: Capillary refill takes less than 2 seconds.   Neurological:      General: No focal deficit present.      Mental Status: She is alert and oriented to person, place, and time.   Psychiatric:         Mood and Affect: Mood normal.         Significant Labs:   All pertinent labs within the past 24 hours have been reviewed.  Recent Lab Results       01/05/22  0701        Anion Gap 9       BUN 18       Calcium 8.5       Chloride 103       CO2 23       Creatinine 0.9       eGFR if  >60       eGFR if non  >60  Comment: Calculation used to obtain the estimated glomerular filtration  rate (eGFR) is the CKD-EPI equation.          Glucose 72       Potassium 4.0       Sodium 135             Significant Imaging: I have reviewed all pertinent imaging  results/findings within the past 24 hours.      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  -patient reports fentanyl patch and oxycodone for pain management at home         VTE Risk Mitigation (From admission, onward)         Ordered     Reason for No Pharmacological VTE Prophylaxis  Once        Question:  Reasons:  Answer:  Physician Provided (leave comment)    01/03/22 2008     IP VTE HIGH RISK PATIENT  Once         01/03/22 2008     Place sequential compression device  Until discontinued         01/03/22 2008                Discharge Planning   VICTOR MANUEL:      Code Status: Full Code   Is the patient medically ready for discharge?:     Reason for patient still in hospital (select all that apply): Patient trending condition  Discharge Plan A: Home with family                  CARMINA Tovar  Department of Hospital Medicine   [facility] - Med S[facility]

## 2022-01-05 NOTE — SUBJECTIVE & OBJECTIVE
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    T4 N1bM1 B 3/85 nodes positive    Encounter for blood transfusion     PONV (postoperative nausea and vomiting)      Past Surgical History:   Procedure Laterality Date    APPENDECTOMY      BILATERAL OOPHORECTOMY      CHOLECYSTECTOMY      with Cytoreduction and salpingectomy    COLOSTOMY      CYSTOSCOPY N/A 12/21/2021    Procedure: CYSTOSCOPY;  Surgeon: John Martínez MD;  Location: [facility];  Service: Urology;  Laterality: N/A;    CYTOREDUCTION      ENDOSCOPIC ULTRASOUND OF UPPER GASTROINTESTINAL TRACT N/A 6/11/2021    Procedure: ULTRASOUND, UPPER GI TRACT, ENDOSCOPIC;  Surgeon: Rosaura Lakhani MD;  Location: [facility];  Service: Endoscopy;  Laterality: N/A;  Linear scope    ERCP N/A 6/11/2021    Procedure: ERCP (ENDOSCOPIC RETROGRADE CHOLANGIOPANCREATOGRAPHY);  Surgeon: Rosaura Lakhani MD;  Location: [facility];  Service: Endoscopy;  Laterality: N/A;    ESOPHAGOGASTRODUODENOSCOPY N/A 5/6/2021    Procedure: EGD (ESOPHAGOGASTRODUODENOSCOPY);  Surgeon: Brandon Rosen MD;  Location: [facility];  Service: Gastroenterology;  Laterality: N/A;    ESOPHAGOGASTRODUODENOSCOPY  06/11/2021    EXCISION OF LESION  10/20/2020    Procedure: EXCISION, LESION COLOSTOMY;  Surgeon: Layton Anderson MD;  Location: [facility];  Service: General;;    LAPAROTOMY,  EXPLORATORY  02/12/2020    LYSIS OF ADHESION, COLOSTOMY    REVISION COLOSTOMY N/A 10/20/2020    Procedure: REVISION, COLOSTOMY;  Surgeon: Layton Anderson MD;  Location: [facility];  Service: General;  Laterality: N/A;  Ostomy bag placed    RIGHT COLECTOMY       Family History    None       Tobacco Use    Smoking status: Never Smoker    Smokeless tobacco: Never Used   Substance and Sexual Activity    Alcohol use: Never    Drug use: Never    Sexual activity: Not on file       Review of Systems   Unable to perform ROS: Other (patient off floor)     Objective:     Vital Signs (Most Recent):  Temp: 99.1 °F (37.3 °C) (01/05/22 0711)  Pulse: 94 (01/05/22 0711)  Resp: 20 (01/05/22 0711)  BP: (!) 100/56 (01/05/22 0711)  SpO2: 98 % (01/05/22 0711) Vital Signs (24h Range):  Temp:  [98.1 °F (36.7 °C)-99.1 °F (37.3 °C)] 99.1 °F (37.3 °C)  Pulse:  [77-94] 94  Resp:  [16-20] 20  SpO2:  [95 %-98 %] 98 %  BP: (100-140)/(56-72) 100/56     Weight: 44.7 kg (98 lb 8.7 oz)  Body mass index is 16.92 kg/m².  Body surface area is 1.42 meters squared.      Intake/Output Summary (Last 24 hours) at 1/5/2022 1314  Last data filed at 1/5/2022 0800  Gross per 24 hour   Intake 1776.55 ml   Output --   Net 1776.55 ml       Physical Exam  Patient off floor  Significant Labs:   BMP:   Recent Labs   Lab 01/03/22  1505 01/04/22  0550 01/05/22  0701   * 81 72    136 135*   K 3.9 4.1 4.0   CL 97 103 103   CO2 29 23 23   BUN 27* 22* 18   CREATININE 1.3 0.9 0.9   CALCIUM 10.0 8.3* 8.5*   , CBC:   Recent Labs   Lab 01/03/22  1505   WBC 7.98   HGB 11.1*   HCT 36.2*      , LFTs:   Recent Labs   Lab 01/03/22  1505   ALT 11   AST 18   ALKPHOS 90   BILITOT 0.4   PROT 7.6   ALBUMIN 4.4    and All pertinent labs from the last 24 hours have been reviewed.    Diagnostic Results:  I have reviewed all pertinent imaging results/findings within the past 24 hours.

## 2022-01-05 NOTE — ASSESSMENT & PLAN NOTE
--previous gastric emptying study consistent with gastroparesis  --Continue Reglan  --Consider GI consult

## 2022-01-05 NOTE — CONSULTS
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 FOLFOX x 12 q2wk cycles, Cytoreduction, HIPEC, 09/19/2018 Exploratory laparotomy, lysis of adhesion, colostomy, 2/12/20.  Currently under surveillance under the care of Dr. Peña. Patient recently discovered to have pelvic mass infiltrating urinary system recommendation s/p biopsy with pending pathology.    Patient presented to [facility] ER for evaluation of intractable nausea/vomiting. Associated symptoms include decreased output from Colostomy. ER workup significant for UA pos for infection. Abdominal imaging obtained findings consistent with constipation.No evidence of bowel obstruction. Patient s/p oral dulcolax, suppository via Colostomy, Relistor injection without relief. Planned small bowel follow through today.   ?      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    T4 N1bM1 B 3/85 nodes positive    Encounter for blood transfusion     PONV (postoperative nausea and vomiting)      Past Surgical History:   Procedure Laterality Date    APPENDECTOMY      BILATERAL OOPHORECTOMY      CHOLECYSTECTOMY      with Cytoreduction and salpingectomy    COLOSTOMY      CYSTOSCOPY N/A 12/21/2021    Procedure: CYSTOSCOPY;  Surgeon: John Martínez MD;  Location: [facility];  Service: Urology;  Laterality: N/A;    CYTOREDUCTION      ENDOSCOPIC ULTRASOUND OF UPPER GASTROINTESTINAL TRACT N/A 6/11/2021    Procedure: ULTRASOUND, UPPER GI TRACT, ENDOSCOPIC;  Surgeon: Rosaura Lakhani MD;  Location: [facility];  Service: Endoscopy;  Laterality: N/A;  Linear scope    ERCP N/A 6/11/2021    Procedure: ERCP (ENDOSCOPIC RETROGRADE CHOLANGIOPANCREATOGRAPHY);  Surgeon: Rosaura Lakhani MD;  Location: [facility];  Service: Endoscopy;  Laterality: N/A;    ESOPHAGOGASTRODUODENOSCOPY N/A 5/6/2021    Procedure: EGD (ESOPHAGOGASTRODUODENOSCOPY);  Surgeon: Brandon Rosen MD;  Location: [facility];  Service: Gastroenterology;  Laterality: N/A;    ESOPHAGOGASTRODUODENOSCOPY  06/11/2021    EXCISION OF LESION  10/20/2020    Procedure: EXCISION, LESION COLOSTOMY;  Surgeon: Layton Anderson MD;  Location: [facility];  Service: General;;    LAPAROTOMY, EXPLORATORY  02/12/2020    LYSIS OF ADHESION, COLOSTOMY    REVISION COLOSTOMY N/A 10/20/2020    Procedure: REVISION, COLOSTOMY;  Surgeon: Layton Anderson MD;  Location: [facility];  Service: General;  Laterality: N/A;  Ostomy bag placed    RIGHT COLECTOMY       Family History    None       Tobacco Use    Smoking status: Never Smoker    Smokeless tobacco: Never Used   Substance and Sexual Activity    Alcohol use: Never    Drug use: Never    Sexual activity: Not on file       Review of Systems   Unable to  perform ROS: Other (patient off floor)     Objective:     Vital Signs (Most Recent):  Temp: 99.1 °F (37.3 °C) (01/05/22 0711)  Pulse: 94 (01/05/22 0711)  Resp: 20 (01/05/22 0711)  BP: (!) 100/56 (01/05/22 0711)  SpO2: 98 % (01/05/22 0711) Vital Signs (24h Range):  Temp:  [98.1 °F (36.7 °C)-99.1 °F (37.3 °C)] 99.1 °F (37.3 °C)  Pulse:  [77-94] 94  Resp:  [16-20] 20  SpO2:  [95 %-98 %] 98 %  BP: (100-140)/(56-72) 100/56     Weight: 44.7 kg (98 lb 8.7 oz)  Body mass index is 16.92 kg/m².  Body surface area is 1.42 meters squared.      Intake/Output Summary (Last 24 hours) at 1/5/2022 1314  Last data filed at 1/5/2022 0800  Gross per 24 hour   Intake 1776.55 ml   Output --   Net 1776.55 ml       Physical Exam  Patient off floor  Significant Labs:   BMP:   Recent Labs   Lab 01/03/22  1505 01/04/22  0550 01/05/22  0701   * 81 72    136 135*   K 3.9 4.1 4.0   CL 97 103 103   CO2 29 23 23   BUN 27* 22* 18   CREATININE 1.3 0.9 0.9   CALCIUM 10.0 8.3* 8.5*   , CBC:   Recent Labs   Lab 01/03/22  1505   WBC 7.98   HGB 11.1*   HCT 36.2*      , LFTs:   Recent Labs   Lab 01/03/22  1505   ALT 11   AST 18   ALKPHOS 90   BILITOT 0.4   PROT 7.6   ALBUMIN 4.4    and All pertinent labs from the last 24 hours have been reviewed.    Diagnostic Results:  I have reviewed all pertinent imaging results/findings within the past 24 hours.    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 Pelvis mass biopsy pending. F/u with Primary Oncologist upon discharge          Thank you for your consult. I will follow-up with patient. Please contact us if you have any additional questions.    Jaky Saavedra NP  Hematology/Oncology  [facility]

## 2022-01-06 PROBLEM — Z51.5 PALLIATIVE CARE ENCOUNTER: Status: RESOLVED | Noted: 2022-01-01 | Resolved: 2022-01-01

## 2022-01-06 NOTE — PLAN OF CARE
Pt remains free from falls/injuries this shift. Safety precautions maintained. Pain managed with pain medication and rest. IVF infusing per orders. Fentanyl patch in place. No s/s of acute distress noted. Will continue to monitor. Chart check completed.

## 2022-01-06 NOTE — SUBJECTIVE & OBJECTIVE
Interval History: Colostomy functioning following small bowel follow through. Patient reports ongoing indigestion/nausea. No vomiting    Oncology Treatment Plan:   [No treatment plan]    Medications:  Continuous Infusions:   lactated ringers Stopped (01/05/22 1520)     Scheduled Meds:   fentaNYL  1 patch Transdermal Q72H    metoclopramide HCl  5 mg Intravenous Q6H    senna-docusate 8.6-50 mg  1 tablet Oral BID     PRN Meds:albuterol-ipratropium, aluminum-magnesium hydroxide-simethicone, melatonin, morphine, ondansetron, oxybutynin, promethazine (PHENERGAN) IVPB, simethicone, sodium chloride 0.9%     Review of Systems   Constitutional: Positive for appetite change. Negative for activity change, chills, fatigue, fever and unexpected weight change.   HENT: Positive for trouble swallowing. Negative for congestion, mouth sores, nosebleeds, sore throat and voice change.    Eyes: Negative for photophobia and visual disturbance.   Respiratory: Negative for cough, chest tightness, shortness of breath and wheezing.    Cardiovascular: Negative for chest pain, palpitations and leg swelling.   Gastrointestinal: Negative for abdominal distention, abdominal pain, anal bleeding, blood in stool, constipation, diarrhea, nausea and vomiting.   Genitourinary: Negative for difficulty urinating, dysuria and hematuria.   Musculoskeletal: Negative for arthralgias, back pain and myalgias.   Skin: Negative for pallor, rash and wound.   Neurological: Positive for weakness. Negative for dizziness, syncope and headaches.   Hematological: Negative for adenopathy. Does not bruise/bleed easily.   Psychiatric/Behavioral: The patient is nervous/anxious.      Objective:     Vital Signs (Most Recent):  Temp: 98.3 °F (36.8 °C) (01/06/22 1129)  Pulse: 80 (01/06/22 1129)  Resp: 18 (01/06/22 1129)  BP: (!) 90/53 (01/06/22 1129)  SpO2: (!) 94 % (01/06/22 1129) Vital Signs (24h Range):  Temp:  [97.4 °F (36.3 °C)-99.2 °F (37.3 °C)] 98.3 °F (36.8  °C)  Pulse:  [] 80  Resp:  [14-20] 18  SpO2:  [93 %-97 %] 94 %  BP: ()/(49-62) 90/53     Weight: 45.6 kg (100 lb 8.5 oz)  Body mass index is 17.26 kg/m².  Body surface area is 1.43 meters squared.      Intake/Output Summary (Last 24 hours) at 1/6/2022 1332  Last data filed at 1/6/2022 1000  Gross per 24 hour   Intake 466.08 ml   Output --   Net 466.08 ml       Physical Exam  Vitals reviewed.   Constitutional:       Appearance: She is well-developed and well-nourished.   HENT:      Head: Normocephalic.      Mouth/Throat:      Mouth: Oropharynx is clear and moist.   Eyes:      General: Lids are normal. No scleral icterus.        Right eye: No discharge.         Left eye: No discharge.      Extraocular Movements: EOM normal.      Conjunctiva/sclera: Conjunctivae normal.   Neck:      Thyroid: No thyroid mass.   Cardiovascular:      Rate and Rhythm: Normal rate and regular rhythm.      Heart sounds: Normal heart sounds. No murmur heard.      Pulmonary:      Effort: Pulmonary effort is normal. No respiratory distress.      Breath sounds: Normal breath sounds. No wheezing or rales.   Abdominal:      General: Bowel sounds are normal. There is no distension.      Palpations: Abdomen is soft.      Tenderness: There is no abdominal tenderness.       Genitourinary:     Comments: deferred  Musculoskeletal:         General: No edema. Normal range of motion.      Cervical back: Normal range of motion.   Skin:     General: Skin is warm, dry and intact.   Neurological:      Mental Status: She is alert and oriented to person, place, and time.   Psychiatric:         Mood and Affect: Mood and affect normal.         Speech: Speech normal.         Behavior: Behavior normal. Behavior is cooperative.         Thought Content: Thought content normal.         Significant Labs:   BMP:   Recent Labs   Lab 01/05/22  0701 01/06/22  0646   GLU 72 92   * 132*   K 4.0 3.7    100   CO2 23 24   BUN 18 25*   CREATININE 0.9 1.0    CALCIUM 8.5* 8.5*   , CBC: No results for input(s): WBC, HGB, HCT, PLT in the last 48 hours., LFTs: No results for input(s): ALT, AST, ALKPHOS, BILITOT, PROT, ALBUMIN in the last 48 hours. and All pertinent labs from the last 24 hours have been reviewed.    Diagnostic Results:  I have reviewed all pertinent imaging results/findings within the past 24 hours.

## 2022-01-06 NOTE — DISCHARGE SUMMARY
[facility]  Discharge Summary      Patient Name: Madeline Warner  MRN: [id number]  Patient Class: IP- Inpatient  Admission Date: 1/3/2022  Hospital Length of Stay: 2 days  Discharge Date and Time:  01/06/2022 1:10 PM  Attending Physician: Tyrone Casey MD   Discharging Provider: Edilma Landers NP  Primary Care Provider: Mariam Peña MD      HPI:   Madeline Warner is a 57 yo female patient referred to ED by gyn-onc with PMHx of metastatic appendiceal carcinoma who presents to the Emergency Department for vomiting. Patient reports acute onset of vomiting 5 days ago.Pt states vomiting occurs randomly and is not coupled with nausea. Pt also noticed a decrease in fecal output in colostomy bag. Last output was yesterday evening. Symptoms are constant and moderate in severity. No mitigating or exacerbating factors reported. Patient denies any fever, chills, SOB, chest pain, abdominal pain, diarrhea, hematochezia, hematuria, cough, congestion, HA, numbness and weakness. Pt was originally dx with cancer in the appendix in 2017. She denies current chemo treatment reports resumption of treatment pending recent biopsy results. 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 Pt has had multiple abdominal surgeries due to cancer.     ED evaluation: CT abdomen negative for acute findings, no evidence of SBO. Labs remarkable for Cr 1.3, baseline 0.8. COVID 19 negative. VSS. Patient received 2 liters IVF. Patient placed in observation for further care.       * No surgery found *      Hospital Course:   Patient was kept on OBS for intractible N/V under the care of hospital medicine. She was started on IVF's.  01/04: Patient ate grits this AM and is c/o increased belching and nausea. Diet changed to NPO. Still no stool output - PO dulcolax ordered. Continue IVF's.  01/05: Heme/onc, palliative care, and GI  "consulted this AM. Pt very upset that her physicians did not see her while in hospital. I explained that we use an "on call system". KUB shows no obstruction, moderate constipation. Relistor injection given this AM. Discussed with surgery who recommended small bowel follow through, which showed normal gastrocolic transit time. Nurse reported pt put on her own fentanyl patch last night. Pt had multiple bowel movements through colostomy this afternoon, needing to empty bag 3 times. Will give banana bag and dc home in AM if able to tolerate diet. Pt sees palliative care and was referred for medical marijuana. Pt has long hx of n/v and failure to thrive/ weight loss. But pt also told me that she induces vomiting when her abd is too distended. Strongly recommended psych care to palliative  1/6: Still endorses nausea. Able to keep broth down this morning. Labs and vitals reviewed. Patient is stable for discharge. Will send home with rectal phenergan. Discussed need to f/u with hem onc within 1 week.        Goals of Care Treatment Preferences:  Code Status: Full Code    Health care agent: 1: mother Nancy Su, 2: Carolyn Ramirez  Health care agent number: No value filed.    Living Will: Yes              Consults:   Consults (From admission, onward)        Status Ordering Provider     Inpatient consult to Gastroenterology  Once        Provider:  (Not yet assigned)    Completed KAMARI MONTOYA     Inpatient consult to Palliative Care  Once        Provider:  (Not yet assigned)    Completed KAMARI MONTOYA     Inpatient consult to Hematology/Oncology  Once        Provider:  Puneet Phan MD    Completed KAMARI MONTOYA          * Intractable nausea and vomiting, Chronic  - s/p 2 liters IVF in ED   --IVF's  --NPO  --antiemetics    1/6: improved from admission, ready for discharge, will send home with phenergan suppositories    Colostomy present on admission  -continue routine care      Gastroesophageal " reflux disease without esophagitis  -continue PPI       Chronic pain due to neoplasm  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  -patient reports fentanyl patch and oxycodone for pain management at home       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        Palliative care encounter-resolved as of 1/6/2022        Final Active Diagnoses:    Diagnosis Date Noted POA    PRINCIPAL PROBLEM:  Intractable nausea and vomiting, Chronic [R11.2] 01/03/2022 Yes    Colostomy present on admission [Z93.3] 06/14/2021 Not Applicable    Gastroesophageal reflux disease without esophagitis [K21.9] 04/20/2021 Yes    Chronic pain due to neoplasm [G89.3] 08/28/2020 Yes    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    Specialty: Hematology and Oncology  Contact information:  [address]  [place] [address] [phone]                       Patient Instructions:      Ambulatory referral/consult to Outpatient Case Management   Referral Priority: Routine Referral Type: Consultation   Referral Reason: Specialty Services Required   Number of Visits Requested: 1     Activity as tolerated       Significant Diagnostic Studies: Labs:   CMP   Recent Labs   Lab 01/05/22  0701 01/06/22  0646   * 132*   K 4.0 3.7    100   CO2 23 24   GLU 72 92   BUN 18 25*   CREATININE 0.9 1.0   CALCIUM 8.5* 8.5*   ANIONGAP 9 8   ESTGFRAFRICA >60 >60   EGFRNONAA >60 >60   , CBC No results for input(s): WBC, HGB, HCT, PLT in the last 48 hours. and All labs within the past 24 hours have been reviewed    Pending Diagnostic Studies:     None          Medications:  Reconciled Home Medications:      Medication List      CHANGE how you take these medications    * promethazine 25 MG tablet  Commonly known as: PHENERGAN  Take 0.5 tablets (12.5 mg total) by mouth every 6 (six) hours as needed for Nausea.  What changed: Another medication with the same name was added. Make sure you understand how and when to take each.     * promethazine 12.5 MG Supp  Commonly known as: PHENERGAN  Place 1 suppository (12.5 mg total) rectally every 6 (six) hours as needed for Nausea.  What changed: You were already taking a medication with the same name, and this prescription was added. Make sure you understand how and when to take each.         * This list has 2 medication(s) that are the same as other medications prescribed for you. Read the directions carefully, and ask your doctor or other care provider to review them with you.            CONTINUE taking these medications    fentaNYL 100 mcg/hr  Commonly known as: DURAGESIC  Place 1 patch onto the skin every 72 hours.     metoclopramide HCl 5 MG tablet  Commonly known as: REGLAN  Take 10 mg by mouth 4 (four) times daily before meals and nightly.     omeprazole 40 MG capsule  Commonly known as: PRILOSEC  Take 1 capsule by mouth once daily.     ondansetron 8 MG Tbdl  Commonly known as: ZOFRAN-ODT  Take 1 tablet (8 mg total) by mouth every 12 (twelve) hours as needed.     oxyCODONE 30 MG Tab  Commonly known as: ROXICODONE  Take 1 tablet (30 mg total) by mouth every 6 (six) hours as needed (pain).     phenazopyridine 200 MG tablet  Commonly known as: PYRIDIUM  Take 1 tablet (200 mg total) by mouth 3 (three) times daily as needed (burning).     solifenacin 10 MG tablet  Commonly known as: VESICARE  Take 1 tablet (10 mg total) by mouth once daily.     zolpidem 5 MG Tab  Commonly known as: AMBIEN  Take 1 tablet (5 mg total) by mouth nightly as needed (insomnia).            Indwelling Lines/Drains at time of discharge:    Lines/Drains/Airways     Drain                 Colostomy LUQ -- days         Colostomy 06/15/21 0016 Descending/sigmoid  days                Time spent on the discharge of patient: 30 minutes         Edilma Landers NP  Department of Hospital Medicine  [facility]

## 2022-01-06 NOTE — PROGRESS NOTES
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 FOLFOX x 12 q2wk cycles, Cytoreduction, HIPEC, 09/19/2018 Exploratory laparotomy, lysis of adhesion, colostomy, 2/12/20.  Currently under surveillance under the care of Dr. Peña. Patient recently discovered to have pelvic mass infiltrating urinary system recommendation s/p biopsy with pending pathology.    Patient presented to [facility] ER for evaluation of intractable nausea/vomiting. Associated symptoms include decreased output from Colostomy. ER workup significant for UA pos for infection. Abdominal imaging obtained findings consistent with constipation.No evidence of bowel obstruction. Patient s/p oral dulcolax, suppository via Colostomy, Relistor injection without relief. Planned small bowel follow through today.   ?      Interval History: Colostomy functioning following small bowel follow through. Patient reports ongoing indigestion/nausea. No vomiting    Oncology Treatment Plan:   [No treatment plan]    Medications:  Continuous Infusions:   lactated ringers Stopped (01/05/22 1520)     Scheduled Meds:   fentaNYL  1 patch Transdermal Q72H    metoclopramide HCl  5 mg Intravenous Q6H    senna-docusate 8.6-50 mg  1 tablet Oral BID     PRN Meds:albuterol-ipratropium, aluminum-magnesium hydroxide-simethicone, melatonin, morphine, ondansetron, oxybutynin, promethazine (PHENERGAN) IVPB, simethicone, sodium chloride 0.9%     Review of Systems   Constitutional: Positive for appetite change. Negative for activity change, chills, fatigue, fever and unexpected weight change.   HENT: Positive for trouble swallowing. Negative for congestion, mouth sores, nosebleeds, sore  throat and voice change.    Eyes: Negative for photophobia and visual disturbance.   Respiratory: Negative for cough, chest tightness, shortness of breath and wheezing.    Cardiovascular: Negative for chest pain, palpitations and leg swelling.   Gastrointestinal: Negative for abdominal distention, abdominal pain, anal bleeding, blood in stool, constipation, diarrhea, nausea and vomiting.   Genitourinary: Negative for difficulty urinating, dysuria and hematuria.   Musculoskeletal: Negative for arthralgias, back pain and myalgias.   Skin: Negative for pallor, rash and wound.   Neurological: Positive for weakness. Negative for dizziness, syncope and headaches.   Hematological: Negative for adenopathy. Does not bruise/bleed easily.   Psychiatric/Behavioral: The patient is nervous/anxious.      Objective:     Vital Signs (Most Recent):  Temp: 98.3 °F (36.8 °C) (01/06/22 1129)  Pulse: 80 (01/06/22 1129)  Resp: 18 (01/06/22 1129)  BP: (!) 90/53 (01/06/22 1129)  SpO2: (!) 94 % (01/06/22 1129) Vital Signs (24h Range):  Temp:  [97.4 °F (36.3 °C)-99.2 °F (37.3 °C)] 98.3 °F (36.8 °C)  Pulse:  [] 80  Resp:  [14-20] 18  SpO2:  [93 %-97 %] 94 %  BP: ()/(49-62) 90/53     Weight: 45.6 kg (100 lb 8.5 oz)  Body mass index is 17.26 kg/m².  Body surface area is 1.43 meters squared.      Intake/Output Summary (Last 24 hours) at 1/6/2022 1332  Last data filed at 1/6/2022 1000  Gross per 24 hour   Intake 466.08 ml   Output --   Net 466.08 ml       Physical Exam  Vitals reviewed.   Constitutional:       Appearance: She is well-developed and well-nourished.   HENT:      Head: Normocephalic.      Mouth/Throat:      Mouth: Oropharynx is clear and moist.   Eyes:      General: Lids are normal. No scleral icterus.        Right eye: No discharge.         Left eye: No discharge.      Extraocular Movements: EOM normal.      Conjunctiva/sclera: Conjunctivae normal.   Neck:      Thyroid: No thyroid mass.   Cardiovascular:      Rate and  Rhythm: Normal rate and regular rhythm.      Heart sounds: Normal heart sounds. No murmur heard.      Pulmonary:      Effort: Pulmonary effort is normal. No respiratory distress.      Breath sounds: Normal breath sounds. No wheezing or rales.   Abdominal:      General: Bowel sounds are normal. There is no distension.      Palpations: Abdomen is soft.      Tenderness: There is no abdominal tenderness.       Genitourinary:     Comments: deferred  Musculoskeletal:         General: No edema. Normal range of motion.      Cervical back: Normal range of motion.   Skin:     General: Skin is warm, dry and intact.   Neurological:      Mental Status: She is alert and oriented to person, place, and time.   Psychiatric:         Mood and Affect: Mood and affect normal.         Speech: Speech normal.         Behavior: Behavior normal. Behavior is cooperative.         Thought Content: Thought content normal.         Significant Labs:   BMP:   Recent Labs   Lab 01/05/22  0701 01/06/22  0646   GLU 72 92   * 132*   K 4.0 3.7    100   CO2 23 24   BUN 18 25*   CREATININE 0.9 1.0   CALCIUM 8.5* 8.5*   , CBC: No results for input(s): WBC, HGB, HCT, PLT in the last 48 hours., LFTs: No results for input(s): ALT, AST, ALKPHOS, BILITOT, PROT, ALBUMIN in the last 48 hours. and All pertinent labs from the last 24 hours have been reviewed.    Diagnostic Results:  I have reviewed all pertinent imaging results/findings within the past 24 hours.    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 Pelvis mass biopsy pending. F/u with Primary Oncologist upon discharge          Thank you for your consult. I will follow-up with patient. Please contact us if you have any additional questions.     Jaky Saavedra NP  Hematology/Oncology  [facility]

## 2022-01-06 NOTE — PLAN OF CARE
Pt being discharged Home in stable condition. IV removed, catheter intact, pt tolerated well. Tele monitor removed, given to US. Discharge instructions given to pt, pt verbalized understanding. Patient refused prescription. MD aware.

## 2022-01-06 NOTE — PLAN OF CARE
Problem: Adult Inpatient Plan of Care  Goal: Plan of Care Review  Outcome: Ongoing, Progressing  Goal: Patient-Specific Goal (Individualized)  Outcome: Ongoing, Progressing  Goal: Absence of Hospital-Acquired Illness or Injury  Outcome: Ongoing, Progressing  Goal: Optimal Comfort and Wellbeing  Outcome: Ongoing, Progressing  Goal: Readiness for Transition of Care  Outcome: Ongoing, Progressing     Problem: Infection  Goal: Absence of Infection Signs and Symptoms  Outcome: Ongoing, Progressing     Problem: Adjustment to Illness (Sepsis/Septic Shock)  Goal: Optimal Coping  Outcome: Ongoing, Progressing     Problem: Bleeding (Sepsis/Septic Shock)  Goal: Absence of Bleeding  Outcome: Ongoing, Progressing     Problem: Glycemic Control Impaired (Sepsis/Septic Shock)  Goal: Blood Glucose Level Within Desired Range  Outcome: Ongoing, Progressing     Problem: Infection Progression (Sepsis/Septic Shock)  Goal: Absence of Infection Signs and Symptoms  Outcome: Ongoing, Progressing     Problem: Nutrition Impaired (Sepsis/Septic Shock)  Goal: Optimal Nutrition Intake  Outcome: Ongoing, Progressing     Problem: Coping Ineffective  Goal: Effective Coping  Outcome: Ongoing, Progressing

## 2022-01-06 NOTE — PLAN OF CARE
[facility]  Discharge Final Note    Primary Care Provider: Mariam Peña MD    Expected Discharge Date: 1/6/2022    Final Discharge Note (most recent)     Final Note - 01/06/22 1332        Final Note    Assessment Type Final Discharge Note     Anticipated Discharge Disposition Home or Self Care     Hospital Resources/Appts/Education Provided Provided patient/caregiver with written discharge plan information;Appointments scheduled and added to AVS        Post-Acute Status    Discharge Delays None known at this time                 Important Message from Medicare  Important Message from Medicare regarding Discharge Appeal Rights: Given to patient/caregiver,Explained to patient/caregiver,Signed/date by patient/caregiver     Date IMM was signed: 01/05/22  Time IMM was signed: 0931    Contact Info     Mariam Peña MD   Specialty: Hematology and Oncology   Relationship: PCP - General    [address]  [place] [address]   Phone: [phone]       Next Steps: Follow up in 3 day(s)

## 2022-01-07 NOTE — TELEPHONE ENCOUNTER
----- Message from Mariam Peña MD sent at 1/7/2022 10:51 AM CST -----  Regarding: RE: pathology and TB  Yes please. She is scheduled for fup today which is a bit too soon, jsut discharged and would make next fri instead  ----- Message -----  From: Terri López RN  Sent: 1/7/2022   9:14 AM CST  To: Mariam Peña MD  Subject: pathology and TB                                 Good morning Dr. Peña,     Path is back on Ms. Warner, and I had a reminder to add her to next Friday depending on results. Can you confirm if you'd like her presented on 1/14, and can I set up a f/u with you following TB?    Thanks

## 2022-01-07 NOTE — PHYSICIAN QUERY
PT Name: Madeline Warner  MR #: [id number]     DOCUMENTATION CLARIFICATION      CDS: Brandy Capley, RN  Email: [email or web address]    This form is a permanent document in the medical record.     Query Date: January 7, 2022    By submitting this query, we are merely seeking further clarification of documentation.  Please utilize your independent clinical judgment when addressing the question(s) below.     The Medical Record contains the following:    Clinical Information Location in Medical Record     Madeline Warner is a 55 yo female patient referred to ED by gyn-onc with PMHx of metastatic appendiceal carcinoma who presents to the Emergency Department for vomiting. Patient reports acute onset of vomiting 5 days ago.Pt states vomiting occurs randomly and is not coupled with nausea. Pt also noticed a decrease in fecal output in colostomy bag. Last output was yesterday evening. Symptoms are constant and moderate in severity.       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  She needs to be on a low residue diet, low fiber.  Over-the-counter Dulcolax at least daily help avoid opiate induced constipation.  We will prescribe a dose of oral naltrexone to take as needed if symptoms recur.    --previous gastric emptying study consistent with gastroparesis  --Continue Reglan    Discussed with surgery who  recommended small bowel follow through, which showed normal gastrocolic transit time.     Moderate amount of stool in the colon suggestive of constipation   H&P 1/3, filed 1/4                                Gastroenterology consult 1/5                  Hematology/ oncology consult 1/5      Discharge summary 1/6      CT 1/3     Please specify suspected or confirmed etiology of  __nausea and vomiting _____?       [   ] constipation from opioid use      [   ] Gastroparesis     [   ] Other etiology (please specify):___________________     [ x ] Clinically Undetermined             Please document in your progress notes daily for the duration of treatment, until resolved, and include in your discharge summary.

## 2022-01-07 NOTE — TELEPHONE ENCOUNTER
Called patient to discuss moving her f/u with Dr. Peña back to Friday of next week following TB. Spoke to patient's mother who agreed on next Friday the 14th at 11:40am. Reviewed that this will be at the [facility] location, verbalized understanding of all information

## 2022-01-10 NOTE — PROGRESS NOTES
History & Physical    SUBJECTIVE:     CC: Ostomy dysfunction/growth  Ref: Jaky MAGUI Saavedra    History of Present Illness:  Patient is a 56 y.o. female presents for evaluation of ostomy dysfunction and growth.  Patient has a very complicated medical history.  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  During her hemicolectomy, there was evidence of peritoneal disease.  She then underwent 6 months of chemotherapy that a very difficult cytoreductive surgery and HIPEC in 2018 with a very prolonged recovery.  She then did not receive any additional chemotherapy following this however then developed a bowel obstruction and underwent a laparotomy and diverting Andrew's colostomy in February 2020 where there was noted to be additional peritoneal disease.  She has been seen by both Surgical Oncology and Medical Oncology in felt to be a good candidate for systemic chemotherapy which is pending initiation.  However, she has had retraction of her colostomy and a growth making pouching very difficult.  She states she has a rash around her ostomy due to leakage and difficulty with pouching due to this growth that she would like to be evaluated for excision.  She denies any use fevers, chills, nausea, vomiting.    Interval history:  Since last clinic visit, the patient has had repeat imaging showing a new pelvic mass concerning for bladder involvement.  She underwent a cystoscopy with TURBT with Urology and biopsy confirming likely metastatic adenocarcinoma.  She states she still has significant abdominal pain and is now worse with eating.  She also has intermittent nausea and vomiting.  She continues to have ostomy output.  Denies any abdominal distention.  Pain is mostly located on the right side of the abdomen.  Is having trouble keeping her weight up.    Review of patient's allergies  indicates:  No Known Allergies    Current Outpatient Medications   Medication Sig Dispense Refill    fentaNYL (DURAGESIC) 100 mcg/hr Place 1 patch onto the skin every 72 hours. 10 patch 0    omeprazole (PRILOSEC) 40 MG capsule Take 1 capsule by mouth once daily.      ondansetron (ZOFRAN-ODT) 8 MG TbDL Take 1 tablet (8 mg total) by mouth every 12 (twelve) hours as needed. 30 tablet 1    oxyCODONE (ROXICODONE) 30 MG Tab Take 1 tablet (30 mg total) by mouth every 6 (six) hours as needed (pain). 120 tablet 0    phenazopyridine (PYRIDIUM) 200 MG tablet Take 1 tablet (200 mg total) by mouth 3 (three) times daily as needed (burning). 15 tablet 0    promethazine (PHENERGAN) 12.5 MG Supp Place 1 suppository (12.5 mg total) rectally every 6 (six) hours as needed for Nausea. 6 suppository 0    promethazine (PHENERGAN) 25 MG tablet Take 0.5 tablets (12.5 mg total) by mouth every 6 (six) hours as needed for Nausea. 15 tablet 0    solifenacin (VESICARE) 10 MG tablet Take 1 tablet (10 mg total) by mouth once daily. 30 tablet 11    zolpidem (AMBIEN) 5 MG Tab Take 1 tablet (5 mg total) by mouth nightly as needed (insomnia). 60 tablet 1    metoclopramide HCl (REGLAN) 5 MG tablet Take 2 tablets (10 mg total) by mouth 4 (four) times daily before meals and nightly. 240 tablet 0     No current facility-administered medications for this visit.       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;  Service: Urology;  Laterality: N/A;    CYTOREDUCTION       ENDOSCOPIC ULTRASOUND OF UPPER GASTROINTESTINAL TRACT N/A 6/11/2021    Procedure: ULTRASOUND, UPPER GI TRACT, ENDOSCOPIC;  Surgeon: Rosaura Lakhani MD;  Location: [facility];  Service: Endoscopy;  Laterality: N/A;  Linear scope    ERCP N/A 6/11/2021    Procedure: ERCP (ENDOSCOPIC RETROGRADE CHOLANGIOPANCREATOGRAPHY);  Surgeon: Rosaura Lakhani MD;  Location: [facility] ENDO;  Service: Endoscopy;  Laterality: N/A;    ESOPHAGOGASTRODUODENOSCOPY N/A 5/6/2021    Procedure: EGD (ESOPHAGOGASTRODUODENOSCOPY);  Surgeon: Brandon Rosen MD;  Location: [facility] ENDO;  Service: Gastroenterology;  Laterality: N/A;    ESOPHAGOGASTRODUODENOSCOPY  06/11/2021    EXCISION OF LESION  10/20/2020    Procedure: EXCISION, LESION COLOSTOMY;  Surgeon: Layton Anderson MD;  Location: [facility] OR;  Service: General;;    LAPAROTOMY, EXPLORATORY  02/12/2020    LYSIS OF ADHESION, COLOSTOMY    REVISION COLOSTOMY N/A 10/20/2020    Procedure: REVISION, COLOSTOMY;  Surgeon: Layton Anderson MD;  Location: [facility] OR;  Service: General;  Laterality: N/A;  Ostomy bag placed    RIGHT COLECTOMY       History reviewed. No pertinent family history.  Social History     Tobacco Use    Smoking status: Never Smoker    Smokeless tobacco: Never Used   Substance Use Topics    Alcohol use: Never    Drug use: Never        Review of Systems:  Review of Systems   Constitutional: Negative for activity change, appetite change, chills, fatigue, fever and unexpected weight change.   HENT: Negative for congestion, ear pain, sore throat and trouble swallowing.    Eyes: Negative for pain, redness and itching.   Respiratory: Negative for cough, shortness of breath and wheezing.    Cardiovascular: Negative for chest pain, palpitations and leg swelling.   Gastrointestinal: Positive for abdominal pain, nausea and vomiting. Negative for abdominal distention, anal bleeding, blood in stool, constipation, diarrhea and rectal pain.   Endocrine: Negative for  cold intolerance, heat intolerance and polyuria.   Genitourinary: Negative for dysuria, flank pain, frequency and hematuria.   Musculoskeletal: Negative for gait problem, joint swelling and neck pain.   Skin: Negative for color change, rash and wound.   Allergic/Immunologic: Negative for environmental allergies and immunocompromised state.   Neurological: Negative for dizziness, speech difficulty, weakness and numbness.   Psychiatric/Behavioral: Negative for agitation, confusion and hallucinations.       OBJECTIVE:     Vital Signs (Most Recent)  Temp: 97 °F (36.1 °C) (01/10/22 1436)  Pulse: 90 (01/10/22 1436)  BP: 114/71 (01/10/22 1436)     41.4 kg (91 lb 4.3 oz)     Physical Exam:  Physical Exam  Constitutional:       Appearance: She is well-developed. She is ill-appearing.      Comments: cachectic   HENT:      Head: Normocephalic and atraumatic.   Eyes:      Conjunctiva/sclera: Conjunctivae normal.   Neck:      Thyroid: No thyromegaly.   Cardiovascular:      Rate and Rhythm: Normal rate and regular rhythm.   Pulmonary:      Effort: Pulmonary effort is normal. No respiratory distress.   Abdominal:      Comments: Well-healed midline incision;soft, nondistended, mild R-sided TTP, no rebound or guarding; L-sided ostomy in place with stool in bag   Musculoskeletal:         General: No tenderness. Normal range of motion.      Cervical back: Normal range of motion.   Skin:     General: Skin is warm and dry.      Capillary Refill: Capillary refill takes less than 2 seconds.      Findings: No rash.   Neurological:      Mental Status: She is alert and oriented to person, place, and time.         Laboratory  Lab Results   Component Value Date    WBC 7.98 01/03/2022    HGB 11.1 (L) 01/03/2022    HCT 36.2 (L) 01/03/2022     01/03/2022    TRIG 89 06/15/2021    ALT 11 01/03/2022    AST 18 01/03/2022     (L) 01/06/2022    K 3.7 01/06/2022     01/06/2022    CREATININE 1.0 01/06/2022    BUN 25 (H) 01/06/2022     CO2 24 01/06/2022    INR 1.0 06/13/2021         Diagnostic Results:  PET Scan: Reviewed  CT: reviewed    ASSESSMENT/PLAN:     54yo F with complicated course with appendiceal goblet cell and adenocarcinoma who has a Andrew's for obstruction with recurrence of metastatic intra-abdominal disease now involving the bladder    - long discussion with the patient regarding her diagnosis of likely recurrent disease involving the pelvic tumor and involving the bladder.  Discussed that unless she is symptomatic requiring transfusions or creates an obstruction in the intestines, I would not recommend palliative surgery at this time.  Discussed that her best course would likely be chemotherapy.  - discussed with the patient that she may require a gastrostomy or PEG-J tube for decompression versus tube feedings to tolerate chemotherapy as she is having trouble tolerating p.o. intake at this time.  Discussed that this would likely be placed by IR versus Gastroenterology and we would try to avoid any surgical placement of tubes.  - will discuss with Urology and Hematology-Oncology  - will be available should the patient require surgical intervention in the future  - RTC p.r.nHaily Anderson MD  Colon and Rectal Surgery  [facility] [place]

## 2022-01-14 NOTE — TELEPHONE ENCOUNTER
Spoke to Ms. Warner and informed her that Dr. Martínez stated that he would like to see her in clinic next Wednesday at [facility] to discuss stent placement, he could schedule surgery for the next day. She can discuss this with Dr. Peña while she is there because Dr. Peña wants this done prior to her starting Chemotherapy. Ms. Warner stated that she may be going out of town, but agreed to schedule for next Wednesday at 1:00 pm at [facility], she agreed to discuss with Dr. Peña.

## 2022-01-14 NOTE — PROGRESS NOTES
Tumor Board Documentation      Madeline Warner was presented by Mariam Peña MD at our Tumor Board on 1/14/2022, which included representatives from (P) Medical Oncology,Hematology,Surgical Oncology,Pathology,Navigation,Research,Genetics,Plastic Surgery,Radiology,Gastrointestinal,Urology.    Madeline currently presents as (P) a current patient with (P) Other, with history of the following treatments: (P) Surgical Intervention(s),Adjuvant Chemotherapy.    Additionally, we reviewed previous medical and familial history, history of present illness, and recent lab results along with all available histopathologic and imaging studies. The tumor board considered available treatment options and made the following recommendations:  (P) Chemotherapy (urology stent placement/ GJ tube placement/)       Obtain STRATA on most recent biopsy / Urology to place stent (s) / GI to evaluate for oral therapy to reduce s/s / IR to place G J tube/  AFter preceeding procedures begin systemic therapy with Folfiri and EGFR agent (holding any Avastin with procedures if used)    The following procedures/referrals were also placed: No orders of the defined types were placed in this encounter.      Clinical Trial Status: (P) Not discussed     National site-specific guidelines were discussed with respect to the case.    Tumor board is a meeting of clinicians from various specialty areas who evaluate and discuss patients for whom a multidisciplinary approach is being considered. Final determinations in the plan of care are those of the provider(s). The responsibility for follow up of recommendations given during tumor board is that of the provider.     Mariam Peña MD

## 2022-01-14 NOTE — TELEPHONE ENCOUNTER
Spoke to Ms. Warner, she stated that she couldn't make it to her appt yesterday because she was too sick to come in. She is seeing Dr. Peña after her booster shot. Her appt is at [facility]. I informed her that Dr. Martínez was wanting to see if she could come by today, but she won't have time before her appt at the [facility] and Dr. Martínez will not be here this afternoon. I will notify Dr. Martínez and see what he wants to do. She verbally understood.

## 2022-01-14 NOTE — TELEPHONE ENCOUNTER
----- Message from John Martínez MD sent at 1/14/2022  7:17 AM CST -----  Can you see if she could run by our office before her oncology appt today for a visit, they want us to consider stents.  She initially was on the books, but fell off yesterday.

## 2022-01-14 NOTE — TELEPHONE ENCOUNTER
See Tumor board note from today.  Consuelo Wilson notified to obtain STRATA on most recent biopsy sample.

## 2022-01-16 NOTE — PROGRESS NOTES
Subjective:      DATE OF VISIT: 1/14/22   ?  Patient ID:?Madeline Warner is a 56 y.o. female.?? MR#: [id number]   ?    ? Primary Care Providers:  Mariam Peña MD, MD (General)     CHIEF COMPLAINT:  Follow-up   ?   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   CURRENT TREATMENT:  TBD    PAST TREATMENT:  Appendectomy, 10/30/17  Right hemicolectomy, 12/1/17  FOLFOX x 12 q2wk cycles  Cytoreduction, HIPEC, 09/19/2018  Exploratory laparotomy, lysis of adhesion, colostomy, 2/12/20  ?   ONCOLOGIC HISTORY:   ?   I had the pleasure meeting for the 1st time Ms. Warner as she transfers her care to [place] [facility], previously treated in [place].    I have carefully reviewed her oncologic history along with her notable for the following:    Ms. Warner has been in excellent health without known medical issues.      She presented at 52 years old to outside hospital in [place] with acute abdominal pain on 10/30/2017 diagnosed with acute appendicitis.     10/30/2017 appendectomy  Pathology report noted 2.5x0.7cm carcinoma, ex-goblet cell type carcinoid, high-grade, proximal margin positive, lymphovascular invasion present, pT4 NX MX.    12/01/2017 right hemicolectomy  Pathology:  Metastatic signet ring cell adenocarcinoma in peritoneal implant    FOLFOX x 12 q2wk cycles    09/19/2018 cytoreduction, HIPEC  Pathology:  Pelvic nodule, colon nodule, rectal nodule, rectal margin final, bilateral ovaries with involvement of metastatic signet ring cell adenocarcinoma    9/2019 EGD with benign findings    1-2/2020 she reports abdominal discomfort. 2/9/20 CT reported abnormal wall thickening finding suggestive of obstruction.    02/07/2020 rectal wall biopsy adenocarcinoma with signet ring morphology    2/13/20 exploratory laparotomy, lysis of adhesion and colostomy revealed numerous peritoneal deposits.  Pathology:  Peritoneum biopsy metastatic signet ring cell  carcinoma    02/19/2020 MRI brain noted to be negative for metastatic disease    3/4/20 PET without noted avid disease or peritoneal carcinomatosis    05/15/2020 CT chest abdomen pelvis  report noted status post hemicolectomy left lower quadrant colostomy.   no evidence of bowel obstruction, new ascites or peritoneal carcinomatosis.  Mucosal thickening had rectosigmoid colon stable.   no new lymphadenopathy or metastatic disease seen.  Interval resolution of recent right pyelonephritis.    01/29/2020 CEA 2.01  06/29/2020 CEA 1.49    She moved to [place] to be closer to family.  She saw my colleague Dr. Negro who had ordered CT chest abdomen pelvis for new baseline here on 08/27/2020 which noted postoperative findings from had right hemicolectomy loop colostomy no lymphadenopathy or masses within chest abdomen or pelvis.    [facility] testing send, not covered.    INTERVAL EVENTS:  Tumor board discussion this morning 01/14/2021.  Recent hospitalization for nausea and vomiting poor p.o. intake.  She has trace spotting/hematuria.  During hospitalization she did fine improvement with use of opioid receptor antagonist help with updated induced constipation.    Review of Systems    ?   A comprehensive 14-point review of systems was reviewed with patient and was negative other than as specified above.   ?   Current Outpatient Medications   Medication Sig Dispense Refill    fentaNYL (DURAGESIC) 100 mcg/hr Place 1 patch onto the skin every 72 hours. 10 patch 0    metoclopramide HCl (REGLAN) 5 MG tablet Take 2 tablets (10 mg total) by mouth 4 (four) times daily before meals and nightly. 240 tablet 0    omeprazole (PRILOSEC) 40 MG capsule Take 1 capsule by mouth once daily.      ondansetron (ZOFRAN-ODT) 8 MG TbDL Take 1 tablet (8 mg total) by mouth every 12 (twelve) hours as needed. 30 tablet 1    oxyCODONE (ROXICODONE) 30 MG Tab Take 1 tablet (30 mg total) by mouth every 6 (six) hours as needed (pain). 120 tablet 0     phenazopyridine (PYRIDIUM) 200 MG tablet Take 1 tablet (200 mg total) by mouth 3 (three) times daily as needed (burning). 15 tablet 0    promethazine (PHENERGAN) 12.5 MG Supp Place 1 suppository (12.5 mg total) rectally every 6 (six) hours as needed for Nausea. 6 suppository 0    promethazine (PHENERGAN) 25 MG tablet Take 0.5 tablets (12.5 mg total) by mouth every 6 (six) hours as needed for Nausea. 15 tablet 0    solifenacin (VESICARE) 10 MG tablet Take 1 tablet (10 mg total) by mouth once daily. 30 tablet 11    zolpidem (AMBIEN) 5 MG Tab Take 1 tablet (5 mg total) by mouth nightly as needed (insomnia). 60 tablet 1    naloxegoL (MOVANTIK) 25 mg tablet Take 25 mg by mouth once daily. 60 tablet 3     No current facility-administered medications for this visit.         Objective:      Physical Exam      ?   Vitals:    01/14/22 1119   BP: 126/79   Pulse: 93   Temp: 97.5 °F (36.4 °C)      ?   ECOG:?0   General appearance: Generally well appearing, in no acute distress.   Limited due to virtual visit  ?   Laboratory:  ?   No visits with results within 1 Day(s) from this visit.   Latest known visit with results is:   Admission on 01/03/2022, Discharged on 01/06/2022   Component Date Value Ref Range Status    WBC 01/03/2022 7.98  3.90 - 12.70 K/uL Final    RBC 01/03/2022 4.23  4.00 - 5.40 M/uL Final    Hemoglobin 01/03/2022 11.1* 12.0 - 16.0 g/dL Final    Hematocrit 01/03/2022 36.2* 37.0 - 48.5 % Final    MCV 01/03/2022 86  82 - 98 fL Final    MCH 01/03/2022 26.2* 27.0 - 31.0 pg Final    MCHC 01/03/2022 30.7* 32.0 - 36.0 g/dL Final    RDW 01/03/2022 14.4  11.5 - 14.5 % Final    Platelets 01/03/2022 293  150 - 450 K/uL Final    MPV 01/03/2022 9.4  9.2 - 12.9 fL Final    Immature Granulocytes 01/03/2022 0.3  0.0 - 0.5 % Final    Gran # (ANC) 01/03/2022 5.7  1.8 - 7.7 K/uL Final    Immature Grans (Abs) 01/03/2022 0.02  0.00 - 0.04 K/uL Final    Lymph # 01/03/2022 1.3  1.0 - 4.8 K/uL Final    Mono #  01/03/2022 0.6  0.3 - 1.0 K/uL Final    Eos # 01/03/2022 0.4  0.0 - 0.5 K/uL Final    Baso # 01/03/2022 0.02  0.00 - 0.20 K/uL Final    nRBC 01/03/2022 0  0 /100 WBC Final    Gran % 01/03/2022 71.2  38.0 - 73.0 % Final    Lymph % 01/03/2022 15.7* 18.0 - 48.0 % Final    Mono % 01/03/2022 7.5  4.0 - 15.0 % Final    Eosinophil % 01/03/2022 5.0  0.0 - 8.0 % Final    Basophil % 01/03/2022 0.3  0.0 - 1.9 % Final    Differential Method 01/03/2022 Automated   Final    Sodium 01/03/2022 137  136 - 145 mmol/L Final    Potassium 01/03/2022 3.9  3.5 - 5.1 mmol/L Final    Chloride 01/03/2022 97  95 - 110 mmol/L Final    CO2 01/03/2022 29  23 - 29 mmol/L Final    Glucose 01/03/2022 119* 70 - 110 mg/dL Final    BUN 01/03/2022 27* 6 - 20 mg/dL Final    Creatinine 01/03/2022 1.3  0.5 - 1.4 mg/dL Final    Calcium 01/03/2022 10.0  8.7 - 10.5 mg/dL Final    Total Protein 01/03/2022 7.6  6.0 - 8.4 g/dL Final    Albumin 01/03/2022 4.4  3.5 - 5.2 g/dL Final    Total Bilirubin 01/03/2022 0.4  0.1 - 1.0 mg/dL Final    Alkaline Phosphatase 01/03/2022 90  55 - 135 U/L Final    AST 01/03/2022 18  10 - 40 U/L Final    ALT 01/03/2022 11  10 - 44 U/L Final    Anion Gap 01/03/2022 11  8 - 16 mmol/L Final    eGFR if  01/03/2022 53* >60 mL/min/1.73 m^2 Final    eGFR if non African American 01/03/2022 46* >60 mL/min/1.73 m^2 Final    Lipase 01/03/2022 33  4 - 60 U/L Final    HIV 1/2 Ag/Ab 01/03/2022 Negative  Negative Final    Hepatitis C Ab 01/03/2022 Negative  Negative Final    HEP C Virus Hold Specimen 01/03/2022 Hold for HCV sendout   Final    POC Rapid COVID 01/03/2022 Negative  Negative Final     Acceptable 01/03/2022 Yes   Final    Specimen UA 01/04/2022 Urine, Clean Catch   Final    Color, UA 01/04/2022 Yellow  Yellow, Straw, Belle Final    Appearance, UA 01/04/2022 Clear  Clear Final    pH, UA 01/04/2022 6.0  5.0 - 8.0 Final    Specific Gravity, UA 01/04/2022 1.025  1.005 -  1.030 Final    Protein, UA 01/04/2022 1+* Negative Final    Glucose, UA 01/04/2022 Negative  Negative Final    Ketones, UA 01/04/2022 Negative  Negative Final    Bilirubin (UA) 01/04/2022 Negative  Negative Final    Occult Blood UA 01/04/2022 3+* Negative Final    Nitrite, UA 01/04/2022 Positive* Negative Final    Urobilinogen, UA 01/04/2022 1.0  <2.0 EU/dL Final    Leukocytes, UA 01/04/2022 Negative  Negative Final    Sodium 01/04/2022 136  136 - 145 mmol/L Final    Potassium 01/04/2022 4.1  3.5 - 5.1 mmol/L Final    Chloride 01/04/2022 103  95 - 110 mmol/L Final    CO2 01/04/2022 23  23 - 29 mmol/L Final    Glucose 01/04/2022 81  70 - 110 mg/dL Final    BUN 01/04/2022 22* 6 - 20 mg/dL Final    Creatinine 01/04/2022 0.9  0.5 - 1.4 mg/dL Final    Calcium 01/04/2022 8.3* 8.7 - 10.5 mg/dL Final    Anion Gap 01/04/2022 10  8 - 16 mmol/L Final    eGFR if African American 01/04/2022 >60  >60 mL/min/1.73 m^2 Final    eGFR if non African American 01/04/2022 >60  >60 mL/min/1.73 m^2 Final    RBC, UA 01/04/2022 10* 0 - 4 /hpf Final    WBC, UA 01/04/2022 3  0 - 5 /hpf Final    Bacteria 01/04/2022 Few* None-Occ /hpf Final    Hyaline Casts, UA 01/04/2022 0  0-1/lpf /lpf Final    Microscopic Comment 01/04/2022 SEE COMMENT   Final    Sodium 01/05/2022 135* 136 - 145 mmol/L Final    Potassium 01/05/2022 4.0  3.5 - 5.1 mmol/L Final    Chloride 01/05/2022 103  95 - 110 mmol/L Final    CO2 01/05/2022 23  23 - 29 mmol/L Final    Glucose 01/05/2022 72  70 - 110 mg/dL Final    BUN 01/05/2022 18  6 - 20 mg/dL Final    Creatinine 01/05/2022 0.9  0.5 - 1.4 mg/dL Final    Calcium 01/05/2022 8.5* 8.7 - 10.5 mg/dL Final    Anion Gap 01/05/2022 9  8 - 16 mmol/L Final    eGFR if African American 01/05/2022 >60  >60 mL/min/1.73 m^2 Final    eGFR if non African American 01/05/2022 >60  >60 mL/min/1.73 m^2 Final    Sodium 01/06/2022 132* 136 - 145 mmol/L Final    Potassium 01/06/2022 3.7  3.5 - 5.1 mmol/L Final     Chloride 01/06/2022 100  95 - 110 mmol/L Final    CO2 01/06/2022 24  23 - 29 mmol/L Final    Glucose 01/06/2022 92  70 - 110 mg/dL Final    BUN 01/06/2022 25* 6 - 20 mg/dL Final    Creatinine 01/06/2022 1.0  0.5 - 1.4 mg/dL Final    Calcium 01/06/2022 8.5* 8.7 - 10.5 mg/dL Final    Anion Gap 01/06/2022 8  8 - 16 mmol/L Final    eGFR if African American 01/06/2022 >60  >60 mL/min/1.73 m^2 Final    eGFR if non African American 01/06/2022 >60  >60 mL/min/1.73 m^2 Final      Lab Results   Component Value Date    WBC 7.98 01/03/2022    HGB 11.1 (L) 01/03/2022    HCT 36.2 (L) 01/03/2022    MCV 86 01/03/2022     01/03/2022         Chemistry        Component Value Date/Time     (L) 01/06/2022 0646    K 3.7 01/06/2022 0646     01/06/2022 0646    CO2 24 01/06/2022 0646    BUN 25 (H) 01/06/2022 0646    CREATININE 1.0 01/06/2022 0646    GLU 92 01/06/2022 0646        Component Value Date/Time    CALCIUM 8.5 (L) 01/06/2022 0646    ALKPHOS 90 01/03/2022 1505    AST 18 01/03/2022 1505    ALT 11 01/03/2022 1505    BILITOT 0.4 01/03/2022 1505    ESTGFRAFRICA >60 01/06/2022 0646    EGFRNONAA >60 01/06/2022 0646        Lab Results   Component Value Date    CEA 3.6 11/29/2021       ?     Imaging:  ?  Results for orders placed or performed during the hospital encounter of 01/03/22 (from the past 2160 hour(s))   CT Abdomen Pelvis  Without Contrast    Impression    Moderate amount of stool in the colon suggestive of constipation.  Postsurgical changes in the right lower quadrant and rectosigmoid region    Suggestion of right-sided hydronephrosis.  No obstructing renal calculi is identified.    All CT scans   are performed using dose optimization techniques including the following: automated exposure control; adjustment of the mA and/or kV; use of iterative reconstruction technique.  Dose modulation was employed for ALARA by means of: Automated exposure control; adjustment of the mA and/or kV according to  patient size (this includes techniques or standardized protocols for targeted exams where dose is matched to indication/reason for exam; i.e. extremities or head); and/or use of iterative reconstructive technique.      Electronically signed by: Moody Landry  Date:    01/03/2022  Time:    18:06   Results for orders placed or performed during the hospital encounter of 12/14/21 (from the past 2160 hour(s))   CT Chest With Contrast    Impression    1. Newly developed pleuroparenchymal lung opacity in the lingula, possible infectious/inflammatory infiltrate versus scarring/atelectasis.  Imaging surveillance recommended.  2. Extensive collateralization of venous flow in the left chest with suspected chronic stenosis/occlusion of the SVC.  3. Chronic dilatation of the intra and extrahepatic biliary tract.  4. Remainder as above.      Electronically signed by: THIERRY Hernandez MD  Date:    12/14/2021  Time:    11:15           Pathology:    Per above     ?   Assessment/Plan:       1. Metastatic signet ring cell carcinoma    2. Chronic pain due to neoplasm    3. Intractable vomiting with nausea, unspecified vomiting type    4. Drug-induced constipation          Plan:     # metastatic signet ring cell adenocarcinoma:   - STRATA GYPSY. [employer] germline testing denied coverage and patient opted to not pursue.    Initial diagnosis October 2017 with appendectomy pathology noting ex-goblet cell type carcinoid, high-grade. Subsequent right hemicolectomy December 2017 with pathology showing metastatic signet ring cell adenocarcinoma involving peritoneal implant s/p FOLFOX x 6 months, and 9/2019 cytoreductive surgery and HIPEC, final pathology noting pelvic nodule, colon nodule, rectal nodule, rectal margin final, bilateral ovaries with involvement of metastatic signet ring cell adenocarcinoma. 2/2020 bowel obstruction with ex lap revealing numerous peritoneal deposits.  Peritoneum biopsy pathology showed metastatic signet ring cell  "carcinoma.     She has had sepsis and imaging in May and most recently 08/27/2020 CT chest abdomen pelvis without noted evidence of disease.  I did discuss with her however is likely discordance and imaging may not reflect true degree of peritoneal disease involvement.  She has not yet had any systemic therapy in the setting of her recurrent metastatic signet ring cell carcinoma.  I discussed natural history of this disease and role of palliative chemotherapy to slow further progression of disease.  She is hesitant to initiate chemotherapy due to concerns for toxicity and just is the importance of quality of life which is quite reasonable given palliative nature of therapy at this point.  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  I discussed options of 5 fluorouracil based regimen, may prefer capecitabine given difficult venous access.  I also discussed agents of irinotecan and bevacizumab as well as potential toxicities.  There may be role for other agents including targeted therapy and immunotherapy pending mutational testing, MSI status.     We reviewed her case at multidisciplinary tumor board - review of pathology was consistent with prior diagnosis of signet ring adenocarcinoma with recommendation for systemic therapy. Referral for HIPEC and declined as well as chemotherapy ultimately (had planned for possible capecitabine/irinotexan/bevacizumab) and opted for surveillance.    - she notes having recent scans in [facility] with prior oncologist - we have requested records when told of this at last visit but have not received.  In the interim unfortunately she has had worsening symptoms GI gastroparesis vomiting and hematuria leading to CT abdomen pelvis at outside facility with report only available at this time noting "distention of stomach and proximal small bowel diffuse wall thickening of more distal small bowel with similar wall thickening of the colon distal colon near " "sigmoid near the expected location of uterus mass lesion measuring 5 cm with loss of normal fat planes between mass uterus and sigmoid colon.  No pathologically enlarged lymph nodes."    Case reviewed at multidisciplinary tumor board 01/14/2022 including review of most recent 2021 imaging and pathology from pelvic mass biopsy 12/2021 which is felt to be consistent with metastatic signet ring adenocarcinoma.  Multidisciplinary discussion along with Urology colorectal surgery Gynecologic Oncology and no surgical intervention is felt appropriate at this time; pelvic exenteraction would be considered as an extreme palliative measure pending response to systemic therapy and patient wishes.  It was felt that palliative systemic therapy should be pursued with preference to avoid bevacizumab at this time given potential for procedures.  Recommend expediting palliative procedures prior to initiation to assist in symptom and nutrition optimization with G-J tube; will need port placement by IR as well. Note:  Pt notes prior technical limitation of line placement in [place] related to her anatomy/scarring?.       I recommended molecular testing with strata most recent sample from 12/2021.  Previously microsatellite stable.  If still microsatellite stable would recommend FOLFOXIRI in accorance with NCCN guidelines and via oncology pathway for intensive regimen with close monitoring of tolerance; this treatment plan has been placed.      Nausea/vomiting/constipation.chronic pain/pelvic:  Multifactorial with gastroparesis tenisha, compressive effects of tumor possible, opioid induced constipation.  Discussed constipation relieving oral regimen as well as naloxegol, prescribed by GI Dr. Lakhani who will follow-up with her as well as palliative care.    Right hydronephrosis:  Discussed at multidisciplinary tumor board with Urology Dr. Martínez who will arrange stent placement prior to initiation of chemotherapy per above.    Advance " Care Planning   Following with palliative care for pain management, advance care planning, goals of care       Follow-Up:   Patient Instructions   Follow-up with GI, Dr. Lakhani  Follow-up with Dr. Martínez re stent  IR port placement and G-J tube  Most recent biopsy to be sent for STRATA  Path expedite MSI testing if possible  Following above will need chemo teaching/consent, tentative FOLFOXIRI plan placed, see above

## 2022-01-16 NOTE — H&P (VIEW-ONLY)
Subjective:      DATE OF VISIT: 1/14/22   ?  Patient ID:?Madeline Warner is a 56 y.o. female.?? MR#: [id number]   ?    ? Primary Care Providers:  Mariam Peña MD, MD (General)     CHIEF COMPLAINT:  Follow-up   ?   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   CURRENT TREATMENT:  TBD    PAST TREATMENT:  Appendectomy, 10/30/17  Right hemicolectomy, 12/1/17  FOLFOX x 12 q2wk cycles  Cytoreduction, HIPEC, 09/19/2018  Exploratory laparotomy, lysis of adhesion, colostomy, 2/12/20  ?   ONCOLOGIC HISTORY:   ?   I had the pleasure meeting for the 1st time Ms. Warner as she transfers her care to [place], previously treated in [place].    I have carefully reviewed her oncologic history along with her notable for the following:    Ms. Warner has been in excellent health without known medical issues.      She presented at 52 years old to outside hospital in [place] with acute abdominal pain on 10/30/2017 diagnosed with acute appendicitis.     10/30/2017 appendectomy  Pathology report noted 2.5x0.7cm carcinoma, ex-goblet cell type carcinoid, high-grade, proximal margin positive, lymphovascular invasion present, pT4 NX MX.    12/01/2017 right hemicolectomy  Pathology:  Metastatic signet ring cell adenocarcinoma in peritoneal implant    FOLFOX x 12 q2wk cycles    09/19/2018 cytoreduction, HIPEC  Pathology:  Pelvic nodule, colon nodule, rectal nodule, rectal margin final, bilateral ovaries with involvement of metastatic signet ring cell adenocarcinoma    9/2019 EGD with benign findings    1-2/2020 she reports abdominal discomfort. 2/9/20 CT reported abnormal wall thickening finding suggestive of obstruction.    02/07/2020 rectal wall biopsy adenocarcinoma with signet ring morphology    2/13/20 exploratory laparotomy, lysis of adhesion and colostomy revealed numerous peritoneal deposits.  Pathology:  Peritoneum biopsy metastatic signet ring cell  carcinoma    02/19/2020 MRI brain noted to be negative for metastatic disease    3/4/20 PET without noted avid disease or peritoneal carcinomatosis    05/15/2020 CT chest abdomen pelvis  report noted status post hemicolectomy left lower quadrant colostomy.   no evidence of bowel obstruction, new ascites or peritoneal carcinomatosis.  Mucosal thickening had rectosigmoid colon stable.   no new lymphadenopathy or metastatic disease seen.  Interval resolution of recent right pyelonephritis.    01/29/2020 CEA 2.01  06/29/2020 CEA 1.49    She moved to [place] to be closer to family.  She saw my colleague Dr. Negro who had ordered CT chest abdomen pelvis for new baseline here on 08/27/2020 which noted postoperative findings from had right hemicolectomy loop colostomy no lymphadenopathy or masses within chest abdomen or pelvis.    [facility] testing send, not covered.    INTERVAL EVENTS:  Tumor board discussion this morning 01/14/2021.  Recent hospitalization for nausea and vomiting poor p.o. intake.  She has trace spotting/hematuria.  During hospitalization she did fine improvement with use of opioid receptor antagonist help with updated induced constipation.    Review of Systems    ?   A comprehensive 14-point review of systems was reviewed with patient and was negative other than as specified above.   ?   Current Outpatient Medications   Medication Sig Dispense Refill    fentaNYL (DURAGESIC) 100 mcg/hr Place 1 patch onto the skin every 72 hours. 10 patch 0    metoclopramide HCl (REGLAN) 5 MG tablet Take 2 tablets (10 mg total) by mouth 4 (four) times daily before meals and nightly. 240 tablet 0    omeprazole (PRILOSEC) 40 MG capsule Take 1 capsule by mouth once daily.      ondansetron (ZOFRAN-ODT) 8 MG TbDL Take 1 tablet (8 mg total) by mouth every 12 (twelve) hours as needed. 30 tablet 1    oxyCODONE (ROXICODONE) 30 MG Tab Take 1 tablet (30 mg total) by mouth every 6 (six) hours as needed (pain). 120 tablet 0     phenazopyridine (PYRIDIUM) 200 MG tablet Take 1 tablet (200 mg total) by mouth 3 (three) times daily as needed (burning). 15 tablet 0    promethazine (PHENERGAN) 12.5 MG Supp Place 1 suppository (12.5 mg total) rectally every 6 (six) hours as needed for Nausea. 6 suppository 0    promethazine (PHENERGAN) 25 MG tablet Take 0.5 tablets (12.5 mg total) by mouth every 6 (six) hours as needed for Nausea. 15 tablet 0    solifenacin (VESICARE) 10 MG tablet Take 1 tablet (10 mg total) by mouth once daily. 30 tablet 11    zolpidem (AMBIEN) 5 MG Tab Take 1 tablet (5 mg total) by mouth nightly as needed (insomnia). 60 tablet 1    naloxegoL (MOVANTIK) 25 mg tablet Take 25 mg by mouth once daily. 60 tablet 3     No current facility-administered medications for this visit.         Objective:      Physical Exam      ?   Vitals:    01/14/22 1119   BP: 126/79   Pulse: 93   Temp: 97.5 °F (36.4 °C)      ?   ECOG:?0   General appearance: Generally well appearing, in no acute distress.   Limited due to virtual visit  ?   Laboratory:  ?   No visits with results within 1 Day(s) from this visit.   Latest known visit with results is:   Admission on 01/03/2022, Discharged on 01/06/2022   Component Date Value Ref Range Status    WBC 01/03/2022 7.98  3.90 - 12.70 K/uL Final    RBC 01/03/2022 4.23  4.00 - 5.40 M/uL Final    Hemoglobin 01/03/2022 11.1* 12.0 - 16.0 g/dL Final    Hematocrit 01/03/2022 36.2* 37.0 - 48.5 % Final    MCV 01/03/2022 86  82 - 98 fL Final    MCH 01/03/2022 26.2* 27.0 - 31.0 pg Final    MCHC 01/03/2022 30.7* 32.0 - 36.0 g/dL Final    RDW 01/03/2022 14.4  11.5 - 14.5 % Final    Platelets 01/03/2022 293  150 - 450 K/uL Final    MPV 01/03/2022 9.4  9.2 - 12.9 fL Final    Immature Granulocytes 01/03/2022 0.3  0.0 - 0.5 % Final    Gran # (ANC) 01/03/2022 5.7  1.8 - 7.7 K/uL Final    Immature Grans (Abs) 01/03/2022 0.02  0.00 - 0.04 K/uL Final    Lymph # 01/03/2022 1.3  1.0 - 4.8 K/uL Final    Mono #  01/03/2022 0.6  0.3 - 1.0 K/uL Final    Eos # 01/03/2022 0.4  0.0 - 0.5 K/uL Final    Baso # 01/03/2022 0.02  0.00 - 0.20 K/uL Final    nRBC 01/03/2022 0  0 /100 WBC Final    Gran % 01/03/2022 71.2  38.0 - 73.0 % Final    Lymph % 01/03/2022 15.7* 18.0 - 48.0 % Final    Mono % 01/03/2022 7.5  4.0 - 15.0 % Final    Eosinophil % 01/03/2022 5.0  0.0 - 8.0 % Final    Basophil % 01/03/2022 0.3  0.0 - 1.9 % Final    Differential Method 01/03/2022 Automated   Final    Sodium 01/03/2022 137  136 - 145 mmol/L Final    Potassium 01/03/2022 3.9  3.5 - 5.1 mmol/L Final    Chloride 01/03/2022 97  95 - 110 mmol/L Final    CO2 01/03/2022 29  23 - 29 mmol/L Final    Glucose 01/03/2022 119* 70 - 110 mg/dL Final    BUN 01/03/2022 27* 6 - 20 mg/dL Final    Creatinine 01/03/2022 1.3  0.5 - 1.4 mg/dL Final    Calcium 01/03/2022 10.0  8.7 - 10.5 mg/dL Final    Total Protein 01/03/2022 7.6  6.0 - 8.4 g/dL Final    Albumin 01/03/2022 4.4  3.5 - 5.2 g/dL Final    Total Bilirubin 01/03/2022 0.4  0.1 - 1.0 mg/dL Final    Alkaline Phosphatase 01/03/2022 90  55 - 135 U/L Final    AST 01/03/2022 18  10 - 40 U/L Final    ALT 01/03/2022 11  10 - 44 U/L Final    Anion Gap 01/03/2022 11  8 - 16 mmol/L Final    eGFR if  01/03/2022 53* >60 mL/min/1.73 m^2 Final    eGFR if non African American 01/03/2022 46* >60 mL/min/1.73 m^2 Final    Lipase 01/03/2022 33  4 - 60 U/L Final    HIV 1/2 Ag/Ab 01/03/2022 Negative  Negative Final    Hepatitis C Ab 01/03/2022 Negative  Negative Final    HEP C Virus Hold Specimen 01/03/2022 Hold for HCV sendout   Final    POC Rapid COVID 01/03/2022 Negative  Negative Final     Acceptable 01/03/2022 Yes   Final    Specimen UA 01/04/2022 Urine, Clean Catch   Final    Color, UA 01/04/2022 Yellow  Yellow, Straw, Belle Final    Appearance, UA 01/04/2022 Clear  Clear Final    pH, UA 01/04/2022 6.0  5.0 - 8.0 Final    Specific Gravity, UA 01/04/2022 1.025  1.005 -  1.030 Final    Protein, UA 01/04/2022 1+* Negative Final    Glucose, UA 01/04/2022 Negative  Negative Final    Ketones, UA 01/04/2022 Negative  Negative Final    Bilirubin (UA) 01/04/2022 Negative  Negative Final    Occult Blood UA 01/04/2022 3+* Negative Final    Nitrite, UA 01/04/2022 Positive* Negative Final    Urobilinogen, UA 01/04/2022 1.0  <2.0 EU/dL Final    Leukocytes, UA 01/04/2022 Negative  Negative Final    Sodium 01/04/2022 136  136 - 145 mmol/L Final    Potassium 01/04/2022 4.1  3.5 - 5.1 mmol/L Final    Chloride 01/04/2022 103  95 - 110 mmol/L Final    CO2 01/04/2022 23  23 - 29 mmol/L Final    Glucose 01/04/2022 81  70 - 110 mg/dL Final    BUN 01/04/2022 22* 6 - 20 mg/dL Final    Creatinine 01/04/2022 0.9  0.5 - 1.4 mg/dL Final    Calcium 01/04/2022 8.3* 8.7 - 10.5 mg/dL Final    Anion Gap 01/04/2022 10  8 - 16 mmol/L Final    eGFR if African American 01/04/2022 >60  >60 mL/min/1.73 m^2 Final    eGFR if non African American 01/04/2022 >60  >60 mL/min/1.73 m^2 Final    RBC, UA 01/04/2022 10* 0 - 4 /hpf Final    WBC, UA 01/04/2022 3  0 - 5 /hpf Final    Bacteria 01/04/2022 Few* None-Occ /hpf Final    Hyaline Casts, UA 01/04/2022 0  0-1/lpf /lpf Final    Microscopic Comment 01/04/2022 SEE COMMENT   Final    Sodium 01/05/2022 135* 136 - 145 mmol/L Final    Potassium 01/05/2022 4.0  3.5 - 5.1 mmol/L Final    Chloride 01/05/2022 103  95 - 110 mmol/L Final    CO2 01/05/2022 23  23 - 29 mmol/L Final    Glucose 01/05/2022 72  70 - 110 mg/dL Final    BUN 01/05/2022 18  6 - 20 mg/dL Final    Creatinine 01/05/2022 0.9  0.5 - 1.4 mg/dL Final    Calcium 01/05/2022 8.5* 8.7 - 10.5 mg/dL Final    Anion Gap 01/05/2022 9  8 - 16 mmol/L Final    eGFR if African American 01/05/2022 >60  >60 mL/min/1.73 m^2 Final    eGFR if non African American 01/05/2022 >60  >60 mL/min/1.73 m^2 Final    Sodium 01/06/2022 132* 136 - 145 mmol/L Final    Potassium 01/06/2022 3.7  3.5 - 5.1 mmol/L Final     Chloride 01/06/2022 100  95 - 110 mmol/L Final    CO2 01/06/2022 24  23 - 29 mmol/L Final    Glucose 01/06/2022 92  70 - 110 mg/dL Final    BUN 01/06/2022 25* 6 - 20 mg/dL Final    Creatinine 01/06/2022 1.0  0.5 - 1.4 mg/dL Final    Calcium 01/06/2022 8.5* 8.7 - 10.5 mg/dL Final    Anion Gap 01/06/2022 8  8 - 16 mmol/L Final    eGFR if African American 01/06/2022 >60  >60 mL/min/1.73 m^2 Final    eGFR if non African American 01/06/2022 >60  >60 mL/min/1.73 m^2 Final      Lab Results   Component Value Date    WBC 7.98 01/03/2022    HGB 11.1 (L) 01/03/2022    HCT 36.2 (L) 01/03/2022    MCV 86 01/03/2022     01/03/2022         Chemistry        Component Value Date/Time     (L) 01/06/2022 0646    K 3.7 01/06/2022 0646     01/06/2022 0646    CO2 24 01/06/2022 0646    BUN 25 (H) 01/06/2022 0646    CREATININE 1.0 01/06/2022 0646    GLU 92 01/06/2022 0646        Component Value Date/Time    CALCIUM 8.5 (L) 01/06/2022 0646    ALKPHOS 90 01/03/2022 1505    AST 18 01/03/2022 1505    ALT 11 01/03/2022 1505    BILITOT 0.4 01/03/2022 1505    ESTGFRAFRICA >60 01/06/2022 0646    EGFRNONAA >60 01/06/2022 0646        Lab Results   Component Value Date    CEA 3.6 11/29/2021       ?     Imaging:  ?  Results for orders placed or performed during the hospital encounter of 01/03/22 (from the past 2160 hour(s))   CT Abdomen Pelvis  Without Contrast    Impression    Moderate amount of stool in the colon suggestive of constipation.  Postsurgical changes in the right lower quadrant and rectosigmoid region    Suggestion of right-sided hydronephrosis.  No obstructing renal calculi is identified.    All CT scans   are performed using dose optimization techniques including the following: automated exposure control; adjustment of the mA and/or kV; use of iterative reconstruction technique.  Dose modulation was employed for ALARA by means of: Automated exposure control; adjustment of the mA and/or kV according to  patient size (this includes techniques or standardized protocols for targeted exams where dose is matched to indication/reason for exam; i.e. extremities or head); and/or use of iterative reconstructive technique.      Electronically signed by: Moody Landry  Date:    01/03/2022  Time:    18:06   Results for orders placed or performed during the hospital encounter of 12/14/21 (from the past 2160 hour(s))   CT Chest With Contrast    Impression    1. Newly developed pleuroparenchymal lung opacity in the lingula, possible infectious/inflammatory infiltrate versus scarring/atelectasis.  Imaging surveillance recommended.  2. Extensive collateralization of venous flow in the left chest with suspected chronic stenosis/occlusion of the SVC.  3. Chronic dilatation of the intra and extrahepatic biliary tract.  4. Remainder as above.      Electronically signed by: THIERRY Hernnadez MD  Date:    12/14/2021  Time:    11:15           Pathology:    Per above     ?   Assessment/Plan:       1. Metastatic signet ring cell carcinoma    2. Chronic pain due to neoplasm    3. Intractable vomiting with nausea, unspecified vomiting type    4. Drug-induced constipation          Plan:     # metastatic signet ring cell adenocarcinoma:   - STRATA GYPSY. [employer] germline testing denied coverage and patient opted to not pursue.    Initial diagnosis October 2017 with appendectomy pathology noting ex-goblet cell type carcinoid, high-grade. Subsequent right hemicolectomy December 2017 with pathology showing metastatic signet ring cell adenocarcinoma involving peritoneal implant s/p FOLFOX x 6 months, and 9/2019 cytoreductive surgery and HIPEC, final pathology noting pelvic nodule, colon nodule, rectal nodule, rectal margin final, bilateral ovaries with involvement of metastatic signet ring cell adenocarcinoma. 2/2020 bowel obstruction with ex lap revealing numerous peritoneal deposits.  Peritoneum biopsy pathology showed metastatic signet ring cell  "carcinoma.     She has had sepsis and imaging in May and most recently 08/27/2020 CT chest abdomen pelvis without noted evidence of disease.  I did discuss with her however is likely discordance and imaging may not reflect true degree of peritoneal disease involvement.  She has not yet had any systemic therapy in the setting of her recurrent metastatic signet ring cell carcinoma.  I discussed natural history of this disease and role of palliative chemotherapy to slow further progression of disease.  She is hesitant to initiate chemotherapy due to concerns for toxicity and just is the importance of quality of life which is quite reasonable given palliative nature of therapy at this point.  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  I discussed options of 5 fluorouracil based regimen, may prefer capecitabine given difficult venous access.  I also discussed agents of irinotecan and bevacizumab as well as potential toxicities.  There may be role for other agents including targeted therapy and immunotherapy pending mutational testing, MSI status.     We reviewed her case at multidisciplinary tumor board - review of pathology was consistent with prior diagnosis of signet ring adenocarcinoma with recommendation for systemic therapy. Referral for HIPEC and declined as well as chemotherapy ultimately (had planned for possible capecitabine/irinotexan/bevacizumab) and opted for surveillance.    - she notes having recent scans in [facility] with prior oncologist - we have requested records when told of this at last visit but have not received.  In the interim unfortunately she has had worsening symptoms GI gastroparesis vomiting and hematuria leading to CT abdomen pelvis at outside facility with report only available at this time noting "distention of stomach and proximal small bowel diffuse wall thickening of more distal small bowel with similar wall thickening of the colon distal colon near " "sigmoid near the expected location of uterus mass lesion measuring 5 cm with loss of normal fat planes between mass uterus and sigmoid colon.  No pathologically enlarged lymph nodes."    Case reviewed at multidisciplinary tumor board 01/14/2022 including review of most recent 2021 imaging and pathology from pelvic mass biopsy 12/2021 which is felt to be consistent with metastatic signet ring adenocarcinoma.  Multidisciplinary discussion along with Urology colorectal surgery Gynecologic Oncology and no surgical intervention is felt appropriate at this time; pelvic exenteraction would be considered as an extreme palliative measure pending response to systemic therapy and patient wishes.  It was felt that palliative systemic therapy should be pursued with preference to avoid bevacizumab at this time given potential for procedures.  Recommend expediting palliative procedures prior to initiation to assist in symptom and nutrition optimization with G-J tube; will need port placement by IR as well. Note:  Pt notes prior technical limitation of line placement in [place] related to her anatomy/scarring?.       I recommended molecular testing with strata most recent sample from 12/2021.  Previously microsatellite stable.  If still microsatellite stable would recommend FOLFOXIRI in accorance with NCCN guidelines and via oncology pathway for intensive regimen with close monitoring of tolerance; this treatment plan has been placed.      Nausea/vomiting/constipation.chronic pain/pelvic:  Multifactorial with gastroparesis tenisha, compressive effects of tumor possible, opioid induced constipation.  Discussed constipation relieving oral regimen as well as naloxegol, prescribed by GI Dr. Lakhani who will follow-up with her as well as palliative care.    Right hydronephrosis:  Discussed at multidisciplinary tumor board with Urology Dr. Martínez who will arrange stent placement prior to initiation of chemotherapy per above.    Advance " Care Planning   Following with palliative care for pain management, advance care planning, goals of care       Follow-Up:   Patient Instructions   Follow-up with GI, Dr. Lakhani  Follow-up with Dr. Martínez re stent  IR port placement and G-J tube  Most recent biopsy to be sent for STRATA  Path expedite MSI testing if possible  Following above will need chemo teaching/consent, tentative FOLFOXIRI plan placed, see above

## 2022-01-16 NOTE — PLAN OF CARE
DISCONTINUE ON PATHWAY REGIMEN - Colorectal    [id number]        Capecitabine (Xeloda)       Bevacizumab-xxxx       Irinotecan (Camptosar)           Additional Orders: Do not administer bevacizumab for at least 28 days   prior to elective surgery and for at least 28 days following surgery and until   the wound is fully healed.    **Always confirm dose/schedule in your pharmacy ordering system**    REASON: Other Reason  PRIOR TREATMENT: [id number]  TREATMENT RESPONSE: Unable to Evaluate    START ON PATHWAY REGIMEN - Colorectal    [id number]        Irinotecan (Camptosar)       Oxaliplatin (Eloxatin)       Leucovorin       Fluorouracil           Additional Orders: Committee recommends starting dose of fluorouracil   2,400 mg/m2/cycle with optional escalation to 3,200 mg/m2/cycle in patients who   do not experience significant toxicities.    **Always confirm dose/schedule in your pharmacy ordering system**    Patient Characteristics:  Distant Metastases, Nonsurgical Candidate, KRAS/NRAS Wild-Type (BRAF V600   Wild-Type/Unknown), Standard Cytotoxic Therapy, First Line Standard Cytotoxic   Therapy, Right-sided Tumor, PS = 0,1, Bevacizumab Ineligible  Tumor Location: Colon  Therapeutic Status: Distant Metastases  Microsatellite/Mismatch Repair Status: GYPSY/pMMR  BRAF Mutation Status: Wild-Type (no mutation)  KRAS/NRAS Mutation Status: Wild-Type (no mutation)  Preferred Therapy Approach: Standard Cytotoxic Therapy  Standard Cytotoxic Line of Therapy: First Line Standard Cytotoxic Therapy  ECOG Performance Status: 1  Primary Tumor Location: Right-sided Tumor  Bevacizumab Eligibility: Ineligible  Intent of Therapy:  Non-Curative / Palliative Intent, Discussed with Patient

## 2022-01-18 NOTE — TELEPHONE ENCOUNTER
LM for pt to contact me directly regarding medi port placement date and to set up chemo infusion appts. Direct number left in message.

## 2022-01-18 NOTE — TELEPHONE ENCOUNTER
Interventional Radiology:    Spoke to pt and got her scheduled for tomorrow 01/19 @ 11:30am. Informed pt to be NPO after midnight, show up to [facility] on [address] at 10:30am, either have a ride with them or have a phone number for the person driving them home so that we can get in contact with them to keep them updated. Answered all questions that the pt had and pt verbalized understanding of all discussed.

## 2022-01-19 PROBLEM — E43 SEVERE MALNUTRITION: Status: ACTIVE | Noted: 2022-01-01

## 2022-01-19 PROBLEM — D72.829 LEUKOCYTOSIS: Status: ACTIVE | Noted: 2022-01-01

## 2022-01-19 PROBLEM — C79.9: Status: ACTIVE | Noted: 2022-01-01

## 2022-01-19 NOTE — NURSING
VSS. Pt still c/o abdominal pain. G-tube connected to  bag to gravity. Small amount of dark green/brown liquid noted in drainage bag. Pt states does not feel any better since draining.

## 2022-01-19 NOTE — HPI
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 Pt will be placed in outpatient extended recovery for pain control, IVfs, and RD consult.     On exam, pt complain of nausea and 10/10 pain at G tube site. Pt reports ongoing chronic pain, N/V, weakness/fatigue, and weight loss for approximately one month.

## 2022-01-19 NOTE — PROGRESS NOTES
Pharmacist Renal Dose Adjustment Note    Madeline Warner is a 56 y.o. female being treated with the medication metoclopramide    Patient Data:    Vital Signs (Most Recent):  Temp: 97.9 °F (36.6 °C) (01/19/22 1500)  Pulse: 104 (01/19/22 1530)  Resp: 16 (01/19/22 1530)  BP: 106/77 (01/19/22 1530)  SpO2: 100 % (01/19/22 1530) Vital Signs (72h Range):  Temp:  [97.9 °F (36.6 °C)-98.8 °F (37.1 °C)]   Pulse:  []   Resp:  [12-22]   BP: (106-153)/(73-86)   SpO2:  [97 %-100 %]      Recent Labs   Lab 01/19/22  1117   CREATININE 1.4     Serum creatinine: 1.4 mg/dL 01/19/22 1117  Estimated creatinine clearance: 29 mL/min    Medication:metoclopramide 10mg 4 times daily will be changed to metoclopramide 5mg 4 times daily for crcl <60ml/min    Pharmacist's Name: Aurora Akbar  Pharmacist's Extension: [phone]

## 2022-01-19 NOTE — SUBJECTIVE & OBJECTIVE
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    T4 N1bM1 B 3/85 nodes positive    Encounter for blood transfusion     PONV (postoperative nausea and vomiting)        Past Surgical History:   Procedure Laterality Date    APPENDECTOMY      BILATERAL OOPHORECTOMY      CHOLECYSTECTOMY      with Cytoreduction and salpingectomy    COLOSTOMY      CYSTOSCOPY N/A 12/21/2021    Procedure: CYSTOSCOPY;  Surgeon: John Martínez MD;  Location: [facility];  Service: Urology;  Laterality: N/A;    CYTOREDUCTION      ENDOSCOPIC ULTRASOUND OF UPPER GASTROINTESTINAL TRACT N/A 6/11/2021    Procedure: ULTRASOUND, UPPER GI TRACT, ENDOSCOPIC;  Surgeon: Rosaura Lakhani MD;  Location: [facility];  Service: Endoscopy;  Laterality: N/A;  Linear scope    ERCP N/A 6/11/2021    Procedure: ERCP (ENDOSCOPIC RETROGRADE CHOLANGIOPANCREATOGRAPHY);  Surgeon: Rosaura Lakhani MD;  Location: [facility];  Service: Endoscopy;  Laterality: N/A;    ESOPHAGOGASTRODUODENOSCOPY N/A 5/6/2021    Procedure: EGD (ESOPHAGOGASTRODUODENOSCOPY);  Surgeon: Brandon Rosen MD;  Location: [facility];  Service: Gastroenterology;  Laterality: N/A;    ESOPHAGOGASTRODUODENOSCOPY  06/11/2021    EXCISION OF LESION  10/20/2020    Procedure: EXCISION, LESION COLOSTOMY;  Surgeon: Layton Anderson MD;  Location: [facility];  Service: General;;    LAPAROTOMY, EXPLORATORY  02/12/2020    LYSIS OF ADHESION, COLOSTOMY    REVISION COLOSTOMY N/A 10/20/2020    Procedure: REVISION, COLOSTOMY;  Surgeon: Layton Anderson MD;  Location: [facility];  Service: General;  Laterality: N/A;  Ostomy bag placed    RIGHT COLECTOMY         Review of patient's allergies indicates:  No Known Allergies    No current facility-administered medications on file prior to encounter.     Current Outpatient Medications on File Prior to Encounter   Medication Sig    fentaNYL (DURAGESIC) 100 mcg/hr Place 1  patch onto the skin every 72 hours.    metoclopramide HCl (REGLAN) 5 MG tablet Take 2 tablets (10 mg total) by mouth 4 (four) times daily before meals and nightly.    omeprazole (PRILOSEC) 40 MG capsule Take 1 capsule by mouth once daily.    ondansetron (ZOFRAN-ODT) 8 MG TbDL Take 1 tablet (8 mg total) by mouth every 12 (twelve) hours as needed.    oxyCODONE (ROXICODONE) 30 MG Tab Take 1 tablet (30 mg total) by mouth every 6 (six) hours as needed (pain).    phenazopyridine (PYRIDIUM) 200 MG tablet Take 1 tablet (200 mg total) by mouth 3 (three) times daily as needed (burning).    solifenacin (VESICARE) 10 MG tablet Take 1 tablet (10 mg total) by mouth once daily.    zolpidem (AMBIEN) 5 MG Tab Take 1 tablet (5 mg total) by mouth nightly as needed (insomnia).    naloxegoL (MOVANTIK) 25 mg tablet Take 1 tablet (25 mg) by mouth once daily.    promethazine (PHENERGAN) 12.5 MG Supp Place 1 suppository (12.5 mg total) rectally every 6 (six) hours as needed for Nausea.    promethazine (PHENERGAN) 25 MG tablet Take 0.5 tablets (12.5 mg total) by mouth every 6 (six) hours as needed for Nausea.     Family History    None       Tobacco Use    Smoking status: Never Smoker    Smokeless tobacco: Never Used   Substance and Sexual Activity    Alcohol use: Never    Drug use: Never    Sexual activity: Not on file     Review of Systems   Constitutional: Positive for activity change, appetite change and fatigue. Negative for chills, diaphoresis, fever and unexpected weight change.   HENT: Negative for congestion, nosebleeds, sinus pressure and sore throat.    Eyes: Negative for pain, discharge and visual disturbance.   Respiratory: Negative for cough, chest tightness, shortness of breath, wheezing and stridor.    Cardiovascular: Negative for chest pain, palpitations and leg swelling.   Gastrointestinal: Positive for abdominal pain (10/10 pain to G tube site ), nausea and vomiting. Negative for abdominal distention, blood  in stool, constipation and diarrhea.   Endocrine: Negative for cold intolerance and heat intolerance.   Genitourinary: Negative for difficulty urinating, dysuria, flank pain, frequency and urgency.   Musculoskeletal: Negative for arthralgias, back pain, joint swelling, myalgias, neck pain and neck stiffness.   Skin: Negative for rash and wound.   Allergic/Immunologic: Negative for food allergies and immunocompromised state.   Neurological: Positive for weakness. Negative for dizziness, seizures, syncope, facial asymmetry, speech difficulty, light-headedness, numbness and headaches.   Hematological: Negative for adenopathy.   Psychiatric/Behavioral: Negative for agitation, confusion and hallucinations.     Objective:     Vital Signs (Most Recent):  Temp: 98.8 °F (37.1 °C) (01/19/22 1345)  Pulse: 104 (01/19/22 1445)  Resp: 18 (01/19/22 1453)  BP: 114/80 (01/19/22 1445)  SpO2: 100 % (01/19/22 1445) Vital Signs (24h Range):  Temp:  [98.4 °F (36.9 °C)-98.8 °F (37.1 °C)] 98.8 °F (37.1 °C)  Pulse:  [] 104  Resp:  [12-22] 18  SpO2:  [99 %-100 %] 100 %  BP: (114-153)/(73-86) 114/80     Weight: 40.4 kg (89 lb 1.1 oz)  Body mass index is 15.29 kg/m².    Physical Exam  Vitals and nursing note reviewed.   Constitutional:       General: She is in acute distress (2/2 pain to G tube site ).      Appearance: She is well-nourished. She is cachectic. She is ill-appearing. She is not diaphoretic.   HENT:      Head: Normocephalic and atraumatic.      Nose: Nose normal.      Mouth/Throat:      Mouth: Oropharynx is clear and moist.   Eyes:      General: No scleral icterus.     Extraocular Movements: EOM normal.      Conjunctiva/sclera: Conjunctivae normal.   Neck:      Trachea: No tracheal deviation.   Cardiovascular:      Rate and Rhythm: Normal rate and regular rhythm.      Pulses: Intact distal pulses.      Heart sounds: Normal heart sounds. No murmur heard.  No friction rub. No gallop.    Pulmonary:      Effort: Pulmonary effort  is normal. No respiratory distress.      Breath sounds: Normal breath sounds. No stridor. No wheezing or rales.   Chest:      Chest wall: No tenderness.   Abdominal:      General: Bowel sounds are normal. There is no distension.      Palpations: Abdomen is soft. There is no mass.      Tenderness: Tenderness: g tube site  There is no guarding or rebound.      Comments: colostomy LLQ  G tube dressing C/D/I    Musculoskeletal:         General: No tenderness, deformity or edema. Normal range of motion.      Cervical back: Normal range of motion and neck supple.   Skin:     General: Skin is warm and dry.      Coloration: Skin is not pale.      Findings: No erythema or rash.   Neurological:      Mental Status: She is alert and oriented to person, place, and time.      Cranial Nerves: No cranial nerve deficit.      Motor: No abnormal muscle tone.      Coordination: Coordination normal.   Psychiatric:         Mood and Affect: Mood is anxious. Affect is tearful.         Behavior: Behavior normal.         Thought Content: Thought content normal.           CRANIAL NERVES     CN III, IV, VI   Extraocular motions are normal.        Significant Labs: All pertinent labs within the past 24 hours have been reviewed.    Significant Imaging: I have reviewed all pertinent imaging results/findings within the past 24 hours.

## 2022-01-19 NOTE — SEDATION DOCUMENTATION
Patient placed on flouro table in supine position and attached to monitors. Patient is AOx3, nicholas, RR, resp even and unlabored,vsss. Patient admits to nausea.

## 2022-01-19 NOTE — NURSING TRANSFER
Nursing Transfer Note      1/19/2022     Reason patient is being transferred:     Transfer transfer from [facility] to ROOM 554    Transfer via ST[facility]ER    Transfer with PERSONAL BELONGINGS  Transported by KAREEM LARA    Medicines sent: N/A    Any special needs or follow-up needed: NONE  Chart send with patient: YES    Notified: MOTHER AT BEDSIDE UPON TRANSFER    Patient reassessed at: 1545 1/19/2022    Upon arrival to floor: TRANSFERRED TO ROOM. TRANSFERRED TO BED USING A SLIDE BOARD.    IV FLUIDS ON PUMP AT PRESCRIBED RATE.  PROCEDURAL ACCESS SITE WITHOUT BLEEDING OR HEMATOMA.  DRESSING DRY AND INTACT.  CARE HANDED OFF TO ARTHUR LARA

## 2022-01-19 NOTE — SEDATION DOCUMENTATION
18F gastric tube placed in luq. Gastric tube held in place with tacks, surgical sponge/gauze and tegaderm dressing applied with no bleeding noted.

## 2022-01-19 NOTE — NURSING
Received pt to room 554 per stretcher from [facility] for peg placement. Vital signs obtained. Mother at bedside. Drsg to upper mid abdominal area noted. Peg tube draining by gravity to drainage bag green/brown drainage.

## 2022-01-19 NOTE — PROGRESS NOTES
Pharmacist Renal Dose Adjustment Note    Madeline Warner is a 56 y.o. female being treated with the medication lovenox    Patient Data:    Vital Signs (Most Recent):  Temp: 97.9 °F (36.6 °C) (01/19/22 1500)  Pulse: 104 (01/19/22 1530)  Resp: 16 (01/19/22 1530)  BP: 106/77 (01/19/22 1530)  SpO2: 100 % (01/19/22 1530) Vital Signs (72h Range):  Temp:  [97.9 °F (36.6 °C)-98.8 °F (37.1 °C)]   Pulse:  []   Resp:  [12-22]   BP: (106-153)/(73-86)   SpO2:  [97 %-100 %]      Recent Labs   Lab 01/19/22  1117   CREATININE 1.4     Serum creatinine: 1.4 mg/dL 01/19/22 1117  Estimated creatinine clearance: 29 mL/min    Medication:lovenox 40mg daily will be changed to lovenox 30mg daily for crcl <30ml/min    Pharmacist's Name: Aurora Akbar  Pharmacist's Extension: [phone]

## 2022-01-19 NOTE — H&P
[facility] ([facility] Medicine  History & Physical    Patient Name: Madeline Warner  MRN: [id number]  Patient Class: OP- Outpatient Recovery  Admission Date: 1/19/2022  Attending Physician: Dr. Barger   Primary Care Provider: Mariam Peña MD         Patient information was obtained from patient and ER records.     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 Pt will be placed in outpatient extended recovery for pain control, IVfs, and RD consult.     On exam, pt complain of nausea and 10/10 pain at G tube site. Pt reports ongoing chronic pain, N/V, weakness/fatigue, and weight loss for approximately one month.     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    T4 N1bM1 B 3/85 nodes positive    Encounter for blood transfusion     PONV (postoperative nausea and vomiting)        Past Surgical History:   Procedure Laterality Date    APPENDECTOMY      BILATERAL OOPHORECTOMY      CHOLECYSTECTOMY      with Cytoreduction and salpingectomy    COLOSTOMY      CYSTOSCOPY N/A 12/21/2021    Procedure: CYSTOSCOPY;  Surgeon: John Martínez MD;  [facility] OR;  Service: Urology;  Laterality: N/A;    CYTOREDUCTION      ENDOSCOPIC ULTRASOUND OF UPPER GASTROINTESTINAL TRACT N/A 6/11/2021    Procedure: ULTRASOUND, UPPER GI TRACT, ENDOSCOPIC;  Surgeon: Rosaura Lakhani MD;  Location: [facility];  Service: Endoscopy;  Laterality: N/A;  Linear scope    ERCP N/A 6/11/2021    Procedure: ERCP (ENDOSCOPIC RETROGRADE CHOLANGIOPANCREATOGRAPHY);  Surgeon: Rosaura Lakhani MD;  Location: [facility];   Service: Endoscopy;  Laterality: N/A;    ESOPHAGOGASTRODUODENOSCOPY N/A 5/6/2021    Procedure: EGD (ESOPHAGOGASTRODUODENOSCOPY);  Surgeon: Brandon Rosen MD;  [facility];  Service: Gastroenterology;  Laterality: N/A;    ESOPHAGOGASTRODUODENOSCOPY  06/11/2021    EXCISION OF LESION  10/20/2020    Procedure: EXCISION, LESION COLOSTOMY;  Surgeon: Layton Anderson MD;  Location: [facility] OR;  Service: General;;    LAPAROTOMY, EXPLORATORY  02/12/2020    LYSIS OF ADHESION, COLOSTOMY    REVISION COLOSTOMY N/A 10/20/2020    Procedure: REVISION, COLOSTOMY;  Surgeon: Layton Anderson MD;  Location: [facility];  Service: General;  Laterality: N/A;  Ostomy bag placed    RIGHT COLECTOMY         Review of patient's allergies indicates:  No Known Allergies    No current facility-administered medications on file prior to encounter.     Current Outpatient Medications on File Prior to Encounter   Medication Sig    fentaNYL (DURAGESIC) 100 mcg/hr Place 1 patch onto the skin every 72 hours.    metoclopramide HCl (REGLAN) 5 MG tablet Take 2 tablets (10 mg total) by mouth 4 (four) times daily before meals and nightly.    omeprazole (PRILOSEC) 40 MG capsule Take 1 capsule by mouth once daily.    ondansetron (ZOFRAN-ODT) 8 MG TbDL Take 1 tablet (8 mg total) by mouth every 12 (twelve) hours as needed.    oxyCODONE (ROXICODONE) 30 MG Tab Take 1 tablet (30 mg total) by mouth every 6 (six) hours as needed (pain).    phenazopyridine (PYRIDIUM) 200 MG tablet Take 1 tablet (200 mg total) by mouth 3 (three) times daily as needed (burning).    solifenacin (VESICARE) 10 MG tablet Take 1 tablet (10 mg total) by mouth once daily.    zolpidem (AMBIEN) 5 MG Tab Take 1 tablet (5 mg total) by mouth nightly as needed (insomnia).    naloxegoL (MOVANTIK) 25 mg tablet Take 1 tablet (25 mg) by mouth once daily.    promethazine (PHENERGAN) 12.5 MG Supp Place 1 suppository (12.5 mg total) rectally every 6 (six) hours as needed for  Nausea.    promethazine (PHENERGAN) 25 MG tablet Take 0.5 tablets (12.5 mg total) by mouth every 6 (six) hours as needed for Nausea.     Family History    None       Tobacco Use    Smoking status: Never Smoker    Smokeless tobacco: Never Used   Substance and Sexual Activity    Alcohol use: Never    Drug use: Never    Sexual activity: Not on file     Review of Systems   Constitutional: Positive for activity change, appetite change and fatigue. Negative for chills, diaphoresis, fever and unexpected weight change.   HENT: Negative for congestion, nosebleeds, sinus pressure and sore throat.    Eyes: Negative for pain, discharge and visual disturbance.   Respiratory: Negative for cough, chest tightness, shortness of breath, wheezing and stridor.    Cardiovascular: Negative for chest pain, palpitations and leg swelling.   Gastrointestinal: Positive for abdominal pain (10/10 pain to G tube site ), nausea and vomiting. Negative for abdominal distention, blood in stool, constipation and diarrhea.   Endocrine: Negative for cold intolerance and heat intolerance.   Genitourinary: Negative for difficulty urinating, dysuria, flank pain, frequency and urgency.   Musculoskeletal: Negative for arthralgias, back pain, joint swelling, myalgias, neck pain and neck stiffness.   Skin: Negative for rash and wound.   Allergic/Immunologic: Negative for food allergies and immunocompromised state.   Neurological: Positive for weakness. Negative for dizziness, seizures, syncope, facial asymmetry, speech difficulty, light-headedness, numbness and headaches.   Hematological: Negative for adenopathy.   Psychiatric/Behavioral: Negative for agitation, confusion and hallucinations.     Objective:     Vital Signs (Most Recent):  Temp: 98.8 °F (37.1 °C) (01/19/22 1345)  Pulse: 104 (01/19/22 1445)  Resp: 18 (01/19/22 1453)  BP: 114/80 (01/19/22 1445)  SpO2: 100 % (01/19/22 1445) Vital Signs (24h Range):  Temp:  [98.4 °F (36.9 °C)-98.8 °F (37.1  °C)] 98.8 °F (37.1 °C)  Pulse:  [] 104  Resp:  [12-22] 18  SpO2:  [99 %-100 %] 100 %  BP: (114-153)/(73-86) 114/80     Weight: 40.4 kg (89 lb 1.1 oz)  Body mass index is 15.29 kg/m².    Physical Exam  Vitals and nursing note reviewed.   Constitutional:       General: She is in acute distress (2/2 pain to G tube site ).      Appearance: She is well-nourished. She is cachectic. She is ill-appearing. She is not diaphoretic.   HENT:      Head: Normocephalic and atraumatic.      Nose: Nose normal.      Mouth/Throat:      Mouth: Oropharynx is clear and moist.   Eyes:      General: No scleral icterus.     Extraocular Movements: EOM normal.      Conjunctiva/sclera: Conjunctivae normal.   Neck:      Trachea: No tracheal deviation.   Cardiovascular:      Rate and Rhythm: Normal rate and regular rhythm.      Pulses: Intact distal pulses.      Heart sounds: Normal heart sounds. No murmur heard.  No friction rub. No gallop.    Pulmonary:      Effort: Pulmonary effort is normal. No respiratory distress.      Breath sounds: Normal breath sounds. No stridor. No wheezing or rales.   Chest:      Chest wall: No tenderness.   Abdominal:      General: Bowel sounds are normal. There is no distension.      Palpations: Abdomen is soft. There is no mass.      Tenderness: Tenderness: g tube site  There is no guarding or rebound.      Comments: colostomy LLQ  G tube dressing C/D/I    Musculoskeletal:         General: No tenderness, deformity or edema. Normal range of motion.      Cervical back: Normal range of motion and neck supple.   Skin:     General: Skin is warm and dry.      Coloration: Skin is not pale.      Findings: No erythema or rash.   Neurological:      Mental Status: She is alert and oriented to person, place, and time.      Cranial Nerves: No cranial nerve deficit.      Motor: No abnormal muscle tone.      Coordination: Coordination normal.   Psychiatric:         Mood and Affect: Mood is anxious. Affect is tearful.          Behavior: Behavior normal.         Thought Content: Thought content normal.           CRANIAL NERVES     CN III, IV, VI   Extraocular motions are normal.        Significant Labs: All pertinent labs within the past 24 hours have been reviewed.    Significant Imaging: I have reviewed all pertinent imaging results/findings within the past 24 hours.    Assessment/Plan:     * Severe malnutrition  S/p G tube placement today per IR  Consult RD  Regular diet   IVFs  Pain control       Intractable nausea and vomiting, Chronic  Scheduled Zofran   Phenergan/compazine prn       Chronic pain due to neoplasm  Cont Fentanyl patch   Hold scheduled OxyContin for now   IV Dilaudid prn       Leukocytosis  Likely 2/2 cancer and procedure   Check CXR and UA       Metastatic signet ring cell carcinoma  F/u with heme/Onc       Colostomy present on admission  Consult wound care       Gastroesophageal reflux disease without esophagitis  Cont Pepcid        VTE Risk Mitigation (From admission, onward)         Ordered     enoxaparin injection 40 mg  Daily         01/19/22 1435     IP VTE HIGH RISK PATIENT  Once         01/19/22 1435     Place sequential compression device  Until discontinued         01/19/22 1435                   Soni Urias NP  Department of Hospital Medicine   [facility] ([facility])

## 2022-01-20 PROBLEM — D72.829 LEUKOCYTOSIS: Status: RESOLVED | Noted: 2022-01-01 | Resolved: 2022-01-01

## 2022-01-20 PROBLEM — Z93.1 GASTROSTOMY TUBE IN PLACE: Status: ACTIVE | Noted: 2022-01-01

## 2022-01-20 NOTE — PT/OT/SLP PROGRESS
Speech Language Pathology      Madeline Warner  MRN: [id number]    ST received and initiated evaluation via a chart review and bedside interview with pt's mother.  Pt is lying in bed with her eyes closed and moaning in pain.  Per pt's mother, the pt is constantly nauseated and only able to take in a small amount of water throughout the day but throws up all other po intake.  She received PEG placement yesterday as she is malnourished and is awaiting port placement for chemo.  Chest x-ray taken yesterday was negative for acute process.  ST educated pt's mother on importance of oral care and continuing free water throughout the day.  She denies the need for ST at this time.   Please re-consult if needs arise.    Leticia Willingham CCC-SLP  9:40am - 9:50am

## 2022-01-20 NOTE — PROGRESS NOTES
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 Pt will be placed in outpatient extended recovery for pain control, IVfs, and RD consult.     On exam, pt complain of nausea and 10/10 pain at G tube site. Pt reports ongoing chronic pain, N/V, weakness/fatigue, and weight loss for approximately one month.       Overview/Hospital Course:  1/20: G tube placed yesterday per IR. Today the patient has copious amounts of green fluid draining from the G tube- this is attached to a guzmán bag, her colostomy has no output, she has bowel sounds, but endorsing nasuea and abdominal pain. CT abdomen pending. Case discussed with Dr Barger and Dr Torres. Initial G tube placement check performed right after placement and the subsequent the following day is pending.       Interval History: G tube placed yesterday per IR. Today the patient has copious amounts of green fluid draining from the G tube- this is attached to a guzmán bag, her colostomy has no output, she has bowel sounds, but endorsing nasuea and abdominal pain. CT abdomen pending. Case discussed with Dr Barger and Dr Torres. Initial G tube placement check performed right after placement and the subsequent the following day is pending.     Review of Systems   Constitutional: Positive for activity change, appetite change and fatigue. Negative for chills, diaphoresis, fever and unexpected weight change.   HENT: Negative for congestion, nosebleeds, sinus pressure and sore throat.    Eyes: Negative for pain, discharge  and visual disturbance.   Respiratory: Negative for cough, chest tightness, shortness of breath, wheezing and stridor.    Cardiovascular: Negative for chest pain, palpitations and leg swelling.   Gastrointestinal: Positive for abdominal pain (10/10 pain to G tube site ), nausea and vomiting. Negative for abdominal distention, blood in stool, constipation and diarrhea.   Endocrine: Negative for cold intolerance and heat intolerance.   Genitourinary: Negative for difficulty urinating, dysuria, flank pain, frequency and urgency.   Musculoskeletal: Negative for arthralgias, back pain, joint swelling, myalgias, neck pain and neck stiffness.   Skin: Negative for rash and wound.   Allergic/Immunologic: Negative for food allergies and immunocompromised state.   Neurological: Positive for weakness. Negative for dizziness, seizures, syncope, facial asymmetry, speech difficulty, light-headedness, numbness and headaches.   Hematological: Negative for adenopathy.   Psychiatric/Behavioral: Negative for agitation, confusion and hallucinations.     Objective:     Vital Signs (Most Recent):  Temp: 98.4 °F (36.9 °C) (01/20/22 1220)  Pulse: 91 (01/20/22 1220)  Resp: 16 (01/20/22 1233)  BP: 123/72 (01/20/22 1220)  SpO2: 95 % (01/20/22 1220) Vital Signs (24h Range):  Temp:  [97.9 °F (36.6 °C)-98.8 °F (37.1 °C)] 98.4 °F (36.9 °C)  Pulse:  [] 91  Resp:  [12-18] 16  SpO2:  [95 %-100 %] 95 %  BP: (101-137)/(69-82) 123/72     Weight: 40.2 kg (88 lb 10 oz)  Body mass index is 15.21 kg/m².    Intake/Output Summary (Last 24 hours) at 1/20/2022 1334  Last data filed at 1/20/2022 1000  Gross per 24 hour   Intake 1203.49 ml   Output 850 ml   Net 353.49 ml      Physical Exam  Vitals and nursing note reviewed.   Constitutional:       General: She is in acute distress (2/2 pain to G tube site ).      Appearance: She is cachectic. She is ill-appearing. She is not diaphoretic.   HENT:      Head: Normocephalic and atraumatic.      Nose: Nose  normal.   Eyes:      General: No scleral icterus.     Conjunctiva/sclera: Conjunctivae normal.   Neck:      Trachea: No tracheal deviation.   Cardiovascular:      Rate and Rhythm: Normal rate and regular rhythm.      Heart sounds: Normal heart sounds. No murmur heard.  No friction rub. No gallop.    Pulmonary:      Effort: Pulmonary effort is normal. No respiratory distress.      Breath sounds: Normal breath sounds. No stridor. No wheezing or rales.   Chest:      Chest wall: No tenderness.   Abdominal:      General: Bowel sounds are normal. There is no distension.      Palpations: Abdomen is soft. There is no mass.      Tenderness: Tenderness: g tube site  There is no guarding or rebound.      Comments: colostomy LLQ  G tube dressing C/D/I- which is now attached to a guzmán bag draining green fluid   Musculoskeletal:         General: No tenderness or deformity. Normal range of motion.      Cervical back: Normal range of motion and neck supple.   Skin:     General: Skin is warm and dry.      Coloration: Skin is not pale.      Findings: No erythema or rash.   Neurological:      Mental Status: She is alert and oriented to person, place, and time.      Cranial Nerves: No cranial nerve deficit.      Motor: No abnormal muscle tone.      Coordination: Coordination normal.   Psychiatric:         Mood and Affect: Mood is anxious. Affect is tearful.         Behavior: Behavior normal.         Thought Content: Thought content normal.         Significant Labs: All pertinent labs within the past 24 hours have been reviewed.    Significant Imaging: I have reviewed all pertinent imaging results/findings within the past 24 hours.      Assessment/Plan:      * Severe malnutrition  S/p G tube placement today per IR  Consult RD  Regular diet   IVFs  Pain control     1/20  RD consult for TF recs  Will initiate once stable       Gastrostomy tube in place  Placed on 1/19 per IR  Today the patient has copious amounts of green fluid draining  from the G tube- this is attached to a guzmán bag, her colostomy has no output, she has bowel sounds, but endorsing nasuea and abdominal pain  CT abdomen pending  Initial G tube placement check performed right after placement and the subsequent check the following day is pending.       Metastatic signet ring cell carcinoma  F/u with heme/Onc       Intractable nausea and vomiting, Chronic  Scheduled Zofran   Phenergan/compazine prn       Colostomy present on admission  Consult wound care     1/20  No output noted from colostomy   Per patient reports no output noted in 3 days  STAT CT abd pending      Gastroesophageal reflux disease without esophagitis  Cont Pepcid      Chronic pain due to neoplasm  Cont Fentanyl patch   Hold scheduled OxyContin for now   IV Dilaudid prn         VTE Risk Mitigation (From admission, onward)         Ordered     enoxaparin injection 40 mg  Daily         01/20/22 1254     IP VTE HIGH RISK PATIENT  Once         01/19/22 1435     Place sequential compression device  Until discontinued         01/19/22 1435                Discharge Planning   VICTOR MANUEL: 1/20/2022     Code Status: Full Code   Is the patient medically ready for discharge?:     Reason for patient still in hospital (select all that apply): Patient unstable  Discharge Plan A: Home with family                  Sidney Cunningham NP  Department of Hospital Medicine   [facility] - Med Surg

## 2022-01-20 NOTE — ASSESSMENT & PLAN NOTE
Nutrition Problem:    Severe Protein-Calorie Malnutrition    in the context of Chronic Illness/Injury  Related to (etiology):    Inadequate ability to consume sufficient energy    Inadequate oral intake    Altered GI function    Increased energy and protein needs    Medical condition  Signs and Symptoms (as evidenced by):    Energy Intake: <75% of estimated energy requirement for  >7 days    Body Fat Depletion: moderate and severe depletion of orbitals, triceps and thoracic and lumbar region     Muscle Mass Depletion: moderate and severe depletion of temples, clavicle region, scapular region, interosseous muscle and lower extremities     Weight Loss: 12 lbs, 12%% x 1 month   Interventions(treatment strategy):    Enteral nutrition     High calorie, high protein diet    Collaboration with other care providers

## 2022-01-20 NOTE — ASSESSMENT & PLAN NOTE
Placed on 1/19 per IR  Today the patient has copious amounts of green fluid draining from the G tube- this is attached to a guzmán bag, her colostomy has no output, she has bowel sounds, but endorsing nasuea and abdominal pain  CT abdomen pending  Initial G tube placement check performed right after placement and the subsequent check the following day is pending.

## 2022-01-20 NOTE — PLAN OF CARE
RD Recommendations    1. As medically able, initiate enteral nutrition: Isosource 1.5, continuous via PEG    - Advance as tolerated to goal: 40 mL/hr     - 100mL H2O flush q 4-6 hours or per MD/NP.    - Provides 1440 calories (100%EEN), 65g protein (>100%EPN), and 733ml H20 + FWF.     - Monitor for refeeding. Check Mg, K+, Na, Phos, and glucose before and during initiation; correct as indicated.      2. Continue diet as prescribed, as tolerated: regular and encourage PO intake    3. RD to follow up to monitor intake, tolerance, and labs.   *Please send consult if acute nutrition needs arise.    Goals:    1. Initiate enteral nutrition within 48 hours.     2. Pt will tolerate >50% estimated energy and protein needs in 48-72 hours.      Iliana Fried MS, RD, LDN

## 2022-01-20 NOTE — ASSESSMENT & PLAN NOTE
S/p G tube placement today per IR  Consult RD  Regular diet   IVFs  Pain control     1/20  RD consult for TF recs  Will initiate once stable

## 2022-01-20 NOTE — SUBJECTIVE & OBJECTIVE
Interval History: G tube placed yesterday per IR. Today the patient has copious amounts of green fluid draining from the G tube- this is attached to a guzmán bag, her colostomy has no output, she has bowel sounds, but endorsing nasuea and abdominal pain. CT abdomen pending. Case discussed with Dr Barger and Dr Torres. Initial G tube placement check performed right after placement and the subsequent the following day is pending.     Review of Systems   Constitutional: Positive for activity change, appetite change and fatigue. Negative for chills, diaphoresis, fever and unexpected weight change.   HENT: Negative for congestion, nosebleeds, sinus pressure and sore throat.    Eyes: Negative for pain, discharge and visual disturbance.   Respiratory: Negative for cough, chest tightness, shortness of breath, wheezing and stridor.    Cardiovascular: Negative for chest pain, palpitations and leg swelling.   Gastrointestinal: Positive for abdominal pain (10/10 pain to G tube site ), nausea and vomiting. Negative for abdominal distention, blood in stool, constipation and diarrhea.   Endocrine: Negative for cold intolerance and heat intolerance.   Genitourinary: Negative for difficulty urinating, dysuria, flank pain, frequency and urgency.   Musculoskeletal: Negative for arthralgias, back pain, joint swelling, myalgias, neck pain and neck stiffness.   Skin: Negative for rash and wound.   Allergic/Immunologic: Negative for food allergies and immunocompromised state.   Neurological: Positive for weakness. Negative for dizziness, seizures, syncope, facial asymmetry, speech difficulty, light-headedness, numbness and headaches.   Hematological: Negative for adenopathy.   Psychiatric/Behavioral: Negative for agitation, confusion and hallucinations.     Objective:     Vital Signs (Most Recent):  Temp: 98.4 °F (36.9 °C) (01/20/22 1220)  Pulse: 91 (01/20/22 1220)  Resp: 16 (01/20/22 1233)  BP: 123/72 (01/20/22 1220)  SpO2: 95 % (01/20/22  1220) Vital Signs (24h Range):  Temp:  [97.9 °F (36.6 °C)-98.8 °F (37.1 °C)] 98.4 °F (36.9 °C)  Pulse:  [] 91  Resp:  [12-18] 16  SpO2:  [95 %-100 %] 95 %  BP: (101-137)/(69-82) 123/72     Weight: 40.2 kg (88 lb 10 oz)  Body mass index is 15.21 kg/m².    Intake/Output Summary (Last 24 hours) at 1/20/2022 1334  Last data filed at 1/20/2022 1000  Gross per 24 hour   Intake 1203.49 ml   Output 850 ml   Net 353.49 ml      Physical Exam  Vitals and nursing note reviewed.   Constitutional:       General: She is in acute distress (2/2 pain to G tube site ).      Appearance: She is cachectic. She is ill-appearing. She is not diaphoretic.   HENT:      Head: Normocephalic and atraumatic.      Nose: Nose normal.   Eyes:      General: No scleral icterus.     Conjunctiva/sclera: Conjunctivae normal.   Neck:      Trachea: No tracheal deviation.   Cardiovascular:      Rate and Rhythm: Normal rate and regular rhythm.      Heart sounds: Normal heart sounds. No murmur heard.  No friction rub. No gallop.    Pulmonary:      Effort: Pulmonary effort is normal. No respiratory distress.      Breath sounds: Normal breath sounds. No stridor. No wheezing or rales.   Chest:      Chest wall: No tenderness.   Abdominal:      General: Bowel sounds are normal. There is no distension.      Palpations: Abdomen is soft. There is no mass.      Tenderness: Tenderness: g tube site  There is no guarding or rebound.      Comments: colostomy LLQ  G tube dressing C/D/I- which is now attached to a guzmán bag draining green fluid   Musculoskeletal:         General: No tenderness or deformity. Normal range of motion.      Cervical back: Normal range of motion and neck supple.   Skin:     General: Skin is warm and dry.      Coloration: Skin is not pale.      Findings: No erythema or rash.   Neurological:      Mental Status: She is alert and oriented to person, place, and time.      Cranial Nerves: No cranial nerve deficit.      Motor: No abnormal muscle  tone.      Coordination: Coordination normal.   Psychiatric:         Mood and Affect: Mood is anxious. Affect is tearful.         Behavior: Behavior normal.         Thought Content: Thought content normal.         Significant Labs: All pertinent labs within the past 24 hours have been reviewed.    Significant Imaging: I have reviewed all pertinent imaging results/findings within the past 24 hours.

## 2022-01-20 NOTE — PT/OT/SLP EVAL
Occupational Therapy   Evaluation and Discharge Note    Name: Madeline Warner  MRN: [id number]  Admitting Diagnosis:  Severe malnutrition   Recent Surgery: Procedure(s) (LRB):  Insertion of G-J tube and bilateral upper extremity venogram (Bilateral) 1 Day Post-Op    Recommendations:     Discharge Recommendations: home  Discharge Equipment Recommendations:  none  Barriers to discharge:  None    Assessment:     Madeline Warner is a 56 y.o. female with a medical diagnosis of Severe malnutrition. At this time, patient is functioning at their prior level of function and does not require further acute OT services.     Plan:     During this hospitalization, patient does not require further acute OT services.  Please re-consult if situation changes.    · Plan of Care Reviewed with: patient,mother    Subjective     Chief Complaint: PEG site pain  Patient/Family Comments/goals: to return home, decrease pain    Occupational Profile:  Living Environment: lives with mother in 1 story house with no steps  Previous level of function: (I) with ADLs and ambulates around in home  Roles and Routines: drives, does not work  Equipment Used at home:  colostomy/ostomy supplies  Assistance upon Discharge: mother    Pain/Comfort:  · Pain Rating 1: 8/10  · Location 1: abdomen (PEG site)  · Pain Addressed 1: Cessation of Activity,Nurse notified  · Pain Rating Post-Intervention 1: 8/10    Patients cultural, spiritual, Christianity conflicts given the current situation:      Objective:     Communicated with: Nurse Rosa and [facility] chart review prior to session.  Patient found HOB elevated with PEG Tube,peripheral IV,colostomy upon OT entry to room.    General Precautions: Standard, fall   Orthopedic Precautions:N/A   Braces: N/A  Respiratory Status: Room air     Occupational Performance:    Bed Mobility:    · Patient completed Rolling/Turning to Left with  modified independence  · Patient completed Rolling/Turning to Right with modified  independence  · Patient completed Scooting/Bridging with modified independence  · Patient completed Supine to Sit with modified independence  · Patient completed Sit to Supine with modified independence   · Pt with c/o increased pain    Functional Mobility/Transfers:  · Patient completed Sit <> Stand Transfer with modified independence  with  no assistive device   · Functional Mobility: ~20ft with Mod I    Activities of Daily Living:  · Upper Body Dressing: modified independence .  · Toileting: modified independence .    Cognitive/Visual Perceptual:  Cognitive/Psychosocial Skills:     -       Oriented to: Person, Place, Time and Situation   -       Follows Commands/attention:Follows multistep  commands  -       Communication: clear/fluent  -       Memory: No Deficits noted  -       Safety awareness/insight to disability: intact   -       Mood/Affect/Coping skills/emotional control: Appropriate to situation  Visual/Perceptual:      -Intact .    Physical Exam:  Balance:    -       Fair+ standing  Postural examination/scapula alignment:    -       Rounded shoulders  -       Forward head  Sensation:    -       Intact  Motor Planning:    -       WFL  Dominant hand:    -       right  Upper Extremity Range of Motion:     -       Right Upper Extremity: WFL  -       Left Upper Extremity: WFL  Upper Extremity Strength:    -       Right Upper Extremity: WFL  -       Left Upper Extremity: WFL   Strength:    -       Right Upper Extremity: WFL  -       Left Upper Extremity: WFL    AMPAC 6 Click ADL:  AMPAC Total Score: 21    Treatment & Education:  OT Eval completed as listed above. Pt functioning at high level and does not require skilled OT services at this time.    Pt educated in safety with bed mobility and t/fs, (B) UE exercises, role of OT and POC. Pt encouraged to increase time OOB. Pt verbalized understanding.       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    T4 N1bM1 B 3/85 nodes positive    Encounter for blood transfusion     PONV (postoperative nausea and vomiting)        Past Surgical History:   Procedure Laterality Date    APPENDECTOMY      BILATERAL OOPHORECTOMY      CHOLECYSTECTOMY      with Cytoreduction and salpingectomy    COLOSTOMY      CYSTOSCOPY N/A 12/21/2021    Procedure: CYSTOSCOPY;  Surgeon: John Martínez MD;  Location: [facility];  Service: Urology;  Laterality: N/A;    CYTOREDUCTION      ENDOSCOPIC ULTRASOUND OF UPPER GASTROINTESTINAL TRACT N/A 6/11/2021    Procedure: ULTRASOUND, UPPER GI TRACT, ENDOSCOPIC;  Surgeon: Rosaura Lakhani MD;  Location: [facility];  Service: Endoscopy;  Laterality: N/A;  Linear scope    ERCP N/A 6/11/2021    Procedure: ERCP (ENDOSCOPIC RETROGRADE CHOLANGIOPANCREATOGRAPHY);  Surgeon: Rosaura Lakhani MD;  Location: [facility];  Service: Endoscopy;  Laterality: N/A;    ESOPHAGOGASTRODUODENOSCOPY N/A 5/6/2021    Procedure: EGD (ESOPHAGOGASTRODUODENOSCOPY);  Surgeon: Brandon Rosen MD;  Location[facility];  Service: Gastroenterology;  Laterality: N/A;    ESOPHAGOGASTRODUODENOSCOPY  06/11/2021    EXCISION OF LESION  10/20/2020    Procedure: EXCISION, LESION COLOSTOMY;  Surgeon: Layton Anderson MD;  Location: [facility];  Service: General;;    FLUOROSCOPY Bilateral 1/19/2022    Procedure: Insertion of G-J tube and bilateral upper extremity venogram;  Surgeon: Bryan Torres MD;  Location: [facility];  Service: General;  Laterality: Bilateral;    LAPAROTOMY, EXPLORATORY  02/12/2020    LYSIS OF ADHESION, COLOSTOMY    REVISION COLOSTOMY N/A 10/20/2020    Procedure: REVISION, COLOSTOMY;  Surgeon: Layton Anderson MD;  Location:  [facility];  Service: General;  Laterality: N/A;  Ostomy bag placed    RIGHT COLECTOMY         Time Tracking:     OT Date of Treatment: 01/20/22  OT Start Time: 1034  OT Stop Time: 1045  OT Total Time (min): 11 min    Billable Minutes:Evaluation 11 min    1/20/2022

## 2022-01-20 NOTE — PLAN OF CARE
[facility]  Initial Discharge Assessment       Primary Care Provider: Mariam Peña MD    Admission Diagnosis: Metastatic signet ring cell carcinoma [C79.9]  Status post insertion of percutaneous endoscopic gastrostomy (PEG) tube [Z93.1]    Admission Date: 1/19/2022  Expected Discharge Date: 1/20/2022    Discharge Barriers Identified: None    Payor: MEDICARE / Plan: MEDICARE PART A & B / Product Type: Government /     Extended Emergency Contact Information  Primary Emergency Contact: NANCY SU  Home Phone: [phone]  Mobile Phone: [phone]  Relation: Mother    Discharge Plan A: Home with family  Discharge Plan B: Home with family      [facility]  [address] [place]  [place] [address]  Phone: [phone] Fax: [phone]    [facility] [address] [address] [address] [facility] [address] [place] [address]  Phone: [phone] Fax: [phone]      Initial Assessment (most recent)     Adult Discharge Assessment - 01/20/22 0946        Discharge Assessment    Assessment Type Discharge Planning Assessment     Confirmed/corrected address, phone number and insurance Yes     Confirmed Demographics Correct on Facesheet     Source of Information patient     Communicated VICTOR MANUEL with patient/caregiver Yes     Reason For Admission G Tube placement     Lives With parent(s)     Facility Arrived From: Home     Do you expect to return to your current living situation? Yes     Do you have help at home or someone to help you manage your care at home? Yes     Who are your caregiver(s) and their phone number(s)? Nancy Su [phone]     Prior to hospitilization cognitive status: Alert/Oriented     Current cognitive status: Alert/Oriented     Walking or Climbing Stairs Difficulty none     Dressing/Bathing Difficulty none     Equipment Currently Used at Home colostomy/ostomy supplies     Readmission within 30 days? No     Patient  currently being followed by outpatient case management? --   Referral made to outpatient CM    Do you currently have service(s) that help you manage your care at home? No     Do you take prescription medications? Yes     Do you have prescription coverage? Yes     Do you have any problems affording any of your prescribed medications? No     Is the patient taking medications as prescribed? yes     Who is going to help you get home at discharge? Nancy Su     How do you get to doctors appointments? family or friend will provide     Are you on dialysis? No     Do you take coumadin? No     Discharge Plan A Home with family     Discharge Plan B Home with family     DME Needed Upon Discharge  other (see comments)   tube feeding formula/supplies    Discharge Plan discussed with: Patient     Discharge Barriers Identified None        Relationship/Environment    Name(s) of Who Lives With Patient Nancy Georges               CM met with patient/Nancy at the bedside to assess for discharge needs.  Nancy is the patients help at home.  Patient was not using any medical equipment at home but does have ostomy supplies.  Patient had a feeding tube placed this admission and will need formula and tube feeding supplies.  CM will make a referral to [facility] to check benefits .  CM will continue to follow for needs.  CM provided a transitional care folder, information on advanced directives, information on pharmacy bedside delivery, and discharge planning begins on admission with contact information for any needs/questions.

## 2022-01-20 NOTE — PROGRESS NOTES
[facility] - Med Surg  Adult Nutrition  Consult Note    SUMMARY     Recommendations    1. As medically able, initiate enteral nutrition: Isosource 1.5, continuous via PEG    - Advance as tolerated to goal: 40 mL/hr     - 100mL H2O flush q 4-6 hours or per MD/NP.    - Provides 1440 calories (100%EEN), 65g protein (>100%EPN), and 733ml H20 + FWF.     - Monitor for refeeding. Check Mg, K+, Na, Phos, and glucose before and during initiation; correct as indicated.      2. Continue diet as prescribed, as tolerated: regular and encourage PO intake    3. RD to follow up to monitor intake, tolerance, and labs.   *Please send consult if acute nutrition needs arise.    Goals:    1. Initiate enteral nutrition within 48 hours.     2. Pt will tolerate >50% estimated energy and protein needs in 48-72 hours.  Nutrition Goal Status: new   Communication of RD recs: POC, sticky note, secure chat and second sign    Assessment and Plan  Nutrition Problem:    Severe Protein-Calorie Malnutrition    in the context of Chronic Illness/Injury  Related to (etiology):    Inadequate ability to consume sufficient energy    Inadequate oral intake    Altered GI function    Increased energy and protein needs    Medical condition  Signs and Symptoms (as evidenced by):    Energy Intake: <75% of estimated energy requirement for  >7 days    Body Fat Depletion: moderate and severe depletion of orbitals, triceps and thoracic and lumbar region     Muscle Mass Depletion: moderate and severe depletion of temples, clavicle region, scapular region, interosseous muscle and lower extremities     Weight Loss: 12 lbs, 12%% x 1 month   Interventions(treatment strategy):    Enteral nutrition     High calorie, high protein diet    Collaboration with other care providers  Nutrition Diagnosis Status:   New     Reason for Assessment  Reason for assessment: consult   Diagnosis: Severe malnutrition    Past Medical History:   Diagnosis Date    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 Reports no other GI issues. Would love to "just get everything through the tube". PEG placed yesterday; says the abdominal area is painful,  sore and tender.      Nutrition Discharge Planning - pending medical course    Nutrition Risk Screen  Nutrition risk screen: tube feeding or parenteral nutrition     Nutrition/Diet History  Patient Reported Diet/Restrictions/Preferences: regular diet, soylent nutrition supplement at home  Food Preferences: likes well seasoned food  Food Allergies: NKFA  Factors Affecting Nutritional Intake: abdominal pain, altered GI function, nausea/vomiting and pain   Spiritual, Cultural Beliefs, Ep[employer]op[employer] Practices, Values that Affect Care: no    Anthropometrics  Temp: 98.3 °F (36.8 °C)  Height Method: Stated  Height: 5' 4" (162.6 cm)  Height (inches): 64 in  Weight Method: Bed Scale  Weight: 40.2 kg (88 lb 10 oz)  Weight (lb): 88.63 lb  Ideal Body Weight (IBW), Female: 120 lb  % Ideal Body Weight, Female (lb): 74.23 %  BMI (Calculated): 15.2   significant recent weight loss indicated  Wt Readings from Last 5 Encounters:   01/20/22 40.2 kg (88 lb 10 oz)   01/14/22 40.4 kg (89 lb 1.1 oz)   01/10/22 41.4 kg (91 lb 4.3 oz)   01/06/22 45.6 kg (100 lb 8.5 oz)   12/21/21 41.6 kg (91 lb 11.4 oz)     Lab/Procedures/Meds  Pertinent Labs: reviewed  Lab Results   Component Value Date     (L) 01/20/2022    K 4.1 01/20/2022     01/20/2022    CO2 19 (L) 01/20/2022    BUN 26 (H) 01/20/2022    CREATININE 1.1 01/20/2022    CALCIUM 8.9 01/20/2022    ANIONGAP 13 01/20/2022    ESTGFRAFRICA >60 01/20/2022    EGFRNONAA " 57 (A) 01/20/2022     Lab Results   Component Value Date    WBC 12.68 01/20/2022    HGB 9.2 (L) 01/20/2022    HCT 30.4 (L) 01/20/2022    MCV 84 01/20/2022     01/20/2022     Lab Results   Component Value Date    ALBUMIN 3.2 (L) 01/20/2022     Pertinent Medications: reviewed   Scheduled Meds:   enoxaparin  30 mg Subcutaneous Daily    famotidine  20 mg Oral Daily    [START ON 1/21/2022] fentaNYL  1 patch Transdermal Q72H    metoclopramide HCl  5 mg Oral QID (AC & HS)    ondansetron  4 mg Intravenous Q6H     PRN Meds:acetaminophen, albuterol-ipratropium, aluminum-magnesium hydroxide-simethicone, dextrose 50%, dextrose 50%, glucagon (human recombinant), glucose, glucose, HYDROmorphone, magnesium oxide, magnesium oxide, naloxone, potassium bicarbonate, potassium bicarbonate, potassium bicarbonate, potassium, sodium phosphates, potassium, sodium phosphates, potassium, sodium phosphates, prochlorperazine, promethazine (PHENERGAN) IVPB, simethicone, sodium chloride 0.9%, zolpidem   Continuous Infusions:   sodium chloride 0.9% 75 mL/hr at 01/20/22 0711      Physical Findings/Assessment  N/V:  denies  Wounds: not indicated   Edema:   not indicated   Last Bowel Movement: 01/19/22      Reginaldo Score: 12  Mouth/Teeth WDL: WDL    O2 Device (Oxygen Therapy): room air  NFPE performed indicating severe muscle and fat wasting, documented in greater detail below    Estimated/Assessed Needs  Weight Used For Calorie Calculations: 40.2 kg (88 lb 10 oz)  Energy Calorie Requirements (kcal): 9571-2686 (35-40, malnutrition)  Energy Need Method: Kcal/kg  Protein Requirements: 48-60g (1.2-1.5g/kg (malnutrition)  Weight Used For Protein Calculations: 40.2 kg (88 lb 10 oz)  RDA Method (mL): 1400  CHO Requirement: 175-200g (50% CHO)    Nutrition Prescription Ordered  regular     Evaluation of Received Nutrient/Fluid Intake  Energy Calories Required: not meeting needs  Protein Required: not meeting needs  Tolerance: tolerating (reports  some pain with swallowing PO)  % Intake of Estimated Energy Needs: 0 - 25 %  % Meal Intake: 0 - 25 %    Monitor and Evaluation  Food and Nutrient Intake: energy intake  Food and Nutrient Administration: diet order and enteral nutrition administration  Anthropometric Measurements: weight, weight change, body mass index  Biochemical Data, Medical Tests and Procedures: electrolyte and renal panel, lipid profile, gastrointestinal profile, glucose/endocrine profile, Inflammatory profile  Nutrition-Focused Physical Findings: overall appearance     Malnutrition Assessment  Malnutrition Type: chronic illness  Energy Intake: severe energy intake                      Edema (Fluid Accumulation): 0-->no edema present   Subcutaneous Fat Loss (Final Summary): severe protein-calorie malnutrition  Muscle Loss Evaluation (Final Summary): severe protein-calorie malnutrition    Severe Weight Loss (Malnutrition): greater than 5% in 1 month    Nutrition Follow-Up  Level of nutrition risk: moderate-high  Frequency of follow-up: Twice weekly: New or unstable tube feeding    Tentative Next Date to be Seen by RD: 01/21/22  Iliana Fired, MS, RD, LDN

## 2022-01-20 NOTE — ASSESSMENT & PLAN NOTE
Consult wound care     1/20  No output noted from colostomy   Per patient reports no output noted in 3 days  STAT CT abd pending

## 2022-01-20 NOTE — PLAN OF CARE
01/20/22 1020   Post-Acute Status   Post-Acute Authorization IV Infusion   IV Infusion Status Referral(s) sent      90.16

## 2022-01-20 NOTE — PLAN OF CARE
Patient remains free of injury. AAOx4. POC reviewed, patient verbalizes understanding. Pain uncontrolled with IV pain medication. PEG to guzmán bag- draining green bile. IV fluids infusing. Ostomy remains to LLQ. Call light and personal items within reach. Chart check complete. Will continue to monitor.

## 2022-01-20 NOTE — CONSULTS
[facility] - [facility]  Adult Nutrition  Consult Note    SUMMARY     Recommendations    1. As medically able, initiate enteral nutrition: Isosource 1.5, continuous via PEG    - Advance as tolerated to goal: 40 mL/hr     - 100mL H2O flush q 4-6 hours or per MD/NP.    - Provides 1440 calories (100%EEN), 65g protein (>100%EPN), and 733ml H20 + FWF.     - Monitor for refeeding. Check Mg, K+, Na, Phos, and glucose before and during initiation; correct as indicated.      2. Continue diet as prescribed, as tolerated: regular and encourage PO intake    3. RD to follow up to monitor intake, tolerance, and labs.   *Please send consult if acute nutrition needs arise.    Goals:    1. Initiate enteral nutrition within 48 hours.     2. Pt will tolerate >50% estimated energy and protein needs in 48-72 hours.  Nutrition Goal Status: new   Communication of RD recs: POC, sticky note, secure chat and second sign    Assessment and Plan  Nutrition Problem:    Severe Protein-Calorie Malnutrition    in the context of Chronic Illness/Injury  Related to (etiology):    Inadequate ability to consume sufficient energy    Inadequate oral intake    Altered GI function    Increased energy and protein needs    Medical condition  Signs and Symptoms (as evidenced by):    Energy Intake: <75% of estimated energy requirement for  >7 days    Body Fat Depletion: moderate and severe depletion of orbitals, triceps and thoracic and lumbar region     Muscle Mass Depletion: moderate and severe depletion of temples, clavicle region, scapular region, interosseous muscle and lower extremities     Weight Loss: 12 lbs, 12%% x 1 month   Interventions(treatment strategy):    Enteral nutrition     High calorie, high protein diet    Collaboration with other care providers  Nutrition Diagnosis Status:   New     Reason for Assessment  Reason for assessment: consult   Diagnosis: Severe malnutrition    Past Medical History:   Diagnosis Date    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 Reports no other GI issues. Would love to "just get everything through the tube". PEG placed yesterday; says the abdominal area is painful,  sore and tender.      Nutrition Discharge Planning - pending medical course    Nutrition Risk Screen  Nutrition risk screen: tube feeding or parenteral nutrition     Nutrition/Diet History  Patient Reported Diet/Restrictions/Preferences: regular diet, soylent nutrition supplement at home  Food Preferences: likes well seasoned food  Food Allergies: NKFA  Factors Affecting Nutritional Intake: abdominal pain, altered GI function, nausea/vomiting and pain   Spiritual, Cultural Beliefs, Amish Practices, Values that Affect Care: no    Anthropometrics  Temp: 98.3 °F (36.8 °C)  Height Method: Stated  Height: 5' 4" (162.6 cm)  Height (inches): 64 in  Weight Method: Bed Scale  Weight: 40.2 kg (88 lb 10 oz)  Weight (lb): 88.63 lb  Ideal Body Weight (IBW), Female: 120 lb  % Ideal Body Weight, Female (lb): 74.23 %  BMI (Calculated): 15.2   significant recent weight loss indicated  Wt Readings from Last 5 Encounters:   01/20/22 40.2 kg (88 lb 10 oz)   01/14/22 40.4 kg (89 lb 1.1 oz)   01/10/22 41.4 kg (91 lb 4.3 oz)   01/06/22 45.6 kg (100 lb 8.5 oz)   12/21/21 41.6 kg (91 lb 11.4 oz)     Lab/Procedures/Meds  Pertinent Labs: reviewed  Lab Results   Component Value Date     (L) 01/20/2022    K 4.1 01/20/2022     01/20/2022    CO2 19 (L) 01/20/2022    BUN 26 (H) 01/20/2022    CREATININE 1.1 01/20/2022    CALCIUM 8.9 01/20/2022    ANIONGAP 13 01/20/2022    ESTGFRAFRICA >60 01/20/2022    EGFRNONAA " 57 (A) 01/20/2022     Lab Results   Component Value Date    WBC 12.68 01/20/2022    HGB 9.2 (L) 01/20/2022    HCT 30.4 (L) 01/20/2022    MCV 84 01/20/2022     01/20/2022     Lab Results   Component Value Date    ALBUMIN 3.2 (L) 01/20/2022     Pertinent Medications: reviewed   Scheduled Meds:   enoxaparin  30 mg Subcutaneous Daily    famotidine  20 mg Oral Daily    [START ON 1/21/2022] fentaNYL  1 patch Transdermal Q72H    metoclopramide HCl  5 mg Oral QID (AC & HS)    ondansetron  4 mg Intravenous Q6H     PRN Meds:acetaminophen, albuterol-ipratropium, aluminum-magnesium hydroxide-simethicone, dextrose 50%, dextrose 50%, glucagon (human recombinant), glucose, glucose, HYDROmorphone, magnesium oxide, magnesium oxide, naloxone, potassium bicarbonate, potassium bicarbonate, potassium bicarbonate, potassium, sodium phosphates, potassium, sodium phosphates, potassium, sodium phosphates, prochlorperazine, promethazine (PHENERGAN) IVPB, simethicone, sodium chloride 0.9%, zolpidem   Continuous Infusions:   sodium chloride 0.9% 75 mL/hr at 01/20/22 0711      Physical Findings/Assessment  N/V:  denies  Wounds: not indicated   Edema:   not indicated   Last Bowel Movement: 01/19/22      Reginaldo Score: 12  Mouth/Teeth WDL: WDL    O2 Device (Oxygen Therapy): room air  NFPE performed indicating severe muscle and fat wasting, documented in greater detail below    Estimated/Assessed Needs  Weight Used For Calorie Calculations: 40.2 kg (88 lb 10 oz)  Energy Calorie Requirements (kcal): 3016-3719 (35-40, malnutrition)  Energy Need Method: Kcal/kg  Protein Requirements: 48-60g (1.2-1.5g/kg (malnutrition)  Weight Used For Protein Calculations: 40.2 kg (88 lb 10 oz)  RDA Method (mL): 1400  CHO Requirement: 175-200g (50% CHO)    Nutrition Prescription Ordered  regular     Evaluation of Received Nutrient/Fluid Intake  Energy Calories Required: not meeting needs  Protein Required: not meeting needs  Tolerance: tolerating (reports  some pain with swallowing PO)  % Intake of Estimated Energy Needs: 0 - 25 %  % Meal Intake: 0 - 25 %    Monitor and Evaluation  Food and Nutrient Intake: energy intake  Food and Nutrient Administration: diet order and enteral nutrition administration  Anthropometric Measurements: weight, weight change, body mass index  Biochemical Data, Medical Tests and Procedures: electrolyte and renal panel, lipid profile, gastrointestinal profile, glucose/endocrine profile, Inflammatory profile  Nutrition-Focused Physical Findings: overall appearance     Malnutrition Assessment  Malnutrition Type: chronic illness  Energy Intake: severe energy intake                      Edema (Fluid Accumulation): 0-->no edema present   Subcutaneous Fat Loss (Final Summary): severe protein-calorie malnutrition  Muscle Loss Evaluation (Final Summary): severe protein-calorie malnutrition    Severe Weight Loss (Malnutrition): greater than 5% in 1 month    Nutrition Follow-Up  Level of nutrition risk: moderate-high  Frequency of follow-up: Twice weekly: New or unstable tube feeding    Tentative Next Date to be Seen by RD: 01/21/22  Iliana Fried, MS, RD, LDN

## 2022-01-20 NOTE — DISCHARGE SUMMARY
Pre Op Diagnosis: dysphagia     Post Op Diagnosis: same     Procedure:  G tube placement     Procedure performed by: Brian PATRICIO, Henrique ROJO     Written Informed Consent Obtained: Yes     Specimen Removed:  no     Estimated Blood Loss:  minimal     Findings: Local anesthesia and moderate sedation were used.     The patient tolerated the procedure well and there were no complications.      Sterile technique was performed in the LUQ, lidocaine was used as a local anesthetic.  G tube placed, check ordered for the am.  Pt tolerated the procedure well without immediate complications.  Please see radiologist report for details. F/u with PCP and/or ordering physician.

## 2022-01-20 NOTE — ASSESSMENT & PLAN NOTE
Likely 2/2 cancer and procedure   Check CXR and UA     1/20  Likely reactive  Resolved on 1/20  CXR negative

## 2022-01-20 NOTE — PLAN OF CARE
Patient peg to drain to guzmán bag- dark green foul smelling fecal like drainage, ostomy intact, BG monitored, Pt remains free of injury, pain managed adequately, no s/s of distress. 24 hour chart check completed. Will continue to monitor.

## 2022-01-20 NOTE — PROGRESS NOTES
Pharmacist Renal Dose Adjustment Note    Madeline Warner is a 56 y.o. female being treated with the medication enoxaparin.     Patient Data:    Vital Signs (Most Recent):  Temp: 98.4 °F (36.9 °C) (01/20/22 1220)  Pulse: 91 (01/20/22 1220)  Resp: 16 (01/20/22 1233)  BP: 123/72 (01/20/22 1220)  SpO2: 95 % (01/20/22 1220) Vital Signs (72h Range):  Temp:  [97.9 °F (36.6 °C)-98.8 °F (37.1 °C)]   Pulse:  []   Resp:  [12-22]   BP: (101-153)/(69-86)   SpO2:  [95 %-100 %]      Recent Labs   Lab 01/19/22  1117 01/20/22  0637   CREATININE 1.4 1.1     Serum creatinine: 1.1 mg/dL 01/20/22 0637  Estimated creatinine clearance: 36.7 mL/min    Medication:enoxaparin dose: 30 mg frequency Q24H will be changed to medication:enoxaparin dose:40 mg frequency:Q24H.     Pharmacist's Name: Janessa Altman  Pharmacist's Extension: [phone]

## 2022-01-20 NOTE — PROGRESS NOTES
Pharmacy Brief Progress Note: Fentanyl Patch Education     Patient/mother educated on fentanyl patch indication, side effects, and drug interactions. Discussed administration and precautions to take while wearing the patch and proper disposal. Patient stated that she did not need the fentanyl patch educational handout because she already has the info at home. Patient/mother expressed understanding and had no further questions.     Thank you for allowing us to participate in this patient's care.   Katherine McArdle, Pharm.D. 1/20/2022 3:26 PM

## 2022-01-20 NOTE — PT/OT/SLP EVAL
Physical Therapy Evaluation and Discharge Note    Patient Name:  Madeline Warner   MRN:  [id number]    Recommendations:     Discharge Recommendations:  home   Discharge Equipment Recommendations: none   Barriers to discharge: None    Assessment:     Madeline Warner is a 56 y.o. female admitted with a medical diagnosis of Severe malnutrition. .  At this time, patient is functioning at their prior level of function and does not require further acute PT services.     Recent Surgery: Procedure(s) (LRB):  Insertion of G-J tube and bilateral upper extremity venogram (Bilateral) 1 Day Post-Op    Plan:     During this hospitalization, patient does not require further acute PT services.  Please re-consult if situation changes.  D/C PT FROM P.T. SERVICES TO [facility] FOR CONT. GAIT TO TOLERANCE    Subjective     Chief Complaint: ABD. PAIN  Patient/Family Comments/goals:   Pain/Comfort:  · Pain Rating 1: 8/10  · Location 1: abdomen (PEG SITE)  · Pain Addressed 1: Reposition,Distraction,Cessation of Activity    Patients cultural, spiritual, Judaism conflicts given the current situation:      Living Environment:  PT LIVES WITH MOTHER 1 STORY HOUSE NO STEPS, AMB INDEP HOUSEHOLD DISTANCES, DRIVES, DOES NOT WORK INDEP WITH ADL'S  Prior to admission, patients level of function was INDEP.  Equipment used at home: NONE.  DME owned (not currently used): none.  Upon discharge, patient will have assistance from MOTHER.    Objective:     Communicated with NURSE RAYMOND prior to session.  Patient found supine with PEG Tube,colostomy,peripheral IV upon PT entry to room.    General Precautions: Standard, fall   Orthopedic Precautions:N/A   Braces: N/A   Respiratory Status: Room air    Exams:  · Cognitive Exam:  Patient is oriented to Person, Place, Time and Situation  · Postural Exam:  Patient presented with the following abnormalities:    · -       Rounded shoulders  · Sensation:    · -       Intact  · RLE ROM:  WFL  · RLE Strength: WFL  · LLE ROM: WFL  · LLE Strength: WFL    Functional Mobility:  · Bed Mobility:     · Rolling Left:  modified independence  · Rolling Right: modified independence  · Scooting: modified independence  · Supine to Sit: modified independence  · Sit to Supine: modified independence  · Transfers:     · Sit to Stand:  modified independence with no AD  · Gait: PT AMB 20' IN ROOM OPAL NO AD, DECLINED FURTHER GAIT DISTANCE DUE TO PAIN, NO LOB OR SOB ON ROOM AIR  · Balance: GOOD    AM-PAC 6 CLICK MOBILITY  Total Score:21     Therapeutic Activities and Exercises:   PT EDUCATED IN ROLE OF P.T., PT ENCOURAGE TO INCREASE TIME OOB IN CHAIR    AM-PAC 6 CLICK MOBILITY  Total Score:21     Patient left HOB elevated with all lines intact, call button in reach, NURSE notified and MOTHER present.    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    T4 N1bM1 B 3/85 nodes positive    Encounter for blood transfusion     Leukocytosis 1/19/2022    PONV (postoperative nausea and vomiting)        Past Surgical History:   Procedure Laterality Date    APPENDECTOMY      BILATERAL OOPHORECTOMY      CHOLECYSTECTOMY      with Cytoreduction and salpingectomy    COLOSTOMY      CYSTOSCOPY N/A 12/21/2021    Procedure: CYSTOSCOPY;  Surgeon: John Martínez MD;  Location: [facility];  Service: Urology;  Laterality: N/A;    CYTOREDUCTION      ENDOSCOPIC ULTRASOUND OF UPPER GASTROINTESTINAL TRACT N/A 6/11/2021    Procedure: ULTRASOUND, UPPER GI TRACT, ENDOSCOPIC;  Surgeon: Rosaura Lakhani MD;  Location: [facility];  Service: Endoscopy;  Laterality: N/A;  Linear scope    ERCP N/A 6/11/2021    Procedure: ERCP (ENDOSCOPIC RETROGRADE CHOLANGIOPANCREATOGRAPHY);  Surgeon: Rosaura Lakhani MD;  Location: [facility];  Service: Endoscopy;   Laterality: N/A;    ESOPHAGOGASTRODUODENOSCOPY N/A 5/6/2021    Procedure: EGD (ESOPHAGOGASTRODUODENOSCOPY);  Surgeon: Brandon Rosen MD;  [facility];  Service: Gastroenterology;  Laterality: N/A;    ESOPHAGOGASTRODUODENOSCOPY  06/11/2021    EXCISION OF LESION  10/20/2020    Procedure: EXCISION, LESION COLOSTOMY;  Surgeon: Layton Anderson MD;  Location: [facility] OR;  Service: General;;    FLUOROSCOPY Bilateral 1/19/2022    Procedure: Insertion of G-J tube and bilateral upper extremity venogram;  Surgeon: Bryan Torres MD;  Location: [facility] CATH LAB;  Service: General;  Laterality: Bilateral;    LAPAROTOMY, EXPLORATORY  02/12/2020    LYSIS OF ADHESION, COLOSTOMY    REVISION COLOSTOMY N/A 10/20/2020    Procedure: REVISION, COLOSTOMY;  Surgeon: Layton Anderson MD;  Location: [facility];  Service: General;  Laterality: N/A;  Ostomy bag placed    RIGHT COLECTOMY         Time Tracking:     PT Received On: 01/20/22  PT Start Time: 1015     PT Stop Time: 1030  PT Total Time (min): 15 min     Billable Minutes: Evaluation 15    01/20/2022

## 2022-01-20 NOTE — PLAN OF CARE
OT Eval completed. Pt functioning at high level and does not require skilled OT services at this time.

## 2022-01-20 NOTE — CARE UPDATE
To clarify, G tube placed by IR for decompression vs tube feedings to tolerate chemo as patient is having difficulty tolerating PO intake.   CT abdomen showed constipation- for which Mag citrate ordered. Will hold off on initiating tube feedings until cleared by Dr Torres IR.

## 2022-01-20 NOTE — HOSPITAL COURSE
Patient was admitted for abd pain. 1/20: G tube placed yesterday per IR. Today the patient has copious amounts of green fluid draining from the G tube- this is attached to a guzmán bag, her colostomy has no output, she has bowel sounds, but endorsing nasuea and abdominal pain. CT abdomen pending. Case discussed with Dr Barger and Dr Torres. Initial G tube placement check performed right after placement and the subsequent the following day is pending. As of 1/21/22  CT abdomen showed interval placement of percutaneous G tube with catheter coursing along the left inferior anterior margin with possible small adjacent hemorrhage. Patient c/o constipation requesting an injection. GI has been consulted to assist. Urology following and plans for stent placement tomorrow. G-tube still draining green fluid.  As of 1/22/22 pt reports passing some gas, still no BM. Will give x 3 doses of Relistor for constipation. Plans for ureteral stent placement today. As of 1/23/22 still no bowel movement. + Flatus. C/o abdominal pain and N/V. Abdominal xray pending. S/p bilateral ureteral stent placement. Palliative care consulted for symptom management and goal planning. As of 1/24/22 patient very lethargic this am.  Pt had dose of Ativan this morning. Narcotics and ativan D/c'd until patient more alert. Mother reports pt passed a very small amount of hard stool overnight. Fever this morning, blood cx ordered. IV zosyn empirically. General Surgery consulted for possible obstruction. As of 1/25/22 patient is more awake but not back to her baseline. Abdominal imaging showed no obstruction. Colostomy with output. Plan for SBFT today. GI has been consulted. Case discussed with General surgery and IR. Blood cultures positive for gram negative rods. Continue IV zosyn. Port placement canceled due to bacteremia. Palliative care following. As of 1/26/22 pt awake and alert this morning, having ostomy output. H/H 7/22.0, will transfuse 1 unit of PRBCs.  Serum potassium 2.7, will replete. Blood cultures growing E.COLI, awaiting susceptibility. Continue Zosyn.  Plans to exchange G-tube for GJ tube per IR and GI. Plan to use J-tube for nutrition. Continue current bowel regimen. On 01/27: Plans to have J tube placed today for nutrition. BC from 1/24 shows E coli ESB: that is sensitive to Ertapenem and Meropenem. Zosyn stopped, Ertapenem started. BC redrawn. On 1/28/22, J tube in place with tube feedings initiated per GI recommendation. Magnesium and Potassium replaced. Ertapenem continued. Repeat blood cultures with results pending.  Palliative Care following with Hospice info visit arranged. On 1/29/22, pt very emotional and expressed desire to discharge today with hospice to spend time with granddaughter. Palliative care updated on current pt status.  Blood cultures with no growth to date.  Will discuss patient wishes tomorrow morning and determine need for port placement pending results of conversation. TF tolerated with adequate colostomy output. Bowel regimen in place per General Surgery. On 1/30/22, Heme/Onc discussed Hospice with patient's mother.  Palliative Care following and pt/mother have had informational discussions with [facility].  Pt/mother requesting to speak with Palliative Care tomorrow prior to making a plan due to established rapport.  TF held due to concern for N/V with antiemetics given. Pt reports more nausea and no evidence of vomiting TF noted. General Surgery following with TF to be resumed at lower rate for tolerance.  Glucose treated per Hypoglycemia protocol.  On 1/31/22, N/V improved on prescribed regime with TF tolerated at 25-30 cc/hr.  PICC line placement unsuccessful. IR consulted for placement. Case discussed with Palliative care and Heme/Onc with PICC line placement for long term antibiotics and discharge to [facility] planned for tomorrow.   Case Management consulted for discharge planning.  On 2/1/22, PICC line placed  for continued IV antibiotic therapy.  Repeat blood cultures with no growth to date. Pt seen and examined and deemed stable for discharge to home with Hospice.  Medications reconciled.  Pt/mother to follow up with [facility] upon discharge for further evaluation.

## 2022-01-21 PROBLEM — T40.2X5A THERAPEUTIC OPIOID-INDUCED CONSTIPATION (OIC): Status: ACTIVE | Noted: 2022-01-01

## 2022-01-21 PROBLEM — K59.03 THERAPEUTIC OPIOID-INDUCED CONSTIPATION (OIC): Status: ACTIVE | Noted: 2022-01-01

## 2022-01-21 NOTE — PLAN OF CARE
Patient remains free of injury. AAOx4. POC reviewed, patient verbalizes understanding. Pain controlled with IV pain medication. IV fluids infusing. Patient refuses ordered meds for constipation. Pt to be NPO after midnight for procedure tomorrow. Call light and personal items within reach. Chart check complete. Will continue to monitor.

## 2022-01-21 NOTE — ASSESSMENT & PLAN NOTE
Consult wound care     1/21  No output noted from colostomy   Per patient reports no output noted in 3 days  CT abdomen showed interval placement of percutaneous G tube with catheter coursing along the left inferior anterior margin with possible small adjacent hemorrhage.

## 2022-01-21 NOTE — ASSESSMENT & PLAN NOTE
Placed on 1/19 per IR  Green fluid draining from the G tube- this is attached to a guzmán bag, her colostomy has no output, she has bowel sounds, but endorsing nasuea and abdominal pain  CT abdomen showed interval placement of percutaneous G tube with catheter coursing along the left inferior anterior margin with possible small adjacent hemorrhage.

## 2022-01-21 NOTE — ASSESSMENT & PLAN NOTE
GI consulted   Patient c/o constipation requesting an injection.   Received  a dose of Relistor last admission

## 2022-01-21 NOTE — SUBJECTIVE & OBJECTIVE
Review of Systems   Constitutional: Positive for activity change, appetite change and fatigue. Negative for chills, diaphoresis, fever and unexpected weight change.   HENT: Negative for congestion, nosebleeds, sinus pressure and sore throat.    Eyes: Negative for pain, discharge and visual disturbance.   Respiratory: Negative for cough, chest tightness, shortness of breath, wheezing and stridor.    Cardiovascular: Negative for chest pain, palpitations and leg swelling.   Gastrointestinal: Positive for abdominal pain (10/10 pain to G tube site ), nausea and vomiting. Negative for abdominal distention, blood in stool, constipation and diarrhea.   Endocrine: Negative for cold intolerance and heat intolerance.   Genitourinary: Negative for difficulty urinating, dysuria, flank pain, frequency and urgency.   Musculoskeletal: Negative for arthralgias, back pain, joint swelling, myalgias, neck pain and neck stiffness.   Skin: Negative for rash and wound.   Allergic/Immunologic: Negative for food allergies and immunocompromised state.   Neurological: Positive for weakness. Negative for dizziness, seizures, syncope, facial asymmetry, speech difficulty, light-headedness, numbness and headaches.   Hematological: Negative for adenopathy.   Psychiatric/Behavioral: Negative for agitation, confusion and hallucinations.     Objective:     Vital Signs (Most Recent):  Temp: 98.7 °F (37.1 °C) (01/21/22 1508)  Pulse: 92 (01/21/22 1508)  Resp: 16 (01/21/22 1508)  BP: 111/66 (01/21/22 1508)  SpO2: 99 % (01/21/22 1508) Vital Signs (24h Range):  Temp:  [97.8 °F (36.6 °C)-103.1 °F (39.5 °C)] 98.7 °F (37.1 °C)  Pulse:  [] 92  Resp:  [14-17] 16  SpO2:  [96 %-99 %] 99 %  BP: (104-130)/(63-77) 111/66     Weight: 39.8 kg (87 lb 11.9 oz)  Body mass index is 15.06 kg/m².    Intake/Output Summary (Last 24 hours) at 1/21/2022 1648  Last data filed at 1/21/2022 1200  Gross per 24 hour   Intake 0 ml   Output 825 ml   Net -825 ml      Physical  Exam  Vitals and nursing note reviewed.   Constitutional:       General: She is not in acute distress ( ).     Appearance: She is cachectic. She is ill-appearing. She is not diaphoretic.   HENT:      Head: Normocephalic and atraumatic.      Nose: Nose normal.   Eyes:      General: No scleral icterus.     Conjunctiva/sclera: Conjunctivae normal.   Neck:      Trachea: No tracheal deviation.   Cardiovascular:      Rate and Rhythm: Normal rate and regular rhythm.      Heart sounds: Normal heart sounds. No murmur heard.  No friction rub. No gallop.    Pulmonary:      Effort: Pulmonary effort is normal. No respiratory distress.      Breath sounds: Normal breath sounds. No stridor. No wheezing or rales.   Chest:      Chest wall: No tenderness.   Abdominal:      General: Bowel sounds are normal. There is no distension.      Palpations: Abdomen is soft. There is no mass.      Tenderness: Tenderness: g tube site  There is no guarding or rebound.      Comments: colostomy LLQ  G tube dressing C/D/I- which is now attached to a guzmán bag draining green fluid   Musculoskeletal:         General: No tenderness or deformity. Normal range of motion.      Cervical back: Normal range of motion and neck supple.   Skin:     General: Skin is warm and dry.      Coloration: Skin is not pale.      Findings: No erythema or rash.   Neurological:      Mental Status: She is alert and oriented to person, place, and time.      Cranial Nerves: No cranial nerve deficit.      Motor: No abnormal muscle tone.      Coordination: Coordination normal.   Psychiatric:         Mood and Affect: Mood is not anxious. Affect is angry. Affect is not tearful.         Behavior: Behavior normal.         Thought Content: Thought content normal.         Significant Labs:   All pertinent labs within the past 24 hours have been reviewed.  BMP:   Recent Labs   Lab 01/20/22  0637   GLU 77   *   K 4.1      CO2 19*   BUN 26*   CREATININE 1.1   CALCIUM 8.9      CBC:   Recent Labs   Lab 01/20/22  0637   WBC 12.68   HGB 9.2*   HCT 30.4*        CMP:   Recent Labs   Lab 01/20/22  0637   *   K 4.1      CO2 19*   GLU 77   BUN 26*   CREATININE 1.1   CALCIUM 8.9   PROT 6.9   ALBUMIN 3.2*   BILITOT 0.3   ALKPHOS 68   AST 11   ALT <5*   ANIONGAP 13   EGFRNONAA 57*       Significant Imaging:

## 2022-01-21 NOTE — PROGRESS NOTES
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 Pt will be placed in outpatient extended recovery for pain control, IVfs, and RD consult.     On exam, pt complain of nausea and 10/10 pain at G tube site. Pt reports ongoing chronic pain, N/V, weakness/fatigue, and weight loss for approximately one month.       Overview/Hospital Course:  1/20: G tube placed yesterday per IR. Today the patient has copious amounts of green fluid draining from the G tube- this is attached to a guzmán bag, her colostomy has no output, she has bowel sounds, but endorsing nasuea and abdominal pain. CT abdomen pending. Case discussed with Dr Barger and Dr Torres. Initial G tube placement check performed right after placement and the subsequent the following day is pending. As of 1/21/22  CT abdomen showed interval placement of percutaneous G tube with catheter coursing along the left inferior anterior margin with possible small adjacent hemorrhage. Patient c/o constipation requesting an injection. GI has been consulted to assist. Urology following and plans for stent placement tomorrow. G-tube still draining green fluid.            Review of Systems   Constitutional: Positive for activity change, appetite change and fatigue. Negative for chills, diaphoresis, fever and unexpected weight change.   HENT: Negative for congestion, nosebleeds, sinus pressure and sore throat.    Eyes: Negative for pain, discharge and visual disturbance.   Respiratory: Negative for  cough, chest tightness, shortness of breath, wheezing and stridor.    Cardiovascular: Negative for chest pain, palpitations and leg swelling.   Gastrointestinal: Positive for abdominal pain (10/10 pain to G tube site ), nausea and vomiting. Negative for abdominal distention, blood in stool, constipation and diarrhea.   Endocrine: Negative for cold intolerance and heat intolerance.   Genitourinary: Negative for difficulty urinating, dysuria, flank pain, frequency and urgency.   Musculoskeletal: Negative for arthralgias, back pain, joint swelling, myalgias, neck pain and neck stiffness.   Skin: Negative for rash and wound.   Allergic/Immunologic: Negative for food allergies and immunocompromised state.   Neurological: Positive for weakness. Negative for dizziness, seizures, syncope, facial asymmetry, speech difficulty, light-headedness, numbness and headaches.   Hematological: Negative for adenopathy.   Psychiatric/Behavioral: Negative for agitation, confusion and hallucinations.     Objective:     Vital Signs (Most Recent):  Temp: 98.7 °F (37.1 °C) (01/21/22 1508)  Pulse: 92 (01/21/22 1508)  Resp: 16 (01/21/22 1508)  BP: 111/66 (01/21/22 1508)  SpO2: 99 % (01/21/22 1508) Vital Signs (24h Range):  Temp:  [97.8 °F (36.6 °C)-103.1 °F (39.5 °C)] 98.7 °F (37.1 °C)  Pulse:  [] 92  Resp:  [14-17] 16  SpO2:  [96 %-99 %] 99 %  BP: (104-130)/(63-77) 111/66     Weight: 39.8 kg (87 lb 11.9 oz)  Body mass index is 15.06 kg/m².    Intake/Output Summary (Last 24 hours) at 1/21/2022 1648  Last data filed at 1/21/2022 1200  Gross per 24 hour   Intake 0 ml   Output 825 ml   Net -825 ml      Physical Exam  Vitals and nursing note reviewed.   Constitutional:       General: She is not in acute distress ( ).     Appearance: She is cachectic. She is ill-appearing. She is not diaphoretic.   HENT:      Head: Normocephalic and atraumatic.      Nose: Nose normal.   Eyes:      General: No scleral icterus.     Conjunctiva/sclera:  Conjunctivae normal.   Neck:      Trachea: No tracheal deviation.   Cardiovascular:      Rate and Rhythm: Normal rate and regular rhythm.      Heart sounds: Normal heart sounds. No murmur heard.  No friction rub. No gallop.    Pulmonary:      Effort: Pulmonary effort is normal. No respiratory distress.      Breath sounds: Normal breath sounds. No stridor. No wheezing or rales.   Chest:      Chest wall: No tenderness.   Abdominal:      General: Bowel sounds are normal. There is no distension.      Palpations: Abdomen is soft. There is no mass.      Tenderness: Tenderness: g tube site  There is no guarding or rebound.      Comments: colostomy LLQ  G tube dressing C/D/I- which is now attached to a guzmán bag draining green fluid   Musculoskeletal:         General: No tenderness or deformity. Normal range of motion.      Cervical back: Normal range of motion and neck supple.   Skin:     General: Skin is warm and dry.      Coloration: Skin is not pale.      Findings: No erythema or rash.   Neurological:      Mental Status: She is alert and oriented to person, place, and time.      Cranial Nerves: No cranial nerve deficit.      Motor: No abnormal muscle tone.      Coordination: Coordination normal.   Psychiatric:         Mood and Affect: Mood is not anxious. Affect is angry. Affect is not tearful.         Behavior: Behavior normal.         Thought Content: Thought content normal.         Significant Labs:   All pertinent labs within the past 24 hours have been reviewed.  BMP:   Recent Labs   Lab 01/20/22  0637   GLU 77   *   K 4.1      CO2 19*   BUN 26*   CREATININE 1.1   CALCIUM 8.9     CBC:   Recent Labs   Lab 01/20/22  0637   WBC 12.68   HGB 9.2*   HCT 30.4*        CMP:   Recent Labs   Lab 01/20/22  0637   *   K 4.1      CO2 19*   GLU 77   BUN 26*   CREATININE 1.1   CALCIUM 8.9   PROT 6.9   ALBUMIN 3.2*   BILITOT 0.3   ALKPHOS 68   AST 11   ALT <5*   ANIONGAP 13   EGFRNONAA 57*        Significant Imaging:         Assessment/Plan:      * Severe malnutrition  S/p G tube placement today per IR  Consult RD  Regular diet   IVFs  Pain control     1/21  RD consult for TF recs  Will initiate once stable       Therapeutic opioid-induced constipation (OIC)  GI consulted   Patient c/o constipation requesting an injection.   Received  a dose of Relistor last admission       Gastrostomy tube in place  Placed on 1/19 per IR  Green fluid draining from the G tube- this is attached to a guzmán bag, her colostomy has no output, she has bowel sounds, but endorsing nasuea and abdominal pain  CT abdomen showed interval placement of percutaneous G tube with catheter coursing along the left inferior anterior margin with possible small adjacent hemorrhage.      Metastatic signet ring cell carcinoma  Oncology consulted       Intractable nausea and vomiting, Chronic  Scheduled Zofran   Phenergan/compazine prn       Colostomy present on admission  Consult wound care     1/21  No output noted from colostomy   Per patient reports no output noted in 3 days  CT abdomen showed interval placement of percutaneous G tube with catheter coursing along the left inferior anterior margin with possible small adjacent hemorrhage.       Gastroesophageal reflux disease without esophagitis  Cont Pepcid      Chronic pain due to neoplasm  Cont Fentanyl patch   Hold scheduled OxyContin for now   IV Dilaudid prn     Hydronephrosis   Urology following   Stent placement planned for tomorrow     VTE Risk Mitigation (From admission, onward)         Ordered     IP VTE HIGH RISK PATIENT  Once         01/19/22 1435     Place sequential compression device  Until discontinued         01/19/22 1435                Discharge Planning   VICTOR MANUEL: 1/20/2022     Code Status: Full Code   Is the patient medically ready for discharge?:     Reason for patient still in hospital (select all that apply): Patient trending condition, Laboratory test, Treatment,  Consult recommendations and Pending disposition  Discharge Plan A: Home with family                  Kerline Hernandez NP  Department of Hospital Medicine   '[facility]

## 2022-01-21 NOTE — PLAN OF CARE
Problem: Infection Progression (Sepsis/Septic Shock)  Goal: Absence of Infection Signs and Symptoms  Outcome: Ongoing, Progressing     Problem: Adult Inpatient Plan of Care  Goal: Absence of Hospital-Acquired Illness or Injury  Outcome: Ongoing, Progressing     Problem: Fall Injury Risk  Goal: Absence of Fall and Fall-Related Injury  Outcome: Ongoing, Progressing      Fall precautions maintained. Call bell and personal items within reach. VSS. Pain moderately controlled with PRN meds and fentanyl patch. N/V moderately controlled with PRN and scheduled meds. Temp of 103.1  Noted, Tylenol administered. Glucose monitoring refused. PEG tube draining brown/green foul smelling liquid. Dressing CDI. No ouput in colostomy. Pt refused to take laxative/mag citrate due to NV. Chart check complete. Will continue to monitor.

## 2022-01-21 NOTE — CONSULTS
Chief Complaint:  Bilateral hydronephrosis    HPI:  56-year-old female who is well known to the service with metastatic signet ring carcinoma, invading into the pelvis.  Unfortunately she has bilateral ureteral malignant obstruction, creating hydronephrosis and obstruction.  Request for stents prior to initiation of chemotherapy.    Allergies:  Patient has no known allergies.    Medications:  See MAR    Review of Systems:  General: No fever, chills, fatigability, or weight loss.  Skin: No rashes, itching, or changes in color or texture of skin.  Chest: Denies AMBROSE, cyanosis, wheezing, cough, and sputum production.  Abdomen: Appetite fine. No weight loss. Denies diarrhea, abdominal pain, hematemesis, or blood in stool.  Musculoskeletal: No joint stiffness or swelling. Denies back pain.  : As above.  All other review of systems negative.    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    PSH:   has a past surgical history that includes Appendectomy; Right colectomy; Colostomy; CYTOREDUCTION; LAPAROTOMY, EXPLORATORY (02/12/2020); Bilateral oophorectomy; Cholecystectomy; Revision Colostomy (N/A, 10/20/2020); Excision of lesion (10/20/2020); Esophagogastroduodenoscopy (N/A, 5/6/2021); Esophagogastroduodenoscopy (06/11/2021); Endoscopic ultrasound of upper gastrointestinal tract (N/A, 6/11/2021); ERCP (N/A, 6/11/2021); Cystoscopy (N/A, 12/21/2021); and Fluoroscopy (Bilateral, 1/19/2022).    FamHx: family history is not on file.    SocHx:  reports that she has never smoked. She has never used smokeless tobacco. She reports that she does not drink alcohol and does not use drugs.      Physical Exam:  Vitals:    01/21/22 1144   BP: 104/63   Pulse: 88   Resp: 16   Temp: 98.8 °F (37.1 °C)     General: A&Ox3, no apparent distress, no deformities  Neck: No  masses, normal ROM  Lungs: normal inspiration, no use of accessory muscles  Heart: normal pulse, no arrhythmias  Abdomen: Soft, NT, ND, no masses, no hernias, no hepatosplenomegaly  Skin: The skin is warm and dry. No jaundice.  Ext: No c/c/e.    Labs/Studies:   Creatinine 1.1  CT bilateral chronic hydronephrosis 1/22    Impression/Plan:     Bilateral ureteral malignant obstruction- to preserve maximum renal function prior to chemotherapy stents will be placed.  Will make patient NPO after midnight and proceed tomorrow.  Proceed with cystoscopy, bilateral retrogrades and bilateral stents.    Patient understands the risks, benefits and alternatives of the above-stated procedure.  These include but not limited to damage to the surrounding structures including the urethra, ureters and bladder.  Risk of perforation requiring open procedures, Blanco catheterization at the conclusion of the procedure.  Risk of need for further surgeries.  Risk of pain, hematuria, infections.  Risk of heart attack, stroke, death, DVT and PE.  Patient understanding of all the above he has elected to pursue.

## 2022-01-21 NOTE — NURSING
Pt refused accucheck. Pt is requesting relistor injection for constipation. NP notified and request that the attending doctor decides on that order.

## 2022-01-21 NOTE — ASSESSMENT & PLAN NOTE
S/p G tube placement today per IR  Consult RD  Regular diet   IVFs  Pain control     1/21  RD consult for TF recs  Will initiate once stable

## 2022-01-22 PROBLEM — N13.30 HYDRONEPHROSIS: Status: ACTIVE | Noted: 2022-01-01

## 2022-01-22 NOTE — ANESTHESIA POSTPROCEDURE EVALUATION
Anesthesia Post Evaluation    Patient: Madeline Warner    Procedure(s) Performed: Procedure(s) (LRB):  CYSTOSCOPY, WITH RETROGRADE PYELOGRAM AND URETERAL STENT INSERTION (Bilateral)    Final Anesthesia Type: MAC      Patient location during evaluation: PACU  Patient participation: Yes- Able to Participate  Level of consciousness: awake and alert, oriented and awake  Post-procedure vital signs: reviewed and stable  Pain management: adequate  Airway patency: patent    PONV status at discharge: No PONV  Anesthetic complications: no      Cardiovascular status: blood pressure returned to baseline  Respiratory status: unassisted, spontaneous ventilation and room air  Hydration status: euvolemic  Follow-up not needed.          Vitals Value Taken Time   BP 96/65 01/22/22 1515   Temp 98 01/22/22 1519   Pulse 85 01/22/22 1518   Resp 48 01/22/22 1518   SpO2 100 % 01/22/22 1518   Vitals shown include unvalidated device data.      No case tracking events are documented in the log.      Pain/Kenisha Score: Pain Rating Prior to Med Admin: 8 (1/22/2022  7:14 AM)  Pain Rating Post Med Admin: 5 (1/22/2022  7:44 AM)

## 2022-01-22 NOTE — PROGRESS NOTES
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 Pt will be placed in outpatient extended recovery for pain control, IVfs, and RD consult.     On exam, pt complain of nausea and 10/10 pain at G tube site. Pt reports ongoing chronic pain, N/V, weakness/fatigue, and weight loss for approximately one month.       Overview/Hospital Course:  1/20: G tube placed yesterday per IR. Today the patient has copious amounts of green fluid draining from the G tube- this is attached to a guzmán bag, her colostomy has no output, she has bowel sounds, but endorsing nasuea and abdominal pain. CT abdomen pending. Case discussed with Dr Barger and Dr Torres. Initial G tube placement check performed right after placement and the subsequent the following day is pending. As of 1/21/22  CT abdomen showed interval placement of percutaneous G tube with catheter coursing along the left inferior anterior margin with possible small adjacent hemorrhage. Patient c/o constipation requesting an injection. GI has been consulted to assist. Urology following and plans for stent placement tomorrow. G-tube still draining green fluid.  As of 1/22/22 pt reports passing some gas, still no BM. Will give x 3 doses of Relistor for constipation. Plans for ureteral stent placement today.       Interval History: No new issues overnight.     Review of Systems   Constitutional: Positive for activity change, appetite change and fatigue. Negative for chills, diaphoresis, fever and unexpected weight  change.   HENT: Negative for congestion, nosebleeds, sinus pressure and sore throat.    Eyes: Negative for pain, discharge and visual disturbance.   Respiratory: Negative for cough, chest tightness, shortness of breath, wheezing and stridor.    Cardiovascular: Negative for chest pain, palpitations and leg swelling.   Gastrointestinal: Positive for abdominal pain (10/10 pain to G tube site ), nausea and vomiting. Negative for abdominal distention, blood in stool, constipation and diarrhea.   Endocrine: Negative for cold intolerance and heat intolerance.   Genitourinary: Negative for difficulty urinating, dysuria, flank pain, frequency and urgency.   Musculoskeletal: Negative for arthralgias, back pain, joint swelling, myalgias, neck pain and neck stiffness.   Skin: Negative for rash and wound.   Allergic/Immunologic: Negative for food allergies and immunocompromised state.   Neurological: Positive for weakness. Negative for dizziness, seizures, syncope, facial asymmetry, speech difficulty, light-headedness, numbness and headaches.   Hematological: Negative for adenopathy.   Psychiatric/Behavioral: Negative for agitation, confusion and hallucinations.     Objective:     Vital Signs (Most Recent):  Temp: 98.2 °F (36.8 °C) (01/22/22 1515)  Pulse: 90 (01/22/22 1525)  Resp: 20 (01/22/22 1525)  BP: 118/78 (01/22/22 1525)  SpO2: 100 % (01/22/22 1525) Vital Signs (24h Range):  Temp:  [98.2 °F (36.8 °C)-99.3 °F (37.4 °C)] 98.2 °F (36.8 °C)  Pulse:  [85-98] 90  Resp:  [14-20] 20  SpO2:  [94 %-100 %] 100 %  BP: ()/(52-78) 118/78     Weight: 39.8 kg (87 lb 11.9 oz)  Body mass index is 15.06 kg/m².    Intake/Output Summary (Last 24 hours) at 1/22/2022 1531  Last data filed at 1/22/2022 1514  Gross per 24 hour   Intake 3801.32 ml   Output 1900 ml   Net 1901.32 ml      Physical Exam  Vitals and nursing note reviewed.   Constitutional:       General: She is not in acute distress ( ).     Appearance: She is cachectic. She is  ill-appearing. She is not diaphoretic.   HENT:      Head: Normocephalic and atraumatic.      Nose: Nose normal.   Eyes:      General: No scleral icterus.     Conjunctiva/sclera: Conjunctivae normal.   Neck:      Trachea: No tracheal deviation.   Cardiovascular:      Rate and Rhythm: Normal rate and regular rhythm.      Heart sounds: Normal heart sounds. No murmur heard.  No friction rub. No gallop.    Pulmonary:      Effort: Pulmonary effort is normal. No respiratory distress.      Breath sounds: Normal breath sounds. No stridor. No wheezing or rales.   Chest:      Chest wall: No tenderness.   Abdominal:      General: Bowel sounds are normal. There is no distension.      Palpations: Abdomen is soft. There is no mass.      Tenderness: Tenderness: g tube site  There is no guarding or rebound.      Comments: colostomy LLQ  G tube dressing C/D/I- which is now attached to a guzmán bag draining green fluid   Musculoskeletal:         General: No tenderness or deformity. Normal range of motion.      Cervical back: Normal range of motion and neck supple.   Skin:     General: Skin is warm and dry.      Coloration: Skin is not pale.      Findings: No erythema or rash.   Neurological:      Mental Status: She is alert and oriented to person, place, and time.      Cranial Nerves: No cranial nerve deficit.      Motor: No abnormal muscle tone.      Coordination: Coordination normal.   Psychiatric:         Mood and Affect: Mood is not anxious. Affect is angry. Affect is not tearful.         Behavior: Behavior normal.         Thought Content: Thought content normal.         Significant Labs:   All pertinent labs within the past 24 hours have been reviewed.  Blood Culture: No results for input(s): LABBLOO in the last 48 hours.  BMP:   Recent Labs   Lab 01/22/22  0659   GLU 59*      K 3.8      CO2 16*   BUN 26*   CREATININE 1.1   CALCIUM 8.8     CBC:   Recent Labs   Lab 01/22/22  0659   WBC 12.19   HGB 8.2*   HCT 28.0*   PLT  350     CMP:   Recent Labs   Lab 01/22/22  0659      K 3.8      CO2 16*   GLU 59*   BUN 26*   CREATININE 1.1   CALCIUM 8.8   ANIONGAP 15   EGFRNONAA 57*       Significant Imaging: I have reviewed all pertinent imaging results/findings within the past 24 hours.      Assessment/Plan:      * Severe malnutrition  S/p G tube placement today per IR  Consult RD  Regular diet   IVFs  Pain control     1/22  Will initiate tube feedings once stable       Hydronephrosis  Urology on board   Plan for ureteral stent placement today by Dr. Martínez       Therapeutic opioid-induced constipation (OIC)  Will give 3 doses of Relistor       Gastrostomy tube in place  Placed on 1/19 per IR  Green fluid draining from the G tube- this is attached to a guzmán bag, her colostomy has no output, she has bowel sounds, but endorsing nasuea and abdominal pain  CT abdomen showed interval placement of percutaneous G tube with catheter coursing along the left inferior anterior margin with possible small adjacent hemorrhage.      Metastatic signet ring cell carcinoma  Oncology consulted       Intractable nausea and vomiting, Chronic  Scheduled Zofran   Phenergan/compazine prn   Improved       Colostomy present on admission  Consult wound care     1/22  Reports passing some flatus   No output noted from colostomy       Gastroesophageal reflux disease without esophagitis  Cont Pepcid      Chronic pain due to neoplasm  Cont Fentanyl patch   Hold scheduled OxyContin for now   IV Dilaudid prn         VTE Risk Mitigation (From admission, onward)         Ordered     IP VTE HIGH RISK PATIENT  Once         01/19/22 1435     Place sequential compression device  Until discontinued         01/19/22 1435                Discharge Planning   VICTOR MANUEL: 1/20/2022     Code Status: Full Code   Is the patient medically ready for discharge?:     Reason for patient still in hospital (select all that apply): Patient trending condition, Laboratory test, Treatment and  Imaging  Discharge Plan A: Home with family                  Kerline Hernandez NP  Department of Hospital Medicine   '[facility]

## 2022-01-22 NOTE — PLAN OF CARE
Problem: Infection Progression (Sepsis/Septic Shock)  Goal: Absence of Infection Signs and Symptoms  Outcome: Ongoing, Progressing     Problem: Adult Inpatient Plan of Care  Goal: Plan of Care Review  Outcome: Ongoing, Progressing      Fall precautions maintained. Call bell and personal items within reach. BP low, fluid rate adjusted to 125 ml/hr. Pain controlled with PRN meds. Glucose monitoring refused. Constipation meds refused. NPO status maintained. Chart check complete. Will continue to monitor.

## 2022-01-22 NOTE — PLAN OF CARE
Patient remains free of injury. AAOx4. POC reviewed, patient verbalizes understanding. Pain controlled with IV pain medication. IV fluids infusing. Regular diet tolerated. Patient refuses POCT glucose checks. Blanco catheter in place. Drainage to PEG noted. Call light and personal items within reach. Chart check complete. Will continue to monitor.

## 2022-01-22 NOTE — CONSULTS
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  Oncology was consulted due to history of metastatic signet ring cell carcinoma.  She is also known to have right hydronephrosis for which ureteral stent placement has been planned by Urology.    Today she continues to complain of abdominal pain, intermittent nausea and vomiting.  She is yet to initiate systemic palliative therapy for her cancer due to poor nutritional status and right hydronephrosis.    Continuous Infusions:   sodium chloride 0.9% 125 mL/hr at 01/22/22 0717     Scheduled Meds:   famotidine  20 mg Oral Daily    fentaNYL  1 patch Transdermal Q72H    [START ON 1/23/2022] methylnaltrexone  8 mg Subcutaneous Every other day    metoclopramide HCl  5 mg Oral QID (AC & HS)    ondansetron  4 mg Intravenous Q6H    polyethylene glycol  17 g Oral BID    senna  8.6 mg Oral Daily     PRN Meds:acetaminophen, albuterol-ipratropium, aluminum-magnesium hydroxide-simethicone, bisacodyL, dextrose 50%, dextrose 50%, glucagon (human recombinant), glucose, glucose, HYDROmorphone, magnesium oxide, magnesium oxide, naloxone, potassium bicarbonate, potassium bicarbonate, potassium bicarbonate, potassium, sodium phosphates, potassium, sodium phosphates, potassium, sodium phosphates, prochlorperazine, promethazine (PHENERGAN) IVPB, simethicone, sodium chloride 0.9%, zolpidem    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    T4 N1bM1 B 3/85 nodes positive    Encounter for blood transfusion     Leukocytosis 1/19/2022    PONV (postoperative nausea and vomiting)      Past Surgical History:    Procedure Laterality Date    APPENDECTOMY      BILATERAL OOPHORECTOMY      CHOLECYSTECTOMY      with Cytoreduction and salpingectomy    COLOSTOMY      CYSTOSCOPY N/A 12/21/2021    Procedure: CYSTOSCOPY;  Surgeon: John Martínez MD;  Location: [facility] OR;  Service: Urology;  Laterality: N/A;    CYTOREDUCTION      ENDOSCOPIC ULTRASOUND OF UPPER GASTROINTESTINAL TRACT N/A 6/11/2021    Procedure: ULTRASOUND, UPPER GI TRACT, ENDOSCOPIC;  Surgeon: Rosaura Lakhani MD;  Location: [facility];  Service: Endoscopy;  Laterality: N/A;  Linear scope    ERCP N/A 6/11/2021    Procedure: ERCP (ENDOSCOPIC RETROGRADE CHOLANGIOPANCREATOGRAPHY);  Surgeon: Rosaura Lakhani MD;  Location: [facility];  Service: Endoscopy;  Laterality: N/A;    ESOPHAGOGASTRODUODENOSCOPY N/A 5/6/2021    Procedure: EGD (ESOPHAGOGASTRODUODENOSCOPY);  Surgeon: Brandon Rosen MD;  [facility];  Service: Gastroenterology;  Laterality: N/A;    ESOPHAGOGASTRODUODENOSCOPY  06/11/2021    EXCISION OF LESION  10/20/2020    Procedure: EXCISION, LESION COLOSTOMY;  Surgeon: Layton Anderson MD;  Location: [facility];  Service: General;;    FLUOROSCOPY Bilateral 1/19/2022    Procedure: Insertion of G-J tube and bilateral upper extremity venogram;  Surgeon: Bryan Torres MD;  Location: [facility] CATH LAB;  Service: General;  Laterality: Bilateral;    LAPAROTOMY, EXPLORATORY  02/12/2020    LYSIS OF ADHESION, COLOSTOMY    REVISION COLOSTOMY N/A 10/20/2020    Procedure: REVISION, COLOSTOMY;  Surgeon: Layton Anderson MD;  Location: [facility];  Service: General;  Laterality: N/A;  Ostomy bag placed    RIGHT COLECTOMY       No family history on file.  Social History     Tobacco Use    Smoking status: Never Smoker    Smokeless tobacco: Never Used   Substance Use Topics    Alcohol use: Never    Drug use: Never       Review of patient's allergies indicates:  No Known Allergies       Review of Systems   Constitutional: Positive for  activity change, appetite change and fatigue. Negative for chills, fever and unexpected weight change.   HENT: Negative for hearing loss, mouth sores, nosebleeds, sore throat, tinnitus, trouble swallowing and voice change.    Eyes: Negative for visual disturbance.   Respiratory: Negative for cough, chest tightness and shortness of breath.    Cardiovascular: Negative for chest pain, palpitations and leg swelling.   Gastrointestinal: Positive for abdominal pain. Negative for anal bleeding, blood in stool, constipation, diarrhea, nausea and vomiting.   Genitourinary: Negative for dysuria, frequency, hematuria, pelvic pain, vaginal bleeding and vaginal pain.   Musculoskeletal: Negative for arthralgias, back pain, joint swelling and neck pain.   Skin: Negative for color change, pallor, rash and wound.   Allergic/Immunologic: Negative for immunocompromised state.   Neurological: Positive for weakness. Negative for dizziness, tremors, syncope, speech difficulty, light-headedness and headaches.   Hematological: Negative for adenopathy. Does not bruise/bleed easily.   Psychiatric/Behavioral: Negative for agitation, confusion, decreased concentration, hallucinations and sleep disturbance. The patient is not nervous/anxious.            OBJECTIVE:     Vital Signs (Most Recent)  Temp: 98.4 °F (36.9 °C) (01/22/22 1220)  Pulse: 85 (01/22/22 1220)  Resp: 18 (01/22/22 1220)  BP: (!) 85/54 (01/22/22 1220)  SpO2: 98 % (01/22/22 1220)    Pain Assessment: No pain reported at this time    Vital Signs Range (Last 24H):  Temp:  [98.3 °F (36.8 °C)-99.3 °F (37.4 °C)]   Pulse:  [85-98]   Resp:  [14-18]   BP: ()/(52-66)   SpO2:  [94 %-99 %]       Physical Exam  Constitutional:       General: She is not in acute distress.     Appearance: She is well-developed and well-nourished. She is cachectic. She is ill-appearing. She is not toxic-appearing.   HENT:      Head: Normocephalic and atraumatic.      Mouth/Throat:      Pharynx: No  oropharyngeal exudate.   Eyes:      General: No scleral icterus.        Right eye: No discharge.         Left eye: No discharge.      Conjunctiva/sclera: Conjunctivae normal.      Pupils: Pupils are equal, round, and reactive to light.   Neck:      Thyroid: No thyromegaly.   Cardiovascular:      Rate and Rhythm: Normal rate and regular rhythm.      Heart sounds: No murmur heard.      Pulmonary:      Effort: Pulmonary effort is normal. No respiratory distress.      Breath sounds: Normal breath sounds.   Chest:      Chest wall: No tenderness.   Breasts:      Right: No supraclavicular adenopathy.      Left: No supraclavicular adenopathy.       Abdominal:      General: Bowel sounds are normal. There is no distension.      Palpations: Abdomen is soft. There is no mass.      Tenderness: There is no abdominal tenderness. There is no guarding or rebound.   Musculoskeletal:         General: No tenderness or edema. Normal range of motion.      Cervical back: Normal range of motion and neck supple.   Lymphadenopathy:      Cervical: No cervical adenopathy.      Right cervical: No superficial cervical adenopathy.     Left cervical: No superficial cervical adenopathy.      Upper Body:      Right upper body: No supraclavicular or pectoral adenopathy.      Left upper body: No supraclavicular or pectoral adenopathy.      Lower Body: No right inguinal adenopathy. No left inguinal adenopathy.   Skin:     General: Skin is warm and dry.      Capillary Refill: Capillary refill takes 2 to 3 seconds.      Coloration: Skin is not pale.      Findings: No erythema or rash.   Neurological:      Mental Status: She is alert and oriented to person, place, and time.      Cranial Nerves: No cranial nerve deficit.      Sensory: No sensory deficit.   Psychiatric:         Mood and Affect: Mood and affect normal.         Behavior: Behavior normal. Behavior is cooperative.         Judgment: Judgment normal.             Laboratory:  CBC with  Differential:  Recent Labs   Lab 01/22/22  0659   WBC 12.19   LYMPH 3.0*  0.4*   BASOPHIL 0.1   RBC 3.26*   HCT 28.0*   HGB 8.2*   MCV 86   MCH 25.2*   RDW 14.6*      MPV 9.3     CMP:  Recent Labs   Lab 01/22/22  0659   GLU 59*   CALCIUM 8.8      K 3.8   CO2 16*      BUN 26*   CREATININE 1.1     BMP:   Recent Labs   Lab 01/22/22  0659   GLU 59*   CALCIUM 8.8      K 3.8   CO2 16*      BUN 26*   CREATININE 1.1     LFTs: No results for input(s): ALT, AST, ALKPHOS, BILITOT, PROT, ALBUMIN in the last 24 hours.  Haptoglobin: No results for input(s): HAPTOGLOBIN in the last 24 hours.  Tumor Markers: No results for input(s): PSA, CEA, , AFPTM, TB2869,  in the last 24 hours.    Invalid input(s): ALGTM  Immunology: No results for input(s): SPEP, INNA, AGNES, FREELAMBDALI in the last 24 hours.  Coagulation: No results for input(s): PT, INR, APTT in the last 24 hours.  Specimen (24h ago, onward)            None        Microbiology Results (last 7 days)     ** No results found for the last 168 hours. 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    Chronic pain due to neoplasm    Intestinal obstruction    Gastroesophageal reflux disease without esophagitis    Elevated LFTs    Left lower lobe pulmonary infiltrate    Acute pancreatitis    Hypotension due to hypovolemia    Abnormal finding of lung    Septic shock    Colostomy present on admission    Gross hematuria    Dysuria    Urinary tract infection without hematuria    Urge incontinence    Bladder mass    Intractable nausea and vomiting, Chronic    Severe malnutrition    Metastatic signet ring cell carcinoma    Gastrostomy tube in place    Therapeutic opioid-induced constipation (OIC)         Plan:  # metastatic signet ring cell carcinoma with peritoneal carcinomatosis:  -plan to initiate FOLFIRI plus  Avastin as outpatient.  -status post G-tube placement for nutrition.  -plan for urethral stent placement due to right hydronephrosis.  -follow-up with oncologist within 1 week.    # constipation:  -thought to be related to narcotics, GI consulted.    # normocytic anemia:  -multifactorial.  Continue to monitor and transfuse for hemoglobin less than 7 grams/deciliters.      Thank you for allowing us to participate in the care of this patient.  Contact us with any question or concern.      Puneet Phan MD

## 2022-01-22 NOTE — ASSESSMENT & PLAN NOTE
S/p G tube placement today per IR  Consult RD  Regular diet   IVFs  Pain control     1/22  Will initiate tube feedings once stable

## 2022-01-22 NOTE — OP NOTE
Date of Procedure:  01/22/2022    Pre-operative Diagnosis:   Bilateral malignant ureteral obstruction    Post-operative Diagnosis:    Bilateral malignant ureteral obstruction    Surgeon:  John Martínez MD    Assistants: None    Specimen: None    Anesthesia:  Mac anesthesia    Blood loss:  Minimal    Findings:  Bilateral 6 [place] by 24 cm stents in good position, retrogrades demonstrate bilateral obstruction.    Procedures:  1. Cysto with placement of bilateral ureteral stents  2. Bilateral retrograde pyelogram  3. Fluoro less than one hour    Procedure in Detail:  Patient was brought to the operative suite and placed under general anesthesia.  After being positioned in dorsal lithotomy position, patient was sterilely prepped and draped.  Twenty-one [place] sheath cystoscope was placed into a normal urethra.  Previous resect tissue was identified, debris within the bladder.  Bilateral ureteral orifices were normal in size, shape, caliber and location.  The remainder of the bladder was otherwise unremarkable, no evidence of intravesical lesions or other abnormalities.  Wire was inserted up the right ureter and passed into the renal pelvis.  Over the wire using Seldinger technique we placed a [place] catheter and removed the wire.  Retrograde was done demonstrating dilatation, but otherwise no filling defects or other abnormalities.  The wire was reinserted and the [place] catheter was removed.  Again using Seldinger technique we placed a 6 [place] by 24 cm double-J ureteral stent, with good coil in the renal pelvis and bladder on flouroscopic examination.  Wire was inserted up the left ureter and passed into the renal pelvis.  Over the wire using Seldinger technique we placed a [place] catheter and removed the wire.  Retrograde was done demonstrating dilatation, but otherwise no filling defects or other abnormalities.  The wire was reinserted and the [place] catheter was removed.  Again using Seldinger technique we  placed a 6 Chinese by 24 cm double-J ureteral stent, with good coil in the renal pelvis and bladder on flouroscopic examination  The bladder was drained with the cystoscope.  Sixteen Chinese Blanco catheter placed within the bladder, 10 mL of sterile water was placed in the balloon which sat nicely upon the bladder neck.  Patient was then taken to the PACU in stable condition.  I will arrange for follow-up as an outpatient in 1 month with renal ultrasound, would possibly recommend a 22 cm stent on the left the next time.    Complications: None.

## 2022-01-22 NOTE — SUBJECTIVE & OBJECTIVE
Interval History: No new issues overnight.     Review of Systems   Constitutional: Positive for activity change, appetite change and fatigue. Negative for chills, diaphoresis, fever and unexpected weight change.   HENT: Negative for congestion, nosebleeds, sinus pressure and sore throat.    Eyes: Negative for pain, discharge and visual disturbance.   Respiratory: Negative for cough, chest tightness, shortness of breath, wheezing and stridor.    Cardiovascular: Negative for chest pain, palpitations and leg swelling.   Gastrointestinal: Positive for abdominal pain (10/10 pain to G tube site ), nausea and vomiting. Negative for abdominal distention, blood in stool, constipation and diarrhea.   Endocrine: Negative for cold intolerance and heat intolerance.   Genitourinary: Negative for difficulty urinating, dysuria, flank pain, frequency and urgency.   Musculoskeletal: Negative for arthralgias, back pain, joint swelling, myalgias, neck pain and neck stiffness.   Skin: Negative for rash and wound.   Allergic/Immunologic: Negative for food allergies and immunocompromised state.   Neurological: Positive for weakness. Negative for dizziness, seizures, syncope, facial asymmetry, speech difficulty, light-headedness, numbness and headaches.   Hematological: Negative for adenopathy.   Psychiatric/Behavioral: Negative for agitation, confusion and hallucinations.     Objective:     Vital Signs (Most Recent):  Temp: 98.2 °F (36.8 °C) (01/22/22 1515)  Pulse: 90 (01/22/22 1525)  Resp: 20 (01/22/22 1525)  BP: 118/78 (01/22/22 1525)  SpO2: 100 % (01/22/22 1525) Vital Signs (24h Range):  Temp:  [98.2 °F (36.8 °C)-99.3 °F (37.4 °C)] 98.2 °F (36.8 °C)  Pulse:  [85-98] 90  Resp:  [14-20] 20  SpO2:  [94 %-100 %] 100 %  BP: ()/(52-78) 118/78     Weight: 39.8 kg (87 lb 11.9 oz)  Body mass index is 15.06 kg/m².    Intake/Output Summary (Last 24 hours) at 1/22/2022 1531  Last data filed at 1/22/2022 1514  Gross per 24 hour   Intake  3801.32 ml   Output 1900 ml   Net 1901.32 ml      Physical Exam  Vitals and nursing note reviewed.   Constitutional:       General: She is not in acute distress ( ).     Appearance: She is cachectic. She is ill-appearing. She is not diaphoretic.   HENT:      Head: Normocephalic and atraumatic.      Nose: Nose normal.   Eyes:      General: No scleral icterus.     Conjunctiva/sclera: Conjunctivae normal.   Neck:      Trachea: No tracheal deviation.   Cardiovascular:      Rate and Rhythm: Normal rate and regular rhythm.      Heart sounds: Normal heart sounds. No murmur heard.  No friction rub. No gallop.    Pulmonary:      Effort: Pulmonary effort is normal. No respiratory distress.      Breath sounds: Normal breath sounds. No stridor. No wheezing or rales.   Chest:      Chest wall: No tenderness.   Abdominal:      General: Bowel sounds are normal. There is no distension.      Palpations: Abdomen is soft. There is no mass.      Tenderness: Tenderness: g tube site  There is no guarding or rebound.      Comments: colostomy LLQ  G tube dressing C/D/I- which is now attached to a guzmán bag draining green fluid   Musculoskeletal:         General: No tenderness or deformity. Normal range of motion.      Cervical back: Normal range of motion and neck supple.   Skin:     General: Skin is warm and dry.      Coloration: Skin is not pale.      Findings: No erythema or rash.   Neurological:      Mental Status: She is alert and oriented to person, place, and time.      Cranial Nerves: No cranial nerve deficit.      Motor: No abnormal muscle tone.      Coordination: Coordination normal.   Psychiatric:         Mood and Affect: Mood is not anxious. Affect is angry. Affect is not tearful.         Behavior: Behavior normal.         Thought Content: Thought content normal.         Significant Labs:   All pertinent labs within the past 24 hours have been reviewed.  Blood Culture: No results for input(s): LABBLOO in the last 48 hours.  BMP:    Recent Labs   Lab 01/22/22  0659   GLU 59*      K 3.8      CO2 16*   BUN 26*   CREATININE 1.1   CALCIUM 8.8     CBC:   Recent Labs   Lab 01/22/22  0659   WBC 12.19   HGB 8.2*   HCT 28.0*        CMP:   Recent Labs   Lab 01/22/22  0659      K 3.8      CO2 16*   GLU 59*   BUN 26*   CREATININE 1.1   CALCIUM 8.8   ANIONGAP 15   EGFRNONAA 57*       Significant Imaging: I have reviewed all pertinent imaging results/findings within the past 24 hours.

## 2022-01-22 NOTE — ANESTHESIA PREPROCEDURE EVALUATION
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  Past Surgical History:   Procedure Laterality Date    APPENDECTOMY      BILATERAL OOPHORECTOMY      CHOLECYSTECTOMY      with Cytoreduction and salpingectomy    COLOSTOMY      CYSTOSCOPY N/A 12/21/2021    Procedure: CYSTOSCOPY;  Surgeon: John Martínez MD;  Location: [facility] OR;  Service: Urology;  Laterality: N/A;    CYTOREDUCTION      ENDOSCOPIC ULTRASOUND OF UPPER GASTROINTESTINAL TRACT N/A 6/11/2021    Procedure: ULTRASOUND, UPPER GI TRACT, ENDOSCOPIC;  Surgeon: Rosaura Lakhani MD;  Location: [facility];  Service: Endoscopy;  Laterality: N/A;  Linear scope    ERCP N/A 6/11/2021    Procedure: ERCP (ENDOSCOPIC RETROGRADE CHOLANGIOPANCREATOGRAPHY);  Surgeon: Rosaura Lakhani MD;  Location: [facility];  Service: Endoscopy;  Laterality: N/A;    ESOPHAGOGASTRODUODENOSCOPY N/A 5/6/2021    Procedure: EGD (ESOPHAGOGASTRODUODENOSCOPY);  Surgeon: Brandon Rosen MD;  Location: [facility];  Service: Gastroenterology;  Laterality: N/A;    ESOPHAGOGASTRODUODENOSCOPY  06/11/2021    EXCISION OF LESION  10/20/2020    Procedure: EXCISION, LESION COLOSTOMY;  Surgeon: Layton Anderson MD;  Location: [facility] OR;  Service: General;;    FLUOROSCOPY Bilateral 1/19/2022    Procedure: Insertion of G-J tube and bilateral upper extremity venogram;  Surgeon: Bryan Torres MD;  Location: [facility] CATH LAB;  Service: General;  Laterality: Bilateral;    LAPAROTOMY, EXPLORATORY  02/12/2020    LYSIS OF ADHESION, COLOSTOMY    REVISION COLOSTOMY N/A 10/20/2020    Procedure: REVISION,  COLOSTOMY;  Surgeon: Layton Anderson MD;  Location: [facility];  Service: General;  Laterality: N/A;  Ostomy bag placed    RIGHT COLECTOMY           Anesthesia Evaluation    I have reviewed the Patient Summary Reports.    I have reviewed the Nursing Notes. I have reviewed the NPO Status.   I have reviewed the Medications.     Review of Systems  Anesthesia Hx:  No problems with previous Anesthesia Hx of Anesthetic complications  History of prior surgery of interest to airway management or planning: Previous anesthesia: General Airway issues documented on chart review include mask, easy, easy direct laryngoscopy  Denies Family Hx of Anesthesia complications.  Personal Hx of Anesthesia complications, Post-Operative Nausea/Vomiting   Social:  No Alcohol Use, Non-Smoker    Hematology/Oncology:         -- Anemia: Current/Recent Cancer. Oncology Comments: Metastatic adenoCA of appendix.,carcinomatosis - pelvis.      Cardiovascular:  Cardiovascular Normal  ECG has been reviewed.    Pulmonary:  Pulmonary Normal    Renal/:  Renal/ Normal  Bilat malignant ureteral obstruction.   Hepatic/GI:   GERD Intractible N/V.  Severe malnutrition.   Neurological:   Chronic Pain Syndrome   Psych:  Psychiatric Normal           Physical Exam  General:  Malnutrition    Airway/Jaw/Neck:  Airway Findings: Mouth Opening: Normal Tongue: Normal  General Airway Assessment: Adult  Mallampati: II  TM Distance: Normal, at least 6 cm  Jaw/Neck Findings:  Neck ROM: Normal ROM  Neck Findings:     Eyes/Ears/Nose:  Eyes/Ears/Nose Findings:    Dental:  Dental Findings: In tact   Chest/Lungs:  Chest/Lungs Findings: Clear to auscultation, Normal Respiratory Rate     Heart/Vascular:  Heart Findings: Rate: Normal  Rhythm: Regular Rhythm  Sounds: Normal  Heart murmur: negative Vascular Findings: Normal    Abdomen:  Abdomen Findings: Normal    Musculoskeletal:  Musculoskeletal Findings: Normal   Skin:  Skin Findings: Normal    Mental Status:  Mental Status  Findings:  Alert and Oriented         Anesthesia Plan  Type of Anesthesia, risks & benefits discussed:  Anesthesia Type:  MAC    Patient's Preference:   Plan Factors:          Intra-op Monitoring Plan: standard ASA monitors  Intra-op Monitoring Plan Comments:   Post Op Pain Control Plan: per primary service following discharge from PACU and multimodal analgesia  Post Op Pain Control Plan Comments:     Induction:   IV  Beta Blocker:  Patient is not currently on a Beta-Blocker (No further documentation required).       Informed Consent: Patient understands risks and agrees with Anesthesia plan.  Questions answered. Anesthesia consent signed with patient.  ASA Score: 4     Day of Surgery Review of History & Physical:    H&P update referred to the surgeon.         Ready For Surgery From Anesthesia Perspective.

## 2022-01-22 NOTE — TRANSFER OF CARE
"Anesthesia Transfer of Care Note    Patient: Madeline Warner    Procedure(s) Performed: Procedure(s) (LRB):  CYSTOSCOPY, WITH RETROGRADE PYELOGRAM AND URETERAL STENT INSERTION (Bilateral)    Patient location: PACU    Anesthesia Type: MAC    Transport from OR: Transported from OR on room air with adequate spontaneous ventilation    Post pain: adequate analgesia    Post assessment: no apparent anesthetic complications    Post vital signs: stable    Level of consciousness: awake and alert    Nausea/Vomiting: no nausea/vomiting    Complications: none          Last vitals:   Visit Vitals  BP (!) 85/54 (Patient Position: Lying)   Pulse 85   Temp 36.9 °C (98.4 °F) (Oral)   Resp 18   Ht 5' 4" (1.626 m)   Wt 39.8 kg (87 lb 11.9 oz)   SpO2 98%   Breastfeeding No   BMI 15.06 kg/m²     "

## 2022-01-23 NOTE — PROGRESS NOTES
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 Pt will be placed in outpatient extended recovery for pain control, IVfs, and RD consult.     On exam, pt complain of nausea and 10/10 pain at G tube site. Pt reports ongoing chronic pain, N/V, weakness/fatigue, and weight loss for approximately one month.       Overview/Hospital Course:  1/20: G tube placed yesterday per IR. Today the patient has copious amounts of green fluid draining from the G tube- this is attached to a guzmán bag, her colostomy has no output, she has bowel sounds, but endorsing nasuea and abdominal pain. CT abdomen pending. Case discussed with Dr Barger and Dr Torres. Initial G tube placement check performed right after placement and the subsequent the following day is pending. As of 1/21/22  CT abdomen showed interval placement of percutaneous G tube with catheter coursing along the left inferior anterior margin with possible small adjacent hemorrhage. Patient c/o constipation requesting an injection. GI has been consulted to assist. Urology following and plans for stent placement tomorrow. G-tube still draining green fluid.  As of 1/22/22 pt reports passing some gas, still no BM. Will give x 3 doses of Relistor for constipation. Plans for ureteral stent placement today. As of 1/23/22 still no bowel movement. + Flatus. C/o abdominal pain and N/V. Abdominal xray pending. S/p bilateral ureteral stent placement. Palliative care consulted for symptom management and goal planning.          Review of Systems   Constitutional: Positive for activity change, appetite change and fatigue. Negative for chills, diaphoresis, fever and unexpected weight change.   HENT: Negative for congestion, nosebleeds, sinus pressure and sore throat.    Eyes: Negative for pain, discharge and visual disturbance.   Respiratory: Negative for cough, chest tightness, shortness of breath, wheezing and stridor.    Cardiovascular: Negative for chest pain, palpitations and leg swelling.   Gastrointestinal: Positive for abdominal pain (10/10 pain to G tube site ), nausea and vomiting. Negative for abdominal distention, blood in stool, constipation and diarrhea.   Endocrine: Negative for cold intolerance and heat intolerance.   Genitourinary: Negative for difficulty urinating, dysuria, flank pain, frequency and urgency.   Musculoskeletal: Negative for arthralgias, back pain, joint swelling, myalgias, neck pain and neck stiffness.   Skin: Negative for rash and wound.   Allergic/Immunologic: Negative for food allergies and immunocompromised state.   Neurological: Positive for weakness. Negative for dizziness, seizures, syncope, facial asymmetry, speech difficulty, light-headedness, numbness and headaches.   Hematological: Negative for adenopathy.   Psychiatric/Behavioral: Negative for agitation, confusion and hallucinations.     Objective:     Vital Signs (Most Recent):  Temp:  (pt refused all vitals) (01/23/22 1113)  Pulse: 79 (01/23/22 0722)  Resp: 18 (01/23/22 0722)  BP: (!) 82/49 (01/23/22 0722)  SpO2: 100 % (01/23/22 0722) Vital Signs (24h Range):  Temp:  [97.9 °F (36.6 °C)-99.9 °F (37.7 °C)] 97.9 °F (36.6 °C)  Pulse:  [] 79  Resp:  [16-20] 18  SpO2:  [95 %-100 %] 100 %  BP: ()/(47-91) 82/49     Weight: 39.8 kg (87 lb 11.9 oz)  Body mass index is 15.06 kg/m².    Intake/Output Summary (Last 24 hours) at 1/23/2022 1440  Last data filed at 1/23/2022 1044  Gross per 24 hour   Intake 3166.84 ml   Output 4425 ml   Net  -1258.16 ml      Physical Exam  Vitals and nursing note reviewed.   Constitutional:       General: She is not in acute distress ( ).     Appearance: She is cachectic. She is ill-appearing. She is not diaphoretic.   HENT:      Head: Normocephalic and atraumatic.      Nose: Nose normal.   Eyes:      General: No scleral icterus.     Conjunctiva/sclera: Conjunctivae normal.   Neck:      Trachea: No tracheal deviation.   Cardiovascular:      Rate and Rhythm: Normal rate and regular rhythm.      Heart sounds: Normal heart sounds. No murmur heard.  No friction rub. No gallop.    Pulmonary:      Effort: Pulmonary effort is normal. No respiratory distress.      Breath sounds: Normal breath sounds. No stridor. No wheezing or rales.   Chest:      Chest wall: No tenderness.   Abdominal:      General: Bowel sounds are normal. There is no distension.      Palpations: Abdomen is soft. There is no mass.      Tenderness: Tenderness: g tube site  There is no guarding or rebound.      Comments: colostomy LLQ  G tube dressing C/D/I- which is now attached to a guzmán bag draining green fluid   Musculoskeletal:         General: No tenderness or deformity. Normal range of motion.      Cervical back: Normal range of motion and neck supple.   Skin:     General: Skin is warm and dry.      Coloration: Skin is not pale.      Findings: No erythema or rash.   Neurological:      Mental Status: She is alert and oriented to person, place, and time.      Cranial Nerves: No cranial nerve deficit.      Motor: No abnormal muscle tone.      Coordination: Coordination normal.   Psychiatric:         Mood and Affect: Mood is not anxious. Affect is angry. Affect is not tearful.         Behavior: Behavior normal.         Thought Content: Thought content normal.         Significant Labs:   All pertinent labs within the past 24 hours have been reviewed.  BMP:   Recent Labs   Lab 01/22/22  0659   GLU 59*      K 3.8      CO2 16*   BUN 26*   CREATININE  1.1   CALCIUM 8.8     CBC:   Recent Labs   Lab 01/22/22  0659   WBC 12.19   HGB 8.2*   HCT 28.0*        CMP:   Recent Labs   Lab 01/22/22  0659      K 3.8      CO2 16*   GLU 59*   BUN 26*   CREATININE 1.1   CALCIUM 8.8   ANIONGAP 15   EGFRNONAA 57*       Significant Imaging: I have reviewed all pertinent imaging results/findings within the past 24 hours.      Assessment/Plan:      * Severe malnutrition  S/p G tube placement today per IR  Consult RD  Regular diet   IVFs  Pain control     1/23  Will initiate tube feedings once stable       Hydronephrosis  Urology on board   S/p bilateral ureteral stent placement.     Therapeutic opioid-induced constipation (OIC)  Will give 3 doses of Relistor       Gastrostomy tube in place  Placed on 1/19 per IR  Green fluid draining from the G tube- this is attached to a guzmán bag, her colostomy has no output, she has bowel sounds, but endorsing nasuea and abdominal pain  CT abdomen showed interval placement of percutaneous G tube with catheter coursing along the left inferior anterior margin with possible small adjacent hemorrhage.     Abdominal xray pending.   Palliative care consulted for symptom management and goal planning.       Metastatic signet ring cell carcinoma  Oncology consulted           Intractable nausea and vomiting, Chronic  Scheduled Zofran   Phenergan/compazine prn         Colostomy present on admission  Consult wound care     1/23  Reports passing some flatus   No output noted from colostomy   Received x1 dose of Relistor         Gastroesophageal reflux disease without esophagitis  Cont Pepcid      Chronic pain due to neoplasm  Cont Fentanyl patch   Hold scheduled OxyContin for now   IV Dilaudid prn         VTE Risk Mitigation (From admission, onward)         Ordered     IP VTE HIGH RISK PATIENT  Once         01/19/22 1435     Place sequential compression device  Until discontinued         01/19/22 1435                Discharge Planning   VICTOR MANUEL:  1/20/2022     Code Status: Full Code   Is the patient medically ready for discharge?:     Reason for patient still in hospital (select all that apply): Laboratory test, Treatment and Imaging  Discharge Plan A: Home with family                  Kerline Hernandez NP  Department of Hospital Medicine   [facility] Med S[facility]

## 2022-01-23 NOTE — SUBJECTIVE & OBJECTIVE
Review of Systems   Constitutional: Positive for activity change, appetite change and fatigue. Negative for chills, diaphoresis, fever and unexpected weight change.   HENT: Negative for congestion, nosebleeds, sinus pressure and sore throat.    Eyes: Negative for pain, discharge and visual disturbance.   Respiratory: Negative for cough, chest tightness, shortness of breath, wheezing and stridor.    Cardiovascular: Negative for chest pain, palpitations and leg swelling.   Gastrointestinal: Positive for abdominal pain (10/10 pain to G tube site ), nausea and vomiting. Negative for abdominal distention, blood in stool, constipation and diarrhea.   Endocrine: Negative for cold intolerance and heat intolerance.   Genitourinary: Negative for difficulty urinating, dysuria, flank pain, frequency and urgency.   Musculoskeletal: Negative for arthralgias, back pain, joint swelling, myalgias, neck pain and neck stiffness.   Skin: Negative for rash and wound.   Allergic/Immunologic: Negative for food allergies and immunocompromised state.   Neurological: Positive for weakness. Negative for dizziness, seizures, syncope, facial asymmetry, speech difficulty, light-headedness, numbness and headaches.   Hematological: Negative for adenopathy.   Psychiatric/Behavioral: Negative for agitation, confusion and hallucinations.     Objective:     Vital Signs (Most Recent):  Temp:  (pt refused all vitals) (01/23/22 1113)  Pulse: 79 (01/23/22 0722)  Resp: 18 (01/23/22 0722)  BP: (!) 82/49 (01/23/22 0722)  SpO2: 100 % (01/23/22 0722) Vital Signs (24h Range):  Temp:  [97.9 °F (36.6 °C)-99.9 °F (37.7 °C)] 97.9 °F (36.6 °C)  Pulse:  [] 79  Resp:  [16-20] 18  SpO2:  [95 %-100 %] 100 %  BP: ()/(47-91) 82/49     Weight: 39.8 kg (87 lb 11.9 oz)  Body mass index is 15.06 kg/m².    Intake/Output Summary (Last 24 hours) at 1/23/2022 1440  Last data filed at 1/23/2022 1044  Gross per 24 hour   Intake 3166.84 ml   Output 4425 ml   Net  -1258.16 ml      Physical Exam  Vitals and nursing note reviewed.   Constitutional:       General: She is not in acute distress ( ).     Appearance: She is cachectic. She is ill-appearing. She is not diaphoretic.   HENT:      Head: Normocephalic and atraumatic.      Nose: Nose normal.   Eyes:      General: No scleral icterus.     Conjunctiva/sclera: Conjunctivae normal.   Neck:      Trachea: No tracheal deviation.   Cardiovascular:      Rate and Rhythm: Normal rate and regular rhythm.      Heart sounds: Normal heart sounds. No murmur heard.  No friction rub. No gallop.    Pulmonary:      Effort: Pulmonary effort is normal. No respiratory distress.      Breath sounds: Normal breath sounds. No stridor. No wheezing or rales.   Chest:      Chest wall: No tenderness.   Abdominal:      General: Bowel sounds are normal. There is no distension.      Palpations: Abdomen is soft. There is no mass.      Tenderness: Tenderness: g tube site  There is no guarding or rebound.      Comments: colostomy LLQ  G tube dressing C/D/I- which is now attached to a guzmán bag draining green fluid   Musculoskeletal:         General: No tenderness or deformity. Normal range of motion.      Cervical back: Normal range of motion and neck supple.   Skin:     General: Skin is warm and dry.      Coloration: Skin is not pale.      Findings: No erythema or rash.   Neurological:      Mental Status: She is alert and oriented to person, place, and time.      Cranial Nerves: No cranial nerve deficit.      Motor: No abnormal muscle tone.      Coordination: Coordination normal.   Psychiatric:         Mood and Affect: Mood is not anxious. Affect is angry. Affect is not tearful.         Behavior: Behavior normal.         Thought Content: Thought content normal.         Significant Labs:   All pertinent labs within the past 24 hours have been reviewed.  BMP:   Recent Labs   Lab 01/22/22  0659   GLU 59*      K 3.8      CO2 16*   BUN 26*   CREATININE  1.1   CALCIUM 8.8     CBC:   Recent Labs   Lab 01/22/22  0659   WBC 12.19   HGB 8.2*   HCT 28.0*        CMP:   Recent Labs   Lab 01/22/22  0659      K 3.8      CO2 16*   GLU 59*   BUN 26*   CREATININE 1.1   CALCIUM 8.8   ANIONGAP 15   EGFRNONAA 57*       Significant Imaging: I have reviewed all pertinent imaging results/findings within the past 24 hours.

## 2022-01-23 NOTE — ASSESSMENT & PLAN NOTE
Consult wound care     1/23  Reports passing some flatus   No output noted from colostomy   Received x1 dose of Relistor

## 2022-01-23 NOTE — NURSING
Call team notified of pt low BP. Fluid bolus was ordered and administered. Pt BP remained low, continuous fluids restarted. Advised to continue to monitor pt.

## 2022-01-23 NOTE — PLAN OF CARE
Problem: Adult Inpatient Plan of Care  Goal: Plan of Care Review  Outcome: Ongoing, Progressing     Problem: Fall Injury Risk  Goal: Absence of Fall and Fall-Related Injury  Outcome: Ongoing, Progressing      Pt remained free of injury. Call bell and personal items within reach. Mother at bedside. Glucose checks refused.  No output in  Colostomy. Chart check complete. Will continue to monitor.

## 2022-01-23 NOTE — ASSESSMENT & PLAN NOTE
Placed on 1/19 per IR  Green fluid draining from the G tube- this is attached to a guzmán bag, her colostomy has no output, she has bowel sounds, but endorsing nasuea and abdominal pain  CT abdomen showed interval placement of percutaneous G tube with catheter coursing along the left inferior anterior margin with possible small adjacent hemorrhage.     Abdominal xray pending.   Palliative care consulted for symptom management and goal planning.

## 2022-01-23 NOTE — PLAN OF CARE
Patient remains injury free. No signs or symptoms of distress noted at this time.  Patient denies any pain or discomfort at this time and will continue to be monitored.

## 2022-01-23 NOTE — PROGRESS NOTES
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  Oncology was consulted due to history of metastatic signet ring cell carcinoma.  She is also known to have right hydronephrosis for which ureteral stent placement has been planned by Urology.    Interval history:  Resting in her bed.  Status post bilateral ureteral stent placement by Urology.  No acute event overnight.      Continuous Infusions:   sodium chloride 0.9% 125 mL/hr at 01/23/22 0720     Scheduled Meds:   famotidine  20 mg Oral Daily    fentaNYL  1 patch Transdermal Q72H    meperidine  12.5 mg Intravenous Once    methylnaltrexone  8 mg Subcutaneous Every other day    metoclopramide HCl  5 mg Oral QID (AC & HS)    ondansetron  4 mg Intravenous Q6H    polyethylene glycol  17 g Oral BID    senna  8.6 mg Oral Daily     PRN Meds:acetaminophen, albuterol-ipratropium, aluminum-magnesium hydroxide-simethicone, bisacodyL, dextrose 50%, dextrose 50%, diphenhydrAMINE, fentaNYL, glucagon (human recombinant), glucose, glucose, HYDROcodone-acetaminophen, HYDROmorphone, hyoscyamine, lorazepam, magnesium oxide, magnesium oxide, naloxone, ondansetron, phenazopyridine, potassium bicarbonate, potassium bicarbonate, potassium bicarbonate, potassium, sodium phosphates, potassium, sodium phosphates, potassium, sodium phosphates, prochlorperazine, promethazine (PHENERGAN) IVPB, simethicone, sodium chloride 0.9%, zolpidem    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    T4 N1bM1 B 3/85 nodes positive    Encounter for blood transfusion     Leukocytosis 1/19/2022    PONV (postoperative  nausea and vomiting)      Past Surgical History:   Procedure Laterality Date    APPENDECTOMY      BILATERAL OOPHORECTOMY      CHOLECYSTECTOMY      with Cytoreduction and salpingectomy    COLOSTOMY      CYSTOSCOPY N/A 12/21/2021    Procedure: CYSTOSCOPY;  Surgeon: John Martínez MD;  Location: [facility];  Service: Urology;  Laterality: N/A;    CYTOREDUCTION      ENDOSCOPIC ULTRASOUND OF UPPER GASTROINTESTINAL TRACT N/A 6/11/2021    Procedure: ULTRASOUND, UPPER GI TRACT, ENDOSCOPIC;  Surgeon: Rosaura Lakhani MD;  Location: [facility];  Service: Endoscopy;  Laterality: N/A;  Linear scope    ERCP N/A 6/11/2021    Procedure: ERCP (ENDOSCOPIC RETROGRADE CHOLANGIOPANCREATOGRAPHY);  Surgeon: Rosaura Lakhani MD;  Location: [facility];  Service: Endoscopy;  Laterality: N/A;    ESOPHAGOGASTRODUODENOSCOPY N/A 5/6/2021    Procedure: EGD (ESOPHAGOGASTRODUODENOSCOPY);  Surgeon: Brandon Rosen MD;  [facility]: [facility];  Service: Gastroenterology;  Laterality: N/A;    ESOPHAGOGASTRODUODENOSCOPY  06/11/2021    EXCISION OF LESION  10/20/2020    Procedure: EXCISION, LESION COLOSTOMY;  Surgeon: Layton Anderson MD;  Location: [facility];  Service: General;;    FLUOROSCOPY Bilateral 1/19/2022    Procedure: Insertion of G-J tube and bilateral upper extremity venogram;  Surgeon: Bryan Torres MD;  Location: [facility] CATH LAB;  Service: General;  Laterality: Bilateral;    LAPAROTOMY, EXPLORATORY  02/12/2020    LYSIS OF ADHESION, COLOSTOMY    REVISION COLOSTOMY N/A 10/20/2020    Procedure: REVISION, COLOSTOMY;  Surgeon: Layton Andersno MD;  Location: [facility];  Service: General;  Laterality: N/A;  Ostomy bag placed    RIGHT COLECTOMY       History reviewed. No pertinent family history.  Social History     Tobacco Use    Smoking status: Never Smoker    Smokeless tobacco: Never Used   Substance Use Topics    Alcohol use: Never    Drug use: Never       Review of patient's allergies indicates:  No  Known Allergies       Review of Systems    Constitutional: Positive for activity change, appetite change and fatigue. Negative for chills, fever and unexpected weight change.   HENT: Negative for hearing loss, mouth sores, nosebleeds, sore throat, tinnitus, trouble swallowing and voice change.    Eyes: Negative for visual disturbance.   Respiratory: Negative for cough, chest tightness and shortness of breath.    Cardiovascular: Negative for chest pain, palpitations and leg swelling.   Gastrointestinal: Positive for abdominal pain. Negative for anal bleeding, blood in stool, constipation, diarrhea, nausea and vomiting.   Genitourinary: Negative for dysuria, frequency, hematuria, pelvic pain, vaginal bleeding and vaginal pain.   Musculoskeletal: Negative for arthralgias, back pain, joint swelling and neck pain.   Skin: Negative for color change, pallor, rash and wound.   Allergic/Immunologic: Negative for immunocompromised state.   Neurological: Positive for weakness. Negative for dizziness, tremors, syncope, speech difficulty, light-headedness and headaches.   Hematological: Negative for adenopathy. Does not bruise/bleed easily.   Psychiatric/Behavioral: Negative for agitation, confusion, decreased concentration, hallucinations and sleep disturbance. The patient is not nervous/anxious.      OBJECTIVE:     Vital Signs (Most Recent)  Temp:  (pt refused all vitals) (01/23/22 1113)  Pulse: 79 (01/23/22 0722)  Resp: 18 (01/23/22 0722)  BP: (!) 82/49 (01/23/22 0722)  SpO2: 100 % (01/23/22 0722)    Pain Assessment: No pain reported at this time    Vital Signs Range (Last 24H):  Temp:  [97.9 °F (36.6 °C)-99.9 °F (37.7 °C)]   Pulse:  []   Resp:  [16-20]   BP: ()/(47-91)   SpO2:  [95 %-100 %]       Physical Exam    Constitutional:       General: She is not in acute distress.     Appearance: She is well-developed and well-nourished. She is cachectic. She is ill-appearing. She is not toxic-appearing.   HENT:       Head: Normocephalic and atraumatic.      Mouth/Throat:      Pharynx: No oropharyngeal exudate.   Eyes:      General: No scleral icterus.        Right eye: No discharge.         Left eye: No discharge.      Conjunctiva/sclera: Conjunctivae normal.      Pupils: Pupils are equal, round, and reactive to light.   Neck:      Thyroid: No thyromegaly.   Cardiovascular:      Rate and Rhythm: Normal rate and regular rhythm.      Heart sounds: No murmur heard.       Pulmonary:      Effort: Pulmonary effort is normal. No respiratory distress.      Breath sounds: Normal breath sounds.   Chest:      Chest wall: No tenderness.   Breasts:      Right: No supraclavicular adenopathy.      Left: No supraclavicular adenopathy.         Abdominal:      General: Bowel sounds are normal. There is no distension.      Palpations: Abdomen is soft. There is no mass.      Tenderness: There is no abdominal tenderness. There is no guarding or rebound.   Musculoskeletal:         General: No tenderness or edema. Normal range of motion.      Cervical back: Normal range of motion and neck supple.   Lymphadenopathy:      Cervical: No cervical adenopathy.      Right cervical: No superficial cervical adenopathy.     Left cervical: No superficial cervical adenopathy.      Upper Body:      Right upper body: No supraclavicular or pectoral adenopathy.      Left upper body: No supraclavicular or pectoral adenopathy.      Lower Body: No right inguinal adenopathy. No left inguinal adenopathy.   Skin:     General: Skin is warm and dry.      Capillary Refill: Capillary refill takes 2 to 3 seconds.      Coloration: Skin is not pale.      Findings: No erythema or rash.   Neurological:      Mental Status: She is alert and oriented to person, place, and time.      Cranial Nerves: No cranial nerve deficit.      Sensory: No sensory deficit.   Psychiatric:         Mood and Affect: Mood and affect normal.         Behavior: Behavior normal. Behavior is cooperative.          Judgment: Judgment normal.        Laboratory:  CBC with Differential:  No results for input(s): WBC, NEUTROPCT, LYMPH, MONOPCT, EOSPCT, BASOPHIL, RBC, HCT, HGB, MCV, MCH, RDW, PLT, MPV in the last 24 hours.    Invalid input(s): MCMC  CMP:  No results for input(s): GLU, CALCIUM, ALBUMIN, PROT, NA, K, CO2, CL, BUN, CREATININE, ALKPHOS, ALT, AST, BILITOT in the last 24 hours.  BMP: No results for input(s): GLU, CALCIUM, NA, K, CO2, CL, BUN, CREATININE in the last 24 hours.  LFTs: No results for input(s): ALT, AST, ALKPHOS, BILITOT, PROT, ALBUMIN in the last 24 hours.  Haptoglobin: No results for input(s): HAPTOGLOBIN in the last 24 hours.  Tumor Markers: No results for input(s): PSA, CEA, , AFPTM, DY1486,  in the last 24 hours.    Invalid input(s): ALGTM  Immunology: No results for input(s): SPEP, INNA, AGNES, FREELAMBDALI in the last 24 hours.  Coagulation: No results for input(s): PT, INR, APTT in the last 24 hours.  Specimen (24h ago, onward)            None        Microbiology Results (last 7 days)     ** No results found for the last 168 hours. 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    Chronic pain due to neoplasm    Intestinal obstruction    Gastroesophageal reflux disease without esophagitis    Elevated LFTs    Left lower lobe pulmonary infiltrate    Acute pancreatitis    Hypotension due to hypovolemia    Abnormal finding of lung    Septic shock    Colostomy present on admission    Gross hematuria    Dysuria    Urinary tract infection without hematuria    Urge incontinence    Bladder mass    Intractable nausea and vomiting, Chronic    Severe malnutrition    Metastatic signet ring cell carcinoma    Gastrostomy tube in place    Therapeutic opioid-induced constipation (OIC)    Hydronephrosis         Plan:    # metastatic signet ring cell carcinoma with peritoneal  carcinomatosis:  -plan to initiate FOLFIRI plus Avastin as outpatient.  -status post G-tube placement for nutrition.  -status post bilateral ureteral stent placement due to right hydronephrosis.  -follow-up with oncologist within 1 week.     # constipation:  -thought to be related to narcotics, GI consulted.     # normocytic anemia:  -multifactorial.  Continue to monitor and transfuse for hemoglobin less than 7 grams/deciliters.        Thank you for allowing us to participate in the care of this patient.  Contact us with any question or concern.    Puneet Phan MD

## 2022-01-23 NOTE — ASSESSMENT & PLAN NOTE
S/p G tube placement today per IR  Consult RD  Regular diet   IVFs  Pain control     1/23  Will initiate tube feedings once stable      leg

## 2022-01-24 PROBLEM — R50.9 FEVER: Status: ACTIVE | Noted: 2022-01-01

## 2022-01-24 NOTE — PLAN OF CARE
RD Recommendations    1. As medically able, initiate enteral nutrition: Isosource 1.5, continuous via PEG    - Advance as tolerated to goal: 40 mL/hr     - 100mL H2O flush q 4-6 hours or per MD/NP.    - Provides 1440 calories (100%EEN), 65g protein (>100%EPN), and 733ml H20 + FWF.     - Monitor for refeeding. Check Mg, K+, Na, Phos, and glucose before and during initiation; correct as indicated.      2. Continue diet as prescribed, as tolerated: regular and encourage PO intake    3. Consider Oral Nutrition Supplements for optimal calorie and protein intake   - Boost Plus (any flavor) with all meals to provide an additional 1080 calories and 42g protein; discontinue once EN initiated     4. RD to follow up to monitor intake, tolerance, and labs.   *Please send consult if acute nutrition needs arise.    Goals:    1. Initiate enteral nutrition within 48 hours.    2. Pt will tolerate >50% estimated energy and protein needs in 48-72 hours.      Iliana Fried, MS, RD, LDN

## 2022-01-24 NOTE — HPI
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  Oncology was consulted due to history of metastatic signet ring cell carcinoma.  She is also known to have right hydronephrosis for which ureteral stent placement has been planned by Urology.     Patient continues to complain of minor abdominal pain, and soreness around Gtube site.She is yet to initiate systemic palliative therapy for her cancer due to poor nutritional status and right hydronephrosis.

## 2022-01-24 NOTE — SUBJECTIVE & OBJECTIVE
Interval History: patient very lethargic this am. Apparently onset following Ativan administration. Palliative medicine at bedside at time of visit. Narcotics and ativan D/c'd until patient more alert. Still awaiting BM.     Oncology Treatment Plan:   OP FOLFOXIRI Q2W    Medications:  Continuous Infusions:   dextrose 5% lactated ringers 100 mL/hr at 01/24/22 1113     Scheduled Meds:   famotidine  20 mg Oral Daily    methylnaltrexone  8 mg Subcutaneous Every other day    metoclopramide HCl  5 mg Oral QID (AC & HS)    mupirocin   Nasal BID    ondansetron  4 mg Intravenous Q6H    polyethylene glycol  17 g Oral BID    senna  8.6 mg Oral Daily     PRN Meds:acetaminophen, albuterol-ipratropium, aluminum-magnesium hydroxide-simethicone, bisacodyL, dextrose 50%, dextrose 50%, diphenhydrAMINE, glucagon (human recombinant), glucose, glucose, HYDROmorphone, hyoscyamine, magnesium oxide, magnesium oxide, naloxone, ondansetron, phenazopyridine, potassium bicarbonate, potassium bicarbonate, potassium bicarbonate, potassium, sodium phosphates, potassium, sodium phosphates, potassium, sodium phosphates, prochlorperazine, promethazine (PHENERGAN) IVPB, simethicone, sodium chloride 0.9%, zolpidem     Review of Systems   Unable to perform ROS: Other     Objective:     Vital Signs (Most Recent):  Temp: 97.8 °F (36.6 °C) (01/24/22 1132)  Pulse: (!) 115 (01/24/22 1132)  Resp: 18 (01/24/22 1132)  BP: 116/63 (01/24/22 1132)  SpO2: 95 % (01/24/22 1132) Vital Signs (24h Range):  Temp:  [97.8 °F (36.6 °C)-100.7 °F (38.2 °C)] 97.8 °F (36.6 °C)  Pulse:  [] 115  Resp:  [16-20] 18  SpO2:  [93 %-97 %] 95 %  BP: (116-147)/(63-93) 116/63     Weight: 41.2 kg (90 lb 13.3 oz)  Body mass index is 15.59 kg/m².  Body surface area is 1.36 meters squared.      Intake/Output Summary (Last 24 hours) at 1/24/2022 1150  Last data filed at 1/24/2022 1009  Gross per 24 hour   Intake 1482.58 ml   Output 2450 ml   Net -967.42 ml       Physical  Exam  Vitals reviewed.   Constitutional:       Appearance: She is well-developed and well-nourished.   HENT:      Head: Normocephalic.      Right Ear: External ear normal.      Left Ear: External ear normal.      Nose: Nose normal.      Mouth/Throat:      Mouth: Oropharynx is clear and moist.   Eyes:      Extraocular Movements: EOM normal.   Neck:      Thyroid: No thyroid mass.   Cardiovascular:      Rate and Rhythm: Normal rate and regular rhythm.      Heart sounds: Normal heart sounds. No murmur heard.      Pulmonary:      Effort: Pulmonary effort is normal. No respiratory distress.      Breath sounds: Decreased breath sounds present. No wheezing or rales.   Abdominal:      General: There is no distension.      Palpations: Abdomen is rigid.       Genitourinary:     Comments: deferred  Musculoskeletal:         General: No edema.   Lymphadenopathy:      Head:      Right side of head: No submandibular, preauricular or posterior auricular adenopathy.      Left side of head: No submandibular, preauricular or posterior auricular adenopathy.   Skin:     General: Skin is warm, dry and intact.   Neurological:      Mental Status: She is lethargic.   Psychiatric:         Mood and Affect: Mood and affect normal.         Significant Labs:   BMP:   Recent Labs   Lab 01/24/22  0921   GLU 67*      K 3.8   *   CO2 15*   BUN 23*   CREATININE 1.2   CALCIUM 8.7   , CBC:   Recent Labs   Lab 01/24/22  0921   WBC 9.47   HGB 8.8*   HCT 30.5*      , LFTs: No results for input(s): ALT, AST, ALKPHOS, BILITOT, PROT, ALBUMIN in the last 48 hours. and All pertinent labs from the last 24 hours have been reviewed.    Diagnostic Results:  I have reviewed all pertinent imaging results/findings within the past 24 hours.

## 2022-01-24 NOTE — ASSESSMENT & PLAN NOTE
--plan to arrange outpatient follow up with Primary Oncologist pending resolution of current clinical situation to initiate chemotherapy

## 2022-01-24 NOTE — ASSESSMENT & PLAN NOTE
S/p G tube placement today per IR  Consult RD  Regular diet   IVFs  Pain control     1/24  Will initiate tube feedings once stable

## 2022-01-24 NOTE — PLAN OF CARE
[facility] - Med Surg  Discharge Reassessment    Primary Care Provider: Mariam Peña MD    Expected Discharge Date: 1/20/2022    Reassessment (most recent)     Discharge Reassessment - 01/24/22 1417        Discharge Reassessment    Assessment Type Discharge Planning Reassessment     Did the patient's condition or plan change since previous assessment? No     Discharge Plan discussed with: Parent(s)     Name(s) and Number(s) Nancy Su at bedside     Communicated VICTOR MANUEL with patient/caregiver Date not available/Unable to determine     Discharge Plan A Home with family     Discharge Plan B Home with family;Home Health     DME Needed Upon Discharge  other (see comments)   tube feeding formula/supplies    Discharge Barriers Identified None     Why the patient remains in the hospital Requires continued medical care

## 2022-01-24 NOTE — PLAN OF CARE
01/24/22 1425   Readmission   Why were you hospitalized in the last 30 days? N/V   Why were you readmitted?   (Procedure to insert G tube)   When you left the hospital how did you feel? symptoms better   When you left the hospital where did you go? Home with Family   Did patient/caregiver refused recommended DC plan? No   Tell me about what happened between when you left the hospital and the day you returned. Came in for G Tube placement   When did you start not feeling well? NA   Did you try to manage your symptoms your self?   (NA)   Did you call anyone?   (NA)   Did you try to see or did see a doctor or nurse before you came?   (NA)   Did you have  a follow-up appointment on discharge? Yes   Did you go?   ([facility]  1/7 and 1/12 cancelled)   Was this a planned readmission? No

## 2022-01-24 NOTE — ASSESSMENT & PLAN NOTE
Placed on 1/19 per IR  Green fluid draining from the G tube- this is attached to a guzmán bag, her colostomy has no output, she has bowel sounds, but endorsing nasuea and abdominal pain  CT abdomen showed interval placement of percutaneous G tube with catheter coursing along the left inferior anterior margin with possible small adjacent hemorrhage.    After further review of CT scan by radiologist may think she is obstructed  General Surgery consulted for possible obstruction.

## 2022-01-24 NOTE — SUBJECTIVE & OBJECTIVE
Review of Systems   Unable to perform ROS: Other     Objective:     Vital Signs (Most Recent):  Temp: 100.1 °F (37.8 °C) (01/24/22 1552)  Pulse: 91 (01/24/22 1552)  Resp: 18 (01/24/22 1552)  BP: 114/69 (01/24/22 1552)  SpO2: (!) 94 % (01/24/22 1552) Vital Signs (24h Range):  Temp:  [97.8 °F (36.6 °C)-100.7 °F (38.2 °C)] 100.1 °F (37.8 °C)  Pulse:  [] 91  Resp:  [16-20] 18  SpO2:  [93 %-97 %] 94 %  BP: (114-147)/(63-93) 114/69     Weight: 41.2 kg (90 lb 13.3 oz)  Body mass index is 15.59 kg/m².    Intake/Output Summary (Last 24 hours) at 1/24/2022 1638  Last data filed at 1/24/2022 1009  Gross per 24 hour   Intake 1482.58 ml   Output 2450 ml   Net -967.42 ml      Physical Exam  Vitals and nursing note reviewed.   Constitutional:       General: She is not in acute distress ( ).     Appearance: She is cachectic. She is ill-appearing. She is not diaphoretic.   HENT:      Head: Normocephalic and atraumatic.      Nose: Nose normal.   Eyes:      General: No scleral icterus.     Conjunctiva/sclera: Conjunctivae normal.   Neck:      Trachea: No tracheal deviation.   Cardiovascular:      Rate and Rhythm: Normal rate and regular rhythm.      Heart sounds: Normal heart sounds. No murmur heard.  No friction rub. No gallop.    Pulmonary:      Effort: Pulmonary effort is normal. No respiratory distress.      Breath sounds: Normal breath sounds. No stridor. No wheezing or rales.   Chest:      Chest wall: No tenderness.   Abdominal:      General: Bowel sounds are normal. There is distension.      Palpations: Abdomen is soft. There is no mass.      Tenderness: Tenderness: g tube site  There is no guarding or rebound.      Comments: colostomy LLQ  G tube dressing C/D/I- attached to a guzmán bag draining dark green fluid   Musculoskeletal:         General: No tenderness or deformity. Normal range of motion.      Cervical back: Normal range of motion and neck supple.   Skin:     General: Skin is warm and dry.      Coloration:  Skin is not pale.      Findings: No erythema or rash.   Neurological:      Mental Status: She is lethargic.      Cranial Nerves: No cranial nerve deficit.      Motor: No abnormal muscle tone.      Coordination: Coordination normal.   Psychiatric:         Mood and Affect: Mood is not anxious. Affect is not angry or tearful.         Significant Labs:   All pertinent labs within the past 24 hours have been reviewed.  Blood Culture: No results for input(s): LABBLOO in the last 48 hours.  BMP:   Recent Labs   Lab 01/24/22  0921   GLU 67*      K 3.8   *   CO2 15*   BUN 23*   CREATININE 1.2   CALCIUM 8.7     CBC:   Recent Labs   Lab 01/24/22  0921   WBC 9.47   HGB 8.8*   HCT 30.5*        CMP:   Recent Labs   Lab 01/24/22  0921      K 3.8   *   CO2 15*   GLU 67*   BUN 23*   CREATININE 1.2   CALCIUM 8.7   ANIONGAP 13   EGFRNONAA 51*       Significant Imaging: I have reviewed all pertinent imaging results/findings within the past 24 hours.

## 2022-01-24 NOTE — PROGRESS NOTES
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 Pt will be placed in outpatient extended recovery for pain control, IVfs, and RD consult.     On exam, pt complain of nausea and 10/10 pain at G tube site. Pt reports ongoing chronic pain, N/V, weakness/fatigue, and weight loss for approximately one month.       Overview/Hospital Course:  1/20: G tube placed yesterday per IR. Today the patient has copious amounts of green fluid draining from the G tube- this is attached to a guzmán bag, her colostomy has no output, she has bowel sounds, but endorsing nasuea and abdominal pain. CT abdomen pending. Case discussed with Dr Barger and Dr Torres. Initial G tube placement check performed right after placement and the subsequent the following day is pending. As of 1/21/22  CT abdomen showed interval placement of percutaneous G tube with catheter coursing along the left inferior anterior margin with possible small adjacent hemorrhage. Patient c/o constipation requesting an injection. GI has been consulted to assist. Urology following and plans for stent placement tomorrow. G-tube still draining green fluid.  As of 1/22/22 pt reports passing some gas, still no BM. Will give x 3 doses of Relistor for constipation. Plans for ureteral stent placement today. As of 1/23/22 still no bowel movement. + Flatus. C/o abdominal pain and N/V. Abdominal xray pending. S/p bilateral ureteral stent placement. Palliative care consulted for symptom management and goal planning.  As of 1/24/22 patient very lethargic this am.  Pt had dose of Ativan this morning. Narcotics and ativan D/c'd until patient more alert. Mother reports pt passed a very small amount of hard stool overnight. Fever this morning, blood cx ordered. IV zosyn empirically. General Surgery consulted for possible obstruction.         Review of Systems   Unable to perform ROS: Other     Objective:     Vital Signs (Most Recent):  Temp: 100.1 °F (37.8 °C) (01/24/22 1552)  Pulse: 91 (01/24/22 1552)  Resp: 18 (01/24/22 1552)  BP: 114/69 (01/24/22 1552)  SpO2: (!) 94 % (01/24/22 1552) Vital Signs (24h Range):  Temp:  [97.8 °F (36.6 °C)-100.7 °F (38.2 °C)] 100.1 °F (37.8 °C)  Pulse:  [] 91  Resp:  [16-20] 18  SpO2:  [93 %-97 %] 94 %  BP: (114-147)/(63-93) 114/69     Weight: 41.2 kg (90 lb 13.3 oz)  Body mass index is 15.59 kg/m².    Intake/Output Summary (Last 24 hours) at 1/24/2022 1638  Last data filed at 1/24/2022 1009  Gross per 24 hour   Intake 1482.58 ml   Output 2450 ml   Net -967.42 ml      Physical Exam  Vitals and nursing note reviewed.   Constitutional:       General: She is not in acute distress ( ).     Appearance: She is cachectic. She is ill-appearing. She is not diaphoretic.   HENT:      Head: Normocephalic and atraumatic.      Nose: Nose normal.   Eyes:      General: No scleral icterus.     Conjunctiva/sclera: Conjunctivae normal.   Neck:      Trachea: No tracheal deviation.   Cardiovascular:      Rate and Rhythm: Normal rate and regular rhythm.      Heart sounds: Normal heart sounds. No murmur heard.  No friction rub. No gallop.    Pulmonary:      Effort: Pulmonary effort is normal. No respiratory distress.      Breath sounds: Normal breath sounds. No stridor. No wheezing or rales.   Chest:      Chest wall: No tenderness.   Abdominal:      General: Bowel sounds are normal. There is distension.      Palpations: Abdomen is soft. There is no mass.      Tenderness: Tenderness: g tube site  There is no guarding or  rebound.      Comments: colostomy LLQ  G tube dressing C/D/I- attached to a guzmán bag draining dark green fluid   Musculoskeletal:         General: No tenderness or deformity. Normal range of motion.      Cervical back: Normal range of motion and neck supple.   Skin:     General: Skin is warm and dry.      Coloration: Skin is not pale.      Findings: No erythema or rash.   Neurological:      Mental Status: She is lethargic.      Cranial Nerves: No cranial nerve deficit.      Motor: No abnormal muscle tone.      Coordination: Coordination normal.   Psychiatric:         Mood and Affect: Mood is not anxious. Affect is not angry or tearful.         Significant Labs:   All pertinent labs within the past 24 hours have been reviewed.  Blood Culture: No results for input(s): LABBLOO in the last 48 hours.  BMP:   Recent Labs   Lab 01/24/22  0921   GLU 67*      K 3.8   *   CO2 15*   BUN 23*   CREATININE 1.2   CALCIUM 8.7     CBC:   Recent Labs   Lab 01/24/22  0921   WBC 9.47   HGB 8.8*   HCT 30.5*        CMP:   Recent Labs   Lab 01/24/22  0921      K 3.8   *   CO2 15*   GLU 67*   BUN 23*   CREATININE 1.2   CALCIUM 8.7   ANIONGAP 13   EGFRNONAA 51*       Significant Imaging: I have reviewed all pertinent imaging results/findings within the past 24 hours.      Assessment/Plan:      * Severe malnutrition  S/p G tube placement today per IR  Consult RD  Regular diet   IVFs  Pain control     1/24  Will initiate tube feedings once stable       Fever  Fever this morning, blood cx ordered. IV zosyn empirically.    Hydronephrosis  Urology on board   S/p bilateral ureteral stent placement.     Therapeutic opioid-induced constipation (OIC)  Will give 3 doses of Relistor       Gastrostomy tube in place  Placed on 1/19 per IR  Green fluid draining from the G tube- this is attached to a guzmán bag, her colostomy has no output, she has bowel sounds, but endorsing nasuea and abdominal pain  CT abdomen showed  interval placement of percutaneous G tube with catheter coursing along the left inferior anterior margin with possible small adjacent hemorrhage.    After further review of CT scan by radiologist may think she is obstructed  General Surgery consulted for possible obstruction.           Metastatic signet ring cell carcinoma  Oncology managing   Palliative consulted           Intractable nausea and vomiting, Chronic  Scheduled Zofran   Phenergan/compazine prn         Colostomy present on admission  Consult wound care     1/24  Mother reports pt passed a very small amount of hard stool overnight.   No output noted from colostomy   Received x1 dose of Relistor         Gastroesophageal reflux disease without esophagitis  Cont Pepcid      Chronic pain due to neoplasm  Cont Fentanyl patch   Hold scheduled OxyContin for now   IV Dilaudid prn     Patient very lethargic. Narcotics and ativan D/c'd until patient more alert.       VTE Risk Mitigation (From admission, onward)         Ordered     IP VTE HIGH RISK PATIENT  Once         01/19/22 1435     Place sequential compression device  Until discontinued         01/19/22 1435                Discharge Planning   VICTOR MANUEL: 1/20/2022     Code Status: DNR   Is the patient medically ready for discharge?:     Reason for patient still in hospital (select all that apply): Patient new problem, Patient trending condition, Laboratory test, Treatment, Imaging and Pending disposition  Discharge Plan A: Home with family                  Kerline Hernandez NP  Department of Hospital Medicine   [facility] - Med Surg

## 2022-01-24 NOTE — CONSULTS
Advance Care Planning    Consult Note  Palliative Medicine      Consult Requested By: Kerline Hernandez NP  Reason for Consult: Goals of care and Pain and symptom management    SUBJECTIVE:     History of Present Illness:  Madeline Warner is a 56 y.o. year old with a history of metastatic signet ring cell adenocarcinoma who was under surveillance with recently identified  pelvic mass infiltrating urinary system (awaiting pathology to guide treatment options) who was admitted following G tube placement for pain control. She is followed in the PM clinic for pain and symptom management and were also consulted during her last admission. She was constipated at that time and was counseled extensively regarding the need for preventative bowel regimen and given OIC education for home. Imaging indicates constipation and has been resistant to methyl-naltrexone and laxatives. Palliative Medicine was consulted to assist with pain management as well as GOC. Ms. Warner was nearly obtunded at the time of my visit. Her mother Nancy was at bedside and reports that she became agitated yesterday following the bowel regimen and required Ativan. She reports that she has been altered ever since that time and even received a dose while she was sleeping. She was due for fentanyl patch replacement today but I have asked that we hold on this until she is more alert. I will also d/c the Ativan to ensure she begins to clear up before sedating medications are given. Her mother asks if she should be scared. While I agree her current status is likely related to sedating medications I am concerned about Ms. Warner. She is due for port placement to facilitate palliative systemic chemotherapy. I am concerned for her currently but also long term prognosis. Ms. Warner and I touched on code status during her last admit and she wished to be FULL code but limit long term life support. I feel that we are in a different place at this time and feel  that resuscitation would not alter her outcome. Her mother agrees and does not want her to go through that trauma so elects DNR/I at this time. I plan to discuss in more detail with Ms. Warner when she is alert.    In regards to pain, I would resume fentanyl once she is more alert and then can continue the current Dilaudid IV dose. Once she is cleared for PO intake would rotate to her home dose of oxycodone 30mg q4hr PRN. As far as constipation, concern of obstruction or extrinsic compression in the setting of known carcinomatosis. I will defer to primary team and happy she has a venting G tube at this time. I will reiterate the role of preventative bowel regimen once she is receptive as this is something that can be controlled outpatient and prevent future admissions/ delays in cancer treatment.      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    T4 N1bM1 B 3/85 nodes positive    Encounter for blood transfusion     Leukocytosis 1/19/2022    PONV (postoperative nausea and vomiting)      Past Surgical History:   Procedure Laterality Date    APPENDECTOMY      BILATERAL OOPHORECTOMY      CHOLECYSTECTOMY      with Cytoreduction and salpingectomy    COLOSTOMY      CYSTOSCOPY N/A 12/21/2021    Procedure: CYSTOSCOPY;  Surgeon: John Martínez MD;  Location: [facility];  Service: Urology;  Laterality: N/A;    CYSTOSCOPY WITH URETEROSCOPY, RETROGRADE PYELOGRAPHY, AND INSERTION OF STENT Bilateral 1/22/2022    Procedure: CYSTOSCOPY, WITH RETROGRADE PYELOGRAM AND URETERAL STENT INSERTION;  Surgeon: John Martínez MD;  Location: [facility];  Service: Urology;  Laterality: Bilateral;    CYTOREDUCTION      ENDOSCOPIC ULTRASOUND OF UPPER GASTROINTESTINAL TRACT N/A 6/11/2021    Procedure: ULTRASOUND, UPPER GI TRACT, ENDOSCOPIC;  Surgeon: Rosaura Lakhani MD;  Location: [facility];  Service: Endoscopy;  Laterality: N/A;  Linear scope     ERCP N/A 6/11/2021    Procedure: ERCP (ENDOSCOPIC RETROGRADE CHOLANGIOPANCREATOGRAPHY);  Surgeon: Rosaura Lakhani MD;  Location: [facility];  Service: Endoscopy;  Laterality: N/A;    ESOPHAGOGASTRODUODENOSCOPY N/A 5/6/2021    Procedure: EGD (ESOPHAGOGASTRODUODENOSCOPY);  Surgeon: Brandon Rosen MD;  Location: [facility];  Service: Gastroenterology;  Laterality: N/A;    ESOPHAGOGASTRODUODENOSCOPY  06/11/2021    EXCISION OF LESION  10/20/2020    Procedure: EXCISION, LESION COLOSTOMY;  Surgeon: Layton Anderson MD;  Location: [facility];  Service: General;;    FLUOROSCOPY Bilateral 1/19/2022    Procedure: Insertion of G-J tube and bilateral upper extremity venogram;  Surgeon: Bryan Torres MD;  Location: [facility];  Service: General;  Laterality: Bilateral;    LAPAROTOMY, EXPLORATORY  02/12/2020    LYSIS OF ADHESION, COLOSTOMY    REVISION COLOSTOMY N/A 10/20/2020    Procedure: REVISION, COLOSTOMY;  Surgeon: Layton Anderson MD;  Location: [facility];  Service: General;  Laterality: N/A;  Ostomy bag placed    RIGHT COLECTOMY       History reviewed. No pertinent family history.  Social History     Socioeconomic History    Marital status:    Tobacco Use    Smoking status: Never Smoker    Smokeless tobacco: Never Used   Substance and Sexual Activity    Alcohol use: Never    Drug use: Never      Review of patient's allergies indicates:  No Known Allergies    Medications:    Current Facility-Administered Medications:     acetaminophen tablet 650 mg, 650 mg, Oral, Q4H PRN, Soni Urias NP, 650 mg at 01/21/22 0841    albuterol-ipratropium 2.5 mg-0.5 mg/3 mL nebulizer solution 3 mL, 3 mL, Nebulization, Q6H PRN, Soni Urias NP    aluminum-magnesium hydroxide-simethicone 200-200-20 mg/5 mL suspension 30 mL, 30 mL, Oral, QID PRN, Soni Urias NP    bisacodyL suppository 10 mg, 10 mg, Other, Daily PRN, Kerline Hernandez NP    dextrose 5 % in lactated ringers infusion, ,  Intravenous, Continuous, Kerline Hernandez NP, Last Rate: 100 mL/hr at 01/24/22 1113, New Bag at 01/24/22 1113    dextrose 50% injection 12.5 g, 12.5 g, Intravenous, PRN, Soni Urias NP    dextrose 50% injection 25 g, 25 g, Intravenous, PRN, Soni Urias NP    diphenhydrAMINE injection 25 mg, 25 mg, Intravenous, Q6H PRN, Jeana Adams MD    famotidine tablet 20 mg, 20 mg, Oral, Daily, Soni Urias NP, 20 mg at 01/24/22 0958    glucagon (human recombinant) injection 1 mg, 1 mg, Intramuscular, PRN, Soni Urias NP    glucose chewable tablet 16 g, 16 g, Oral, PRN, Soni Urias NP    glucose chewable tablet 24 g, 24 g, Oral, PRN, Soni Urias NP    HYDROmorphone injection 1 mg, 1 mg, Intravenous, Q4H PRN, Nadira Dotson NP, 1 mg at 01/22/22 0714    hyoscyamine ODT 0.125 mg, 0.125 mg, Sublingual, Q4H PRN, John Martínez MD    magnesium oxide tablet 800 mg, 800 mg, Oral, PRN, Soni Urias NP    magnesium oxide tablet 800 mg, 800 mg, Oral, PRN, Soni Urias NP    methylnaltrexone Syrg 8 mg, 8 mg, Subcutaneous, Every other day, Sumi Doan MD, 8 mg at 01/23/22 1000    metoclopramide HCl tablet 5 mg, 5 mg, Oral, QID (AC & HS), Bryan Torres MD, 5 mg at 01/24/22 1111    mupirocin 2 % ointment, , Nasal, BID, Colt Lazaro MD, Given at 01/24/22 1111    naloxone 0.4 mg/mL injection 0.02 mg, 0.02 mg, Intravenous, PRN, Soni Urias NP    ondansetron injection 4 mg, 4 mg, Intravenous, Q6H, Soni Urias NP, 4 mg at 01/24/22 1111    ondansetron injection 4 mg, 4 mg, Intravenous, Once PRN, Jeana Adams MD    phenazopyridine tablet 100 mg, 100 mg, Oral, TID PRN, John Martínez MD    polyethylene glycol packet 17 g, 17 g, Oral, BID, Sidney Cunningham NP, 17 g at 01/24/22 0958    potassium bicarbonate disintegrating tablet 35 mEq, 35 mEq, Oral, PRN, Soni Urias, NP    potassium bicarbonate disintegrating tablet 50 mEq, 50 mEq, Oral,  PRN, Soni Urias NP    potassium bicarbonate disintegrating tablet 60 mEq, 60 mEq, Oral, PRN, Soni Urias NP    potassium, sodium phosphates 280-160-250 mg packet 2 packet, 2 packet, Oral, PRN, Soni Urias NP    potassium, sodium phosphates 280-160-250 mg packet 2 packet, 2 packet, Oral, PRN, Soni Urias NP    potassium, sodium phosphates 280-160-250 mg packet 2 packet, 2 packet, Oral, PRN, Soni Urias NP    prochlorperazine injection Soln 5 mg, 5 mg, Intravenous, Q6H PRN, Soni Urias NP    promethazine (PHENERGAN) 6.25 mg in dextrose 5 % 50 mL IVPB, 6.25 mg, Intravenous, Q6H PRN, Soni Urias NP, Stopped at 01/21/22 0910    senna tablet 8.6 mg, 8.6 mg, Oral, Daily, Sidney Cunningham NP, 8.6 mg at 01/24/22 0959    simethicone chewable tablet 80 mg, 1 tablet, Oral, QID PRN, Soni Urias NP    sodium chloride 0.9% flush 10 mL, 10 mL, Intravenous, Q8H PRN, Soni Urias NP    zolpidem tablet 5 mg, 5 mg, Oral, Nightly PRN, Soni Urias NP, 5 mg at 01/23/22 2119    ROS:  Review of Systems   Unable to perform ROS: Mental status change       OBJECTIVE:     Physical Exam:  Vitals: Temp: 97.8 °F (36.6 °C) (01/24/22 1132)  Pulse: (!) 115 (01/24/22 1132)  Resp: 18 (01/24/22 1132)  BP: 116/63 (01/24/22 1132)  SpO2: 95 % (01/24/22 1132)    Physical Exam  Vitals reviewed.   Constitutional:       General: She is not in acute distress.     Appearance: Normal appearance. She is well-developed. She is cachectic. She is ill-appearing.   HENT:      Head: Normocephalic and atraumatic.   Cardiovascular:      Rate and Rhythm: Normal rate and regular rhythm.      Heart sounds: Normal heart sounds. No murmur heard.  No friction rub.   Pulmonary:      Effort: Pulmonary effort is normal. No respiratory distress.      Breath sounds: Normal breath sounds. No wheezing or rales.   Abdominal:      General: Bowel sounds are increased. There is no distension.      Tenderness: There is no abdominal  tenderness.   Musculoskeletal:      Cervical back: Normal range of motion.      Right lower leg: No edema.      Left lower leg: No edema.   Skin:     General: Skin is dry.      Coloration: Skin is pale.      Findings: No rash.           Review of Symptoms    Symptom Assessment (ESAS 0-10 Scale)  Unable to complete assessment         ECOG Performance Status rdGrdrrdarddrderd:rd rd3rd Living Arrangements:  Lives with family      Advance Directives:   Living Will: Yes        Copy on chart: Yes    LaPOST: No    Do Not Resuscitate Status: Yes    Medical Power of : Yes      Decision Making:  Family answered questions and Patient unable to communicate due to disease severity/cognitive impairment      Labs:  WBC   Date Value Ref Range Status   01/24/2022 9.47 3.90 - 12.70 K/uL Final     Hemoglobin   Date Value Ref Range Status   01/24/2022 8.8 (L) 12.0 - 16.0 g/dL Final     Hematocrit   Date Value Ref Range Status   01/24/2022 30.5 (L) 37.0 - 48.5 % Final     MCV   Date Value Ref Range Status   01/24/2022 86 82 - 98 fL Final     Platelets   Date Value Ref Range Status   01/24/2022 319 150 - 450 K/uL Final       BMP  Lab Results   Component Value Date     01/24/2022    K 3.8 01/24/2022     (H) 01/24/2022    CO2 15 (L) 01/24/2022    BUN 23 (H) 01/24/2022    CREATININE 1.2 01/24/2022    CALCIUM 8.7 01/24/2022    ANIONGAP 13 01/24/2022    ESTGFRAFRICA 59 (A) 01/24/2022    EGFRNONAA 51 (A) 01/24/2022       Lab Results   Component Value Date    AST 11 01/20/2022    ALKPHOS 68 01/20/2022    BILITOT 0.3 01/20/2022       Albumin   Date Value Ref Range Status   01/20/2022 3.2 (L) 3.5 - 5.2 g/dL Final       Radiology:I have reviewed all pertinent imaging results/findings within the past 24 hours.  - CT abd/ pelvis 1/23/22 reviewed    ASSESSMENT   Madeline Warner is a 56 y.o. year old with a history of metastatic signet ring cell adenocarcinoma who was under surveillance with recently identified  pelvic mass infiltrating  urinary system (awaiting pathology to guide treatment options) who was admitted following G tube placement for pain control. She is followed in the PM clinic for pain and symptom management and were also consulted during her last admission. She was constipated at that time and was counseled extensively regarding the need for preventative bowel regimen and given OIC education for home. Imaging indicates constipation and has been resistant to methyl-naltrexone and laxatives. Palliative Medicine was consulted to assist with pain management as well as GOC.    PLAN   1. Neoplasm related pain  - Resume Fentanyl 100mcg/ hr q72 hr patch once alert  - Continue the current Dilaudid IV dose.   - Once she is cleared for PO intake would rotate to her home dose of oxycodone 30mg q4hr PRN.  -  reviewed     2. Constipation  - Due to solifenacin and opioids not on a preventative regimen? Refractory to Relistor but great response during last admit  - Concern of obstruction or extrinsic compression in the setting of known carcinomatosis  - I will reiterate the role of preventative bowel regimen once she is receptive as this is something that can be controlled outpatient and prevent future admissions/ delays in cancer   - Provide OIC resources again     3. Metastatic signet ring cell adenocarcinoma  - s/p ureteral stent  - Plans to have port placed soon to facilitate palliative systemic chemotherapy  - Plans to follow up with primary oncologist at d/c     4. Encounter for Palliative Care  - Code status: DNR/I- changed today in talking to her mother. Will discuss with her in detail when she is able to participate  - HCPOA on file and reviewed  - She is established in the PM clinic       Discussed case and visit details with Dr. Lazaro     Thank you for allowing Palliative Medicine to be involved in the care of Madeline Warner.         Medical decision making: HIGH based on high risk of death untreated symptoms high risk  medications poor prognosis management of more than one chronic illness in exacerbation or progression of disease managing side effects of medications or polypharmacy parenteral opioids    Plan required increased review of medication choice, interaction, dosing, frequency, and route due to patient complexity. Patient complexity increased by: high risk medication use, altered mentation, at risk for delirium, continuous use of opioids, continuous use of benzodiazepines, feeding tube, NPO and malnutrition    20 min ACP time spent discussing: code status, assessed patient specific goals and addressed the best way to achieve them, coordination of care and emotional support, formulating and communicating prognosis, exploring burden/ benefit of various approaches of treatment, reviewed existing ACP documents      Remedios Rossi PA-C  Palliative Medicine

## 2022-01-24 NOTE — PROGRESS NOTES
[facility]  Adult Nutrition  Consult Note    SUMMARY     Recommendations    1. As medically able, initiate enteral nutrition: Isosource 1.5, continuous via PEG    - Advance as tolerated to goal: 40 mL/hr     - 100mL H2O flush q 4-6 hours or per MD/NP.    - Provides 1440 calories (100%EEN), 65g protein (>100%EPN), and 733ml H20 + FWF.     - Monitor for refeeding. Check Mg, K+, Na, Phos, and glucose before and during initiation; correct as indicated.      2. Continue diet as prescribed, as tolerated: regular and encourage PO intake    3. Consider Oral Nutrition Supplements for optimal calorie and protein intake   - Boost Plus (any flavor) with all meals to provide an additional 1080 calories and 42g protein; discontinue once EN initiated     4. RD to follow up to monitor intake, tolerance, and labs.   *Please send consult if acute nutrition needs arise.    Goals:    1. Initiate enteral nutrition within 48 hours.   Nutrition Goal Status: not met, continues    2. Pt will tolerate >50% estimated energy and protein needs in 48-72 hours.  Nutrition Goal Status: not met, continues   Communication of RD recs: POC, sticky note, secure chat and second sign    Assessment and Plan  Nutrition Problem:    Severe Protein-Calorie Malnutrition    in the context of Chronic Illness/Injury  Related to (etiology):    Inadequate ability to consume sufficient energy    Inadequate oral intake    Altered GI function    Increased energy and protein needs    Medical condition  Signs and Symptoms (as evidenced by):    Energy Intake: <75% of estimated energy requirement for  >7 days    Body Fat Depletion: moderate and severe depletion of orbitals, triceps and thoracic and lumbar region     Muscle Mass Depletion: moderate and severe depletion of temples, clavicle region, scapular region, interosseous muscle and lower extremities     Weight Loss: 12 lbs, 12%% x 1 month   Interventions(treatment strategy):    Enteral nutrition     High  "calorie, high protein diet    Collaboration with other care providers  Nutrition Diagnosis Status:   Continues    Reason for Assessment  Reason for assessment: RD follow-up  Diagnosis: Severe malnutrition    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 Reports no other GI issues. Would love to "just get everything through the tube". PEG placed yesterday; says the abdominal area is painful, sore and tender.      1/24: Pt sleeping during RD visit, person at bedside reports poor appetite and intake since last assessment, 0-50%. PEG still not used, says they are considering g-j-tube. Wt gain since last assessment, possible fluid. Pt could benefit from ONS TID. Will continue to monitor.      Nutrition Discharge Planning - pending medical course    Nutrition Risk Screen  Nutrition risk screen: tube feeding or parenteral nutrition     Nutrition/Diet History  Patient Reported Diet/Restrictions/Preferences: regular diet, soylent nutrition supplement at home  Food Preferences: likes well seasoned food  Food Allergies: NKFA  Factors Affecting Nutritional Intake: abdominal pain, altered GI function, nausea/vomiting and pain   Spiritual, Cultural Beliefs, Buddhist Practices, Values that Affect Care: no    Anthropometrics  Temp: 97.8 °F (36.6 °C)  Height Method: Stated  Height: 5' 4" (162.6 cm)  Height (inches): 64 in  Weight Method: Bed Scale  Weight: 41.2 kg (90 lb 13.3 oz)  Weight (lb): 90.83 lb  Ideal Body Weight (IBW), Female: 120 lb  % Ideal Body Weight, Female (lb): 73.12 %  BMI (Calculated): 15.6     Wt " Readings from Last 5 Encounters:   01/24/22 41.2 kg (90 lb 13.3 oz)   01/14/22 40.4 kg (89 lb 1.1 oz)   01/10/22 41.4 kg (91 lb 4.3 oz)   01/06/22 45.6 kg (100 lb 8.5 oz)   12/21/21 41.6 kg (91 lb 11.4 oz)     Labs  Pertinent Labs: reviewed  Lab Results   Component Value Date     01/24/2022    CALCIUM 8.7 01/24/2022    HGB 8.8 (L) 01/24/2022    HCT 30.5 (L) 01/24/2022    K 3.8 01/24/2022    BUN 23 (H) 01/24/2022    CREATININE 1.2 01/24/2022    ESTGFRAFRICA 59 (A) 01/24/2022    EGFRNONAA 51 (A) 01/24/2022    ALBUMIN 3.2 (L) 01/20/2022     Meds  Pertinent Medications: reviewed    famotidine  20 mg Oral Daily    methylnaltrexone  8 mg Subcutaneous Every other day    metoclopramide HCl  5 mg Oral QID (AC & HS)    mupirocin   Nasal BID    ondansetron  4 mg Intravenous Q6H    polyethylene glycol  17 g Oral BID    senna  8.6 mg Oral Daily     PRN Meds:acetaminophen, albuterol-ipratropium, aluminum-magnesium hydroxide-simethicone, bisacodyL, dextrose 50%, dextrose 50%, diphenhydrAMINE, glucagon (human recombinant), glucose, glucose, HYDROmorphone, hyoscyamine, magnesium oxide, magnesium oxide, naloxone, ondansetron, phenazopyridine, potassium bicarbonate, potassium bicarbonate, potassium bicarbonate, potassium, sodium phosphates, potassium, sodium phosphates, potassium, sodium phosphates, prochlorperazine, promethazine (PHENERGAN) IVPB, simethicone, sodium chloride 0.9%, zolpidem   Continuous Infusions:   dextrose 5% lactated ringers 100 mL/hr at 01/24/22 1113     Physical Findings/Assessment  N/V: denies  Wounds: not indicated   Edema:  not indicated   Last Bowel Movement: 01/19/22      Reginaldo Score: 12  Mouth/Teeth WDL: WDL    O2 Device (Oxygen Therapy): room air  NFPE performed indicating severe muscle and fat wasting, documented in greater detail below    Estimated/Assessed Needs  Weight Used For Calorie Calculations: 40.2 kg (88 lb 10 oz)  Energy Calorie Requirements (kcal): 2943-1493 (35-40,  malnutrition)  Energy Need Method: Kcal/kg  Protein Requirements: 48-60g (1.2-1.5g/kg (malnutrition)  Weight Used For Protein Calculations: 40.2 kg (88 lb 10 oz)  RDA Method (mL): 1400  CHO Requirement: 175-200g (50% CHO)    Nutrition Prescription Ordered  regular     Evaluation of Received Nutrient/Fluid Intake  Energy Calories Required: not meeting needs  Protein Required: not meeting needs  Tolerance: tolerating   % Intake of Estimated Energy Needs: 0 - 25 %  % Meal Intake: 0 - 25 %    Monitor and Evaluation  Food and Nutrient Intake: energy intake  Food and Nutrient Administration: diet order and enteral nutrition administration  Anthropometric Measurements: weight, weight change, body mass index  Biochemical Data, Medical Tests and Procedures: electrolyte and renal panel, lipid profile, gastrointestinal profile, glucose/endocrine profile, Inflammatory profile  Nutrition-Focused Physical Findings: overall appearance     Malnutrition Assessment  Malnutrition Type: chronic illness  Energy Intake: severe energy intake                      Edema (Fluid Accumulation): 0-->no edema present   Subcutaneous Fat Loss (Final Summary): severe protein-calorie malnutrition  Muscle Loss Evaluation (Final Summary): severe protein-calorie malnutrition    Severe Weight Loss (Malnutrition): greater than 5% in 1 month    Nutrition Follow-Up  Level of nutrition risk: moderate-high  Frequency of follow-up: Twice weekly  Tentative Next Date to be Seen by RD: 01/25/22  Iliana Fried, MS, RD, LDN

## 2022-01-24 NOTE — TELEPHONE ENCOUNTER
----- Message from John Martínez MD sent at 1/22/2022  3:14 PM CST -----  One month with a renal ultrasound

## 2022-01-24 NOTE — ASSESSMENT & PLAN NOTE
Cont Fentanyl patch   Hold scheduled OxyContin for now   IV Dilaudid prn     Patient very lethargic. Narcotics and ativan D/c'd until patient more alert.

## 2022-01-24 NOTE — PLAN OF CARE
Problem: Adjustment to Illness (Sepsis/Septic Shock)  Goal: Optimal Coping  Outcome: Ongoing, Progressing     Problem: Infection Progression (Sepsis/Septic Shock)  Goal: Absence of Infection Signs and Symptoms  Outcome: Ongoing, Progressing     Problem: Infection  Goal: Absence of Infection Signs and Symptoms  Outcome: Ongoing, Progressing     Problem: Pain Chronic (Persistent)  Goal: Acceptable Pain Control and Functional Ability  Outcome: Ongoing, Progressing     Problem: Coping Ineffective  Goal: Effective Coping  Outcome: Ongoing, Progressing

## 2022-01-24 NOTE — PROGRESS NOTES
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  Oncology was consulted due to history of metastatic signet ring cell carcinoma.  She is also known to have right hydronephrosis for which ureteral stent placement has been planned by Urology.     Today she continues to complain of abdominal pain, intermittent nausea and vomiting.  She is yet to initiate systemic palliative therapy for her cancer due to poor nutritional status and right hydronephrosis.         Interval History: patient very lethargic this am. Apparently onset following Ativan administration. Palliative medicine at bedside at time of visit. Narcotics and ativan D/c'd until patient more alert. Still awaiting BM.     Oncology Treatment Plan:   OP FOLFOXIRI Q2W    Medications:  Continuous Infusions:   dextrose 5% lactated ringers 100 mL/hr at 01/24/22 1113     Scheduled Meds:   famotidine  20 mg Oral Daily    methylnaltrexone  8 mg Subcutaneous Every other day    metoclopramide HCl  5 mg Oral QID (AC & HS)    mupirocin   Nasal BID    ondansetron  4 mg Intravenous Q6H    polyethylene glycol  17 g Oral BID    senna  8.6 mg Oral Daily     PRN Meds:acetaminophen, albuterol-ipratropium, aluminum-magnesium hydroxide-simethicone, bisacodyL, dextrose 50%, dextrose 50%, diphenhydrAMINE, glucagon (human recombinant), glucose, glucose, HYDROmorphone, hyoscyamine, magnesium oxide, magnesium oxide, naloxone, ondansetron, phenazopyridine, potassium bicarbonate, potassium bicarbonate, potassium bicarbonate, potassium, sodium phosphates, potassium, sodium phosphates, potassium, sodium phosphates, prochlorperazine, promethazine (PHENERGAN) IVPB, simethicone, sodium chloride 0.9%,  zolpidem     Review of Systems   Unable to perform ROS: Other     Objective:     Vital Signs (Most Recent):  Temp: 97.8 °F (36.6 °C) (01/24/22 1132)  Pulse: (!) 115 (01/24/22 1132)  Resp: 18 (01/24/22 1132)  BP: 116/63 (01/24/22 1132)  SpO2: 95 % (01/24/22 1132) Vital Signs (24h Range):  Temp:  [97.8 °F (36.6 °C)-100.7 °F (38.2 °C)] 97.8 °F (36.6 °C)  Pulse:  [] 115  Resp:  [16-20] 18  SpO2:  [93 %-97 %] 95 %  BP: (116-147)/(63-93) 116/63     Weight: 41.2 kg (90 lb 13.3 oz)  Body mass index is 15.59 kg/m².  Body surface area is 1.36 meters squared.      Intake/Output Summary (Last 24 hours) at 1/24/2022 1150  Last data filed at 1/24/2022 1009  Gross per 24 hour   Intake 1482.58 ml   Output 2450 ml   Net -967.42 ml       Physical Exam  Vitals reviewed.   Constitutional:       Appearance: She is well-developed and well-nourished.   HENT:      Head: Normocephalic.      Right Ear: External ear normal.      Left Ear: External ear normal.      Nose: Nose normal.      Mouth/Throat:      Mouth: Oropharynx is clear and moist.   Eyes:      Extraocular Movements: EOM normal.   Neck:      Thyroid: No thyroid mass.   Cardiovascular:      Rate and Rhythm: Normal rate and regular rhythm.      Heart sounds: Normal heart sounds. No murmur heard.      Pulmonary:      Effort: Pulmonary effort is normal. No respiratory distress.      Breath sounds: Decreased breath sounds present. No wheezing or rales.   Abdominal:      General: There is no distension.      Palpations: Abdomen is rigid.       Genitourinary:     Comments: deferred  Musculoskeletal:         General: No edema.   Lymphadenopathy:      Head:      Right side of head: No submandibular, preauricular or posterior auricular adenopathy.      Left side of head: No submandibular, preauricular or posterior auricular adenopathy.   Skin:     General: Skin is warm, dry and intact.   Neurological:      Mental Status: She is lethargic.   Psychiatric:         Mood and Affect: Mood  and affect normal.         Significant Labs:   BMP:   Recent Labs   Lab 01/24/22  0921   GLU 67*      K 3.8   *   CO2 15*   BUN 23*   CREATININE 1.2   CALCIUM 8.7   , CBC:   Recent Labs   Lab 01/24/22  0921   WBC 9.47   HGB 8.8*   HCT 30.5*      , LFTs: No results for input(s): ALT, AST, ALKPHOS, BILITOT, PROT, ALBUMIN in the last 48 hours. and All pertinent labs from the last 24 hours have been reviewed.    Diagnostic Results:  I have reviewed all pertinent imaging results/findings within the past 24 hours.    Assessment/Plan:     * Severe malnutrition  --s/p G tube placement. Awaiting bowel function to initiate tube feedings    Hydronephrosis  S/p bilateral stent placement     Metastatic signet ring cell carcinoma  --plan to arrange outpatient follow up with Primary Oncologist pending resolution of current clinical situation to initiate chemotherapy    Intractable nausea and vomiting, Chronic  -management per Primary team/palliative medicine     Chronic pain due to neoplasm  --management per palliative medicine         Thank you for your consult. I will follow-up with patient. Please contact us if you have any additional questions.     Jaky Saavedra NP  Hematology/Oncology  [facility]

## 2022-01-24 NOTE — ASSESSMENT & PLAN NOTE
Consult wound care     1/24  Mother reports pt passed a very small amount of hard stool overnight.   No output noted from colostomy   Received x1 dose of Relistor

## 2022-01-25 PROBLEM — R78.81 BACTEREMIA: Status: ACTIVE | Noted: 2022-01-01

## 2022-01-25 NOTE — PLAN OF CARE
Problem: Adjustment to Illness (Sepsis/Septic Shock)  Goal: Optimal Coping  Outcome: Ongoing, Progressing     Problem: Bleeding (Sepsis/Septic Shock)  Goal: Absence of Bleeding  Outcome: Ongoing, Progressing     Problem: Glycemic Control Impaired (Sepsis/Septic Shock)  Goal: Blood Glucose Level Within Desired Range  Outcome: Ongoing, Progressing     Problem: Infection Progression (Sepsis/Septic Shock)  Goal: Absence of Infection Signs and Symptoms  Outcome: Ongoing, Progressing     Problem: Nutrition Impaired (Sepsis/Septic Shock)  Goal: Optimal Nutrition Intake  Outcome: Ongoing, Progressing     Problem: Adult Inpatient Plan of Care  Goal: Plan of Care Review  Outcome: Ongoing, Progressing  Goal: Patient-Specific Goal (Individualized)  Outcome: Ongoing, Progressing  Goal: Absence of Hospital-Acquired Illness or Injury  Outcome: Ongoing, Progressing  Goal: Optimal Comfort and Wellbeing  Outcome: Ongoing, Progressing  Goal: Readiness for Transition of Care  Outcome: Ongoing, Progressing     Problem: Infection  Goal: Absence of Infection Signs and Symptoms  Outcome: Ongoing, Progressing     Problem: Skin Injury Risk Increased  Goal: Skin Health and Integrity  Outcome: Ongoing, Progressing     Problem: Fall Injury Risk  Goal: Absence of Fall and Fall-Related Injury  Outcome: Ongoing, Progressing     Problem: Pain Chronic (Persistent)  Goal: Acceptable Pain Control and Functional Ability  Outcome: Ongoing, Progressing     Problem: Coping Ineffective  Goal: Effective Coping  Outcome: Ongoing, Progressing

## 2022-01-25 NOTE — CONSULTS
[facility] Surg  Gastroenterology  Consult Note    Patient Name: Madeline Warner  MRN: [id number]  Admission Date: 1/19/2022  Hospital Length of Stay: 4 days  Code Status: DNR   Attending Provider: Colt Lazaro MD   Consulting Provider: Jai Vinson PA-C  Primary Care Physician: Mariam Peña MD  Principal Problem:Severe malnutrition    Inpatient consult to Gastroenterology  Consult performed by: Jai Vinson PA-C  Consult ordered by: Kerline Hernandez NP  Reason for consult: Severe constipation        Subjective:     HPI:  The patient presented to the ER for abdominal pain. We have been consulted for constipation.   Her history is significant for adenocarcinoma diagnosed in 2017 at the time of appendectomy. 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  During her hemicolectomy, there was evidence of peritoneal disease.  She then underwent 6 months of chemotherapy and a very difficult cytoreductive surgery and HIPEC in 2018 with a very prolonged recovery.  She later developed a bowel obstruction and underwent a laparotomy and diverting Andrew's colostomy in February 2020 where there was noted to be additional peritoneal disease. In the interim, she has developed a pelvic mass which involves the urinary tract. She underwent a cystoscopy with TURBT with Urology and biopsy confirming likely metastatic adenocarcinoma. Additional full details included in Dr. Peña's note dated 01/14/22. IR placed a G-tube on 01/19/22 but was unable to do a GJ. Urology placed bilateral ureteral stents 01/22/22. Chemotherapy discussed but current BCx positive for gram negative rods.   Due to continued abdominal pain, nausea and vomiting, General Surgery was consulted. Work up ruled out bowel obstruction but significant constipation was noted. The patient was started on Relistor, Senna and Miralax. Her mother said she had small stools  last night after not going for several days. This morning, she had about 200 cc of loose stool. No blood noted. No relief of abdominal pain provided. However, she is asking for ice cream and hasn't been vomited in the last 24 hrs. The patient is lethargic due to Ativan; therefore, the history has been provided by her mother. CT w/ contrast (01/20/22) showed interval placement of percutaneous G tube with catheter coursing along the left inferior anterior margin with possible small adjacent hemorrhage. Constipation noted similar to previous studies. AXR (01/23/22) showed interval placement of bilateral ureteral stents, minimal small bowel gas noted without evidence of obstruction.  Peg tube balloon in the left upper abdomen over the stomach. AXR (01/25/22) showed interval decrease in the amount of air in the small and large bowel without evidence for obstruction. SBFT pending.       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    T4 N1bM1 B 3/85 nodes positive    Encounter for blood transfusion     Leukocytosis 1/19/2022    PONV (postoperative nausea and vomiting)        Past Surgical History:   Procedure Laterality Date    APPENDECTOMY      BILATERAL OOPHORECTOMY      CHOLECYSTECTOMY      with Cytoreduction and salpingectomy    COLOSTOMY      CYSTOSCOPY N/A 12/21/2021    Procedure: CYSTOSCOPY;  Surgeon: John Martínez MD;  Location: [facility] OR;  Service: Urology;  Laterality: N/A;    CYSTOSCOPY WITH URETEROSCOPY, RETROGRADE PYELOGRAPHY, AND INSERTION OF STENT Bilateral 1/22/2022    Procedure: CYSTOSCOPY, WITH RETROGRADE PYELOGRAM AND URETERAL STENT INSERTION;  Surgeon: John Martínez MD;  Location: [facility] OR;  Service: Urology;  Laterality: Bilateral;    CYTOREDUCTION      ENDOSCOPIC ULTRASOUND OF UPPER GASTROINTESTINAL TRACT N/A 6/11/2021    Procedure: ULTRASOUND, UPPER GI TRACT, ENDOSCOPIC;  Surgeon: Rosaura Lakhani MD;   Location: [facility] ENDO;  Service: Endoscopy;  Laterality: N/A;  Linear scope    ERCP N/A 6/11/2021    Procedure: ERCP (ENDOSCOPIC RETROGRADE CHOLANGIOPANCREATOGRAPHY);  Surgeon: Rosaura Lahkani MD;  Location: [facility] ENDO;  Service: Endoscopy;  Laterality: N/A;    ESOPHAGOGASTRODUODENOSCOPY N/A 5/6/2021    Procedure: EGD (ESOPHAGOGASTRODUODENOSCOPY);  Surgeon: Brandon Rosen MD;  [facility];  Service: Gastroenterology;  Laterality: N/A;    ESOPHAGOGASTRODUODENOSCOPY  06/11/2021    EXCISION OF LESION  10/20/2020    Procedure: EXCISION, LESION COLOSTOMY;  Surgeon: Layton Anderson MD;  Location: [facility] OR;  Service: General;;    FLUOROSCOPY Bilateral 1/19/2022    Procedure: Insertion of G-J tube and bilateral upper extremity venogram;  Surgeon: Bryan Torres MD;  Location: [facility] CATH LAB;  Service: General;  Laterality: Bilateral;    LAPAROTOMY, EXPLORATORY  02/12/2020    LYSIS OF ADHESION, COLOSTOMY    REVISION COLOSTOMY N/A 10/20/2020    Procedure: REVISION, COLOSTOMY;  Surgeon: Layton Anderson MD;  Location: [facility] OR;  Service: General;  Laterality: N/A;  Ostomy bag placed    RIGHT COLECTOMY         Review of patient's allergies indicates:  No Known Allergies  Family History    None       Tobacco Use    Smoking status: Never Smoker    Smokeless tobacco: Never Used   Substance and Sexual Activity    Alcohol use: Never    Drug use: Never    Sexual activity: Not on file     Review of Systems   Unable to perform ROS: Mental status change     Objective:     Vital Signs (Most Recent):  Temp: 99.4 °F (37.4 °C) (01/25/22 1154)  Pulse: 92 (01/25/22 1154)  Resp: 20 (01/25/22 1300)  BP: (!) 87/50 (01/25/22 1154)  SpO2: (!) 94 % (01/25/22 1154) Vital Signs (24h Range):  Temp:  [98.1 °F (36.7 °C)-100.1 °F (37.8 °C)] 99.4 °F (37.4 °C)  Pulse:  [] 92  Resp:  [16-20] 20  SpO2:  [93 %-96 %] 94 %  BP: ()/(50-69) 87/50     Weight: 42.9 kg (94 lb 9.2 oz) (01/25/22 0600)  Body mass index  is 16.23 kg/m².      Intake/Output Summary (Last 24 hours) at 1/25/2022 1527  Last data filed at 1/25/2022 1429  Gross per 24 hour   Intake 4781.11 ml   Output 3000 ml   Net 1781.11 ml       Lines/Drains/Airways     Drain                 Colostomy LUQ -- days         Colostomy 06/15/21 0016 Descending/sigmoid  days         Gastrostomy/Enterostomy 01/19/22 1325 Gastrostomy tube w/ balloon LUQ 6 days         Urethral Catheter 01/22/22 1400 Latex;Straight-tip 16 Fr. 3 days          Peripheral Intravenous Line                 Peripheral IV - Single Lumen 01/23/22 1358 18 G;1 3/4 in Left Upper Arm 2 days         Peripheral IV - Single Lumen 01/24/22 1900 Anterior;Proximal;Right Forearm <1 day                Physical Exam  Constitutional:       General: She is not in acute distress.     Appearance: She is underweight. She is ill-appearing.   HENT:      Head: Normocephalic and atraumatic.   Eyes:      Extraocular Movements: Extraocular movements intact.   Cardiovascular:      Rate and Rhythm: Normal rate and regular rhythm.      Heart sounds: No murmur heard.      Pulmonary:      Effort: Pulmonary effort is normal. No respiratory distress.      Breath sounds: Normal breath sounds. No wheezing.   Abdominal:      General: Bowel sounds are normal.      Tenderness: There is abdominal tenderness.      Comments: The abdomen is distended, tight, but very small. She has tenderness near her PEG site. PEG currently capped. Ostomy noted with stool in the bag.    Musculoskeletal:      Right lower leg: No edema.      Left lower leg: No edema.   Skin:     General: Skin is warm and dry.      Findings: No rash.   Neurological:      Mental Status: She is lethargic.         Significant Labs:  CBC:   Recent Labs   Lab 01/24/22  0921 01/25/22  1033   WBC 9.47 13.13*   HGB 8.8* 7.3*   HCT 30.5* 23.1*    219     CMP:   Recent Labs   Lab 01/25/22  1033   *   CALCIUM 7.8*      K 2.7*   CO2 20*      BUN 23*    CREATININE 1.5*     Coagulation: No results for input(s): PT, INR, APTT in the last 48 hours.    Significant Imaging:  Imaging results within the past 24 hours have been reviewed.    Assessment/Plan:     * Severe malnutrition  Trial of PEG feeds once constipation improves.   Continue Reglan at home dosing.   If slow rate of PEG feeds causes symptoms, can consider GJ tube coordinated with IR services and GI.     Therapeutic opioid-induced constipation (OIC)  Constipation likely a combination of opioid use and poor motility from prior abdominal surgeries and cancer.   She is starting to have stools. Continue Relistor, Senna and Miralax at this time.   Minimize narcotic use when possible, but will be difficult given her cancer.     Intractable nausea and vomiting, Chronic  Suspect some degree of poor motility impacting her symptoms. Her mother says it has been better the last 24 hrs.   Continue current Reglan. Could consider increasing the dose.   Antiemetics as needed.       Gastroesophageal reflux disease without esophagitis  Continue H2 blocker but could change to PPI if warranted.      Fever  Blood cultures positive. Awaiting sensitivities. Continue antibiotic management per HM team.     Metastatic signet ring cell carcinoma  Oncology following.     Intestinal obstruction  No signs of obstruction on imaging. General Surgery notes reviewed.         Thank you for your consult. I will follow-up with patient. Please contact us if you have any additional questions.    Jai Vinson PA-C  Gastroenterology  [facility]

## 2022-01-25 NOTE — HOSPITAL COURSE
The patient's hospital course was reviewed.  It seems that the surgery service was consulted when the radiology department consulted the medicine department and Center CT scan that was done several days ago could be concerning for constipation.  The Oncology service has been seeing the patient for known carcinomatosis.  In review of the Oncology note a mention was made of a GI consult for constipation.  The patient has been treated for constipation with good results in the past.    She has had a Gastrografin small-bowel follow-through earlier in January that showed no evidence of obstruction.    At this point the patient is very somnolent and a review of symptoms cannot be obtain an exam is very challenging.    According to the family member was there with her she has had very little ostomy output since admission to the hospital after the gastrostomy tube was placed    The patient had bilateral ureteral stents placed several days ago    Was reported by the nursing service at the patient was given an enema through her colostomy with minimal results      01/25/2022.  More awake this morning.  Less abdominal pain.  Abdominal film showed no obstruction.  Some colostomy output.  Gastrografin small-bowel follow-through is planned.  The patient will be seen by colorectal surgery who is familiar with her

## 2022-01-25 NOTE — ASSESSMENT & PLAN NOTE
Trial of PEG feeds once constipation improves.   Continue Reglan at home dosing.   If slow rate of PEG feeds causes symptoms, can consider GJ tube coordinated with IR services and GI.

## 2022-01-25 NOTE — PROGRESS NOTES
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  Oncology was consulted due to history of metastatic signet ring cell carcinoma.  She is also known to have right hydronephrosis for which ureteral stent placement has been planned by Urology.     Today she continues to complain of abdominal pain, intermittent nausea and vomiting.  She is yet to initiate systemic palliative therapy for her cancer due to poor nutritional status and right hydronephrosis.         Interval History: somewhat more alert today but still lethargic. Awakens fairly easily but falls asleep during conversation. Small bowel follow through planned today. BC + Gram neg rods. On IV abx per Primary team.     Oncology Treatment Plan:   OP FOLFOXIRI Q2W    Medications:  Continuous Infusions:   dextrose 5% lactated ringers 100 mL/hr at 01/25/22 0659     Scheduled Meds:   famotidine  20 mg Oral Daily    methylnaltrexone  8 mg Subcutaneous Every other day    metoclopramide HCl  5 mg Oral QID (AC & HS)    mupirocin   Nasal BID    ondansetron  4 mg Intravenous Q6H    piperacillin-tazobactam (ZOSYN) IVPB  4.5 g Intravenous Q8H    polyethylene glycol  17 g Oral BID    senna  8.6 mg Oral Daily     PRN Meds:acetaminophen, albuterol-ipratropium, aluminum-magnesium hydroxide-simethicone, bisacodyL, dextrose 50%, dextrose 50%, diphenhydrAMINE, glucagon (human recombinant), glucose, glucose, HYDROmorphone, hyoscyamine, magnesium oxide, magnesium oxide, naloxone, ondansetron, phenazopyridine, potassium bicarbonate, potassium bicarbonate, potassium bicarbonate, potassium, sodium phosphates, potassium, sodium phosphates, potassium, sodium phosphates, prochlorperazine, promethazine  (PHENERGAN) IVPB, simethicone, sodium chloride 0.9%, zolpidem     Review of Systems   Unable to perform ROS: Other (limited ROS given current obtunded state)   Constitutional: Positive for fatigue.   Gastrointestinal: Negative for nausea.   Musculoskeletal: Negative for arthralgias.     Objective:     Vital Signs (Most Recent):  Temp: 99.4 °F (37.4 °C) (01/25/22 0733)  Pulse: 88 (01/25/22 0733)  Resp: 17 (01/25/22 0733)  BP: (!) 87/53 (01/25/22 0733)  SpO2: 96 % (01/25/22 0733) Vital Signs (24h Range):  Temp:  [98.1 °F (36.7 °C)-100.1 °F (37.8 °C)] 99.4 °F (37.4 °C)  Pulse:  [] 88  Resp:  [16-18] 17  SpO2:  [93 %-96 %] 96 %  BP: ()/(53-69) 87/53     Weight: 42.9 kg (94 lb 9.2 oz)  Body mass index is 16.23 kg/m².  Body surface area is 1.39 meters squared.      Intake/Output Summary (Last 24 hours) at 1/25/2022 1137  Last data filed at 1/25/2022 1034  Gross per 24 hour   Intake 4781.11 ml   Output 2575 ml   Net 2206.11 ml       Physical Exam  Vitals reviewed.   Constitutional:       Appearance: She is well-developed.   HENT:      Head: Normocephalic.      Right Ear: External ear normal.      Left Ear: External ear normal.      Nose: Nose normal.   Neck:      Thyroid: No thyroid mass.   Cardiovascular:      Rate and Rhythm: Normal rate and regular rhythm.      Heart sounds: Normal heart sounds. No murmur heard.      Pulmonary:      Effort: Pulmonary effort is normal. No respiratory distress.      Breath sounds: Decreased breath sounds present. No wheezing or rales.   Abdominal:      General: There is no distension.      Palpations: Abdomen is rigid.       Genitourinary:     Comments: deferred  Lymphadenopathy:      Head:      Right side of head: No submandibular, preauricular or posterior auricular adenopathy.      Left side of head: No submandibular, preauricular or posterior auricular adenopathy.   Skin:     General: Skin is warm and dry.   Neurological:      Mental Status: She is lethargic.          Significant Labs:   BMP:   Recent Labs   Lab 01/24/22  0921   GLU 67*      K 3.8   *   CO2 15*   BUN 23*   CREATININE 1.2   CALCIUM 8.7   , CBC:   Recent Labs   Lab 01/24/22  0921 01/25/22  1033   WBC 9.47 13.13*   HGB 8.8* 7.3*   HCT 30.5* 23.1*    219   , LFTs: No results for input(s): ALT, AST, ALKPHOS, BILITOT, PROT, ALBUMIN in the last 48 hours. and All pertinent labs from the last 24 hours have been reviewed.    Diagnostic Results:  I have reviewed all pertinent imaging results/findings within the past 24 hours.    Assessment/Plan:     * Severe malnutrition  --s/p G tube placement. Awaiting bowel function to initiate tube feedings    Fever  --BC + gram neg bacteremia  --IV abx management per Primary team  --monitor fever curve      Hydronephrosis  S/p bilateral stent placement     Metastatic signet ring cell carcinoma  --plan to arrange outpatient follow up with Primary Oncologist pending resolution of current clinical situation to initiate chemotherapy    Intractable nausea and vomiting, Chronic  -management per Primary team/palliative medicine   -no nausea currently    Chronic pain due to neoplasm  --management per palliative medicine   --narcotics currently on hold due to patient's lethargic state  --patient denies pain at present time        Thank you for your consult. I will follow-up with patient. Please contact us if you have any additional questions.     Jaky Saavedra NP  Hematology/Oncology  [facility]

## 2022-01-25 NOTE — ASSESSMENT & PLAN NOTE
Constipation likely a combination of opioid use and poor motility from prior abdominal surgeries and cancer.   She is starting to have stools. Continue Relistor, Senna and Miralax at this time.   Minimize narcotic use when possible, but will be difficult given her cancer.

## 2022-01-25 NOTE — ASSESSMENT & PLAN NOTE
Oncology managing   Plan to arrange outpatient follow up with Primary Oncologist pending resolution of current clinical situation to initiate chemotherapy

## 2022-01-25 NOTE — PROGRESS NOTES
Notified per PANCHO Stapleton RN patient's mother reported patient fall. Patient assessed and assisted back to bed per PANCHO Stapleton RN. Patient's mother stated she fell asleep, upon waking patient noted to be sliding off edge of bed, states patient never hit the floor. No visible injuries noted at this time. LEW Hernandez NP notified of incident, no new orders noted at this time.

## 2022-01-25 NOTE — SUBJECTIVE & OBJECTIVE
Interval History: somewhat more alert today but still lethargic. Awakens fairly easily but falls asleep during conversation. Small bowel follow through planned today. BC + Gram neg rods. On IV abx per Primary team.     Oncology Treatment Plan:   OP FOLFOXIRI Q2W    Medications:  Continuous Infusions:   dextrose 5% lactated ringers 100 mL/hr at 01/25/22 0659     Scheduled Meds:   famotidine  20 mg Oral Daily    methylnaltrexone  8 mg Subcutaneous Every other day    metoclopramide HCl  5 mg Oral QID (AC & HS)    mupirocin   Nasal BID    ondansetron  4 mg Intravenous Q6H    piperacillin-tazobactam (ZOSYN) IVPB  4.5 g Intravenous Q8H    polyethylene glycol  17 g Oral BID    senna  8.6 mg Oral Daily     PRN Meds:acetaminophen, albuterol-ipratropium, aluminum-magnesium hydroxide-simethicone, bisacodyL, dextrose 50%, dextrose 50%, diphenhydrAMINE, glucagon (human recombinant), glucose, glucose, HYDROmorphone, hyoscyamine, magnesium oxide, magnesium oxide, naloxone, ondansetron, phenazopyridine, potassium bicarbonate, potassium bicarbonate, potassium bicarbonate, potassium, sodium phosphates, potassium, sodium phosphates, potassium, sodium phosphates, prochlorperazine, promethazine (PHENERGAN) IVPB, simethicone, sodium chloride 0.9%, zolpidem     Review of Systems   Unable to perform ROS: Other (limited ROS given current obtunded state)   Constitutional: Positive for fatigue.   Gastrointestinal: Negative for nausea.   Musculoskeletal: Negative for arthralgias.     Objective:     Vital Signs (Most Recent):  Temp: 99.4 °F (37.4 °C) (01/25/22 0733)  Pulse: 88 (01/25/22 0733)  Resp: 17 (01/25/22 0733)  BP: (!) 87/53 (01/25/22 0733)  SpO2: 96 % (01/25/22 0733) Vital Signs (24h Range):  Temp:  [98.1 °F (36.7 °C)-100.1 °F (37.8 °C)] 99.4 °F (37.4 °C)  Pulse:  [] 88  Resp:  [16-18] 17  SpO2:  [93 %-96 %] 96 %  BP: ()/(53-69) 87/53     Weight: 42.9 kg (94 lb 9.2 oz)  Body mass index is 16.23 kg/m².  Body surface  area is 1.39 meters squared.      Intake/Output Summary (Last 24 hours) at 1/25/2022 1137  Last data filed at 1/25/2022 1034  Gross per 24 hour   Intake 4781.11 ml   Output 2575 ml   Net 2206.11 ml       Physical Exam  Vitals reviewed.   Constitutional:       Appearance: She is well-developed.   HENT:      Head: Normocephalic.      Right Ear: External ear normal.      Left Ear: External ear normal.      Nose: Nose normal.   Neck:      Thyroid: No thyroid mass.   Cardiovascular:      Rate and Rhythm: Normal rate and regular rhythm.      Heart sounds: Normal heart sounds. No murmur heard.      Pulmonary:      Effort: Pulmonary effort is normal. No respiratory distress.      Breath sounds: Decreased breath sounds present. No wheezing or rales.   Abdominal:      General: There is no distension.      Palpations: Abdomen is rigid.       Genitourinary:     Comments: deferred  Lymphadenopathy:      Head:      Right side of head: No submandibular, preauricular or posterior auricular adenopathy.      Left side of head: No submandibular, preauricular or posterior auricular adenopathy.   Skin:     General: Skin is warm and dry.   Neurological:      Mental Status: She is lethargic.         Significant Labs:   BMP:   Recent Labs   Lab 01/24/22  0921   GLU 67*      K 3.8   *   CO2 15*   BUN 23*   CREATININE 1.2   CALCIUM 8.7   , CBC:   Recent Labs   Lab 01/24/22  0921 01/25/22  1033   WBC 9.47 13.13*   HGB 8.8* 7.3*   HCT 30.5* 23.1*    219   , LFTs: No results for input(s): ALT, AST, ALKPHOS, BILITOT, PROT, ALBUMIN in the last 48 hours. and All pertinent labs from the last 24 hours have been reviewed.    Diagnostic Results:  I have reviewed all pertinent imaging results/findings within the past 24 hours.

## 2022-01-25 NOTE — SUBJECTIVE & OBJECTIVE
Review of Systems   Unable to perform ROS: Mental status change     Objective:     Vital Signs (Most Recent):  Temp: 98.6 °F (37 °C) (01/25/22 1604)  Pulse: 82 (01/25/22 1604)  Resp: 16 (01/25/22 1604)  BP: 99/61 (01/25/22 1604)  SpO2: (!) 94 % (01/25/22 1604) Vital Signs (24h Range):  Temp:  [98.1 °F (36.7 °C)-99.4 °F (37.4 °C)] 98.6 °F (37 °C)  Pulse:  [] 82  Resp:  [16-20] 16  SpO2:  [93 %-96 %] 94 %  BP: (86-99)/(50-61) 99/61     Weight: 42.9 kg (94 lb 9.2 oz)  Body mass index is 16.23 kg/m².    Intake/Output Summary (Last 24 hours) at 1/25/2022 1721  Last data filed at 1/25/2022 1429  Gross per 24 hour   Intake 4781.11 ml   Output 3000 ml   Net 1781.11 ml      Physical Exam  Vitals and nursing note reviewed.   Constitutional:       General: She is not in acute distress ( ).     Appearance: She is cachectic. She is ill-appearing. She is not diaphoretic.   HENT:      Head: Normocephalic and atraumatic.      Nose: Nose normal.   Eyes:      General: No scleral icterus.     Conjunctiva/sclera: Conjunctivae normal.   Neck:      Trachea: No tracheal deviation.   Cardiovascular:      Rate and Rhythm: Normal rate and regular rhythm.      Heart sounds: Normal heart sounds. No murmur heard.  No friction rub. No gallop.    Pulmonary:      Effort: Pulmonary effort is normal. No respiratory distress.      Breath sounds: Normal breath sounds. No stridor. No wheezing or rales.   Chest:      Chest wall: No tenderness.   Abdominal:      General: Bowel sounds are normal. There is distension.      Palpations: Abdomen is soft. There is no mass.      Tenderness: Tenderness: g tube site  There is no guarding or rebound.      Comments: colostomy LLQ with liquid stool in bag   G tube dressing C/D/I- attached to a guzmán bag draining dark green fluid   Musculoskeletal:         General: No tenderness or deformity. Normal range of motion.      Cervical back: Normal range of motion and neck supple.   Skin:     General: Skin is  warm and dry.      Coloration: Skin is not pale.      Findings: No erythema or rash.   Neurological:      Mental Status: She is lethargic.      Cranial Nerves: No cranial nerve deficit.      Motor: No abnormal muscle tone.      Coordination: Coordination normal.   Psychiatric:         Mood and Affect: Mood is not anxious. Affect is not angry or tearful.         Significant Labs:   All pertinent labs within the past 24 hours have been reviewed.  Blood Culture:   Recent Labs   Lab 01/24/22 0921   LABBLOO Gram stain aer bottle: Gram negative rods   Results called to and read back by: Alyson Urbina RN 01/25/2022    02:21  Gram stain aer bottle: Gram negative rods   Results called to and read back by: Alyson Urbina RN 01/25/2022    02:21     BMP:   Recent Labs   Lab 01/25/22  1033   *      K 2.7*      CO2 20*   BUN 23*   CREATININE 1.5*   CALCIUM 7.8*     CBC:   Recent Labs   Lab 01/24/22  0921 01/25/22  1033   WBC 9.47 13.13*   HGB 8.8* 7.3*   HCT 30.5* 23.1*    219     CMP:   Recent Labs   Lab 01/24/22  0921 01/25/22  1033    138   K 3.8 2.7*   * 109   CO2 15* 20*   GLU 67* 112*   BUN 23* 23*   CREATININE 1.2 1.5*   CALCIUM 8.7 7.8*   ANIONGAP 13 9   EGFRNONAA 51* 39*       Significant Imaging:

## 2022-01-25 NOTE — ASSESSMENT & PLAN NOTE
Recommend GI consultation for further evaluation and management    Gastrografin small-bowel follow-through as this may be therapeutic.    Patient will be seen by colorectal.    She does need a bowel regime and or medications to help with this.

## 2022-01-25 NOTE — ASSESSMENT & PLAN NOTE
Most likely related to chronic constipation from narcotics and or carcinomatosis.    The patient has had a small-bowel follow-through in the past that showed no evidence of a bowel obstruction.    I would recommend that she be made NPO.  Medicine can decide if oral or gastrostomy tube medications are warranted.  The patient should have repeat abdominal films in the morning.    A GI consult should be obtained    Plans for a Gastrografin small-bowel follow-through, this is mainly for therapeutic purposes.    Discussed with colorectal surgery she will see the patient

## 2022-01-25 NOTE — SUBJECTIVE & OBJECTIVE
No current facility-administered medications on file prior to encounter.     Current Outpatient Medications on File Prior to Encounter   Medication Sig    fentaNYL (DURAGESIC) 100 mcg/hr Place 1 patch onto the skin every 72 hours.    metoclopramide HCl (REGLAN) 5 MG tablet Take 2 tablets (10 mg total) by mouth 4 (four) times daily before meals and nightly.    omeprazole (PRILOSEC) 40 MG capsule Take 1 capsule by mouth once daily.    ondansetron (ZOFRAN-ODT) 8 MG TbDL Take 1 tablet (8 mg total) by mouth every 12 (twelve) hours as needed.    oxyCODONE (ROXICODONE) 30 MG Tab Take 1 tablet (30 mg total) by mouth every 6 (six) hours as needed (pain).    phenazopyridine (PYRIDIUM) 200 MG tablet Take 1 tablet (200 mg total) by mouth 3 (three) times daily as needed (burning).    solifenacin (VESICARE) 10 MG tablet Take 1 tablet (10 mg total) by mouth once daily.    zolpidem (AMBIEN) 5 MG Tab Take 1 tablet (5 mg total) by mouth nightly as needed (insomnia).    naloxegoL (MOVANTIK) 25 mg tablet Take 1 tablet (25 mg) by mouth once daily.    promethazine (PHENERGAN) 12.5 MG Supp Place 1 suppository (12.5 mg total) rectally every 6 (six) hours as needed for Nausea.    promethazine (PHENERGAN) 25 MG tablet Take 0.5 tablets (12.5 mg total) by mouth every 6 (six) hours as needed for Nausea.       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    T4 N1bM1 B 3/85 nodes positive    Encounter for blood transfusion     Leukocytosis 1/19/2022    PONV (postoperative nausea and vomiting)      Past Surgical History:   Procedure Laterality Date    APPENDECTOMY      BILATERAL OOPHORECTOMY      CHOLECYSTECTOMY      with Cytoreduction and salpingectomy    COLOSTOMY      CYSTOSCOPY N/A 12/21/2021    Procedure: CYSTOSCOPY;  Surgeon: John Martínez MD;  Location: [facility];  Service: Urology;   Laterality: N/A;    CYSTOSCOPY WITH URETEROSCOPY, RETROGRADE PYELOGRAPHY, AND INSERTION OF STENT Bilateral 1/22/2022    Procedure: CYSTOSCOPY, WITH RETROGRADE PYELOGRAM AND URETERAL STENT INSERTION;  Surgeon: John Martínez MD;  Location: [facility];  Service: Urology;  Laterality: Bilateral;    CYTOREDUCTION      ENDOSCOPIC ULTRASOUND OF UPPER GASTROINTESTINAL TRACT N/A 6/11/2021    Procedure: ULTRASOUND, UPPER GI TRACT, ENDOSCOPIC;  Surgeon: Rosaura Lakhani MD;  Location: [facility];  Service: Endoscopy;  Laterality: N/A;  Linear scope    ERCP N/A 6/11/2021    Procedure: ERCP (ENDOSCOPIC RETROGRADE CHOLANGIOPANCREATOGRAPHY);  Surgeon: Rosaura Lakhani MD;  Location: [facility];  Service: Endoscopy;  Laterality: N/A;    ESOPHAGOGASTRODUODENOSCOPY N/A 5/6/2021    Procedure: EGD (ESOPHAGOGASTRODUODENOSCOPY);  Surgeon: Brandon Rosen MD;  Location: [facility];  Service: Gastroenterology;  Laterality: N/A;    ESOPHAGOGASTRODUODENOSCOPY  06/11/2021    EXCISION OF LESION  10/20/2020    Procedure: EXCISION, LESION COLOSTOMY;  Surgeon: Layton Anderson MD;  Location: [facility];  Service: General;;    FLUOROSCOPY Bilateral 1/19/2022    Procedure: Insertion of G-J tube and bilateral upper extremity venogram;  Surgeon: Bryan Torres MD;  Location: [facility] CATH LAB;  Service: General;  Laterality: Bilateral;    LAPAROTOMY, EXPLORATORY  02/12/2020    LYSIS OF ADHESION, COLOSTOMY    REVISION COLOSTOMY N/A 10/20/2020    Procedure: REVISION, COLOSTOMY;  Surgeon: Layton Anderson MD;  Location: [facility];  Service: General;  Laterality: N/A;  Ostomy bag placed    RIGHT COLECTOMY       Family History    None       Tobacco Use    Smoking status: Never Smoker    Smokeless tobacco: Never Used   Substance and Sexual Activity    Alcohol use: Never    Drug use: Never    Sexual activity: Not on file     Review of Systems   Unable to perform ROS: Mental status change     Objective:     Vital Signs  (Most Recent):  Temp: 100.1 °F (37.8 °C) (01/24/22 1552)  Pulse: 91 (01/24/22 1552)  Resp: 18 (01/24/22 1552)  BP: 114/69 (01/24/22 1552)  SpO2: (!) 94 % (01/24/22 1552) Vital Signs (24h Range):  Temp:  [97.8 °F (36.6 °C)-100.7 °F (38.2 °C)] 100.1 °F (37.8 °C)  Pulse:  [] 91  Resp:  [16-20] 18  SpO2:  [93 %-97 %] 94 %  BP: (114-147)/(63-93) 114/69     Weight: 41.2 kg (90 lb 13.3 oz)  Body mass index is 15.59 kg/m².    Physical Exam  Vitals and nursing note reviewed.   Constitutional:       Appearance: She is well-developed and well-nourished. She is ill-appearing.      Comments: Somnolent  Cachectic   HENT:      Head: Normocephalic.   Eyes:      Extraocular Movements: EOM normal.      Pupils: Pupils are equal, round, and reactive to light.   Neck:      Thyroid: No thyromegaly.      Vascular: No JVD.      Trachea: No tracheal deviation.   Cardiovascular:      Rate and Rhythm: Normal rate and regular rhythm.      Heart sounds: Normal heart sounds.   Pulmonary:      Breath sounds: Normal breath sounds. No wheezing.   Abdominal:      General: Abdomen is flat. There is no distension.      Palpations: Abdomen is not rigid. There is no hepatosplenomegaly or mass.      Tenderness: There is abdominal tenderness. There is no guarding or rebound.      Comments: Firm and nodular, gastrostomy tube in colostomy   Musculoskeletal:         General: No edema. Normal range of motion.   Lymphadenopathy:      Cervical: No cervical adenopathy.   Skin:     General: Skin is warm and dry.      Capillary Refill: Capillary refill takes 2 to 3 seconds.      Findings: No erythema or rash.   Psychiatric:         Mood and Affect: Mood and affect normal.      Comments: Unable to determine         Significant Labs:  I have reviewed all pertinent lab results within the past 24 hours.  CBC:   Recent Labs   Lab 01/24/22  0921   WBC 9.47   RBC 3.55*   HGB 8.8*   HCT 30.5*      MCV 86   MCH 24.8*   MCHC 28.9*     BMP:   Recent Labs   Lab  01/24/22  0921   GLU 67*      K 3.8   *   CO2 15*   BUN 23*   CREATININE 1.2   CALCIUM 8.7     CMP:   Recent Labs   Lab 01/20/22  0637 01/22/22  0659 01/24/22  0921   GLU 77   < > 67*   CALCIUM 8.9   < > 8.7   ALBUMIN 3.2*  --   --    PROT 6.9  --   --    *   < > 139   K 4.1   < > 3.8   CO2 19*   < > 15*      < > 111*   BUN 26*   < > 23*   CREATININE 1.1   < > 1.2   ALKPHOS 68  --   --    ALT <5*  --   --    AST 11  --   --    BILITOT 0.3  --   --     < > = values in this interval not displayed.       Significant Diagnostics:  Review of diagnostic studies over the last several weeks    EXAMINATION:  XR ABDOMEN AP 1 VIEW     CLINICAL HISTORY:  abdominal pain;     COMPARISON:  01/04/2022     FINDINGS:  Interval placement of bilateral ureteral stents which appear in good position.  There is an ostomy in the left lower abdomen.  Minimal small bowel gas noted without evidence of obstruction.  Peg tube balloon in the left upper abdomen over the stomach.     Impression:     See above.        Electronically signed by: Vitaliy West  Date:                                            01/23/2022  Time:                                           15:17      Reading Physician Reading Date Result Priority   Singh Beasley MD  [phone] 1/20/2022 STAT     Narrative & Impression  EXAMINATION:  CT ABDOMEN PELVIS WITH CONTRAST     CLINICAL HISTORY:  Abdominal pain and distension     TECHNIQUE:  Standard contrast enhanced CT performed with  ml Omnipaque 350.  with reformats.  All CT scans at this facility are performed  using dose modulation techniques as appropriate to performed exam including the following:  automated exposure control; adjustment of mA and/or kV according to the patients size (this includes techniques or standardized protocols for targeted exams where dose is matched to indication/reason for exam: i.e. extremities or head);  iterative reconstruction  technique.     COMPARISON:  06/13/2021 CT scan with contrast     FINDINGS:  Lung bases are clear.     The liver demonstrates mild intrahepatic bile duct dilatation.  The extrahepatic common bile duct is 14.5 mm.  The gallbladder is absent with clips in the gallbladder fossa.     A left upper quadrant percutaneous G-tube is present.  The catheter may course along the anterior inferior margin of the liver surface at the left hepatic lobe.  Just inferior to this is a small hyperdense area which could represent a small amount of hemorrhage.     The spleen is nonenlarged.     Both kidneys reveal chronic hydronephrosis.     The aorta is grossly unremarkable.  The IVC is patent     There are postoperative changes of the bowel with evidence of a left lower quadrant ostomy.  There are several distended loops of bowel with evidence of constipation, similar to the previous studies.     No free fluid is seen.     There is perirectal and presacral infiltration.     The bladder is mostly decompressed.     No free air.     No additional findings.     Impression:     Interval placement of percutaneous G tube with catheter coursing along the left inferior anterior margin with possible small adjacent hemorrhage.  Otherwise stable CT scan of the abdomen and pelvis as above.        Electronically signed by: Singh Beasley MD  Date:                                            01/20/2022  Time:                                           16:05    Reading Physician Reading Date Result Priority   Kostas Echevarria MD  [phone] 1/5/2022 STAT     Narrative & Impression  EXAMINATION:  XR SMALL BOWEL FOLLOW THROUGH     CLINICAL HISTORY:  constipation;     TECHNIQUE:  Small-bowel follow-through examination.  Fluoro time 0.6 minutes.  Total 16 images obtained.     COMPARISON:  01/03/2022     FINDINGS:   image demonstrates mild gaseous distention of the stomach.  Prominent stool overlying the right lower quadrant abdomen.  Left lower  quadrant colostomy.     2 hours after oral contrast administration, contrast is seen within the colostomy, indicating a normal gastrocolic transit time (reference range: Less than 3 hours).     Impression:     As above.        Electronically signed by: Kostas Echevarria  Date:                                            01/05/2022  Time:                                           15:50    Reading Physician Reading Date Result Priority   Gris Uriostegui MD  [phone] 1/4/2022 STAT     Narrative & Impression  EXAMINATION:  XR ABDOMEN AP 1 VIEW     CLINICAL HISTORY:  possible SBO;     TECHNIQUE:  AP View(s) of the abdomen was performed.     COMPARISON:  Prior     FINDINGS:  Nonspecific bowel gas pattern with scattered amount of stool in the right colon.  Dilated gas-filled loop of bowel in the upper right abdomen.  Round hyperdensity in the left hemiabdomen may be superficial to the patient.  No significant change since the prior CT.  Please see that report.     Impression:     Stable findings since the prior CT.  Please see that report obtained yesterday        Electronically signed by: Moody Landry  Date:                                            01/04/2022  Time:                                           18:52    Gris Uriostegui MD  [phone] 1/3/2022 STAT     Narrative & Impression  EXAMINATION:  CT ABDOMEN PELVIS WITHOUT CONTRAST     CLINICAL HISTORY:  Nausea/vomiting;     TECHNIQUE:  Low dose axial images, sagittal and coronal reformations were obtained from the lung bases to the pubic symphysis, Oral contrast was not administered.     COMPARISON:  Prior 06/13/2021     FINDINGS:  Evaluation limited by lack of soft tissue contrast.There is interval resolution of the left basilar opacities.  Status post cholecystectomy.  Moderate amount of stool in the colon.  Postsurgical changes in the right lower quadrant.  Postsurgical changes in the region of the rectosigmoid colon.  Suggestion of right-sided hydronephrosis.  No  evidence for obstructing right renal calculi.  The course of the right ureter is not well evaluated.  Persistent fat stranding in the rectosigmoid region.  Stomach is filled with debris.     Impression:     Moderate amount of stool in the colon suggestive of constipation.  Postsurgical changes in the right lower quadrant and rectosigmoid region     Suggestion of right-sided hydronephrosis.  No obstructing renal calculi is identified.     All CT scans   are performed using dose optimization techniques including the following: automated exposure control; adjustment of the mA and/or kV; use of iterative reconstruction technique.  Dose modulation was employed for ALARA by means of: Automated exposure control; adjustment of the mA and/or kV according to patient size (this includes techniques or standardized protocols for targeted exams where dose is matched to indication/reason for exam; i.e. extremities or head); and/or use of iterative reconstructive technique.        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  4 cc of Gadavist was administered without immediate complication.     COMPARISON:  None     FINDINGS:  There is a mass within the pelvis that measures approximately 5.6 by 4.6 cm that appears to be contiguous with the cervix/endometrial canal and may represent a large fundal fibroid.  There is even the suggestion of a low signal intensity wall between the mass and the rectum with some effacement of the rectum to the left side of the pelvis.  If the patient  is confirmed to have a prior hysterectomy than this may represent a mass projecting off the vaginal cuff or the rectum.  If the mass does project off the rectum the masses seen projecting off the high rectum approximately 12 cm from the anal verge.  Notes state that the patient is status post bilateral oophorectomy.  There are no definitively enlarged inguinal, external iliac or common iliac chain lymph nodes identified.  The T1 marrow signal of the bony pelvis is within normal limits.     Impression:     1. Pelvic mass that appears to be contiguous with the endometrial canal/cervix and may represent a fundal fibroid.  Clinical correlation with patient's history of prior surgeries is recommended with other possibilities given above.        Electronically signed by: Vladislav Jackson DO  Date:                                            12/06/2021  Time:                                           11:04

## 2022-01-25 NOTE — ASSESSMENT & PLAN NOTE
Cont Fentanyl patch   Hold scheduled OxyContin for now   IV Dilaudid prn     Narcotics and ativan D/c'd until patient more alert.   Continue the current Dilaudid IV dose.

## 2022-01-25 NOTE — CONSULTS
[facility] Surg  General Surgery  Consult Note    Patient Name: Madeline Warner  MRN: [id number]  Code Status: DNR  Admission Date: 1/19/2022  Hospital Length of Stay: 3 days  Attending Physician: Colt Lazaro MD  Primary Care Provider: Mariam Peña MD    Patient information was obtained from relative(s), past medical records and ER records.     Inpatient consult to General Surgery  Consult performed by: Rishi Krishnan MD  Consult ordered by: Kerline Hernandez NP  Reason for consult: Obstruction  Assessment/Recommendations: One.  I feel this is likely related to chronic constipation and metastatic cancer  Two.  Consideration should be given to a Gastroenterology consult  Three.  Abdominal films in the morning  Four.  Patient may benefit from a small-bowel follow-through with Gastrografin are Gastrografin enema         through her ostomy site if technically feasible    Five.  I do not feel that the patient is a surgical candidate and consideration should be given to use the gastrostomy tube for decompression  Six.  Clarification whether the gastrostomy tube was placed for decompression or tube feeds    I was not able to discuss this with the patient due to her somnolent state        Subjective:     Principal Problem: Severe malnutrition    History of Present Illness: Patient is a 56-year-old female was admitted to the hospital after gastrostomy tube placement.  It is questionable whether the gastrostomy tube was placed by interventional radiology for decompression or feeding and review of the notes.  The patient has no known abdominal carcinomatosis.  She has undergone multiple surgeries including a HIPEC treatment.  In review of the chart it seems that there are issues with constipation due to her narcotic use.  She underwent a CT scan that showed no significant bowel abnormalities except for constipation.  The surgery service was consulted by the medicine service because of a possibility of a  bowel obstruction.    The patient is currently laying in bed and is very lethargic.  It was reported that she was given Ativan this morning for agitation and has been somnolent since then.  She has a gastrostomy tube placed to gravity and is being given a regular diet      No current facility-administered medications on file prior to encounter.     Current Outpatient Medications on File Prior to Encounter   Medication Sig    fentaNYL (DURAGESIC) 100 mcg/hr Place 1 patch onto the skin every 72 hours.    metoclopramide HCl (REGLAN) 5 MG tablet Take 2 tablets (10 mg total) by mouth 4 (four) times daily before meals and nightly.    omeprazole (PRILOSEC) 40 MG capsule Take 1 capsule by mouth once daily.    ondansetron (ZOFRAN-ODT) 8 MG TbDL Take 1 tablet (8 mg total) by mouth every 12 (twelve) hours as needed.    oxyCODONE (ROXICODONE) 30 MG Tab Take 1 tablet (30 mg total) by mouth every 6 (six) hours as needed (pain).    phenazopyridine (PYRIDIUM) 200 MG tablet Take 1 tablet (200 mg total) by mouth 3 (three) times daily as needed (burning).    solifenacin (VESICARE) 10 MG tablet Take 1 tablet (10 mg total) by mouth once daily.    zolpidem (AMBIEN) 5 MG Tab Take 1 tablet (5 mg total) by mouth nightly as needed (insomnia).    naloxegoL (MOVANTIK) 25 mg tablet Take 1 tablet (25 mg) by mouth once daily.    promethazine (PHENERGAN) 12.5 MG Supp Place 1 suppository (12.5 mg total) rectally every 6 (six) hours as needed for Nausea.    promethazine (PHENERGAN) 25 MG tablet Take 0.5 tablets (12.5 mg total) by mouth every 6 (six) hours as needed for Nausea.       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    T4 N1bM1 B 3/85 nodes positive    Encounter for blood transfusion     Leukocytosis 1/19/2022    PONV (postoperative nausea and vomiting)      Past Surgical History:   Procedure  Laterality Date    APPENDECTOMY      BILATERAL OOPHORECTOMY      CHOLECYSTECTOMY      with Cytoreduction and salpingectomy    COLOSTOMY      CYSTOSCOPY N/A 12/21/2021    Procedure: CYSTOSCOPY;  Surgeon: John Martínez MD;  Location: [facility];  Service: Urology;  Laterality: N/A;    CYSTOSCOPY WITH URETEROSCOPY, RETROGRADE PYELOGRAPHY, AND INSERTION OF STENT Bilateral 1/22/2022    Procedure: CYSTOSCOPY, WITH RETROGRADE PYELOGRAM AND URETERAL STENT INSERTION;  Surgeon: John Martínez MD;  Location: [facility];  Service: Urology;  Laterality: Bilateral;    CYTOREDUCTION      ENDOSCOPIC ULTRASOUND OF UPPER GASTROINTESTINAL TRACT N/A 6/11/2021    Procedure: ULTRASOUND, UPPER GI TRACT, ENDOSCOPIC;  Surgeon: Rosaura Lakhani MD;  Location: [facility];  Service: Endoscopy;  Laterality: N/A;  Linear scope    ERCP N/A 6/11/2021    Procedure: ERCP (ENDOSCOPIC RETROGRADE CHOLANGIOPANCREATOGRAPHY);  Surgeon: Rosaura Lakhani MD;  Location: [facility];  Service: Endoscopy;  Laterality: N/A;    ESOPHAGOGASTRODUODENOSCOPY N/A 5/6/2021    Procedure: EGD (ESOPHAGOGASTRODUODENOSCOPY);  Surgeon: Brandon Rosen MD;  Location: [facility];  Service: Gastroenterology;  Laterality: N/A;    ESOPHAGOGASTRODUODENOSCOPY  06/11/2021    EXCISION OF LESION  10/20/2020    Procedure: EXCISION, LESION COLOSTOMY;  Surgeon: Layton Anderson MD;  Location: [facility] OR;  Service: General;;    FLUOROSCOPY Bilateral 1/19/2022    Procedure: Insertion of G-J tube and bilateral upper extremity venogram;  Surgeon: Bryan Torres MD;  Location: [facility] CATH LAB;  Service: General;  Laterality: Bilateral;    LAPAROTOMY, EXPLORATORY  02/12/2020    LYSIS OF ADHESION, COLOSTOMY    REVISION COLOSTOMY N/A 10/20/2020    Procedure: REVISION, COLOSTOMY;  Surgeon: Layton Anderson MD;  Location: [facility] OR;  Service: General;  Laterality: N/A;  Ostomy bag placed    RIGHT COLECTOMY       Family History    None       Tobacco Use     Smoking status: Never Smoker    Smokeless tobacco: Never Used   Substance and Sexual Activity    Alcohol use: Never    Drug use: Never    Sexual activity: Not on file     Review of Systems   Unable to perform ROS: Mental status change     Objective:     Vital Signs (Most Recent):  Temp: 100.1 °F (37.8 °C) (01/24/22 1552)  Pulse: 91 (01/24/22 1552)  Resp: 18 (01/24/22 1552)  BP: 114/69 (01/24/22 1552)  SpO2: (!) 94 % (01/24/22 1552) Vital Signs (24h Range):  Temp:  [97.8 °F (36.6 °C)-100.7 °F (38.2 °C)] 100.1 °F (37.8 °C)  Pulse:  [] 91  Resp:  [16-20] 18  SpO2:  [93 %-97 %] 94 %  BP: (114-147)/(63-93) 114/69     Weight: 41.2 kg (90 lb 13.3 oz)  Body mass index is 15.59 kg/m².    Physical Exam  Vitals and nursing note reviewed.   Constitutional:       Appearance: She is well-developed and well-nourished. She is ill-appearing.      Comments: Somnolent  Cachectic   HENT:      Head: Normocephalic.   Eyes:      Extraocular Movements: EOM normal.      Pupils: Pupils are equal, round, and reactive to light.   Neck:      Thyroid: No thyromegaly.      Vascular: No JVD.      Trachea: No tracheal deviation.   Cardiovascular:      Rate and Rhythm: Normal rate and regular rhythm.      Heart sounds: Normal heart sounds.   Pulmonary:      Breath sounds: Normal breath sounds. No wheezing.   Abdominal:      General: Abdomen is flat. There is no distension.      Palpations: Abdomen is not rigid. There is no hepatosplenomegaly or mass.      Tenderness: There is abdominal tenderness. There is no guarding or rebound.      Comments: Firm and nodular, gastrostomy tube in colostomy   Musculoskeletal:         General: No edema. Normal range of motion.   Lymphadenopathy:      Cervical: No cervical adenopathy.   Skin:     General: Skin is warm and dry.      Capillary Refill: Capillary refill takes 2 to 3 seconds.      Findings: No erythema or rash.   Psychiatric:         Mood and Affect: Mood and affect normal.      Comments: Unable  to determine         Significant Labs:  I have reviewed all pertinent lab results within the past 24 hours.  CBC:   Recent Labs   Lab 01/24/22 0921   WBC 9.47   RBC 3.55*   HGB 8.8*   HCT 30.5*      MCV 86   MCH 24.8*   MCHC 28.9*     BMP:   Recent Labs   Lab 01/24/22 0921   GLU 67*      K 3.8   *   CO2 15*   BUN 23*   CREATININE 1.2   CALCIUM 8.7     CMP:   Recent Labs   Lab 01/20/22  0637 01/22/22  0659 01/24/22 0921   GLU 77   < > 67*   CALCIUM 8.9   < > 8.7   ALBUMIN 3.2*  --   --    PROT 6.9  --   --    *   < > 139   K 4.1   < > 3.8   CO2 19*   < > 15*      < > 111*   BUN 26*   < > 23*   CREATININE 1.1   < > 1.2   ALKPHOS 68  --   --    ALT <5*  --   --    AST 11  --   --    BILITOT 0.3  --   --     < > = values in this interval not displayed.       Significant Diagnostics:  Review of diagnostic studies over the last several weeks    EXAMINATION:  XR ABDOMEN AP 1 VIEW     CLINICAL HISTORY:  abdominal pain;     COMPARISON:  01/04/2022     FINDINGS:  Interval placement of bilateral ureteral stents which appear in good position.  There is an ostomy in the left lower abdomen.  Minimal small bowel gas noted without evidence of obstruction.  Peg tube balloon in the left upper abdomen over the stomach.     Impression:     See above.        Electronically signed by: Vitaliy West  Date:                                            01/23/2022  Time:                                           15:17      Reading Physician Reading Date Result Priority   Singh Beasley MD  [phone]  [phone] 1/20/2022 STAT     Narrative & Impression  EXAMINATION:  CT ABDOMEN PELVIS WITH CONTRAST     CLINICAL HISTORY:  Abdominal pain and distension     TECHNIQUE:  Standard contrast enhanced CT performed with  ml Omnipaque 350.  with reformats.  All CT scans at this facility are performed  using dose modulation techniques as appropriate to performed exam including the following:  automated  exposure control; adjustment of mA and/or kV according to the patients size (this includes techniques or standardized protocols for targeted exams where dose is matched to indication/reason for exam: i.e. extremities or head);  iterative reconstruction technique.     COMPARISON:  06/13/2021 CT scan with contrast     FINDINGS:  Lung bases are clear.     The liver demonstrates mild intrahepatic bile duct dilatation.  The extrahepatic common bile duct is 14.5 mm.  The gallbladder is absent with clips in the gallbladder fossa.     A left upper quadrant percutaneous G-tube is present.  The catheter may course along the anterior inferior margin of the liver surface at the left hepatic lobe.  Just inferior to this is a small hyperdense area which could represent a small amount of hemorrhage.     The spleen is nonenlarged.     Both kidneys reveal chronic hydronephrosis.     The aorta is grossly unremarkable.  The IVC is patent     There are postoperative changes of the bowel with evidence of a left lower quadrant ostomy.  There are several distended loops of bowel with evidence of constipation, similar to the previous studies.     No free fluid is seen.     There is perirectal and presacral infiltration.     The bladder is mostly decompressed.     No free air.     No additional findings.     Impression:     Interval placement of percutaneous G tube with catheter coursing along the left inferior anterior margin with possible small adjacent hemorrhage.  Otherwise stable CT scan of the abdomen and pelvis as above.        Electronically signed by: Singh Beasley MD  Date:                                            01/20/2022  Time:                                           16:05    Reading Physician Reading Date Result Priority   Kostas Echevarria MD  [phone] 1/5/2022 STAT     Narrative & Impression  EXAMINATION:  XR SMALL BOWEL FOLLOW THROUGH     CLINICAL HISTORY:  constipation;     TECHNIQUE:  Small-bowel follow-through  examination.  Fluoro time 0.6 minutes.  Total 16 images obtained.     COMPARISON:  01/03/2022     FINDINGS:   image demonstrates mild gaseous distention of the stomach.  Prominent stool overlying the right lower quadrant abdomen.  Left lower quadrant colostomy.     2 hours after oral contrast administration, contrast is seen within the colostomy, indicating a normal gastrocolic transit time (reference range: Less than 3 hours).     Impression:     As above.        Electronically signed by: Kostas Echevarria  Date:                                            01/05/2022  Time:                                           15:50    Reading Physician Reading Date Result Priority   Gris Uriostegui MD  [phone] 1/4/2022 STAT     Narrative & Impression  EXAMINATION:  XR ABDOMEN AP 1 VIEW     CLINICAL HISTORY:  possible SBO;     TECHNIQUE:  AP View(s) of the abdomen was performed.     COMPARISON:  Prior     FINDINGS:  Nonspecific bowel gas pattern with scattered amount of stool in the right colon.  Dilated gas-filled loop of bowel in the upper right abdomen.  Round hyperdensity in the left hemiabdomen may be superficial to the patient.  No significant change since the prior CT.  Please see that report.     Impression:     Stable findings since the prior CT.  Please see that report obtained yesterday        Electronically signed by: Moody Landry  Date:                                            01/04/2022  Time:                                           18:52    Gris Uriostegui MD  [phone] 1/3/2022 STAT     Narrative & Impression  EXAMINATION:  CT ABDOMEN PELVIS WITHOUT CONTRAST     CLINICAL HISTORY:  Nausea/vomiting;     TECHNIQUE:  Low dose axial images, sagittal and coronal reformations were obtained from the lung bases to the pubic symphysis, Oral contrast was not administered.     COMPARISON:  Prior 06/13/2021     FINDINGS:  Evaluation limited by lack of soft tissue contrast.There is interval resolution of the left  basilar opacities.  Status post cholecystectomy.  Moderate amount of stool in the colon.  Postsurgical changes in the right lower quadrant.  Postsurgical changes in the region of the rectosigmoid colon.  Suggestion of right-sided hydronephrosis.  No evidence for obstructing right renal calculi.  The course of the right ureter is not well evaluated.  Persistent fat stranding in the rectosigmoid region.  Stomach is filled with debris.     Impression:     Moderate amount of stool in the colon suggestive of constipation.  Postsurgical changes in the right lower quadrant and rectosigmoid region     Suggestion of right-sided hydronephrosis.  No obstructing renal calculi is identified.     All CT scans   are performed using dose optimization techniques including the following: automated exposure control; adjustment of the mA and/or kV; use of iterative reconstruction technique.  Dose modulation was employed for ALARA by means of: Automated exposure control; adjustment of the mA and/or kV according to patient size (this includes techniques or standardized protocols for targeted exams where dose is matched to indication/reason for exam; i.e. extremities or head); and/or use of iterative reconstructive technique.        Electronically signed by: Moody Landry  Date:                                            01/03/2022  Time:                                           18:06    Vladislav Jackson DO  [phone] 12/6/2021 Routine     Narrative & Impression  EXAMINATION:  MRI PELVIS W WO CONTRAST     CLINICAL HISTORY:  further eval metastatic signet ring carcinoma;  Secondary malignant neoplasm of unspecified site     TECHNIQUE:  Multiplanar multisequence MR imaging of the pelvis was performed with and without contrast.  4 cc of Gadavist was administered without immediate complication.     COMPARISON:  None     FINDINGS:  There is a mass within the pelvis that measures approximately 5.6 by 4.6 cm that appears to be contiguous  with the cervix/endometrial canal and may represent a large fundal fibroid.  There is even the suggestion of a low signal intensity wall between the mass and the rectum with some effacement of the rectum to the left side of the pelvis.  If the patient is confirmed to have a prior hysterectomy than this may represent a mass projecting off the vaginal cuff or the rectum.  If the mass does project off the rectum the masses seen projecting off the high rectum approximately 12 cm from the anal verge.  Notes state that the patient is status post bilateral oophorectomy.  There are no definitively enlarged inguinal, external iliac or common iliac chain lymph nodes identified.  The T1 marrow signal of the bony pelvis is within normal limits.     Impression:     1. Pelvic mass that appears to be contiguous with the endometrial canal/cervix and may represent a fundal fibroid.  Clinical correlation with patient's history of prior surgeries is recommended with other possibilities given above.        Electronically signed by: Vladislav Jackson DO  Date:                                            12/06/2021  Time:                                           11:04                Assessment/Plan:     * Severe malnutrition  Further management per Medicine.  I feel that there should be clarification with a gastrostomy tube was placed for nutrition or for palliation with drainage of the stomach due to the intra-abdominal carcinomatosis    Fever  Management per Medicine    Hydronephrosis  Patient has had bilateral ureteral stents placed    Therapeutic opioid-induced constipation (OIC)  Recommend GI consultation for further evaluation and management    Gastrostomy tube in place  Recommend leaving this to gravity, placed by Interventional Radiology    Metastatic signet ring cell carcinoma     Likely contributing to her bowel dysfunction.  Chronic narcotic use likely contributing to her bowel dysfunction.    Given the fact that the patient has  exploratory laparotomy and HIPEC a do not feel she would be a candidate for re-exploration    Intractable nausea and vomiting, Chronic  Likely related to her metastatic signet ring carcinoma    Colostomy present on admission  Long-standing    Gastroesophageal reflux disease without esophagitis  Management per Medicine    Intestinal obstruction  Most likely related to chronic constipation from narcotics and or carcinomatosis.    The patient has had a small-bowel follow-through in the past that showed no evidence of a bowel obstruction.    I would recommend that she be made NPO.  Medicine can decide if oral or gastrostomy tube medications are warranted.  The patient should have repeat abdominal films in the morning.    A GI consult should be obtained.  The patient may benefit from a small-bowel follow-through with Gastrografin as this may help treat her constipation.  A Gastrografin enema through the colostomy is also something that can be considered    Chronic pain due to neoplasm  Use of narcotics her likely contributing to her constipation.    I would recommend a GI consultation to discuss this as was mentioned in the Oncology note      VTE Risk Mitigation (From admission, onward)         Ordered     IP VTE HIGH RISK PATIENT  Once         01/19/22 1435     Place sequential compression device  Until discontinued         01/19/22 1435                Thank you for your consult. I will follow-up with patient. Please contact us if you have any additional questions.    Rishi Krishnan MD  General Surgery  [facility]

## 2022-01-25 NOTE — ASSESSMENT & PLAN NOTE
Most likely related to chronic constipation from narcotics and or carcinomatosis.    The patient has had a small-bowel follow-through in the past that showed no evidence of a bowel obstruction.    I would recommend that she be made NPO.  Medicine can decide if oral or gastrostomy tube medications are warranted.  The patient should have repeat abdominal films in the morning.    A GI consult should be obtained.  The patient may benefit from a small-bowel follow-through with Gastrografin as this may help treat her constipation.  A Gastrografin enema through the colostomy is also something that can be considered

## 2022-01-25 NOTE — ASSESSMENT & PLAN NOTE
--management per palliative medicine   --narcotics currently on hold due to patient's lethargic state  --patient denies pain at present time

## 2022-01-25 NOTE — ASSESSMENT & PLAN NOTE
S/p G tube placement today per IR  Consult RD  Regular diet   IVFs  Pain control     1/25  Appears g-tube was placed for drainage   IR was unable to place GJ tube at that time  Spoke with IR will re-attempt GJ with assistance from GI once pt more stable   Due to bacteremia unable to start TPN.   Will discuss with team

## 2022-01-25 NOTE — HPI
The patient presented to the ER for abdominal pain. We have been consulted for constipation.   Her history is significant for adenocarcinoma diagnosed in 2017 at the time of appendectomy. 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  During her hemicolectomy, there was evidence of peritoneal disease.  She then underwent 6 months of chemotherapy and a very difficult cytoreductive surgery and HIPEC in 2018 with a very prolonged recovery.  She later developed a bowel obstruction and underwent a laparotomy and diverting Andrew's colostomy in February 2020 where there was noted to be additional peritoneal disease. In the interim, she has developed a pelvic mass which involves the urinary tract. She underwent a cystoscopy with TURBT with Urology and biopsy confirming likely metastatic adenocarcinoma. Additional full details included in Dr. Peña's note dated 01/14/22. IR placed a G-tube on 01/19/22 but was unable to do a GJ. Urology placed bilateral ureteral stents 01/22/22. Chemotherapy discussed but current BCx positive for gram negative rods.   Due to continued abdominal pain, nausea and vomiting, General Surgery was consulted. Work up ruled out bowel obstruction but significant constipation was noted. The patient was started on Relistor, Senna and Miralax. Her mother said she had small stools last night after not going for several days. This morning, she had about 200 cc of loose stool. No blood noted. No relief of abdominal pain provided. However, she is asking for ice cream and hasn't been vomited in the last 24 hrs. The patient is lethargic due to Ativan; therefore, the history has been provided by her mother. CT w/ contrast (01/20/22) showed interval placement of percutaneous G tube with catheter coursing along the left inferior anterior margin with possible small adjacent hemorrhage. Constipation noted similar to previous studies. AXR  (01/23/22) showed interval placement of bilateral ureteral stents, minimal small bowel gas noted without evidence of obstruction.  Peg tube balloon in the left upper abdomen over the stomach. AXR (01/25/22) showed interval decrease in the amount of air in the small and large bowel without evidence for obstruction. SBFT pending.

## 2022-01-25 NOTE — HPI
Patient is a 56-year-old female was admitted to the hospital after gastrostomy tube placement.  It is questionable whether the gastrostomy tube was placed by interventional radiology for decompression or feeding and review of the notes.  The patient has no known abdominal carcinomatosis.  She has undergone multiple surgeries including a HIPEC treatment.  In review of the chart it seems that there are issues with constipation due to her narcotic use.  She underwent a CT scan that showed no significant bowel abnormalities except for constipation.  The surgery service was consulted by the medicine service because of a possibility of a bowel obstruction.    The patient is currently laying in bed and is very lethargic.  It was reported that she was given Ativan this morning for agitation and has been somnolent since then.  She has a gastrostomy tube placed to gravity and is being given a regular diet

## 2022-01-25 NOTE — PLAN OF CARE
Problem: Adjustment to Illness (Sepsis/Septic Shock)  Goal: Optimal Coping  Outcome: Ongoing, Progressing     Problem: Bleeding (Sepsis/Septic Shock)  Goal: Absence of Bleeding  Outcome: Ongoing, Progressing     Problem: Glycemic Control Impaired (Sepsis/Septic Shock)  Goal: Blood Glucose Level Within Desired Range  Outcome: Ongoing, Progressing     Problem: Adult Inpatient Plan of Care  Goal: Plan of Care Review  Outcome: Ongoing, Progressing     Problem: Adult Inpatient Plan of Care  Goal: Absence of Hospital-Acquired Illness or Injury  Outcome: Ongoing, Progressing     Problem: Adult Inpatient Plan of Care  Goal: Optimal Comfort and Wellbeing  Outcome: Ongoing, Progressing     Problem: Adult Inpatient Plan of Care  Goal: Readiness for Transition of Care  Outcome: Ongoing, Progressing     Problem: Infection  Goal: Absence of Infection Signs and Symptoms  Outcome: Ongoing, Progressing     Problem: Pain Chronic (Persistent)  Goal: Acceptable Pain Control and Functional Ability  Outcome: Ongoing, Progressing     Problem: Coping Ineffective  Goal: Effective Coping  Outcome: Ongoing, Progressing

## 2022-01-25 NOTE — ASSESSMENT & PLAN NOTE
Further management per Medicine.  I feel that there should be clarification with a gastrostomy tube was placed for nutrition or for palliation with drainage of the stomach due to the intra-abdominal carcinomatosis

## 2022-01-25 NOTE — ASSESSMENT & PLAN NOTE
Will give 3 doses of Relistor     Plan for SBFT today.   GI has been consulted.   Continue Relistor, Senna and Miralax at this time.

## 2022-01-25 NOTE — ASSESSMENT & PLAN NOTE
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

## 2022-01-25 NOTE — ASSESSMENT & PLAN NOTE
Use of narcotics her likely contributing to her constipation.    I would recommend a GI consultation to discuss this as was mentioned in the Oncology note

## 2022-01-25 NOTE — PROGRESS NOTES
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 Pt will be placed in outpatient extended recovery for pain control, IVfs, and RD consult.     On exam, pt complain of nausea and 10/10 pain at G tube site. Pt reports ongoing chronic pain, N/V, weakness/fatigue, and weight loss for approximately one month.       Overview/Hospital Course:  1/20: G tube placed yesterday per IR. Today the patient has copious amounts of green fluid draining from the G tube- this is attached to a guzmán bag, her colostomy has no output, she has bowel sounds, but endorsing nasuea and abdominal pain. CT abdomen pending. Case discussed with Dr Barger and Dr Torres. Initial G tube placement check performed right after placement and the subsequent the following day is pending. As of 1/21/22  CT abdomen showed interval placement of percutaneous G tube with catheter coursing along the left inferior anterior margin with possible small adjacent hemorrhage. Patient c/o constipation requesting an injection. GI has been consulted to assist. Urology following and plans for stent placement tomorrow. G-tube still draining green fluid.  As of 1/22/22 pt reports passing some gas, still no BM. Will give x 3 doses of Relistor for constipation. Plans for ureteral stent placement today. As of 1/23/22 still no bowel movement. + Flatus. C/o abdominal pain and N/V. Abdominal xray pending. S/p bilateral ureteral stent placement. Palliative care consulted for symptom management  and goal planning. As of 1/24/22 patient very lethargic this am.  Pt had dose of Ativan this morning. Narcotics and ativan D/c'd until patient more alert. Mother reports pt passed a very small amount of hard stool overnight. Fever this morning, blood cx ordered. IV zosyn empirically. General Surgery consulted for possible obstruction. As of 1/25/22 patient is more awake but not back to her baseline. Abdominal imaging showed no obstruction. Colostomy with output. Plan for SBFT today. GI has been consulted. Case discussed with General surgery and IR. Blood cultures positive for gram negative rods. Continue IV zosyn. Port placement canceled due to bacteremia. Palliative care following.                Review of Systems   Unable to perform ROS: Mental status change     Objective:     Vital Signs (Most Recent):  Temp: 98.6 °F (37 °C) (01/25/22 1604)  Pulse: 82 (01/25/22 1604)  Resp: 16 (01/25/22 1604)  BP: 99/61 (01/25/22 1604)  SpO2: (!) 94 % (01/25/22 1604) Vital Signs (24h Range):  Temp:  [98.1 °F (36.7 °C)-99.4 °F (37.4 °C)] 98.6 °F (37 °C)  Pulse:  [] 82  Resp:  [16-20] 16  SpO2:  [93 %-96 %] 94 %  BP: (86-99)/(50-61) 99/61     Weight: 42.9 kg (94 lb 9.2 oz)  Body mass index is 16.23 kg/m².    Intake/Output Summary (Last 24 hours) at 1/25/2022 1721  Last data filed at 1/25/2022 1429  Gross per 24 hour   Intake 4781.11 ml   Output 3000 ml   Net 1781.11 ml      Physical Exam  Vitals and nursing note reviewed.   Constitutional:       General: She is not in acute distress ( ).     Appearance: She is cachectic. She is ill-appearing. She is not diaphoretic.   HENT:      Head: Normocephalic and atraumatic.      Nose: Nose normal.   Eyes:      General: No scleral icterus.     Conjunctiva/sclera: Conjunctivae normal.   Neck:      Trachea: No tracheal deviation.   Cardiovascular:      Rate and Rhythm: Normal rate and regular rhythm.      Heart sounds: Normal heart sounds. No murmur heard.  No friction rub. No gallop.     Pulmonary:      Effort: Pulmonary effort is normal. No respiratory distress.      Breath sounds: Normal breath sounds. No stridor. No wheezing or rales.   Chest:      Chest wall: No tenderness.   Abdominal:      General: Bowel sounds are normal. There is distension.      Palpations: Abdomen is soft. There is no mass.      Tenderness: Tenderness: g tube site  There is no guarding or rebound.      Comments: colostomy LLQ with liquid stool in bag   G tube dressing C/D/I- attached to a guzmán bag draining dark green fluid   Musculoskeletal:         General: No tenderness or deformity. Normal range of motion.      Cervical back: Normal range of motion and neck supple.   Skin:     General: Skin is warm and dry.      Coloration: Skin is not pale.      Findings: No erythema or rash.   Neurological:      Mental Status: She is lethargic.      Cranial Nerves: No cranial nerve deficit.      Motor: No abnormal muscle tone.      Coordination: Coordination normal.   Psychiatric:         Mood and Affect: Mood is not anxious. Affect is not angry or tearful.         Significant Labs:   All pertinent labs within the past 24 hours have been reviewed.  Blood Culture:   Recent Labs   Lab 01/24/22 0921   LABBLOO Gram stain aer bottle: Gram negative rods   Results called to and read back by: Alyson Urbina RN 01/25/2022    02:21  Gram stain aer bottle: Gram negative rods   Results called to and read back by: Alyson Urbina RN 01/25/2022    02:21     BMP:   Recent Labs   Lab 01/25/22  1033   *      K 2.7*      CO2 20*   BUN 23*   CREATININE 1.5*   CALCIUM 7.8*     CBC:   Recent Labs   Lab 01/24/22  0921 01/25/22  1033   WBC 9.47 13.13*   HGB 8.8* 7.3*   HCT 30.5* 23.1*    219     CMP:   Recent Labs   Lab 01/24/22  0921 01/25/22  1033    138   K 3.8 2.7*   * 109   CO2 15* 20*   GLU 67* 112*   BUN 23* 23*   CREATININE 1.2 1.5*   CALCIUM 8.7 7.8*   ANIONGAP 13 9   EGFRNONAA 51* 39*        Significant Imaging:         Assessment/Plan:      * Severe malnutrition  S/p G tube placement today per IR  Consult RD  Regular diet   IVFs  Pain control     1/25  Appears g-tube was placed for drainage   IR was unable to place GJ tube at that time  Spoke with IR will re-attempt GJ with assistance from GI once pt more stable   Due to bacteremia unable to start TPN.   Will discuss with team      Bacteremia  Fever this morning, blood cx ordered. IV zosyn empirically.    1/25/22  -Blood cultures positive for gram negative rods.   -Continue IV zosyn.   -Follow fever trend           Hydronephrosis  Urology on board   S/p bilateral ureteral stent placement.     Therapeutic opioid-induced constipation (OIC)  Will give 3 doses of Relistor     Plan for SBFT today.   GI has been consulted.   Continue Relistor, Senna and Miralax at this time.           Gastrostomy tube in place  Placed on 1/19 per IR  Green fluid draining from the G tube- this is attached to a guzmán bag, her colostomy has no output, she has bowel sounds, but endorsing nasuea and abdominal pain  CT abdomen showed interval placement of percutaneous G tube with catheter coursing along the left inferior anterior margin with possible small adjacent hemorrhage.    General sx recommends leaving this to gravity      Metastatic signet ring cell carcinoma  Oncology managing   Plan to arrange outpatient follow up with Primary Oncologist pending resolution of current clinical situation to initiate chemotherapy               Intractable nausea and vomiting, Chronic  Scheduled Zofran   Phenergan/compazine prn         Colostomy present on admission  Consult wound care           Gastroesophageal reflux disease without esophagitis  Cont Pepcid      Chronic pain due to neoplasm  Cont Fentanyl patch   Hold scheduled OxyContin for now   IV Dilaudid prn     Narcotics and ativan D/c'd until patient more alert.   Continue the current Dilaudid IV dose.       VTE Risk  Mitigation (From admission, onward)         Ordered     IP VTE HIGH RISK PATIENT  Once         01/19/22 1435     Place sequential compression device  Until discontinued         01/19/22 1435                Discharge Planning   VICTOR MANUEL: 1/20/2022     Code Status: DNR   Is the patient medically ready for discharge?:     Reason for patient still in hospital (select all that apply): Patient trending condition, Laboratory test and Treatment  Discharge Plan A: Home with family                  Kerline Hernandez NP  Department of Hospital Medicine   [facility] - Med S[facility]

## 2022-01-25 NOTE — PROGRESS NOTES
"Advance Care Planning     Progress Note   Palliative Medicine      SUBJECTIVE:     History of Present Illness:  Patient seen and examined with her mother at bedside. Her mom says that she had a rough night with low blood pressure and has woken up a bit but not back to her baseline. She is also very upset with the discussion last night with the surgeon. She says they hope to push forth with the port and be able to start chemotherapy as planned. Madeline woke up with her mother touching her shoulders and frequent cues to stay awake. We discussed the barriers to port placement, starting TF, and initiating chemotherapy. I am worried that she has continued to decline and that treatment continues to be delayed. Madeline acknowledged that she is very ill and adds that "nothing is working". She hopes she can still pursue chemotherapy as she says this is her only choice if she wants to live. While I hold that same hope I am also worried about her. I worry that she will continue to decline and continuing to seek life prolonging care will not alter the outcome while postponing comfort/ causing distress. Madeline also agreed with this sentiment then began to quietly cry. We will continue to follow along as the workup is ongoing and will await oncology evaluation regarding treatment options. She understands that there could come a time that her disease or functional status has progressed beyond the benefit of treatment and if that were the case we would recommend redirecting our goal to comfort. Nancy and Madeline seem to be understanding the gravity of the situation and preparing for tough conversations while still trying to cling to the idea of improving and being able to start treatment.         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    T4 N1bM1 B 3/85 nodes positive    Encounter for blood transfusion     Leukocytosis 1/19/2022    PONV (postoperative nausea and " vomiting)      Past Surgical History:   Procedure Laterality Date    APPENDECTOMY      BILATERAL OOPHORECTOMY      CHOLECYSTECTOMY      with Cytoreduction and salpingectomy    COLOSTOMY      CYSTOSCOPY N/A 12/21/2021    Procedure: CYSTOSCOPY;  Surgeon: John Martínez MD;  Location: [facility];  Service: Urology;  Laterality: N/A;    CYSTOSCOPY WITH URETEROSCOPY, RETROGRADE PYELOGRAPHY, AND INSERTION OF STENT Bilateral 1/22/2022    Procedure: CYSTOSCOPY, WITH RETROGRADE PYELOGRAM AND URETERAL STENT INSERTION;  Surgeon: John Martínez MD;  Location: [facility];  Service: Urology;  Laterality: Bilateral;    CYTOREDUCTION      ENDOSCOPIC ULTRASOUND OF UPPER GASTROINTESTINAL TRACT N/A 6/11/2021    Procedure: ULTRASOUND, UPPER GI TRACT, ENDOSCOPIC;  Surgeon: Rosaura Lakhani MD;  Location: [facility];  Service: Endoscopy;  Laterality: N/A;  Linear scope    ERCP N/A 6/11/2021    Procedure: ERCP (ENDOSCOPIC RETROGRADE CHOLANGIOPANCREATOGRAPHY);  Surgeon: Rosaura Lakhani MD;  Location: [facility];  Service: Endoscopy;  Laterality: N/A;    ESOPHAGOGASTRODUODENOSCOPY N/A 5/6/2021    Procedure: EGD (ESOPHAGOGASTRODUODENOSCOPY);  Surgeon: Brandon Rosen MD;  Location: [facility];  Service: Gastroenterology;  Laterality: N/A;    ESOPHAGOGASTRODUODENOSCOPY  06/11/2021    EXCISION OF LESION  10/20/2020    Procedure: EXCISION, LESION COLOSTOMY;  Surgeon: Layton Anderson MD;  Location: [facility];  Service: General;;    FLUOROSCOPY Bilateral 1/19/2022    Procedure: Insertion of G-J tube and bilateral upper extremity venogram;  Surgeon: Bryan Torres MD;  Location: [facility] CATH LAB;  Service: General;  Laterality: Bilateral;    LAPAROTOMY, EXPLORATORY  02/12/2020    LYSIS OF ADHESION, COLOSTOMY    REVISION COLOSTOMY N/A 10/20/2020    Procedure: REVISION, COLOSTOMY;  Surgeon: Layton Anderson MD;  Location: [facility] OR;  Service: General;  Laterality: N/A;  Ostomy bag placed    RIGHT COLECTOMY        History reviewed. No pertinent family history.  Review of patient's allergies indicates:  No Known Allergies    Medications:    Current Facility-Administered Medications:     acetaminophen tablet 650 mg, 650 mg, Oral, Q4H PRN, Soni Urias NP, 650 mg at 01/24/22 1722    albuterol-ipratropium 2.5 mg-0.5 mg/3 mL nebulizer solution 3 mL, 3 mL, Nebulization, Q6H PRN, Soni Urias NP    aluminum-magnesium hydroxide-simethicone 200-200-20 mg/5 mL suspension 30 mL, 30 mL, Oral, QID PRN, Soni Urias NP    bisacodyL suppository 10 mg, 10 mg, Other, Daily PRN, Kerline Hernandez NP    dextrose 5 % in lactated ringers infusion, , Intravenous, Continuous, Kerline Hernandez NP, Last Rate: 100 mL/hr at 01/25/22 0659, Rate Verify at 01/25/22 0659    dextrose 50% injection 12.5 g, 12.5 g, Intravenous, PRN, Soni Urias NP    dextrose 50% injection 25 g, 25 g, Intravenous, PRN, Soni Urias NP    diphenhydrAMINE injection 25 mg, 25 mg, Intravenous, Q6H PRN, Jeana Adams MD    famotidine tablet 20 mg, 20 mg, Oral, Daily, Soni Urias NP, 20 mg at 01/25/22 0930    glucagon (human recombinant) injection 1 mg, 1 mg, Intramuscular, PRN, Soni Urias NP    glucose chewable tablet 16 g, 16 g, Oral, PRN, Soni Urias NP    glucose chewable tablet 24 g, 24 g, Oral, PRN, Soni Urias NP    HYDROmorphone injection 1 mg, 1 mg, Intravenous, Q4H PRN, Nadira Dotson NP, 1 mg at 01/22/22 0714    hyoscyamine ODT 0.125 mg, 0.125 mg, Sublingual, Q4H PRN, John Martínez MD    magnesium oxide tablet 800 mg, 800 mg, Oral, PRN, Soni B. Adonay, NP    magnesium oxide tablet 800 mg, 800 mg, Oral, PRN, Soni Urias, MAGUI    methylnaltrexone Syrg 8 mg, 8 mg, Subcutaneous, Every other day, Sumi Doan MD, 8 mg at 01/25/22 0930    metoclopramide HCl tablet 5 mg, 5 mg, Oral, QID (AC & HS), Bryan Torres MD, 5 mg at 01/25/22 0604    mupirocin 2 % ointment, , Nasal, BID, Colt  GRETCHEN Lazaro MD, Given at 01/25/22 0934    naloxone 0.4 mg/mL injection 0.02 mg, 0.02 mg, Intravenous, PRN, Soni Urias NP    ondansetron injection 4 mg, 4 mg, Intravenous, Q6H, Soni Urias NP, 4 mg at 01/25/22 0558    ondansetron injection 4 mg, 4 mg, Intravenous, Once PRN, Jeana Adams MD    phenazopyridine tablet 100 mg, 100 mg, Oral, TID PRN, John Martínez MD    piperacillin-tazobactam 4.5 g in dextrose 5 % 100 mL IVPB (ready to mix system), 4.5 g, Intravenous, Q8H, Kerline Hernandez NP, Last Rate: 25 mL/hr at 01/25/22 0934, 4.5 g at 01/25/22 0934    polyethylene glycol packet 17 g, 17 g, Oral, BID, Sidney Cunningham NP, 17 g at 01/25/22 0930    potassium bicarbonate disintegrating tablet 35 mEq, 35 mEq, Oral, PRN, Soni Urias NP    potassium bicarbonate disintegrating tablet 50 mEq, 50 mEq, Oral, PRN, Soni Urias NP    potassium bicarbonate disintegrating tablet 60 mEq, 60 mEq, Oral, PRN, Soni Urias NP    potassium, sodium phosphates 280-160-250 mg packet 2 packet, 2 packet, Oral, PRN, Soni Urias NP    potassium, sodium phosphates 280-160-250 mg packet 2 packet, 2 packet, Oral, PRN, Soni Urias NP    potassium, sodium phosphates 280-160-250 mg packet 2 packet, 2 packet, Oral, PRN, Soni Urias NP    prochlorperazine injection Soln 5 mg, 5 mg, Intravenous, Q6H PRN, Soni Urias NP    promethazine (PHENERGAN) 6.25 mg in dextrose 5 % 50 mL IVPB, 6.25 mg, Intravenous, Q6H PRN, Soni Urias NP, Stopped at 01/21/22 0910    senna tablet 8.6 mg, 8.6 mg, Oral, Daily, Sidney Cunningham NP, 8.6 mg at 01/25/22 0930    simethicone chewable tablet 80 mg, 1 tablet, Oral, QID PRN, Soni Urias NP    sodium chloride 0.9% flush 10 mL, 10 mL, Intravenous, Q8H PRN, Soni Urias NP    zolpidem tablet 5 mg, 5 mg, Oral, Nightly PRN, Soni Urias NP, 5 mg at 01/23/22 0661    Review of Symptoms    Symptom Assessment (ESAS 0-10 Scale)  Pain:   0  Dyspnea:  0  Anxiety:  0  Nausea:  0  Depression:  10  Anorexia:  0  Fatigue:  10  Insomnia:  0  Restlessness:  0  Agitation:  0     Constipation:  Positive    Bowel Management Plan (BMP):  Yes      ECOG Performance Status ndGndrndanddndend:nd nd2nd Living Arrangements:  Lives with family      Advance Care Planning   Advance Directives:   Living Will: Yes        Copy on chart: Yes    LaPOST: No    Do Not Resuscitate Status: Yes    Medical Power of : Yes    Agent's Name:  Mother Nancy Su   Agent's Contact Number:  [phone]    Decision Making:  Family answered questions and Patient unable to communicate due to disease severity/cognitive impairment         ROS:  Review of Systems   Unable to perform ROS: Acuity of condition       OBJECTIVE:     Physical Exam:  Vitals: Temp: 99.4 °F (37.4 °C) (01/25/22 0733)  Pulse: 88 (01/25/22 0733)  Resp: 17 (01/25/22 0733)  BP: (!) 87/53 (01/25/22 0733)  SpO2: 96 % (01/25/22 0733)    Physical Exam  Vitals and nursing note reviewed.   Constitutional:       General: She is not in acute distress.     Appearance: Normal appearance. She is well-developed. She is cachectic. She is ill-appearing.   HENT:      Head: Normocephalic and atraumatic.   Cardiovascular:      Rate and Rhythm: Normal rate and regular rhythm.      Heart sounds: Normal heart sounds. No murmur heard.  No friction rub.   Pulmonary:      Effort: Pulmonary effort is normal. No respiratory distress.      Breath sounds: Normal breath sounds. No wheezing or rales.   Abdominal:      General: Bowel sounds are increased. There is no distension.      Tenderness: There is no abdominal tenderness.   Musculoskeletal:      Cervical back: Normal range of motion.      Right lower leg: No edema.      Left lower leg: No edema.   Skin:     General: Skin is dry.      Coloration: Skin is pale.      Findings: No rash.   Neurological:      Mental Status: She is lethargic.   Psychiatric:         Mood and Affect: Affect is flat.          Behavior: Behavior is withdrawn.         Labs:  WBC   Date Value Ref Range Status   01/24/2022 9.47 3.90 - 12.70 K/uL Final     Hemoglobin   Date Value Ref Range Status   01/24/2022 8.8 (L) 12.0 - 16.0 g/dL Final     Hematocrit   Date Value Ref Range Status   01/24/2022 30.5 (L) 37.0 - 48.5 % Final     MCV   Date Value Ref Range Status   01/24/2022 86 82 - 98 fL Final     Platelets   Date Value Ref Range Status   01/24/2022 319 150 - 450 K/uL Final       BMP  Lab Results   Component Value Date     01/24/2022    K 3.8 01/24/2022     (H) 01/24/2022    CO2 15 (L) 01/24/2022    BUN 23 (H) 01/24/2022    CREATININE 1.2 01/24/2022    CALCIUM 8.7 01/24/2022    ANIONGAP 13 01/24/2022    ESTGFRAFRICA 59 (A) 01/24/2022    EGFRNONAA 51 (A) 01/24/2022       Lab Results   Component Value Date    AST 11 01/20/2022    ALKPHOS 68 01/20/2022    BILITOT 0.3 01/20/2022       Albumin   Date Value Ref Range Status   01/20/2022 3.2 (L) 3.5 - 5.2 g/dL Final       Radiology:I have reviewed all pertinent imaging results/findings within the past 24 hours.  - CT abd/ pelvis 1/23/22 reviewed     ASSESSMENT   Madeline Warner is a 56 y.o. year old with a history of metastatic signet ring cell adenocarcinoma who was under surveillance with recently identified  pelvic mass infiltrating urinary system (awaiting pathology to guide treatment options) who was admitted following G tube placement for pain control. She is followed in the PM clinic for pain and symptom management and were also consulted during her last admission. She was constipated at that time and was counseled extensively regarding the need for preventative bowel regimen and given OIC education for home. Imaging indicates constipation and has been resistant to methyl-naltrexone and laxatives. Palliative Medicine was consulted to assist with pain management as well as GOC.     PLAN   1. Neoplasm related pain  - Resume Fentanyl 100mcg/ hr q72 hr patch once alert  -  Continue the current Dilaudid IV dose.   - Once she is cleared for PO intake would rotate to her home dose of oxycodone 30mg q4hr PRN.  -  reviewed     2. Constipation  - Refractory to Relistor but great response during last admit  - Concern of obstruction or extrinsic compression in the setting of known carcinomatosis. General surgery notes recommended with plans for SBFT  - I will reiterate the role of preventative bowel regimen once she is receptive as this is something that can be controlled outpatient and prevent future admissions/ delays in cancer   - Provide OIC resources again     3. Metastatic signet ring cell adenocarcinoma  - s/p ureteral stent  - Plans to have port placed soon to facilitate palliative systemic chemotherapy  - Plans to follow up with primary oncologist at d/c     4. Encounter for Palliative Care  - Code status: DNR/I. Will discuss with her in detail when she is able to participate  - HCPOA on file and reviewed  - She is established in the PM clinic         Discussed case and visit details with Dr. Lazaro     Thank you for allowing Palliative Medicine to be involved in the care of Madeline Warner.         Medical decision making: HIGH based on high risk of death untreated symptoms high risk medications poor prognosis management of more than one chronic illness in exacerbation or progression of disease managing side effects of medications or polypharmacy    Plan required increased review of medication choice, interaction, dosing, frequency, and route due to patient complexity. Patient complexity increased by: high risk medication use, being on more than 8 medications, altered mentation, at risk for delirium, feeding tube, NPO and malnutrition    > 50% of 40 min visit spent in chart review, face to face discussion of goals of care, symptom assessment, coordination of care and emotional support, formulating and communicating prognosis and goals of care, exploring burden/ benefit of  various approaches of treatment.      Remedios Rossi PA-C  Palliative Medicine

## 2022-01-25 NOTE — PROGRESS NOTES
[facility]  General Surgery  Progress Note    Subjective:     History of Present Illness:  Patient is a 56-year-old female was admitted to the hospital after gastrostomy tube placement.  It is questionable whether the gastrostomy tube was placed by interventional radiology for decompression or feeding and review of the notes.  The patient has no known abdominal carcinomatosis.  She has undergone multiple surgeries including a HIPEC treatment.  In review of the chart it seems that there are issues with constipation due to her narcotic use.  She underwent a CT scan that showed no significant bowel abnormalities except for constipation.  The surgery service was consulted by the medicine service because of a possibility of a bowel obstruction.    The patient is currently laying in bed and is very lethargic.  It was reported that she was given Ativan this morning for agitation and has been somnolent since then.  She has a gastrostomy tube placed to gravity and is being given a regular diet      Post-Op Info:  Procedure(s) (LRB):  CYSTOSCOPY, WITH RETROGRADE PYELOGRAM AND URETERAL STENT INSERTION (Bilateral)   3 Days Post-Op     Interval History:  Patient is more awake.  Less abdominal pain.  Abdominal film showed no obstruction.  The patient has had some colostomy output.    Discussed with colorectal surgery who will see her as they are familiar with her clinical situation    Patient will will have a Gastrografin small-bowel follow-through today, mainly for therapeutic purposes.  Discussed with Radiology    Medications:  Continuous Infusions:   dextrose 5% lactated ringers 100 mL/hr at 01/25/22 0659     Scheduled Meds:   famotidine  20 mg Oral Daily    methylnaltrexone  8 mg Subcutaneous Every other day    metoclopramide HCl  5 mg Oral QID (AC & HS)    mupirocin   Nasal BID    ondansetron  4 mg Intravenous Q6H    piperacillin-tazobactam (ZOSYN) IVPB  4.5 g Intravenous Q8H    polyethylene glycol  17 g Oral  BID    senna  8.6 mg Oral Daily     PRN Meds:acetaminophen, albuterol-ipratropium, aluminum-magnesium hydroxide-simethicone, bisacodyL, dextrose 50%, dextrose 50%, diphenhydrAMINE, glucagon (human recombinant), glucose, glucose, HYDROmorphone, hyoscyamine, magnesium oxide, magnesium oxide, naloxone, ondansetron, phenazopyridine, potassium bicarbonate, potassium bicarbonate, potassium bicarbonate, potassium, sodium phosphates, potassium, sodium phosphates, potassium, sodium phosphates, prochlorperazine, promethazine (PHENERGAN) IVPB, simethicone, sodium chloride 0.9%, zolpidem     Review of patient's allergies indicates:  No Known Allergies  Objective:     Vital Signs (Most Recent):  Temp: 99.4 °F (37.4 °C) (01/25/22 0733)  Pulse: 88 (01/25/22 0733)  Resp: 17 (01/25/22 0733)  BP: (!) 87/53 (01/25/22 0733)  SpO2: 96 % (01/25/22 0733) Vital Signs (24h Range):  Temp:  [97.8 °F (36.6 °C)-100.1 °F (37.8 °C)] 99.4 °F (37.4 °C)  Pulse:  [] 88  Resp:  [16-18] 17  SpO2:  [93 %-96 %] 96 %  BP: ()/(53-69) 87/53     Weight: 42.9 kg (94 lb 9.2 oz)  Body mass index is 16.23 kg/m².    Intake/Output - Last 3 Shifts       01/23 0700 01/24 0659 01/24 0700 01/25 0659 01/25 0700 01/26 0659    P.O. 460 236 0    I.V. (mL/kg) 1124.6 (27.3) 3971.1 (92.6)     IV Piggyback  692     Total Intake(mL/kg) 1584.6 (38.5) 4899.1 (114.2) 0 (0)    Urine (mL/kg/hr) 800 (0.8) 1150 (1.1) 200 (1.1)    Emesis/NG output 600      Drains 1100 1275 350    Stool 0 150     Total Output 2500 2575 550    Net -915.4 +2324.1 -550           Stool Occurrence 1 x            Physical Exam  Vitals and nursing note reviewed.   Constitutional:       Appearance: She is well-developed and well-nourished. She is ill-appearing.      Comments: More alert, cachectic   HENT:      Head: Normocephalic.   Eyes:      Extraocular Movements: EOM normal.      Pupils: Pupils are equal, round, and reactive to light.   Neck:      Thyroid: No thyromegaly.      Vascular: No  JVD.      Trachea: No tracheal deviation.   Cardiovascular:      Rate and Rhythm: Normal rate and regular rhythm.      Heart sounds: Normal heart sounds.   Pulmonary:      Breath sounds: Normal breath sounds. No wheezing.   Abdominal:      General: Abdomen is flat. Bowel sounds are normal. There is no distension.      Palpations: Abdomen is soft. Abdomen is not rigid. There is no hepatosplenomegaly or mass.      Tenderness: There is abdominal tenderness (Mild). There is no guarding or rebound.      Comments: Areas of nodularity, colostomy, gastrostomy tube   Musculoskeletal:         General: No edema. Normal range of motion.   Lymphadenopathy:      Cervical: No cervical adenopathy.   Skin:     General: Skin is warm and dry.      Capillary Refill: Capillary refill takes 2 to 3 seconds.      Findings: No erythema or rash.   Neurological:      Mental Status: She is oriented to person, place, and time.   Psychiatric:         Mood and Affect: Mood and affect and mood normal.         Behavior: Behavior normal.         Significant Labs:  I have reviewed all pertinent lab results within the past 24 hours.  CBC:   Recent Labs   Lab 01/25/22  1033   WBC 13.13*   RBC 2.88*   HGB 7.3*   HCT 23.1*      MCV 80*   MCH 25.3*   MCHC 31.6*     BMP:   Recent Labs   Lab 01/24/22  0921   GLU 67*      K 3.8   *   CO2 15*   BUN 23*   CREATININE 1.2   CALCIUM 8.7       Significant Diagnostics:  I have reviewed all pertinent imaging results/findings within the past 24 hours.  Abdominal film     Reading Physician Reading Date Result Priority   Ascencion Mcgarry MD  [phone] 1/25/2022 Routine     Narrative & Impression  EXAMINATION:  XR ABDOMEN FLAT AND ERECT     CLINICAL HISTORY:  Follow-up for possible bowel obstruction verses severe constipation, colostomy;     TECHNIQUE:  Flat and erect AP views of the abdomen were performed.     COMPARISON:  January 23, 2022     FINDINGS:  Stable bilateral ureteral stents, left lower  quadrant ostomy and PEG tube in place.  Similar amount of air in the stomach.  Interval decrease in the amount of air throughout large and small bowel.  Stable postoperative changes throughout the upper and lower abdomen and pelvis.  There are phleboliths versus ureteroliths some overlying the pelvis.  Osseous structures are unchanged with convex left curvature of the upper lumbar spine again seen.  Lung bases are unchanged.     Impression:     1.  Overall, there is been interval improvement.  Interval decrease in the amount of air in the small bowel without evidence for obstruction.        Electronically signed by: Ascencion Mcgarry MD  Date:                                            01/25/2022  Time:                                           08:56             Exam Ended: 01/25/22 08:42 Last Resulted: 01/25/22 08:56               Assessment/Plan:     * Severe malnutrition  Further management per Medicine.  I feel that there should be clarification with a gastrostomy tube was placed for nutrition or for palliation with drainage of the stomach due to the intra-abdominal carcinomatosis    Fever  Management per Medicine    Hydronephrosis  Patient has had bilateral ureteral stents placed    Therapeutic opioid-induced constipation (OIC)  Recommend GI consultation for further evaluation and management    Gastrografin small-bowel follow-through as this may be therapeutic.    Patient will be seen by colorectal.    She does need a bowel regime and or medications to help with this.    Gastrostomy tube in place  Recommend leaving this to gravity, placed by Interventional Radiology    Metastatic signet ring cell carcinoma     Likely contributing to her bowel dysfunction.  Chronic narcotic use likely contributing to her bowel dysfunction.    Given the fact that the patient has exploratory laparotomy and HIPEC a do not feel she would be a candidate for re-exploration    Intractable nausea and vomiting, Chronic  Likely related to her  metastatic signet ring carcinoma    Colostomy present on admission  Long-standing    Gastroesophageal reflux disease without esophagitis  Management per Medicine    Intestinal obstruction  Most likely related to chronic constipation from narcotics and or carcinomatosis.    The patient has had a small-bowel follow-through in the past that showed no evidence of a bowel obstruction.    I would recommend that she be made NPO.  Medicine can decide if oral or gastrostomy tube medications are warranted.  The patient should have repeat abdominal films in the morning.    A GI consult should be obtained    Plans for a Gastrografin small-bowel follow-through, this is mainly for therapeutic purposes.    Discussed with colorectal surgery she will see the patient    Chronic pain due to neoplasm  Use of narcotics her likely contributing to her constipation.    I would recommend a GI consultation to discuss this as was mentioned in the Oncology note      I would not recommend any intra-abdominal surgery on this patient as the risks would outweigh the benefits.    Rishi Krishnan MD  General Surgery  [facility] Med S[facility]

## 2022-01-25 NOTE — ASSESSMENT & PLAN NOTE
Placed on 1/19 per IR  Green fluid draining from the G tube- this is attached to a guzmán bag, her colostomy has no output, she has bowel sounds, but endorsing nasuea and abdominal pain  CT abdomen showed interval placement of percutaneous G tube with catheter coursing along the left inferior anterior margin with possible small adjacent hemorrhage.    General sx recommends leaving this to gravity

## 2022-01-25 NOTE — ASSESSMENT & PLAN NOTE
Suspect some degree of poor motility impacting her symptoms. Her mother says it has been better the last 24 hrs.   Continue current Reglan. Could consider increasing the dose.   Antiemetics as needed.

## 2022-01-25 NOTE — ASSESSMENT & PLAN NOTE
Fever this morning, blood cx ordered. IV zosyn empirically.    1/25/22  -Blood cultures positive for gram negative rods.   -Continue IV zosyn.   -Follow fever trend

## 2022-01-25 NOTE — SUBJECTIVE & OBJECTIVE
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    T4 N1bM1 B 3/85 nodes positive    Encounter for blood transfusion     Leukocytosis 1/19/2022    PONV (postoperative nausea and vomiting)        Past Surgical History:   Procedure Laterality Date    APPENDECTOMY      BILATERAL OOPHORECTOMY      CHOLECYSTECTOMY      with Cytoreduction and salpingectomy    COLOSTOMY      CYSTOSCOPY N/A 12/21/2021    Procedure: CYSTOSCOPY;  Surgeon: John Martínez MD;  Location: [facility] OR;  Service: Urology;  Laterality: N/A;    CYSTOSCOPY WITH URETEROSCOPY, RETROGRADE PYELOGRAPHY, AND INSERTION OF STENT Bilateral 1/22/2022    Procedure: CYSTOSCOPY, WITH RETROGRADE PYELOGRAM AND URETERAL STENT INSERTION;  Surgeon: John Martínez MD;  Location: [facility] OR;  Service: Urology;  Laterality: Bilateral;    CYTOREDUCTION      ENDOSCOPIC ULTRASOUND OF UPPER GASTROINTESTINAL TRACT N/A 6/11/2021    Procedure: ULTRASOUND, UPPER GI TRACT, ENDOSCOPIC;  Surgeon: Rosaura Lakhani MD;  Location: [facility];  Service: Endoscopy;  Laterality: N/A;  Linear scope    ERCP N/A 6/11/2021    Procedure: ERCP (ENDOSCOPIC RETROGRADE CHOLANGIOPANCREATOGRAPHY);  Surgeon: Rosaura Lakhani MD;  Location: [facility];  Service: Endoscopy;  Laterality: N/A;    ESOPHAGOGASTRODUODENOSCOPY N/A 5/6/2021    Procedure: EGD (ESOPHAGOGASTRODUODENOSCOPY);  Surgeon: Brandon Rosen MD;  Location: [facility] ENDO;  Service: Gastroenterology;  Laterality: N/A;    ESOPHAGOGASTRODUODENOSCOPY  06/11/2021    EXCISION OF LESION  10/20/2020    Procedure: EXCISION, LESION COLOSTOMY;  Surgeon: Layton Anderson MD;  Location: [facility] OR;  Service: General;;    FLUOROSCOPY Bilateral 1/19/2022    Procedure: Insertion of G-J tube and bilateral upper extremity venogram;  Surgeon: Bryan Torres MD;  Location: [facility] CATH LAB;  Service: General;  Laterality: Bilateral;    LAPAROTOMY,  EXPLORATORY  02/12/2020    LYSIS OF ADHESION, COLOSTOMY    REVISION COLOSTOMY N/A 10/20/2020    Procedure: REVISION, COLOSTOMY;  Surgeon: Layton Anderson MD;  Location: [facility];  Service: General;  Laterality: N/A;  Ostomy bag placed    RIGHT COLECTOMY         Review of patient's allergies indicates:  No Known Allergies  Family History    None       Tobacco Use    Smoking status: Never Smoker    Smokeless tobacco: Never Used   Substance and Sexual Activity    Alcohol use: Never    Drug use: Never    Sexual activity: Not on file     Review of Systems   Unable to perform ROS: Mental status change     Objective:     Vital Signs (Most Recent):  Temp: 99.4 °F (37.4 °C) (01/25/22 1154)  Pulse: 92 (01/25/22 1154)  Resp: 20 (01/25/22 1300)  BP: (!) 87/50 (01/25/22 1154)  SpO2: (!) 94 % (01/25/22 1154) Vital Signs (24h Range):  Temp:  [98.1 °F (36.7 °C)-100.1 °F (37.8 °C)] 99.4 °F (37.4 °C)  Pulse:  [] 92  Resp:  [16-20] 20  SpO2:  [93 %-96 %] 94 %  BP: ()/(50-69) 87/50     Weight: 42.9 kg (94 lb 9.2 oz) (01/25/22 0600)  Body mass index is 16.23 kg/m².      Intake/Output Summary (Last 24 hours) at 1/25/2022 1527  Last data filed at 1/25/2022 1429  Gross per 24 hour   Intake 4781.11 ml   Output 3000 ml   Net 1781.11 ml       Lines/Drains/Airways     Drain                 Colostomy LUQ -- days         Colostomy 06/15/21 0016 Descending/sigmoid  days         Gastrostomy/Enterostomy 01/19/22 1325 Gastrostomy tube w/ balloon LUQ 6 days         Urethral Catheter 01/22/22 1400 Latex;Straight-tip 16 Fr. 3 days          Peripheral Intravenous Line                 Peripheral IV - Single Lumen 01/23/22 1358 18 G;1 3/4 in Left Upper Arm 2 days         Peripheral IV - Single Lumen 01/24/22 1900 Anterior;Proximal;Right Forearm <1 day                Physical Exam  Constitutional:       General: She is not in acute distress.     Appearance: She is underweight. She is ill-appearing.   HENT:      Head:  Normocephalic and atraumatic.   Eyes:      Extraocular Movements: Extraocular movements intact.   Cardiovascular:      Rate and Rhythm: Normal rate and regular rhythm.      Heart sounds: No murmur heard.      Pulmonary:      Effort: Pulmonary effort is normal. No respiratory distress.      Breath sounds: Normal breath sounds. No wheezing.   Abdominal:      General: Bowel sounds are normal.      Tenderness: There is abdominal tenderness.      Comments: The abdomen is distended, tight, but very small. She has tenderness near her PEG site. PEG currently capped. Ostomy noted with stool in the bag.    Musculoskeletal:      Right lower leg: No edema.      Left lower leg: No edema.   Skin:     General: Skin is warm and dry.      Findings: No rash.   Neurological:      Mental Status: She is lethargic.         Significant Labs:  CBC:   Recent Labs   Lab 01/24/22  0921 01/25/22  1033   WBC 9.47 13.13*   HGB 8.8* 7.3*   HCT 30.5* 23.1*    219     CMP:   Recent Labs   Lab 01/25/22  1033   *   CALCIUM 7.8*      K 2.7*   CO2 20*      BUN 23*   CREATININE 1.5*     Coagulation: No results for input(s): PT, INR, APTT in the last 48 hours.    Significant Imaging:  Imaging results within the past 24 hours have been reviewed.

## 2022-01-25 NOTE — H&P (VIEW-ONLY)
[facility] Surg  Gastroenterology  Consult Note    Patient Name: Madeline Warner  MRN: [id number]  Admission Date: 1/19/2022  Hospital Length of Stay: 4 days  Code Status: DNR   Attending Provider: Colt Lazaro MD   Consulting Provider: Jai Vinson PA-C  Primary Care Physician: Mariam Peña MD  Principal Problem:Severe malnutrition    Inpatient consult to Gastroenterology  Consult performed by: Jai Vinson PA-C  Consult ordered by: Kerline Hernandez NP  Reason for consult: Severe constipation        Subjective:     HPI:  The patient presented to the ER for abdominal pain. We have been consulted for constipation.   Her history is significant for adenocarcinoma diagnosed in 2017 at the time of appendectomy. 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  During her hemicolectomy, there was evidence of peritoneal disease.  She then underwent 6 months of chemotherapy and a very difficult cytoreductive surgery and HIPEC in 2018 with a very prolonged recovery.  She later developed a bowel obstruction and underwent a laparotomy and diverting Andrew's colostomy in February 2020 where there was noted to be additional peritoneal disease. In the interim, she has developed a pelvic mass which involves the urinary tract. She underwent a cystoscopy with TURBT with Urology and biopsy confirming likely metastatic adenocarcinoma. Additional full details included in Dr. Peña's note dated 01/14/22. IR placed a G-tube on 01/19/22 but was unable to do a GJ. Urology placed bilateral ureteral stents 01/22/22. Chemotherapy discussed but current BCx positive for gram negative rods.   Due to continued abdominal pain, nausea and vomiting, General Surgery was consulted. Work up ruled out bowel obstruction but significant constipation was noted. The patient was started on Relistor, Senna and Miralax. Her mother said she had small stools  last night after not going for several days. This morning, she had about 200 cc of loose stool. No blood noted. No relief of abdominal pain provided. However, she is asking for ice cream and hasn't been vomited in the last 24 hrs. The patient is lethargic due to Ativan; therefore, the history has been provided by her mother. CT w/ contrast (01/20/22) showed interval placement of percutaneous G tube with catheter coursing along the left inferior anterior margin with possible small adjacent hemorrhage. Constipation noted similar to previous studies. AXR (01/23/22) showed interval placement of bilateral ureteral stents, minimal small bowel gas noted without evidence of obstruction.  Peg tube balloon in the left upper abdomen over the stomach. AXR (01/25/22) showed interval decrease in the amount of air in the small and large bowel without evidence for obstruction. SBFT pending.       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    T4 N1bM1 B 3/85 nodes positive    Encounter for blood transfusion     Leukocytosis 1/19/2022    PONV (postoperative nausea and vomiting)        Past Surgical History:   Procedure Laterality Date    APPENDECTOMY      BILATERAL OOPHORECTOMY      CHOLECYSTECTOMY      with Cytoreduction and salpingectomy    COLOSTOMY      CYSTOSCOPY N/A 12/21/2021    Procedure: CYSTOSCOPY;  Surgeon: John Martínez MD;  Location: [facility] OR;  Service: Urology;  Laterality: N/A;    CYSTOSCOPY WITH URETEROSCOPY, RETROGRADE PYELOGRAPHY, AND INSERTION OF STENT Bilateral 1/22/2022    Procedure: CYSTOSCOPY, WITH RETROGRADE PYELOGRAM AND URETERAL STENT INSERTION;  Surgeon: John Martínez MD;  Location: [facility] OR;  Service: Urology;  Laterality: Bilateral;    CYTOREDUCTION      ENDOSCOPIC ULTRASOUND OF UPPER GASTROINTESTINAL TRACT N/A 6/11/2021    Procedure: ULTRASOUND, UPPER GI TRACT, ENDOSCOPIC;  Surgeon: Rosaura Lakhani MD;   Location: [facility] ENDO;  Service: Endoscopy;  Laterality: N/A;  Linear scope    ERCP N/A 6/11/2021    Procedure: ERCP (ENDOSCOPIC RETROGRADE CHOLANGIOPANCREATOGRAPHY);  Surgeon: Rosaura Lakhani MD;  Location: [facility] ENDO;  Service: Endoscopy;  Laterality: N/A;    ESOPHAGOGASTRODUODENOSCOPY N/A 5/6/2021    Procedure: EGD (ESOPHAGOGASTRODUODENOSCOPY);  Surgeon: Brandon Rosen MD;  Location: [facility];  Service: Gastroenterology;  Laterality: N/A;    ESOPHAGOGASTRODUODENOSCOPY  06/11/2021    EXCISION OF LESION  10/20/2020    Procedure: EXCISION, LESION COLOSTOMY;  Surgeon: Layton Anderson MD;  Location: [facility] OR;  Service: General;;    FLUOROSCOPY Bilateral 1/19/2022    Procedure: Insertion of G-J tube and bilateral upper extremity venogram;  Surgeon: Bryan Torres MD;  Location: [facility] CATH LAB;  Service: General;  Laterality: Bilateral;    LAPAROTOMY, EXPLORATORY  02/12/2020    LYSIS OF ADHESION, COLOSTOMY    REVISION COLOSTOMY N/A 10/20/2020    Procedure: REVISION, COLOSTOMY;  Surgeon: Layton Anderson MD;  Location: [facility] OR;  Service: General;  Laterality: N/A;  Ostomy bag placed    RIGHT COLECTOMY         Review of patient's allergies indicates:  No Known Allergies  Family History    None       Tobacco Use    Smoking status: Never Smoker    Smokeless tobacco: Never Used   Substance and Sexual Activity    Alcohol use: Never    Drug use: Never    Sexual activity: Not on file     Review of Systems   Unable to perform ROS: Mental status change     Objective:     Vital Signs (Most Recent):  Temp: 99.4 °F (37.4 °C) (01/25/22 1154)  Pulse: 92 (01/25/22 1154)  Resp: 20 (01/25/22 1300)  BP: (!) 87/50 (01/25/22 1154)  SpO2: (!) 94 % (01/25/22 1154) Vital Signs (24h Range):  Temp:  [98.1 °F (36.7 °C)-100.1 °F (37.8 °C)] 99.4 °F (37.4 °C)  Pulse:  [] 92  Resp:  [16-20] 20  SpO2:  [93 %-96 %] 94 %  BP: ()/(50-69) 87/50     Weight: 42.9 kg (94 lb 9.2 oz) (01/25/22 0600)  Body mass index  is 16.23 kg/m².      Intake/Output Summary (Last 24 hours) at 1/25/2022 1527  Last data filed at 1/25/2022 1429  Gross per 24 hour   Intake 4781.11 ml   Output 3000 ml   Net 1781.11 ml       Lines/Drains/Airways     Drain                 Colostomy LUQ -- days         Colostomy 06/15/21 0016 Descending/sigmoid  days         Gastrostomy/Enterostomy 01/19/22 1325 Gastrostomy tube w/ balloon LUQ 6 days         Urethral Catheter 01/22/22 1400 Latex;Straight-tip 16 Fr. 3 days          Peripheral Intravenous Line                 Peripheral IV - Single Lumen 01/23/22 1358 18 G;1 3/4 in Left Upper Arm 2 days         Peripheral IV - Single Lumen 01/24/22 1900 Anterior;Proximal;Right Forearm <1 day                Physical Exam  Constitutional:       General: She is not in acute distress.     Appearance: She is underweight. She is ill-appearing.   HENT:      Head: Normocephalic and atraumatic.   Eyes:      Extraocular Movements: Extraocular movements intact.   Cardiovascular:      Rate and Rhythm: Normal rate and regular rhythm.      Heart sounds: No murmur heard.      Pulmonary:      Effort: Pulmonary effort is normal. No respiratory distress.      Breath sounds: Normal breath sounds. No wheezing.   Abdominal:      General: Bowel sounds are normal.      Tenderness: There is abdominal tenderness.      Comments: The abdomen is distended, tight, but very small. She has tenderness near her PEG site. PEG currently capped. Ostomy noted with stool in the bag.    Musculoskeletal:      Right lower leg: No edema.      Left lower leg: No edema.   Skin:     General: Skin is warm and dry.      Findings: No rash.   Neurological:      Mental Status: She is lethargic.         Significant Labs:  CBC:   Recent Labs   Lab 01/24/22  0921 01/25/22  1033   WBC 9.47 13.13*   HGB 8.8* 7.3*   HCT 30.5* 23.1*    219     CMP:   Recent Labs   Lab 01/25/22  1033   *   CALCIUM 7.8*      K 2.7*   CO2 20*      BUN 23*    CREATININE 1.5*     Coagulation: No results for input(s): PT, INR, APTT in the last 48 hours.    Significant Imaging:  Imaging results within the past 24 hours have been reviewed.    Assessment/Plan:     * Severe malnutrition  Trial of PEG feeds once constipation improves.   Continue Reglan at home dosing.   If slow rate of PEG feeds causes symptoms, can consider GJ tube coordinated with IR services and GI.     Therapeutic opioid-induced constipation (OIC)  Constipation likely a combination of opioid use and poor motility from prior abdominal surgeries and cancer.   She is starting to have stools. Continue Relistor, Senna and Miralax at this time.   Minimize narcotic use when possible, but will be difficult given her cancer.     Intractable nausea and vomiting, Chronic  Suspect some degree of poor motility impacting her symptoms. Her mother says it has been better the last 24 hrs.   Continue current Reglan. Could consider increasing the dose.   Antiemetics as needed.       Gastroesophageal reflux disease without esophagitis  Continue H2 blocker but could change to PPI if warranted.      Fever  Blood cultures positive. Awaiting sensitivities. Continue antibiotic management per HM team.     Metastatic signet ring cell carcinoma  Oncology following.     Intestinal obstruction  No signs of obstruction on imaging. General Surgery notes reviewed.         Thank you for your consult. I will follow-up with patient. Please contact us if you have any additional questions.    Jai Vinson PA-C  Gastroenterology  [facility]

## 2022-01-26 PROBLEM — E87.6 HYPOKALEMIA: Status: ACTIVE | Noted: 2022-01-01

## 2022-01-26 NOTE — ASSESSMENT & PLAN NOTE
--BC + gram neg bacteremia (E. Coli) Susceptibilities pending   --IV abx management per Primary team

## 2022-01-26 NOTE — PROGRESS NOTES
Patient vomited 150ml of green bile. Dr. Richardson and LEW Hernandez, NP notified related to concern of aspiration of gastrogaffin. Instructed to unclamp G-Tube to drainage.

## 2022-01-26 NOTE — PROGRESS NOTES
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  Oncology was consulted due to history of metastatic signet ring cell carcinoma.  She is also known to have right hydronephrosis for which ureteral stent placement has been planned by Urology.     Today she continues to complain of abdominal pain, intermittent nausea and vomiting.  She is yet to initiate systemic palliative therapy for her cancer due to poor nutritional status and right hydronephrosis.         Interval History: Stool output s/p SBFT. Patient reports improvement in abdominal pain. G-J tube planned per GI once equipment obtained for nutrition.     Oncology Treatment Plan:   OP FOLFOXIRI Q2W    Medications:  Continuous Infusions:   dextrose 5% lactated ringers 100 mL/hr at 01/26/22 0608     Scheduled Meds:   famotidine  20 mg Oral Daily    fentaNYL  1 patch Transdermal Q72H    [START ON 1/27/2022] methylnaltrexone  8 mg Subcutaneous Every other day    metoclopramide HCl  5 mg Oral QID (AC & HS)    mupirocin   Nasal BID    ondansetron  4 mg Intravenous Q6H    piperacillin-tazobactam (ZOSYN) IVPB  4.5 g Intravenous Q8H    polyethylene glycol  17 g Oral BID    potassium chloride  10 mEq Intravenous Q1H    senna  8.6 mg Oral Daily     PRN Meds:sodium chloride, acetaminophen, albuterol-ipratropium, aluminum-magnesium hydroxide-simethicone, bisacodyL, dextrose 50%, dextrose 50%, diphenhydrAMINE, glucagon (human recombinant), glucose, glucose, HYDROmorphone, hyoscyamine, magnesium oxide, magnesium oxide, naloxone, ondansetron, phenazopyridine, potassium bicarbonate, potassium bicarbonate, potassium bicarbonate, potassium, sodium phosphates, potassium, sodium phosphates,  potassium, sodium phosphates, prochlorperazine, promethazine (PHENERGAN) IVPB, simethicone, sodium chloride 0.9%, zolpidem     Review of Systems   Unable to perform ROS: Other (limited ROS given current obtunded state)   Constitutional: Positive for activity change, appetite change, chills and fatigue. Negative for fever and unexpected weight change.   HENT: Negative for congestion, mouth sores, nosebleeds, sore throat, trouble swallowing and voice change.    Eyes: Negative for visual disturbance.   Respiratory: Negative for cough, chest tightness, shortness of breath and wheezing.    Cardiovascular: Negative for chest pain, palpitations and leg swelling.   Gastrointestinal: Negative for abdominal distention, abdominal pain, blood in stool, constipation, diarrhea, nausea and vomiting.   Genitourinary: Negative for difficulty urinating, dysuria and hematuria.   Musculoskeletal: Positive for arthralgias. Negative for back pain and myalgias.   Skin: Negative for pallor, rash and wound.   Neurological: Positive for weakness. Negative for dizziness, syncope and headaches.   Hematological: Negative for adenopathy. Does not bruise/bleed easily.   Psychiatric/Behavioral: Positive for dysphoric mood. The patient is nervous/anxious.      Objective:     Vital Signs (Most Recent):  Temp: 98.8 °F (37.1 °C) (01/26/22 1214)  Pulse: 82 (01/26/22 1214)  Resp: 20 (01/26/22 1249)  BP: (!) 88/50 (01/26/22 1214)  SpO2: 97 % (01/26/22 1214) Vital Signs (24h Range):  Temp:  [98 °F (36.7 °C)-98.8 °F (37.1 °C)] 98.8 °F (37.1 °C)  Pulse:  [75-89] 82  Resp:  [16-20] 20  SpO2:  [94 %-99 %] 97 %  BP: ()/(50-65) 88/50     Weight: 41.8 kg (92 lb 2.4 oz)  Body mass index is 15.82 kg/m².  Body surface area is 1.37 meters squared.      Intake/Output Summary (Last 24 hours) at 1/26/2022 1434  Last data filed at 1/26/2022 0800  Gross per 24 hour   Intake 1447.06 ml   Output 950 ml   Net 497.06 ml       Physical Exam  Vitals reviewed.    Constitutional:       Appearance: She is well-developed.   HENT:      Head: Normocephalic.      Right Ear: External ear normal.      Left Ear: External ear normal.      Nose: Nose normal.   Neck:      Thyroid: No thyroid mass.   Cardiovascular:      Rate and Rhythm: Normal rate and regular rhythm.      Heart sounds: Normal heart sounds. No murmur heard.      Pulmonary:      Effort: Pulmonary effort is normal. No respiratory distress.      Breath sounds: Decreased breath sounds present. No wheezing or rales.   Abdominal:      General: There is no distension.      Palpations: Abdomen is rigid.       Genitourinary:     Comments: deferred  Lymphadenopathy:      Head:      Right side of head: No submandibular, preauricular or posterior auricular adenopathy.      Left side of head: No submandibular, preauricular or posterior auricular adenopathy.   Skin:     General: Skin is warm and dry.   Neurological:      Mental Status: She is lethargic.         Significant Labs:   BMP:   Recent Labs   Lab 01/25/22  1033 01/26/22  0938   * 115*    137   K 2.7* 2.7*    107   CO2 20* 22*   BUN 23* 19   CREATININE 1.5* 1.3   CALCIUM 7.8* 8.1*   , CBC:   Recent Labs   Lab 01/25/22  1033 01/26/22  0938   WBC 13.13* 10.89   HGB 7.3* 7.0*   HCT 23.1* 22.0*    193   , LFTs: No results for input(s): ALT, AST, ALKPHOS, BILITOT, PROT, ALBUMIN in the last 48 hours. and All pertinent labs from the last 24 hours have been reviewed.    Diagnostic Results:  I have reviewed all pertinent imaging results/findings within the past 24 hours.    Assessment/Plan:     * Severe malnutrition  -G tube in place. Draining.  -plan for G-J tube which would allow nutrition and decompression. Hopefully nutritional/functional status improves and continued GI function to initiate chemotherapy. Palliative medicine discussed with patient possible redirection to comfort care pending course    Hypokalemia  management per primary  team    Bacteremia  --BC + gram neg bacteremia (E. Coli) Susceptibilities pending   --IV abx management per Primary team      Hydronephrosis  S/p bilateral stent placement     Metastatic signet ring cell carcinoma  --plan to arrange outpatient follow up with Primary Oncologist pending resolution of current clinical situation to initiate chemotherapy. Hopefully patient improves from debility/malnutrition standpoint to initiate treatment. If not would need to discuss comfort care    Intractable nausea and vomiting, Chronic  -management per Primary team/palliative medicine       Colostomy present on admission        Chronic pain due to neoplasm  -management per palliative medicine   -patient notes improved abdominal pain but still reports generalized pain            Thank you for your consult. I will follow-up with patient. Please contact us if you have any additional questions.     Jaky Saavedra NP  Hematology/Oncology  [facility]

## 2022-01-26 NOTE — SUBJECTIVE & OBJECTIVE
Interval History: Stool output s/p SBFT. Patient reports improvement in abdominal pain. G-J tube planned per GI once equipment obtained for nutrition.     Oncology Treatment Plan:   OP FOLFOXIRI Q2W    Medications:  Continuous Infusions:   dextrose 5% lactated ringers 100 mL/hr at 01/26/22 0608     Scheduled Meds:   famotidine  20 mg Oral Daily    fentaNYL  1 patch Transdermal Q72H    [START ON 1/27/2022] methylnaltrexone  8 mg Subcutaneous Every other day    metoclopramide HCl  5 mg Oral QID (AC & HS)    mupirocin   Nasal BID    ondansetron  4 mg Intravenous Q6H    piperacillin-tazobactam (ZOSYN) IVPB  4.5 g Intravenous Q8H    polyethylene glycol  17 g Oral BID    potassium chloride  10 mEq Intravenous Q1H    senna  8.6 mg Oral Daily     PRN Meds:sodium chloride, acetaminophen, albuterol-ipratropium, aluminum-magnesium hydroxide-simethicone, bisacodyL, dextrose 50%, dextrose 50%, diphenhydrAMINE, glucagon (human recombinant), glucose, glucose, HYDROmorphone, hyoscyamine, magnesium oxide, magnesium oxide, naloxone, ondansetron, phenazopyridine, potassium bicarbonate, potassium bicarbonate, potassium bicarbonate, potassium, sodium phosphates, potassium, sodium phosphates, potassium, sodium phosphates, prochlorperazine, promethazine (PHENERGAN) IVPB, simethicone, sodium chloride 0.9%, zolpidem     Review of Systems   Unable to perform ROS: Other (limited ROS given current obtunded state)   Constitutional: Positive for activity change, appetite change, chills and fatigue. Negative for fever and unexpected weight change.   HENT: Negative for congestion, mouth sores, nosebleeds, sore throat, trouble swallowing and voice change.    Eyes: Negative for visual disturbance.   Respiratory: Negative for cough, chest tightness, shortness of breath and wheezing.    Cardiovascular: Negative for chest pain, palpitations and leg swelling.   Gastrointestinal: Negative for abdominal distention, abdominal pain, blood in  stool, constipation, diarrhea, nausea and vomiting.   Genitourinary: Negative for difficulty urinating, dysuria and hematuria.   Musculoskeletal: Positive for arthralgias. Negative for back pain and myalgias.   Skin: Negative for pallor, rash and wound.   Neurological: Positive for weakness. Negative for dizziness, syncope and headaches.   Hematological: Negative for adenopathy. Does not bruise/bleed easily.   Psychiatric/Behavioral: Positive for dysphoric mood. The patient is nervous/anxious.      Objective:     Vital Signs (Most Recent):  Temp: 98.8 °F (37.1 °C) (01/26/22 1214)  Pulse: 82 (01/26/22 1214)  Resp: 20 (01/26/22 1249)  BP: (!) 88/50 (01/26/22 1214)  SpO2: 97 % (01/26/22 1214) Vital Signs (24h Range):  Temp:  [98 °F (36.7 °C)-98.8 °F (37.1 °C)] 98.8 °F (37.1 °C)  Pulse:  [75-89] 82  Resp:  [16-20] 20  SpO2:  [94 %-99 %] 97 %  BP: ()/(50-65) 88/50     Weight: 41.8 kg (92 lb 2.4 oz)  Body mass index is 15.82 kg/m².  Body surface area is 1.37 meters squared.      Intake/Output Summary (Last 24 hours) at 1/26/2022 1434  Last data filed at 1/26/2022 0800  Gross per 24 hour   Intake 1447.06 ml   Output 950 ml   Net 497.06 ml       Physical Exam  Vitals reviewed.   Constitutional:       Appearance: She is well-developed.   HENT:      Head: Normocephalic.      Right Ear: External ear normal.      Left Ear: External ear normal.      Nose: Nose normal.   Neck:      Thyroid: No thyroid mass.   Cardiovascular:      Rate and Rhythm: Normal rate and regular rhythm.      Heart sounds: Normal heart sounds. No murmur heard.      Pulmonary:      Effort: Pulmonary effort is normal. No respiratory distress.      Breath sounds: Decreased breath sounds present. No wheezing or rales.   Abdominal:      General: There is no distension.      Palpations: Abdomen is rigid.       Genitourinary:     Comments: deferred  Lymphadenopathy:      Head:      Right side of head: No submandibular, preauricular or posterior auricular  adenopathy.      Left side of head: No submandibular, preauricular or posterior auricular adenopathy.   Skin:     General: Skin is warm and dry.   Neurological:      Mental Status: She is lethargic.         Significant Labs:   BMP:   Recent Labs   Lab 01/25/22  1033 01/26/22  0938   * 115*    137   K 2.7* 2.7*    107   CO2 20* 22*   BUN 23* 19   CREATININE 1.5* 1.3   CALCIUM 7.8* 8.1*   , CBC:   Recent Labs   Lab 01/25/22  1033 01/26/22  0938   WBC 13.13* 10.89   HGB 7.3* 7.0*   HCT 23.1* 22.0*    193   , LFTs: No results for input(s): ALT, AST, ALKPHOS, BILITOT, PROT, ALBUMIN in the last 48 hours. and All pertinent labs from the last 24 hours have been reviewed.    Diagnostic Results:  I have reviewed all pertinent imaging results/findings within the past 24 hours.

## 2022-01-26 NOTE — PROGRESS NOTES
[facility]  [facility]  Progress Note    Subjective:     Post-Op Info:  Procedure(s) (LRB):  Arm Port Insertion (N/A)   1 Day Post-Op     Interval History: SBFT showing no obstruction, ileus only. Having ostomy output. Feeling slightly better today.    Medications:  Continuous Infusions:   dextrose 5% lactated ringers 100 mL/hr at 01/26/22 0608     Scheduled Meds:   famotidine  20 mg Oral Daily    fentaNYL  1 patch Transdermal Q72H    [START ON 1/27/2022] methylnaltrexone  8 mg Subcutaneous Every other day    metoclopramide HCl  5 mg Oral QID (AC & HS)    mupirocin   Nasal BID    ondansetron  4 mg Intravenous Q6H    piperacillin-tazobactam (ZOSYN) IVPB  4.5 g Intravenous Q8H    polyethylene glycol  17 g Oral BID    senna  8.6 mg Oral Daily     PRN Meds:sodium chloride, acetaminophen, albuterol-ipratropium, aluminum-magnesium hydroxide-simethicone, bisacodyL, dextrose 50%, dextrose 50%, diphenhydrAMINE, glucagon (human recombinant), glucose, glucose, HYDROmorphone, hyoscyamine, magnesium oxide, magnesium oxide, naloxone, ondansetron, phenazopyridine, potassium bicarbonate, potassium bicarbonate, potassium bicarbonate, potassium, sodium phosphates, potassium, sodium phosphates, potassium, sodium phosphates, prochlorperazine, promethazine (PHENERGAN) IVPB, simethicone, sodium chloride 0.9%, zolpidem     Review of patient's allergies indicates:  No Known Allergies  Objective:     Vital Signs (Most Recent):  Temp: 98.8 °F (37.1 °C) (01/26/22 1214)  Pulse: 82 (01/26/22 1214)  Resp: 20 (01/26/22 1249)  BP: (!) 88/50 (01/26/22 1214)  SpO2: 97 % (01/26/22 1214) Vital Signs (24h Range):  Temp:  [98 °F (36.7 °C)-98.8 °F (37.1 °C)] 98.8 °F (37.1 °C)  Pulse:  [75-89] 82  Resp:  [16-20] 20  SpO2:  [94 %-99 %] 97 %  BP: ()/(50-65) 88/50     Weight: 41.8 kg (92 lb 2.4 oz)  Body mass index is 15.82 kg/m².    Intake/Output - Last 3 Shifts       01/24 0700 01/25 0659 01/25 0700 01/26 0659 01/26  0700  01/27 0659    P.O. 236 120 0    I.V. (mL/kg) 3971.1 (92.6) 1217.5 (29.1)     IV Piggyback 692 229.5     Total Intake(mL/kg) 4899.1 (114.2) 1567.1 (37.5) 0 (0)    Urine (mL/kg/hr) 1150 (1.1) 950 (0.9)     Emesis/NG output       Drains 1275 600     Stool 150 625     Total Output 2575 2175     Net +2324.1 -607.9 0                 Physical Exam  Constitutional:       Appearance: She is well-developed. She is ill-appearing.   HENT:      Head: Normocephalic and atraumatic.   Eyes:      Extraocular Movements: EOM normal.      Conjunctiva/sclera: Conjunctivae normal.   Neck:      Thyroid: No thyromegaly.   Cardiovascular:      Rate and Rhythm: Normal rate.   Pulmonary:      Effort: Pulmonary effort is normal. No respiratory distress.   Abdominal:      Comments: Soft, mild distention; ostomy viable with stool in bag   Musculoskeletal:         General: No tenderness or edema. Normal range of motion.      Cervical back: Normal range of motion.   Skin:     General: Skin is warm and dry.      Capillary Refill: Capillary refill takes less than 2 seconds.      Findings: No rash.   Neurological:      Mental Status: She is oriented to person, place, and time.   Psychiatric:         Mood and Affect: Mood and affect normal.         Significant Labs:  I have reviewed all pertinent lab results within the past 24 hours.  CBC:   Recent Labs   Lab 01/26/22  0938   WBC 10.89   RBC 2.81*   HGB 7.0*   HCT 22.0*      MCV 78*   MCH 24.9*   MCHC 31.8*     BMP:   Recent Labs   Lab 01/26/22  0938   *      K 2.7*      CO2 22*   BUN 19   CREATININE 1.3   CALCIUM 8.1*       Significant Diagnostics:  I have reviewed all pertinent imaging results/findings within the past 24 hours.    Assessment/Plan:     * Severe malnutrition  Would exchanged G-tube for GJ tube as previously discussed to allow for nutrition via J-tube    Bacteremia  Management per Medicine    Hydronephrosis  Patient has had bilateral ureteral stents  placed    Therapeutic opioid-induced constipation (OIC)  Will need a good bowel regimen to assist with avoiding constipation due to narcotic use    Gastrostomy tube in place  Drain to gravity when able. Okay to replace to GJ tube per IR and GI    Metastatic signet ring cell carcinoma  No current surgical intervention required at this time.  Patient has ileus and no definitive obstruction.  Patient will benefit from GJ tube exchange for possible decompression and tube feeds to assist with chemotherapy administration.  Agree with palliative care consult and discussion with end of life planning.      Intractable nausea and vomiting, Chronic  Likely related to her metastatic signet ring carcinoma and narcotic use    Colostomy present on admission  stable    Gastroesophageal reflux disease without esophagitis  Management per Medicine    Intestinal obstruction  No obstruction, ileus present from narcotics    Chronic pain due to neoplasm  Care per primary team        Layton Anderson MD  Colorectal Surgery  [facility]

## 2022-01-26 NOTE — ASSESSMENT & PLAN NOTE
--plan to arrange outpatient follow up with Primary Oncologist pending resolution of current clinical situation to initiate chemotherapy. Hopefully patient improves from debility/malnutrition standpoint to initiate treatment. If not would need to discuss comfort care

## 2022-01-26 NOTE — PLAN OF CARE
RD Recommendations    1. As medically able, initiate enteral nutrition: Isosource 1.5, continuous via PEG    - Advance as tolerated to goal: 40 mL/hr     - 100mL H2O flush q 4-6 hours or per MD/NP.    - Provides 1440 calories (100%EEN), 65g protein (>100%EPN), and 733ml H20 + FWF.     - Monitor for refeeding. Check Mg, K+, Na, Phos, and glucose before and during initiation; correct as indicated.      2. Consider PO diet, goal: regular and encourage intake    3. RD to follow up to monitor intake, tolerance, and labs.   *Please send consult if acute nutrition needs arise.    Goals:    1. Initiate enteral nutrition within 48 hours.   Nutrition Goal Status: not met, continues    2. Pt will tolerate >50% estimated energy and protein needs in 48-72 hours.    Nutrition Goal Status: not met, continues     Iliana Fried, MS, RD, LDN

## 2022-01-26 NOTE — PROGRESS NOTES
Advance Care Planning     Progress Note   Palliative Medicine      SUBJECTIVE:     History of Present Illness:  Patient seen and examined with her mother at bedside. She was crying and complaining of generalized pain. The nurse administered IV Dilaudid while I was in the room and after about 10 minutes with breathing exercises and redirecting she said she started to feel better and the medication took effect. She then asked that she be able to go home so she can drink something sweet and be with family. She says she knows she is not getting better and is dying. She then turns to her mom and begs her to not let her die in the hospital. I updated her of my conversation with the team regarding the GJ tube, plan for nutrition, port, and chemotherapy, as well as concern of frozen abdomen. She is open to placing the J tube and says if this is successful in providing nutrition while still having ostomy output then she might continue with the port placement but doubts this will happen. She is hopeful it will enable her to have some nutrition then be able to eat and vent via the G tube for comfort. She says she knows she is not going to get better and is not willing to postpone comfort while we await the other interventions (port placement and cancer targeted therapy). She is tired of hurting and does not want to suffer nor want her family witness her hurting. She has been thinking about her death for a while now and has even researched Death with Dignity and possibly traveling to states where this is legal. Ultimately she is accepting of the inevitable and wants to be with her family while optimizing her pain regimen. Since the J tube can be placed tomorrow she will delay redirection for now but has a low threshold for pivoting goals if this does not produce the desired outcome. We discussed the risk vs benefit of opioids and at this time while they will slow gut transit she has such significant disease and pain that I do  not feel they can be avoided. She understands the need for a bowel regimen while on opioids as to not confound the picture of bowel function. I will resume her fentanyl patch today and continue IV Dilaudid. GI has resumed Relistor since she is having BM overnight and unable to give PO meds since the G tube should be left to gravity at all times.     We also discussed the current code status that was decided by her mother at my recommendation on Monday. Madeline agrees with this decision and would like to remain DNR/I.    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    T4 N1bM1 B 3/85 nodes positive    Encounter for blood transfusion     Leukocytosis 1/19/2022    PONV (postoperative nausea and vomiting)      Past Surgical History:   Procedure Laterality Date    APPENDECTOMY      BILATERAL OOPHORECTOMY      CHOLECYSTECTOMY      with Cytoreduction and salpingectomy    COLOSTOMY      CYSTOSCOPY N/A 12/21/2021    Procedure: CYSTOSCOPY;  Surgeon: John Martínez MD;  Location: [facility];  Service: Urology;  Laterality: N/A;    CYSTOSCOPY WITH URETEROSCOPY, RETROGRADE PYELOGRAPHY, AND INSERTION OF STENT Bilateral 1/22/2022    Procedure: CYSTOSCOPY, WITH RETROGRADE PYELOGRAM AND URETERAL STENT INSERTION;  Surgeon: John Martínez MD;  Location: [facility] OR;  Service: Urology;  Laterality: Bilateral;    CYTOREDUCTION      ENDOSCOPIC ULTRASOUND OF UPPER GASTROINTESTINAL TRACT N/A 6/11/2021    Procedure: ULTRASOUND, UPPER GI TRACT, ENDOSCOPIC;  Surgeon: Rosaura Lakhani MD;  Location: [facility];  Service: Endoscopy;  Laterality: N/A;  Linear scope    ERCP N/A 6/11/2021    Procedure: ERCP (ENDOSCOPIC RETROGRADE CHOLANGIOPANCREATOGRAPHY);  Surgeon: Rosaura Lakhani MD;  Location: [facility] ENDO;  Service: Endoscopy;  Laterality: N/A;     ESOPHAGOGASTRODUODENOSCOPY N/A 5/6/2021    Procedure: EGD (ESOPHAGOGASTRODUODENOSCOPY);  Surgeon: Brandon Rosen MD;  Location: [facility];  Service: Gastroenterology;  Laterality: N/A;    ESOPHAGOGASTRODUODENOSCOPY  06/11/2021    EXCISION OF LESION  10/20/2020    Procedure: EXCISION, LESION COLOSTOMY;  Surgeon: Layton Anderson MD;  Location: [facility] OR;  Service: General;;    FLUOROSCOPY Bilateral 1/19/2022    Procedure: Insertion of G-J tube and bilateral upper extremity venogram;  Surgeon: Bryan Torres MD;  Location: [facility] CATH LAB;  Service: General;  Laterality: Bilateral;    FLUOROSCOPY N/A 1/25/2022    Procedure: Arm Port Insertion;  Surgeon: Bryan Torres MD;  Location: [facility] CATH LAB;  Service: General;  Laterality: N/A;    LAPAROTOMY, EXPLORATORY  02/12/2020    LYSIS OF ADHESION, COLOSTOMY    REVISION COLOSTOMY N/A 10/20/2020    Procedure: REVISION, COLOSTOMY;  Surgeon: Layton Anderson MD;  Location: [facility];  Service: General;  Laterality: N/A;  Ostomy bag placed    RIGHT COLECTOMY       History reviewed. No pertinent family history.  Review of patient's allergies indicates:  No Known Allergies    Medications:    Current Facility-Administered Medications:     0.9%  NaCl infusion (for blood administration), , Intravenous, Q24H PRN, Kerline Hernandez NP    acetaminophen tablet 650 mg, 650 mg, Oral, Q4H PRN, Soni Urias NP, 650 mg at 01/24/22 1722    albuterol-ipratropium 2.5 mg-0.5 mg/3 mL nebulizer solution 3 mL, 3 mL, Nebulization, Q6H PRN, Soni Urias NP    aluminum-magnesium hydroxide-simethicone 200-200-20 mg/5 mL suspension 30 mL, 30 mL, Oral, QID PRN, Soni Urias NP    bisacodyL suppository 10 mg, 10 mg, Other, Daily PRN, Kerline Hernandez NP    dextrose 5 % in lactated ringers infusion, , Intravenous, Continuous, Kerline Hernandez NP, Last Rate: 100 mL/hr at 01/26/22 0608, New Bag at 01/26/22 0608    dextrose 50% injection 12.5 g, 12.5 g,  Intravenous, PRN, Soni Urias NP    dextrose 50% injection 25 g, 25 g, Intravenous, PRN, Soni Urias NP    diphenhydrAMINE injection 25 mg, 25 mg, Intravenous, Q6H PRN, Jeana Adams MD    famotidine tablet 20 mg, 20 mg, Oral, Daily, Soni Urias NP, 20 mg at 01/25/22 0930    fentaNYL 100 mcg/hr 1 patch, 1 patch, Transdermal, Q72H, Remedios Rossi PA-C, 1 patch at 01/26/22 1006    glucagon (human recombinant) injection 1 mg, 1 mg, Intramuscular, PRN, oSni Urias NP    glucose chewable tablet 16 g, 16 g, Oral, PRN, Soni Urias NP    glucose chewable tablet 24 g, 24 g, Oral, PRN, Soni Urias NP    HYDROmorphone injection 1 mg, 1 mg, Intravenous, Q3H PRN, Remedios Rossi PA-C    hyoscyamine ODT 0.125 mg, 0.125 mg, Sublingual, Q4H PRN, John Martínez MD    magnesium oxide tablet 800 mg, 800 mg, Oral, PRN, Soni Urias NP    magnesium oxide tablet 800 mg, 800 mg, Oral, PRN, Soni Urias NP    [START ON 1/27/2022] methylnaltrexone Syrg 8 mg, 8 mg, Subcutaneous, Every other day, Sumi Doan MD    metoclopramide HCl tablet 5 mg, 5 mg, Oral, QID (AC & HS), Bryan Torres MD, 5 mg at 01/26/22 1006    mupirocin 2 % ointment, , Nasal, BID, Colt Lazaro MD, Given at 01/26/22 1007    naloxone 0.4 mg/mL injection 0.02 mg, 0.02 mg, Intravenous, PRN, Soni Urias NP    ondansetron injection 4 mg, 4 mg, Intravenous, Q6H, Soni Urias NP, 4 mg at 01/26/22 1139    ondansetron injection 4 mg, 4 mg, Intravenous, Once PRN, Jeana Adams MD    phenazopyridine tablet 100 mg, 100 mg, Oral, TID PRN, John Martínez MD    piperacillin-tazobactam 4.5 g in dextrose 5 % 100 mL IVPB (ready to mix system), 4.5 g, Intravenous, Q8H, Kerline Hernandez NP, Last Rate: 25 mL/hr at 01/26/22 0907, 4.5 g at 01/26/22 0907    polyethylene glycol packet 17 g, 17 g, Oral, BID, Sidney Cunningham NP, 17 g at 01/25/22 2039    potassium bicarbonate  disintegrating tablet 35 mEq, 35 mEq, Oral, PRN, Soni Urias NP    potassium bicarbonate disintegrating tablet 50 mEq, 50 mEq, Oral, PRN, Soni Urias NP    potassium bicarbonate disintegrating tablet 60 mEq, 60 mEq, Oral, PRN, Soni Urias NP    potassium chloride 10 mEq in 100 mL IVPB, 10 mEq, Intravenous, Q1H, Kerline Hernandez NP, Last Rate: 100 mL/hr at 01/26/22 1144, 10 mEq at 01/26/22 1144    potassium, sodium phosphates 280-160-250 mg packet 2 packet, 2 packet, Oral, PRN, Soni Urias NP    potassium, sodium phosphates 280-160-250 mg packet 2 packet, 2 packet, Oral, PRN, Soni Urias NP    potassium, sodium phosphates 280-160-250 mg packet 2 packet, 2 packet, Oral, PRN, Snoi Urias NP    prochlorperazine injection Soln 5 mg, 5 mg, Intravenous, Q6H PRN, Soni Urias NP    promethazine (PHENERGAN) 6.25 mg in dextrose 5 % 50 mL IVPB, 6.25 mg, Intravenous, Q6H PRN, Soni Urias NP, Stopped at 01/21/22 0910    senna tablet 8.6 mg, 8.6 mg, Oral, Daily, Sidney Cunningham NP, 8.6 mg at 01/25/22 0930    simethicone chewable tablet 80 mg, 1 tablet, Oral, QID PRN, Soni Urias NP    sodium chloride 0.9% flush 10 mL, 10 mL, Intravenous, Q8H PRN, Soni Urias NP    zolpidem tablet 5 mg, 5 mg, Oral, Nightly PRN, Soni Urias NP, 5 mg at 01/23/22 2119    Review of Symptoms    Symptom Assessment (ESAS 0-10 Scale)  Pain:  10  Dyspnea:  0  Anxiety:  0  Nausea:  0  Depression:  10  Anorexia:  0  Fatigue:  10  Insomnia:  0  Restlessness:  0  Agitation:  0     Constipation:  Positive    Bowel Management Plan (BMP):  Yes      Pain Assessment:  Location(s): generalized    Generalized       Location: generalized        Quality: none        Quantity: 10/10 in intensity        Chronicity: Onset 0 month(s) ago, gradually worsening        Aggravating Factors: none        Alleviating Factors: opiates        Associated Symptoms: constipation and nausea    ECOG Performance Status  ndGndrndanddndend:nd nd2nd Living Arrangements:  Lives with family      Advance Care Planning   Advance Directives:   Living Will: Yes        Copy on chart: Yes    LaPOST: No    Do Not Resuscitate Status: Yes    Medical Power of : Yes    Agent's Name:  Mother Nancy Su   Agent's Contact Number:  [phone]    Decision Making:  Family answered questions and Patient answered questions         ROS:  Review of Systems   Constitutional: Positive for activity change, appetite change and unexpected weight change.   HENT: Negative for sore throat and trouble swallowing.    Respiratory: Negative for cough and shortness of breath.    Cardiovascular: Negative for chest pain and leg swelling.   Gastrointestinal: Positive for abdominal pain, nausea and vomiting. Negative for constipation and diarrhea.   Musculoskeletal: Positive for arthralgias and myalgias. Negative for joint swelling.   Neurological: Positive for weakness. Negative for numbness.   Psychiatric/Behavioral: Positive for dysphoric mood. Negative for confusion and sleep disturbance. The patient is not nervous/anxious.        OBJECTIVE:     Physical Exam:  Vitals: Temp: 98.8 °F (37.1 °C) (01/26/22 1214)  Pulse: 82 (01/26/22 1214)  Resp: 16 (01/26/22 1214)  BP: (!) 88/50 (01/26/22 1214)  SpO2: 97 % (01/26/22 1214)    Physical Exam  Vitals and nursing note reviewed.   Constitutional:       General: She is not in acute distress.     Appearance: Normal appearance. She is well-developed. She is cachectic. She is ill-appearing.   HENT:      Head: Normocephalic and atraumatic.   Cardiovascular:      Rate and Rhythm: Normal rate and regular rhythm.      Heart sounds: Normal heart sounds. No murmur heard.  No friction rub.   Pulmonary:      Effort: Pulmonary effort is normal. No respiratory distress.      Breath sounds: Normal breath sounds. No wheezing or rales.   Abdominal:      General: Bowel sounds are increased. There is no distension.      Tenderness: There is no  abdominal tenderness.   Musculoskeletal:      Cervical back: Normal range of motion.      Right lower leg: No edema.      Left lower leg: No edema.   Skin:     General: Skin is dry.      Coloration: Skin is pale.      Findings: No rash.   Neurological:      Mental Status: She is oriented to person, place, and time. She is lethargic.      Cranial Nerves: No cranial nerve deficit.   Psychiatric:         Attention and Perception: Attention and perception normal.         Mood and Affect: Affect is flat.         Speech: Speech normal.         Behavior: Behavior is withdrawn. Behavior is cooperative.         Thought Content: Thought content normal.         Cognition and Memory: Cognition and memory normal.         Judgment: Judgment normal.         Labs:  WBC   Date Value Ref Range Status   01/26/2022 10.89 3.90 - 12.70 K/uL Final       Hemoglobin   Date Value Ref Range Status   01/26/2022 7.0 (L) 12.0 - 16.0 g/dL Final       Hematocrit   Date Value Ref Range Status   01/26/2022 22.0 (L) 37.0 - 48.5 % Final       MCV   Date Value Ref Range Status   01/26/2022 78 (L) 82 - 98 fL Final       Platelets   Date Value Ref Range Status   01/26/2022 193 150 - 450 K/uL Final       BMP  Lab Results   Component Value Date     01/26/2022    K 2.7 (LL) 01/26/2022     01/26/2022    CO2 22 (L) 01/26/2022    BUN 19 01/26/2022    CREATININE 1.3 01/26/2022    CALCIUM 8.1 (L) 01/26/2022    ANIONGAP 8 01/26/2022    ESTGFRAFRICA 53 (A) 01/26/2022    EGFRNONAA 46 (A) 01/26/2022       Lab Results   Component Value Date    AST 11 01/20/2022    ALKPHOS 68 01/20/2022    BILITOT 0.3 01/20/2022       Albumin   Date Value Ref Range Status   01/20/2022 3.2 (L) 3.5 - 5.2 g/dL Final       Radiology:I have reviewed all pertinent imaging results/findings within the past 24 hours.  - CT abd/ pelvis 1/23/22 reviewed     ASSESSMENT   Madeline Warner is a 56 y.o. year old with a history of metastatic signet ring cell adenocarcinoma who was  under surveillance with recently identified  pelvic mass infiltrating urinary system (awaiting pathology to guide treatment options) who was admitted following G tube placement for pain control. She is followed in the PM clinic for pain and symptom management and were also consulted during her last admission. She was constipated at that time and was counseled extensively regarding the need for preventative bowel regimen and given OIC education for home. Imaging indicates constipation and has been resistant to methyl-naltrexone and laxatives. Palliative Medicine was consulted to assist with pain management as well as GOC.     PLAN   1. Neoplasm related pain  - Resume Fentanyl 100mcg/ hr q72 hr   - Continue the current Dilaudid IV dose but change to q3hr PRN.   - If pain uncontrolled would recommend increasing to Dilaudid 1.5mg q3hr PRN   - Once she is cleared for PO intake consider rotating to her home dose of oxycodone 30mg q4hr PRN.     2. Constipation  - Resuming Relistor as we are unable to give PO and she is having ostomy output  - Extensive discussion with team and concern of frozen abdomen     3. Metastatic signet ring cell adenocarcinoma  - s/p ureteral stent  - Plan was to have port placed soon to facilitate palliative systemic chemotherapy but she is not optimistic this will occur and not interested in delaying comfort     4. Encounter for Palliative Care  - Code status: DNR/I. She agrees with her mother's decision and current code status.  - HCPOA on file and reviewed        Discussed case and visit details with Dr. Lazaro, Dr. Doan, Dr. Phan, Dr. Peña, and Dr. Torres    Thank you for allowing Palliative Medicine to be involved in the care of Madeline Warner.         Medical decision making: HIGH based on high risk of death untreated symptoms high risk medications poor prognosis management of more than one chronic illness in exacerbation or progression of disease managing side effects of  medications or polypharmacy    Plan required increased review of medication choice, interaction, dosing, frequency, and route due to patient complexity. Patient complexity increased by: high risk medication use, being on more than 8 medications, altered mentation, at risk for delirium, feeding tube, NPO and malnutrition    Total time 110 min  > 50% of 65 min visit spent in chart review, coordination with medical team, researching condition, and developing plan of care.    45 min spent discussing discussion of goals of care, symptom assessment, coordination of care and emotional support, formulating and communicating prognosis and goals of care, exploring burden/ benefit of various approaches of treatment.  [id number]    Remedios Rossi PA-C  Palliative Medicine

## 2022-01-26 NOTE — PROGRESS NOTES
Chief Complaint:  Bilateral hydronephrosis    HPI:    1/25/22- seen today, asleep and talked with her mom at bedside.  Patient is stable from spasms, stents are not causing any issues.  1/21/22-  56-year-old female who is well known to the service with metastatic signet ring carcinoma, invading into the pelvis.  Unfortunately she has bilateral ureteral malignant obstruction, creating hydronephrosis and obstruction.  Request for stents prior to initiation of chemotherapy.    Allergies:  Patient has no known allergies.    Medications:  See MAR    Review of Systems:  General: No fever, chills, fatigability, or weight loss.  Skin: No rashes, itching, or changes in color or texture of skin.  Chest: Denies AMBROSE, cyanosis, wheezing, cough, and sputum production.  Abdomen: Appetite fine. No weight loss. Denies diarrhea, abdominal pain, hematemesis, or blood in stool.  Musculoskeletal: No joint stiffness or swelling. Denies back pain.  : As above.  All other review of systems negative.    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    PSH:   has a past surgical history that includes Appendectomy; Right colectomy; Colostomy; CYTOREDUCTION; LAPAROTOMY, EXPLORATORY (02/12/2020); Bilateral oophorectomy; Cholecystectomy; Revision Colostomy (N/A, 10/20/2020); Excision of lesion (10/20/2020); Esophagogastroduodenoscopy (N/A, 5/6/2021); Esophagogastroduodenoscopy (06/11/2021); Endoscopic ultrasound of upper gastrointestinal tract (N/A, 6/11/2021); ERCP (N/A, 6/11/2021); Cystoscopy (N/A, 12/21/2021); Fluoroscopy (Bilateral, 1/19/2022); and Cystoscopy with ureteroscopy, retrograde pyelography, and insertion of stent (Bilateral, 1/22/2022).    FamHx: family history is not on file.    SocHx:  reports that she has never smoked. She has never used smokeless tobacco.  She reports that she does not drink alcohol and does not use drugs.      Physical Exam:  Vitals:    01/25/22 1604   BP: 99/61   Pulse: 82   Resp: 16   Temp: 98.6 °F (37 °C)     General: A&Ox3, no apparent distress, no deformities  Neck: No masses, normal ROM  Lungs: normal inspiration, no use of accessory muscles  Heart: normal pulse, no arrhythmias  Abdomen: Soft, NT, ND, no masses, no hernias, no hepatosplenomegaly  Skin: The skin is warm and dry. No jaundice.  Ext: No c/c/e.    Labs/Studies:   Creatinine 1.1  CT bilateral chronic hydronephrosis 1/22    Impression/Plan:     Bilateral ureteral malignant obstruction- stents are stable, spasms meds to continue post discharge.  Patient does have follow up arranged, no further recommendations.  Blanco removal once patient and primary team are ready, should void spontaneously.

## 2022-01-26 NOTE — ASSESSMENT & PLAN NOTE
Constipation likely a combination of opioid use and poor motility from prior abdominal surgeries and cancer.   Continue Relistor, Senna and Miralax at this time.   Fentyl patch was restarted today. Difficult to decrease in the setting of cancer.

## 2022-01-26 NOTE — SUBJECTIVE & OBJECTIVE
Subjective:     Interval History:   She is more alert today. She is having pain but describes it more at all over pain, in her hands, legs. No significant abdominal pain a this time. She has continued to have bowel movements.     Review of Systems   Constitutional:        See Interval History for daily ROS.      Objective:     Vital Signs (Most Recent):  Temp: 98.2 °F (36.8 °C) (01/26/22 0714)  Pulse: 75 (01/26/22 1035)  Resp: 20 (01/26/22 1035)  BP: 104/65 (01/26/22 0714)  SpO2: 98 % (01/26/22 1035) Vital Signs (24h Range):  Temp:  [98 °F (36.7 °C)-99.4 °F (37.4 °C)] 98.2 °F (36.8 °C)  Pulse:  [75-92] 75  Resp:  [16-20] 20  SpO2:  [94 %-99 %] 98 %  BP: ()/(50-65) 104/65     Weight: 41.8 kg (92 lb 2.4 oz) (01/26/22 0436)  Body mass index is 15.82 kg/m².      Intake/Output Summary (Last 24 hours) at 1/26/2022 1117  Last data filed at 1/26/2022 0800  Gross per 24 hour   Intake 1567.06 ml   Output 1625 ml   Net -57.94 ml       Lines/Drains/Airways     Drain                 Colostomy LUQ -- days         Colostomy 06/15/21 0016 Descending/sigmoid  days         Gastrostomy/Enterostomy 01/19/22 1325 Gastrostomy tube w/ balloon LUQ 6 days         Urethral Catheter 01/22/22 1400 Latex;Straight-tip 16 Fr. 3 days          Peripheral Intravenous Line                 Peripheral IV - Single Lumen 01/23/22 1358 18 G;1 3/4 in Left Upper Arm 2 days         Peripheral IV - Single Lumen 01/24/22 1900 Anterior;Proximal;Right Forearm 1 day                Physical Exam  Constitutional:       General: She is awake. She is not in acute distress.     Appearance: She is underweight. She is ill-appearing.   HENT:      Head: Normocephalic and atraumatic.   Eyes:      Extraocular Movements: Extraocular movements intact.   Cardiovascular:      Rate and Rhythm: Normal rate and regular rhythm.      Heart sounds: No murmur heard.      Pulmonary:      Effort: Pulmonary effort is normal. No respiratory distress.      Breath sounds:  Normal breath sounds. No wheezing.   Abdominal:      General: Bowel sounds are normal.      Tenderness: There is abdominal tenderness.      Comments: The abdomen is distended, tight, but very small. No significant tenderness today. PEG to drainage with thick bilious output. Ostomy noted.    Musculoskeletal:      Right lower leg: No edema.      Left lower leg: No edema.   Skin:     General: Skin is warm and dry.      Findings: No rash.         Significant Labs:  CBC:   Recent Labs   Lab 01/25/22  1033 01/26/22  0938   WBC 13.13* 10.89   HGB 7.3* 7.0*   HCT 23.1* 22.0*    193     CMP:   Recent Labs   Lab 01/26/22  0938   *   CALCIUM 8.1*      K 2.7*   CO2 22*      BUN 19   CREATININE 1.3     Coagulation: No results for input(s): PT, INR, APTT in the last 48 hours.      Significant Imaging:  Imaging results within the past 24 hours have been reviewed.

## 2022-01-26 NOTE — ASSESSMENT & PLAN NOTE
Patient is feeling better with G-tube to drainage and good stool output. IR to place G-J tube once it is received (had to be ordered).

## 2022-01-26 NOTE — ASSESSMENT & PLAN NOTE
No current surgical intervention required at this time.  Patient has ileus and no definitive obstruction.  Patient will benefit from GJ tube exchange for possible decompression and tube feeds to assist with chemotherapy administration.  Agree with palliative care consult and discussion with end of life planning.

## 2022-01-26 NOTE — SUBJECTIVE & OBJECTIVE
Interval History: SBFT showing no obstruction, ileus only. Having ostomy output. Feeling slightly better today.    Medications:  Continuous Infusions:   dextrose 5% lactated ringers 100 mL/hr at 01/26/22 0608     Scheduled Meds:   famotidine  20 mg Oral Daily    fentaNYL  1 patch Transdermal Q72H    [START ON 1/27/2022] methylnaltrexone  8 mg Subcutaneous Every other day    metoclopramide HCl  5 mg Oral QID (AC & HS)    mupirocin   Nasal BID    ondansetron  4 mg Intravenous Q6H    piperacillin-tazobactam (ZOSYN) IVPB  4.5 g Intravenous Q8H    polyethylene glycol  17 g Oral BID    senna  8.6 mg Oral Daily     PRN Meds:sodium chloride, acetaminophen, albuterol-ipratropium, aluminum-magnesium hydroxide-simethicone, bisacodyL, dextrose 50%, dextrose 50%, diphenhydrAMINE, glucagon (human recombinant), glucose, glucose, HYDROmorphone, hyoscyamine, magnesium oxide, magnesium oxide, naloxone, ondansetron, phenazopyridine, potassium bicarbonate, potassium bicarbonate, potassium bicarbonate, potassium, sodium phosphates, potassium, sodium phosphates, potassium, sodium phosphates, prochlorperazine, promethazine (PHENERGAN) IVPB, simethicone, sodium chloride 0.9%, zolpidem     Review of patient's allergies indicates:  No Known Allergies  Objective:     Vital Signs (Most Recent):  Temp: 98.8 °F (37.1 °C) (01/26/22 1214)  Pulse: 82 (01/26/22 1214)  Resp: 20 (01/26/22 1249)  BP: (!) 88/50 (01/26/22 1214)  SpO2: 97 % (01/26/22 1214) Vital Signs (24h Range):  Temp:  [98 °F (36.7 °C)-98.8 °F (37.1 °C)] 98.8 °F (37.1 °C)  Pulse:  [75-89] 82  Resp:  [16-20] 20  SpO2:  [94 %-99 %] 97 %  BP: ()/(50-65) 88/50     Weight: 41.8 kg (92 lb 2.4 oz)  Body mass index is 15.82 kg/m².    Intake/Output - Last 3 Shifts       01/24 0700 01/25 0659 01/25 0700 01/26 0659 01/26 0700 01/27 0659    P.O. 236 120 0    I.V. (mL/kg) 3971.1 (92.6) 1217.5 (29.1)     IV Piggyback 692 229.5     Total Intake(mL/kg) 4899.1 (114.2) 1567.1 (37.5) 0  (0)    Urine (mL/kg/hr) 1150 (1.1) 950 (0.9)     Emesis/NG output       Drains 1275 600     Stool 150 625     Total Output 2575 2175     Net +2324.1 -607.9 0                 Physical Exam  Constitutional:       Appearance: She is well-developed. She is ill-appearing.   HENT:      Head: Normocephalic and atraumatic.   Eyes:      Extraocular Movements: EOM normal.      Conjunctiva/sclera: Conjunctivae normal.   Neck:      Thyroid: No thyromegaly.   Cardiovascular:      Rate and Rhythm: Normal rate.   Pulmonary:      Effort: Pulmonary effort is normal. No respiratory distress.   Abdominal:      Comments: Soft, mild distention; ostomy viable with stool in bag   Musculoskeletal:         General: No tenderness or edema. Normal range of motion.      Cervical back: Normal range of motion.   Skin:     General: Skin is warm and dry.      Capillary Refill: Capillary refill takes less than 2 seconds.      Findings: No rash.   Neurological:      Mental Status: She is oriented to person, place, and time.   Psychiatric:         Mood and Affect: Mood and affect normal.         Significant Labs:  I have reviewed all pertinent lab results within the past 24 hours.  CBC:   Recent Labs   Lab 01/26/22  0938   WBC 10.89   RBC 2.81*   HGB 7.0*   HCT 22.0*      MCV 78*   MCH 24.9*   MCHC 31.8*     BMP:   Recent Labs   Lab 01/26/22  0938   *      K 2.7*      CO2 22*   BUN 19   CREATININE 1.3   CALCIUM 8.1*       Significant Diagnostics:  I have reviewed all pertinent imaging results/findings within the past 24 hours.

## 2022-01-26 NOTE — PROGRESS NOTES
[facility]  Adult Nutrition  Progress Note    SUMMARY     Recommendations    1. As medically able, initiate enteral nutrition: Isosource 1.5, continuous via PEG    - Advance as tolerated to goal: 40 mL/hr     - 100mL H2O flush q 4-6 hours or per MD/NP.    - Provides 1440 calories (100%EEN), 65g protein (>100%EPN), and 733ml H20 + FWF.     - Monitor for refeeding. Check Mg, K+, Na, Phos, and glucose before and during initiation; correct as indicated.      2. Consider PO diet, goal: regular and encourage intake    3. RD to follow up to monitor intake, tolerance, and labs.   *Please send consult if acute nutrition needs arise.    Goals:    1. Initiate enteral nutrition within 48 hours.   Nutrition Goal Status: not met, continues    2. Pt will tolerate >50% estimated energy and protein needs in 48-72 hours.  Nutrition Goal Status: not met, continues   Communication of RD recs: POC, sticky note, secure chat and second sign    Assessment and Plan  Nutrition Problem:    Severe Protein-Calorie Malnutrition    in the context of Chronic Illness/Injury  Related to (etiology):    Inadequate ability to consume sufficient energy    Inadequate oral intake    Altered GI function    Increased energy and protein needs    Medical condition  Signs and Symptoms (as evidenced by):    Energy Intake: <75% of estimated energy requirement for  >7 days    Body Fat Depletion: moderate and severe depletion of orbitals, triceps and thoracic and lumbar region     Muscle Mass Depletion: moderate and severe depletion of temples, clavicle region, scapular region, interosseous muscle and lower extremities     Weight Loss: 12 lbs, 12%% x 1 month   Interventions(treatment strategy):    Enteral nutrition     High calorie, high protein diet    Collaboration with other care providers  Nutrition Diagnosis Status:   Worsening    Reason for Assessment  Reason for assessment: RD follow-up  Diagnosis: Severe malnutrition    Past Medical History:  "  Diagnosis Date    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 Reports no other GI issues. Would love to "just get everything through the tube". PEG placed yesterday; says the abdominal area is painful, sore and tender.      1/24: Pt sleeping during RD visit, person at bedside reports poor appetite and intake since last assessment, 0-50%. PEG still not used, says they are considering g-j-tube. Wt gain since last assessment, possible fluid. Pt could benefit from ONS TID. Will continue to monitor.      1/26: RD assessment completed per EMR, pt with continued NPO status, 0-25% energy needs. Significant drainage from g-tube, plans to place g/j tube today with EN initiation after 24h. Unable to initate PN d/t bacteremia. Pt with continued weight loss since last assessment. Significant altered labs noted (see below). Will continue to monitor.     Nutrition Discharge Planning - pending medical course, enteral nutrition via PEG, PO diet as tolerated    Nutrition Risk Screen  Nutrition risk screen: tube feeding or parenteral nutrition     Nutrition/Diet History  Patient Reported Diet/Restrictions/Preferences: regular diet, soylent nutrition supplement at home  Food Preferences: likes well seasoned food  Food Allergies: NKFA  Factors Affecting Nutritional Intake: abdominal pain, altered GI function, nausea/vomiting and pain, NPO   Spiritual, Cultural Beliefs, Uatsdin Practices, Values that Affect Care: no    Anthropometrics  Temp: 98.2 °F (36.8 °C)  Height Method: Stated  Height: 5' 4" (162.6 cm)  Height (inches): 64 in  Weight " Method: Bed Scale  Weight: 41.8 kg (92 lb 2.4 oz)  Weight (lb): 92.15 lb  Ideal Body Weight (IBW), Female: 120 lb  % Ideal Body Weight, Female (lb): 73.12 %  BMI (Calculated): 15.8     Wt Readings from Last 5 Encounters:   01/26/22 41.8 kg (92 lb 2.4 oz)   01/14/22 40.4 kg (89 lb 1.1 oz)   01/10/22 41.4 kg (91 lb 4.3 oz)   01/06/22 45.6 kg (100 lb 8.5 oz)   12/21/21 41.6 kg (91 lb 11.4 oz)     Labs  Pertinent Labs: reviewed  Lab Results   Component Value Date     01/26/2022    CALCIUM 8.1 (L) 01/26/2022    HGB 7.0 (L) 01/26/2022    HCT 22.0 (L) 01/26/2022    K 2.7 (LL) 01/26/2022    BUN 19 01/26/2022    CREATININE 1.3 01/26/2022    ESTGFRAFRICA 53 (A) 01/26/2022    EGFRNONAA 46 (A) 01/26/2022    ALBUMIN 3.2 (L) 01/20/2022     Meds  Pertinent Medications: reviewed    famotidine  20 mg Oral Daily    fentaNYL  1 patch Transdermal Q72H    [START ON 1/27/2022] methylnaltrexone  8 mg Subcutaneous Every other day    metoclopramide HCl  5 mg Oral QID (AC & HS)    mupirocin   Nasal BID    ondansetron  4 mg Intravenous Q6H    piperacillin-tazobactam (ZOSYN) IVPB  4.5 g Intravenous Q8H    polyethylene glycol  17 g Oral BID    potassium chloride  10 mEq Intravenous Q1H    senna  8.6 mg Oral Daily     PRN Meds:acetaminophen, albuterol-ipratropium, aluminum-magnesium hydroxide-simethicone, bisacodyL, dextrose 50%, dextrose 50%, diphenhydrAMINE, glucagon (human recombinant), glucose, glucose, HYDROmorphone, hyoscyamine, magnesium oxide, magnesium oxide, naloxone, ondansetron, phenazopyridine, potassium bicarbonate, potassium bicarbonate, potassium bicarbonate, potassium, sodium phosphates, potassium, sodium phosphates, potassium, sodium phosphates, prochlorperazine, promethazine (PHENERGAN) IVPB, simethicone, sodium chloride 0.9%, zolpidem   Continuous Infusions:   dextrose 5% lactated ringers 100 mL/hr at 01/26/22 0608     Physical Findings/Assessment  N/V: frequent  Wounds: not indicated   Edema:  not indicated    Last Bowel Movement: 01/26/22      Reginaldo Score: 15  Mouth/Teeth WDL: WDL    O2 Device (Oxygen Therapy): room air  NFPE performed indicating severe muscle and fat wasting    Estimated/Assessed Needs  Weight Used For Calorie Calculations: 40.2 kg (88 lb 10 oz)  Energy Calorie Requirements (kcal): 0517-0070 (35-40, malnutrition)  Energy Need Method: Kcal/kg  Protein Requirements: 48-60g (1.2-1.5g/kg (malnutrition)  Weight Used For Protein Calculations: 40.2 kg (88 lb 10 oz)  RDA Method (mL): 1400  CHO Requirement: 175-200g (50% CHO)    Nutrition Prescription Ordered  regular     Evaluation of Received Nutrient/Fluid Intake  Energy Calories Required: not meeting needs  Protein Required: not meeting needs  Tolerance: n/v indicated  % Intake of Estimated Energy Needs: 0 - 25 %  % Meal Intake: 0 - 25 %    Monitor and Evaluation  Food and Nutrient Intake: energy intake  Food and Nutrient Administration: diet order and enteral nutrition administration  Anthropometric Measurements: weight, weight change, body mass index  Biochemical Data, Medical Tests and Procedures: electrolyte and renal panel, lipid profile, gastrointestinal profile, glucose/endocrine profile, Inflammatory profile  Nutrition-Focused Physical Findings: overall appearance     Malnutrition Assessment  Malnutrition Type: chronic illness  Energy Intake: severe energy intake  Edema (Fluid Accumulation): 0-->no edema present   Subcutaneous Fat Loss (Final Summary): severe protein-calorie malnutrition  Muscle Loss Evaluation (Final Summary): severe protein-calorie malnutrition    Severe Weight Loss (Malnutrition): greater than 5% in 1 month    Nutrition Follow-Up  Level of nutrition risk: high  Frequency of follow-up: 2-3x weekly  Tentative Next Date to be Seen by RD: 01/28/22  Iliana Fried, MS, RD, LDN

## 2022-01-26 NOTE — ASSESSMENT & PLAN NOTE
-management per palliative medicine   -patient notes improved abdominal pain but still reports generalized pain

## 2022-01-26 NOTE — PROGRESS NOTES
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 Pt will be placed in outpatient extended recovery for pain control, IVfs, and RD consult.     On exam, pt complain of nausea and 10/10 pain at G tube site. Pt reports ongoing chronic pain, N/V, weakness/fatigue, and weight loss for approximately one month.       Overview/Hospital Course:  1/20: G tube placed yesterday per IR. Today the patient has copious amounts of green fluid draining from the G tube- this is attached to a guzmán bag, her colostomy has no output, she has bowel sounds, but endorsing nasuea and abdominal pain. CT abdomen pending. Case discussed with Dr Barger and Dr Torres. Initial G tube placement check performed right after placement and the subsequent the following day is pending. As of 1/21/22  CT abdomen showed interval placement of percutaneous G tube with catheter coursing along the left inferior anterior margin with possible small adjacent hemorrhage. Patient c/o constipation requesting an injection. GI has been consulted to assist. Urology following and plans for stent placement tomorrow. G-tube still draining green fluid.  As of 1/22/22 pt reports passing some gas, still no BM. Will give x 3 doses of Relistor for constipation. Plans for ureteral stent placement today. As of 1/23/22 still no bowel movement. + Flatus. C/o abdominal pain and N/V. Abdominal xray pending. S/p bilateral ureteral stent placement. Palliative care consulted for symptom management and goal  planning. As of 1/24/22 patient very lethargic this am.  Pt had dose of Ativan this morning. Narcotics and ativan D/c'd until patient more alert. Mother reports pt passed a very small amount of hard stool overnight. Fever this morning, blood cx ordered. IV zosyn empirically. General Surgery consulted for possible obstruction. As of 1/25/22 patient is more awake but not back to her baseline. Abdominal imaging showed no obstruction. Colostomy with output. Plan for SBFT today. GI has been consulted. Case discussed with General surgery and IR. Blood cultures positive for gram negative rods. Continue IV zosyn. Port placement canceled due to bacteremia. Palliative care following. As of 1/26/22 pt awake and alert this morning, having ostomy output. H/H 7/22.0, will transfuse 1 unit of PRBCs. Serum potassium 2.7, will replete. Blood cultures growing E.COLI, awaiting susceptibility. Continue Zosyn.  Plans to exchange G-tube for GJ tube per IR and GI. Plan to use J-tube for nutrition. Continue current bowel regimen.                Review of Systems   Constitutional: Positive for activity change, appetite change, chills and fatigue. Negative for fever and unexpected weight change.   HENT: Negative for congestion, mouth sores, nosebleeds, sore throat, trouble swallowing and voice change.    Eyes: Negative for visual disturbance.   Respiratory: Negative for cough, chest tightness, shortness of breath and wheezing.    Cardiovascular: Negative for chest pain, palpitations and leg swelling.   Gastrointestinal: Positive for abdominal pain, nausea and vomiting. Negative for abdominal distention, blood in stool, constipation and diarrhea.   Genitourinary: Negative for difficulty urinating, dysuria and hematuria.   Musculoskeletal: Positive for arthralgias. Negative for back pain and myalgias.   Skin: Negative for pallor, rash and wound.   Neurological: Positive for weakness. Negative for dizziness, syncope and headaches.    Hematological: Negative for adenopathy. Does not bruise/bleed easily.   Psychiatric/Behavioral: Positive for dysphoric mood. The patient is not nervous/anxious.      Objective:     Vital Signs (Most Recent):  Temp: 97.8 °F (36.6 °C) (01/26/22 1646)  Pulse: 74 (01/26/22 1646)  Resp: 20 (01/26/22 1646)  BP: 117/69 (01/26/22 1646)  SpO2: 96 % (01/26/22 1646) Vital Signs (24h Range):  Temp:  [97.8 °F (36.6 °C)-99.2 °F (37.3 °C)] 97.8 °F (36.6 °C)  Pulse:  [74-89] 74  Resp:  [16-20] 20  SpO2:  [96 %-99 %] 96 %  BP: ()/(50-72) 117/69     Weight: 41.8 kg (92 lb 2.4 oz)  Body mass index is 15.82 kg/m².    Intake/Output Summary (Last 24 hours) at 1/26/2022 1658  Last data filed at 1/26/2022 1200  Gross per 24 hour   Intake 1447.06 ml   Output 950 ml   Net 497.06 ml      Physical Exam  Vitals and nursing note reviewed.   Constitutional:       General: She is not in acute distress ( ).     Appearance: She is cachectic. She is ill-appearing. She is not diaphoretic.   HENT:      Head: Normocephalic and atraumatic.      Nose: Nose normal.   Eyes:      General: No scleral icterus.     Conjunctiva/sclera: Conjunctivae normal.   Neck:      Trachea: No tracheal deviation.   Cardiovascular:      Rate and Rhythm: Normal rate and regular rhythm.      Heart sounds: Normal heart sounds. No murmur heard.  No friction rub. No gallop.    Pulmonary:      Effort: Pulmonary effort is normal. No respiratory distress.      Breath sounds: Normal breath sounds. No stridor. No wheezing or rales.   Chest:      Chest wall: No tenderness.   Abdominal:      General: Bowel sounds are normal. There is distension (mild).      Palpations: Abdomen is soft. There is no mass.      Tenderness: Tenderness: g tube site  There is no guarding or rebound.      Comments: colostomy LLQ with liquid stool in bag   G tube dressing C/D/I- attached to a guzmán bag draining light green fluid   Musculoskeletal:         General: No tenderness or deformity. Normal range  of motion.      Cervical back: Normal range of motion and neck supple.   Skin:     General: Skin is warm and dry.      Coloration: Skin is not pale.      Findings: No erythema or rash.   Neurological:      Mental Status: She is alert and oriented to person, place, and time.      Cranial Nerves: No cranial nerve deficit.      Motor: No abnormal muscle tone.      Coordination: Coordination normal.   Psychiatric:         Mood and Affect: Mood is depressed. Mood is not anxious. Affect is tearful. Affect is not angry.         Significant Labs:   All pertinent labs within the past 24 hours have been reviewed.  Blood Culture: No results for input(s): LABBLOO in the last 48 hours.  BMP:   Recent Labs   Lab 01/26/22  0938   *      K 2.7*      CO2 22*   BUN 19   CREATININE 1.3   CALCIUM 8.1*     CBC:   Recent Labs   Lab 01/25/22  1033 01/26/22  0938   WBC 13.13* 10.89   HGB 7.3* 7.0*   HCT 23.1* 22.0*    193     CMP:   Recent Labs   Lab 01/25/22  1033 01/26/22  0938    137   K 2.7* 2.7*    107   CO2 20* 22*   * 115*   BUN 23* 19   CREATININE 1.5* 1.3   CALCIUM 7.8* 8.1*   ANIONGAP 9 8   EGFRNONAA 39* 46*       Significant Imaging: I have reviewed all pertinent imaging results/findings within the past 24 hours.      Assessment/Plan:      * Severe malnutrition  S/p G tube placement today per IR  Consult RD  Regular diet   IVFs  Pain control     1/25  Appears g-tube was placed for drainage   IR was unable to place GJ tube at that time  Spoke with IR will re-attempt GJ with assistance from GI once pt more stable   Due to bacteremia unable to start TPN.   Will discuss with team    1/26  - Plans to exchange G-tube for GJ tube per IR and GI. Plan to use J-tube for nutrition.      Hypokalemia  Serum potassium 2.7, will replete       Bacteremia  Fever this morning, blood cx ordered. IV zosyn empirically.    1/26/22  -Blood cultures growing E.COLI, awaiting susceptibility   -Continue Zosyn     -WBCs have normalized         Hydronephrosis  Urology on board   S/p bilateral ureteral stent placement.     Therapeutic opioid-induced constipation (OIC)  Will give 3 doses of Relistor      Continue Relistor, Senna and Miralax at this time.           Gastrostomy tube in place  Placed on 1/19 per IR  Green fluid draining from the G tube- this is attached to a guzmán bag, her colostomy has no output, she has bowel sounds, but endorsing nasuea and abdominal pain  CT abdomen showed interval placement of percutaneous G tube with catheter coursing along the left inferior anterior margin with possible small adjacent hemorrhage.    General sx recommends leaving this to gravity      Metastatic signet ring cell carcinoma  Oncology managing   Plan to arrange outpatient follow up with Primary Oncologist pending resolution of current clinical situation to initiate chemotherapy               Intractable nausea and vomiting, Chronic  Scheduled Zofran   Phenergan/compazine prn         Colostomy present on admission  Consult wound care           Gastroesophageal reflux disease without esophagitis  Cont Pepcid      Chronic pain due to neoplasm  Cont Fentanyl patch   IV Dilaudid q3 hours prn   Palliative care managing       VTE Risk Mitigation (From admission, onward)         Ordered     IP VTE HIGH RISK PATIENT  Once         01/19/22 1435     Place sequential compression device  Until discontinued         01/19/22 1435                Discharge Planning   VICTOR MANUEL: 1/20/2022     Code Status: DNR   Is the patient medically ready for discharge?:     Reason for patient still in hospital (select all that apply): Patient trending condition, Laboratory test and Treatment  Discharge Plan A: Home with family                  Kerline Hernandez NP  Department of Hospital Medicine   [facility]

## 2022-01-26 NOTE — PLAN OF CARE
Patient continued on IV ABX without adverse reactions. Patient medicated once on shift for pain.patient Peg tube remains intact and draining copious amounts of fluid. Patients mother remains at bed side

## 2022-01-26 NOTE — PROGRESS NOTES
Notified LEW Hernandez NP and Dr. Mcdonough of lab result K. 2.7 and H&H 7.3/23.1 previously called into [facility]BERT[facility] España RN. New orders noted for K+ replacement via IV. No new orders noted for H&H at this time.

## 2022-01-26 NOTE — ASSESSMENT & PLAN NOTE
-G tube in place. Draining.  -plan for G-J tube which would allow nutrition and decompression. Hopefully nutritional/functional status improves and continued GI function to initiate chemotherapy. Palliative medicine discussed with patient possible redirection to comfort care pending course

## 2022-01-26 NOTE — ASSESSMENT & PLAN NOTE
Fever this morning, blood cx ordered. IV zosyn empirically.    1/26/22  -Blood cultures growing E.COLI, awaiting susceptibility   -Continue Zosyn    -WBCs have normalized

## 2022-01-26 NOTE — SUBJECTIVE & OBJECTIVE
Review of Systems   Constitutional: Positive for activity change, appetite change, chills and fatigue. Negative for fever and unexpected weight change.   HENT: Negative for congestion, mouth sores, nosebleeds, sore throat, trouble swallowing and voice change.    Eyes: Negative for visual disturbance.   Respiratory: Negative for cough, chest tightness, shortness of breath and wheezing.    Cardiovascular: Negative for chest pain, palpitations and leg swelling.   Gastrointestinal: Positive for abdominal pain, nausea and vomiting. Negative for abdominal distention, blood in stool, constipation and diarrhea.   Genitourinary: Negative for difficulty urinating, dysuria and hematuria.   Musculoskeletal: Positive for arthralgias. Negative for back pain and myalgias.   Skin: Negative for pallor, rash and wound.   Neurological: Positive for weakness. Negative for dizziness, syncope and headaches.   Hematological: Negative for adenopathy. Does not bruise/bleed easily.   Psychiatric/Behavioral: Positive for dysphoric mood. The patient is not nervous/anxious.      Objective:     Vital Signs (Most Recent):  Temp: 97.8 °F (36.6 °C) (01/26/22 1646)  Pulse: 74 (01/26/22 1646)  Resp: 20 (01/26/22 1646)  BP: 117/69 (01/26/22 1646)  SpO2: 96 % (01/26/22 1646) Vital Signs (24h Range):  Temp:  [97.8 °F (36.6 °C)-99.2 °F (37.3 °C)] 97.8 °F (36.6 °C)  Pulse:  [74-89] 74  Resp:  [16-20] 20  SpO2:  [96 %-99 %] 96 %  BP: ()/(50-72) 117/69     Weight: 41.8 kg (92 lb 2.4 oz)  Body mass index is 15.82 kg/m².    Intake/Output Summary (Last 24 hours) at 1/26/2022 1658  Last data filed at 1/26/2022 1200  Gross per 24 hour   Intake 1447.06 ml   Output 950 ml   Net 497.06 ml      Physical Exam  Vitals and nursing note reviewed.   Constitutional:       General: She is not in acute distress ( ).     Appearance: She is cachectic. She is ill-appearing. She is not diaphoretic.   HENT:      Head: Normocephalic and atraumatic.      Nose: Nose  normal.   Eyes:      General: No scleral icterus.     Conjunctiva/sclera: Conjunctivae normal.   Neck:      Trachea: No tracheal deviation.   Cardiovascular:      Rate and Rhythm: Normal rate and regular rhythm.      Heart sounds: Normal heart sounds. No murmur heard.  No friction rub. No gallop.    Pulmonary:      Effort: Pulmonary effort is normal. No respiratory distress.      Breath sounds: Normal breath sounds. No stridor. No wheezing or rales.   Chest:      Chest wall: No tenderness.   Abdominal:      General: Bowel sounds are normal. There is distension (mild).      Palpations: Abdomen is soft. There is no mass.      Tenderness: Tenderness: g tube site  There is no guarding or rebound.      Comments: colostomy LLQ with liquid stool in bag   G tube dressing C/D/I- attached to a guzmán bag draining light green fluid   Musculoskeletal:         General: No tenderness or deformity. Normal range of motion.      Cervical back: Normal range of motion and neck supple.   Skin:     General: Skin is warm and dry.      Coloration: Skin is not pale.      Findings: No erythema or rash.   Neurological:      Mental Status: She is alert and oriented to person, place, and time.      Cranial Nerves: No cranial nerve deficit.      Motor: No abnormal muscle tone.      Coordination: Coordination normal.   Psychiatric:         Mood and Affect: Mood is depressed. Mood is not anxious. Affect is tearful. Affect is not angry.         Significant Labs:   All pertinent labs within the past 24 hours have been reviewed.  Blood Culture: No results for input(s): LABBLOO in the last 48 hours.  BMP:   Recent Labs   Lab 01/26/22  0938   *      K 2.7*      CO2 22*   BUN 19   CREATININE 1.3   CALCIUM 8.1*     CBC:   Recent Labs   Lab 01/25/22  1033 01/26/22  0938   WBC 13.13* 10.89   HGB 7.3* 7.0*   HCT 23.1* 22.0*    193     CMP:   Recent Labs   Lab 01/25/22  1033 01/26/22  0938    137   K 2.7* 2.7*    107    CO2 20* 22*   * 115*   BUN 23* 19   CREATININE 1.5* 1.3   CALCIUM 7.8* 8.1*   ANIONGAP 9 8   EGFRNONAA 39* 46*       Significant Imaging: I have reviewed all pertinent imaging results/findings within the past 24 hours.

## 2022-01-26 NOTE — ASSESSMENT & PLAN NOTE
S/p G tube placement today per IR  Consult RD  Regular diet   IVFs  Pain control     1/25  Appears g-tube was placed for drainage   IR was unable to place GJ tube at that time  Spoke with IR will re-attempt GJ with assistance from GI once pt more stable   Due to bacteremia unable to start TPN.   Will discuss with team    1/26  - Plans to exchange G-tube for GJ tube per IR and GI. Plan to use J-tube for nutrition.

## 2022-01-27 NOTE — SUBJECTIVE & OBJECTIVE
Interval History: Plans to have J tube placed today for nutrition. BC from 1/24 shows E coli ESB: that is sensitive to Ertapenem and Meropenem. Zosyn stopped, Ertapenem started. BC redrawn.    Review of Systems   Constitutional: Positive for activity change, appetite change, chills and fatigue. Negative for fever and unexpected weight change.   HENT: Negative for congestion, mouth sores, nosebleeds, sore throat, trouble swallowing and voice change.    Eyes: Negative for visual disturbance.   Respiratory: Negative for cough, chest tightness, shortness of breath and wheezing.    Cardiovascular: Negative for chest pain, palpitations and leg swelling.   Gastrointestinal: Positive for abdominal pain, nausea and vomiting. Negative for abdominal distention, blood in stool, constipation and diarrhea.   Genitourinary: Negative for difficulty urinating, dysuria and hematuria.   Musculoskeletal: Positive for arthralgias. Negative for back pain and myalgias.   Skin: Negative for pallor, rash and wound.   Neurological: Positive for weakness. Negative for dizziness, syncope and headaches.   Hematological: Negative for adenopathy. Does not bruise/bleed easily.   Psychiatric/Behavioral: Positive for dysphoric mood. The patient is not nervous/anxious.      Objective:     Vital Signs (Most Recent):  Temp: 98 °F (36.7 °C) (01/27/22 1442)  Pulse: 84 (01/27/22 1442)  Resp: 19 (01/27/22 1442)  BP: 129/76 (01/27/22 1442)  SpO2: 96 % (01/27/22 1442) Vital Signs (24h Range):  Temp:  [97 °F (36.1 °C)-99.9 °F (37.7 °C)] 98 °F (36.7 °C)  Pulse:  [67-84] 84  Resp:  [16-20] 19  SpO2:  [93 %-100 %] 96 %  BP: (105-140)/(69-85) 129/76     Weight: 41.7 kg (92 lb)  Body mass index is 15.79 kg/m².    Intake/Output Summary (Last 24 hours) at 1/27/2022 1448  Last data filed at 1/27/2022 1405  Gross per 24 hour   Intake 3046.72 ml   Output 525 ml   Net 2521.72 ml      Physical Exam  Vitals and nursing note reviewed.   Constitutional:       General: She  is not in acute distress ( ).     Appearance: She is cachectic. She is ill-appearing. She is not diaphoretic.   HENT:      Head: Normocephalic and atraumatic.      Nose: Nose normal.   Eyes:      General: No scleral icterus.     Conjunctiva/sclera: Conjunctivae normal.   Neck:      Trachea: No tracheal deviation.   Cardiovascular:      Rate and Rhythm: Normal rate and regular rhythm.      Heart sounds: Normal heart sounds. No murmur heard.  No friction rub. No gallop.    Pulmonary:      Effort: Pulmonary effort is normal. No respiratory distress.      Breath sounds: Normal breath sounds. No stridor. No wheezing or rales.   Chest:      Chest wall: No tenderness.   Abdominal:      General: Bowel sounds are normal. There is distension (mild).      Palpations: Abdomen is soft. There is no mass.      Tenderness: Tenderness: g tube site  There is no guarding or rebound.      Comments: colostomy LLQ with liquid stool in bag   G tube dressing C/D/I- attached to a guzmán bag draining light green fluid   Musculoskeletal:         General: No tenderness or deformity. Normal range of motion.      Cervical back: Normal range of motion and neck supple.   Skin:     General: Skin is warm and dry.      Coloration: Skin is not pale.      Findings: No erythema or rash.   Neurological:      Mental Status: She is alert and oriented to person, place, and time.      Cranial Nerves: No cranial nerve deficit.      Motor: No abnormal muscle tone.      Coordination: Coordination normal.   Psychiatric:         Mood and Affect: Mood is depressed. Mood is not anxious. Affect is tearful. Affect is not angry.         Significant Labs:   All pertinent labs within the past 24 hours have been reviewed.  Recent Lab Results       01/27/22  1131   01/27/22  0918   01/27/22  0909   01/27/22  0845   01/26/22  1920        Albumin       2.1         Anion Gap       10         Appearance, UA         Clear       Bacteria, UA         Few       Baso #       0.02          Basophil %       0.2         Bilirubin (UA)         Negative       BUN       14         Calcium       8.2         Chloride       107         CO2       20         Color, UA         Yellow       Creatinine       1.3         Differential Method       Automated         eGFR if        53         eGFR if non        46  Comment: Calculation used to obtain the estimated glomerular filtration  rate (eGFR) is the CKD-EPI equation.            Eos #       0.3         Eosinophil %       2.4         Glucose       96         Glucose, UA         Negative       Gran # (ANC)       9.6         Gran %       79.7         Hematocrit       33.1         Hemoglobin       10.2         Hyaline Casts, UA         0       Immature Grans (Abs)       0.05  Comment: Mild elevation in immature granulocytes is non specific and   can be seen in a variety of conditions including stress response,   acute inflammation, trauma and pregnancy. Correlation with other   laboratory and clinical findings is essential.           Immature Granulocytes       0.4         Ketones, UA         Negative       Leukocytes, UA         2+       Lymph #       1.4         Lymph %       11.6         Magnesium       1.1         MCH       25.0         MCHC       30.8         MCV       81         Microscopic Comment         SEE COMMENT  Comment: Other formed elements not mentioned in the report are not   present in the microscopic examination.          Mono #       0.7         Mono %       5.7         MPV       9.7         NITRITE UA         Negative       nRBC       0         Occult Blood UA         3+       pH, UA         7.0       Phosphorus       2.7         Platelets       241         POCT Glucose 116               Potassium       3.3         Protein, UA         2+  Comment: Recommend a 24 hour urine protein or a urine   protein/creatinine ratio if globulin induced proteinuria is  clinically suspected.          Acceptable    Yes   Yes           RBC       4.08         RBC, UA         >100       RDW       15.1         SARS Coronavirus 2 Antigen   Negative   Negative           Sodium       137         Specific [place], UA         1.015       Specimen UA         Urine, Catheterized       Squam Epithel, UA         2       UROBILINOGEN UA         Negative       WBC Clumps, UA         Few       WBC, UA         20       WBC       12.05                          01/26/22  1558        Albumin       Anion Gap       Appearance, UA       Bacteria, UA       Baso #       Basophil %       Bilirubin (UA)       BUN       Calcium       Chloride       CO2       Color, UA       Creatinine       Differential Method       eGFR if        eGFR if non        Eos #       Eosinophil %       Glucose       Glucose, UA       Gran # (ANC)       Gran %       Hematocrit       Hemoglobin       Hyaline Casts, UA       Immature Grans (Abs)       Immature Granulocytes       Ketones, UA       Leukocytes, UA       Lymph #       Lymph %       Magnesium       MCH       MCHC       MCV       Microscopic Comment       Mono #       Mono %       MPV       NITRITE UA       nRBC       Occult Blood UA       pH, UA       Phosphorus       Platelets       POCT Glucose 120       Potassium       Protein, UA        Acceptable       RBC       RBC, UA       RDW       SARS Coronavirus 2 Antigen       Sodium       Specific Gravity, UA       Specimen UA       Squam Epithel, UA       UROBILINOGEN UA       WBC Clumps, UA       WBC, UA       WBC             Significant Imaging: I have reviewed all pertinent imaging results/findings within the past 24 hours.

## 2022-01-27 NOTE — INTERVAL H&P NOTE
The patient has been examined and the H&P has been reviewed:    I concur with the findings and no changes have occurred since H&P was written.    Anesthesia/Surgery risks, benefits and alternative options discussed and understood by patient/family.          Active Hospital Problems    Diagnosis  POA    *Severe malnutrition [E43]  Yes    Hypokalemia [E87.6]  Yes    Bacteremia [R78.81]  No    Hydronephrosis [N13.30]  Yes    Therapeutic opioid-induced constipation (OIC) [K59.03, T40.2X5A]  Yes    Gastrostomy tube in place [Z93.1]  Not Applicable    Metastatic signet ring cell carcinoma [C79.9]  Yes    Intractable nausea and vomiting, Chronic [R11.2]  Yes    Colostomy present on admission [Z93.3]  Not Applicable    Gastroesophageal reflux disease without esophagitis [K21.9]  Yes    Chronic pain due to neoplasm [G89.3]  Yes      Resolved Hospital Problems    Diagnosis Date Resolved POA    Leukocytosis [D72.829] 01/20/2022 Yes

## 2022-01-27 NOTE — ANESTHESIA PREPROCEDURE EVALUATION
01/27/2022  Madeline Warner is a 56 y.o., female.  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    Chronic pain due to neoplasm    Intestinal obstruction    Gastroesophageal reflux disease without esophagitis    Elevated LFTs    Left lower lobe pulmonary infiltrate    Acute pancreatitis    Hypotension due to hypovolemia    Abnormal finding of lung    Septic shock    Colostomy present on admission    Gross hematuria    Dysuria    Urinary tract infection without hematuria    Urge incontinence    Bladder mass    Intractable nausea and vomiting, Chronic    Severe malnutrition    Metastatic signet ring cell carcinoma    Gastrostomy tube in place    Therapeutic opioid-induced constipation (OIC)    Hydronephrosis    Bacteremia    Hypokalemia     Past Surgical History:   Procedure Laterality Date    APPENDECTOMY      BILATERAL OOPHORECTOMY      CHOLECYSTECTOMY      with Cytoreduction and salpingectomy    COLOSTOMY      CYSTOSCOPY N/A 12/21/2021    Procedure: CYSTOSCOPY;  Surgeon: John Martínez MD;  Location: [facility];  Service: Urology;  Laterality: N/A;    CYSTOSCOPY WITH URETEROSCOPY, RETROGRADE PYELOGRAPHY, AND INSERTION OF STENT Bilateral 1/22/2022    Procedure: CYSTOSCOPY, WITH RETROGRADE PYELOGRAM AND URETERAL STENT INSERTION;  Surgeon: John Martínez MD;  Location: [facility] OR;  Service: Urology;  Laterality: Bilateral;    CYTOREDUCTION      ENDOSCOPIC ULTRASOUND OF UPPER GASTROINTESTINAL TRACT N/A 6/11/2021    Procedure: ULTRASOUND, UPPER GI TRACT, ENDOSCOPIC;  Surgeon: Rosaura Lakhani MD;  Location: [facility];  Service: Endoscopy;  Laterality: N/A;  Linear scope    ERCP N/A 6/11/2021    Procedure: ERCP (ENDOSCOPIC RETROGRADE CHOLANGIOPANCREATOGRAPHY);  Surgeon: Rosaura Lakhani MD;   Location: [facility] ENDO;  Service: Endoscopy;  Laterality: N/A;    ESOPHAGOGASTRODUODENOSCOPY N/A 5/6/2021    Procedure: EGD (ESOPHAGOGASTRODUODENOSCOPY);  Surgeon: Brandon Rosen MD;  Location: [facility];  Service: Gastroenterology;  Laterality: N/A;    ESOPHAGOGASTRODUODENOSCOPY  06/11/2021    EXCISION OF LESION  10/20/2020    Procedure: EXCISION, LESION COLOSTOMY;  Surgeon: Layton Anderson MD;  Location: [facility] OR;  Service: General;;    FLUOROSCOPY Bilateral 1/19/2022    Procedure: Insertion of G-J tube and bilateral upper extremity venogram;  Surgeon: Bryan Torres MD;  Location: [facility] CATH LAB;  Service: General;  Laterality: Bilateral;    FLUOROSCOPY N/A 1/25/2022    Procedure: Arm Port Insertion;  Surgeon: Bryan Torres MD;  Location: [facility] CATH LAB;  Service: General;  Laterality: N/A;    LAPAROTOMY, EXPLORATORY  02/12/2020    LYSIS OF ADHESION, COLOSTOMY    REVISION COLOSTOMY N/A 10/20/2020    Procedure: REVISION, COLOSTOMY;  Surgeon: Layton Anderson MD;  Location: [facility] OR;  Service: General;  Laterality: N/A;  Ostomy bag placed    RIGHT COLECTOMY       Lab Results   Component Value Date    WBC 12.05 01/27/2022    HGB 10.2 (L) 01/27/2022    HCT 33.1 (L) 01/27/2022    MCV 81 (L) 01/27/2022     01/27/2022         Chemistry        Component Value Date/Time     01/27/2022 0845    K 3.3 (L) 01/27/2022 0845     01/27/2022 0845    CO2 20 (L) 01/27/2022 0845    BUN 14 01/27/2022 0845    CREATININE 1.3 01/27/2022 0845    GLU 96 01/27/2022 0845        Component Value Date/Time    CALCIUM 8.2 (L) 01/27/2022 0845    ALKPHOS 68 01/20/2022 0637    AST 11 01/20/2022 0637    ALT <5 (L) 01/20/2022 0637    BILITOT 0.3 01/20/2022 0637    ESTGFRAFRICA 53 (A) 01/27/2022 0845    EGFRNONAA 46 (A) 01/27/2022 0845          Anesthesia Evaluation    I have reviewed the Patient Summary Reports.    I have reviewed the Nursing Notes. I have reviewed the NPO Status.   I have reviewed the Medications.      Review of Systems  Anesthesia Hx:  Hx of Anesthetic complications PONV Denies Family Hx of Anesthesia complications.   Denies Personal Hx of Anesthesia complications.   Renal/:   Chronic Renal Disease    Hepatic/GI:   GERD    Neurological:   Neuromuscular Disease,        Physical Exam  General:  Cachexia    Airway/Jaw/Neck:  Airway Findings: Mouth Opening: Normal Tongue: Normal  General Airway Assessment: Adult  Mallampati: II  Improves to II with phonation.  TM Distance: Normal, at least 6 cm      Dental:  Dental Findings: In tact        Mental Status:  Mental Status Findings:  Cooperative, Alert and Oriented         Anesthesia Plan  Type of Anesthesia, risks & benefits discussed:  Anesthesia Type:  MAC    Patient's Preference:   Plan Factors:          Intra-op Monitoring Plan:   Intra-op Monitoring Plan Comments:   Post Op Pain Control Plan: per primary service following discharge from PACU  Post Op Pain Control Plan Comments:     Induction:    Beta Blocker:         Informed Consent: Patient understands risks and agrees with Anesthesia plan.  Questions answered. Anesthesia consent signed with patient.  ASA Score: 3     Day of Surgery Review of History & Physical: I have interviewed and examined the patient. I have reviewed the patient's H&P dated:  There are no significant changes.  H&P update referred to the surgeon.         Ready For Surgery From Anesthesia Perspective.

## 2022-01-27 NOTE — PROGRESS NOTES
"Patient c/o not wanting to go through "this"; pain, procedures, etc anymore. Patient states she just wants to go home. HALEIGH Flores and LEW Hernandez NP notified of patient statement.   "

## 2022-01-27 NOTE — PROGRESS NOTES
Patient c/o increased pain to lower back. Patient stated palliative care PA stated she would increase frequency of medication. HALEIGH Flores notified with new orders noted to change Dilaudid frequency to q3hrs PRN.

## 2022-01-27 NOTE — SUBJECTIVE & OBJECTIVE
Interval History: no acute events overnight. Patient resting comfortably at time of visit. Improved hypokalemia. G-J tube planned today  Oncology Treatment Plan:   OP FOLFOXIRI Q2W    Medications:  Continuous Infusions:   dextrose 5% lactated ringers 100 mL/hr at 01/26/22 1952     Scheduled Meds:   fentaNYL  1 patch Transdermal Q72H    methylnaltrexone  8 mg Subcutaneous Every other day    metoclopramide HCl  5 mg Intravenous QID (AC & HS)    mupirocin   Nasal BID    ondansetron  4 mg Intravenous Q6H    piperacillin-tazobactam (ZOSYN) IVPB  4.5 g Intravenous Q8H     PRN Meds:sodium chloride, acetaminophen, albuterol-ipratropium, aluminum-magnesium hydroxide-simethicone, bisacodyL, dextrose 50%, dextrose 50%, diazePAM, diphenhydrAMINE, glucagon (human recombinant), glucose, glucose, HYDROmorphone, hyoscyamine, magnesium oxide, magnesium oxide, naloxone, phenazopyridine, potassium bicarbonate, potassium bicarbonate, potassium bicarbonate, potassium, sodium phosphates, potassium, sodium phosphates, potassium, sodium phosphates, prochlorperazine, promethazine (PHENERGAN) IVPB, simethicone, sodium chloride 0.9%, zolpidem     Review of Systems   Unable to perform ROS: Other (limited ROS given current obtunded state)   Constitutional: Positive for activity change, appetite change and fatigue. Negative for chills, fever and unexpected weight change.   HENT: Negative for congestion, mouth sores, nosebleeds, sore throat, trouble swallowing and voice change.    Eyes: Negative for visual disturbance.   Respiratory: Negative for cough, chest tightness, shortness of breath and wheezing.    Cardiovascular: Negative for chest pain, palpitations and leg swelling.   Gastrointestinal: Negative for abdominal distention, abdominal pain, blood in stool, constipation, diarrhea, nausea and vomiting.   Genitourinary: Negative for difficulty urinating, dysuria and hematuria.   Musculoskeletal: Positive for arthralgias. Negative for  back pain and myalgias.   Skin: Negative for pallor, rash and wound.   Neurological: Positive for weakness. Negative for dizziness, syncope and headaches.   Hematological: Negative for adenopathy. Does not bruise/bleed easily.   Psychiatric/Behavioral: Positive for dysphoric mood. The patient is nervous/anxious.      Objective:     Vital Signs (Most Recent):  Temp: 98 °F (36.7 °C) (01/27/22 0740)  Pulse: 74 (01/27/22 0740)  Resp: 20 (01/27/22 1134)  BP: 109/71 (01/27/22 0740)  SpO2: 99 % (01/27/22 0740) Vital Signs (24h Range):  Temp:  [97 °F (36.1 °C)-99.2 °F (37.3 °C)] 98 °F (36.7 °C)  Pulse:  [67-83] 74  Resp:  [16-20] 20  SpO2:  [93 %-99 %] 99 %  BP: ()/(50-77) 109/71     Weight: 41.8 kg (92 lb 2.4 oz)  Body mass index is 15.82 kg/m².  Body surface area is 1.37 meters squared.      Intake/Output Summary (Last 24 hours) at 1/27/2022 1206  Last data filed at 1/27/2022 0800  Gross per 24 hour   Intake 2796.72 ml   Output 525 ml   Net 2271.72 ml       Physical Exam  Vitals reviewed.   Constitutional:       Appearance: She is well-developed.   HENT:      Head: Normocephalic.      Right Ear: External ear normal.      Left Ear: External ear normal.      Nose: Nose normal.   Neck:      Thyroid: No thyroid mass.   Cardiovascular:      Rate and Rhythm: Normal rate and regular rhythm.      Heart sounds: Normal heart sounds. No murmur heard.      Pulmonary:      Effort: Pulmonary effort is normal. No respiratory distress.      Breath sounds: Decreased breath sounds present. No wheezing or rales.   Abdominal:      General: There is no distension.      Palpations: Abdomen is rigid.       Genitourinary:     Comments: deferred  Lymphadenopathy:      Head:      Right side of head: No submandibular, preauricular or posterior auricular adenopathy.      Left side of head: No submandibular, preauricular or posterior auricular adenopathy.   Skin:     General: Skin is warm and dry.   Neurological:      Mental Status: She is  lethargic.         Significant Labs:   BMP:   Recent Labs   Lab 01/26/22  0938 01/27/22  0845   * 96    137   K 2.7* 3.3*    107   CO2 22* 20*   BUN 19 14   CREATININE 1.3 1.3   CALCIUM 8.1* 8.2*   MG  --  1.1*   , CBC:   Recent Labs   Lab 01/26/22  0938 01/27/22  0845   WBC 10.89 12.05   HGB 7.0* 10.2*   HCT 22.0* 33.1*    241   , LFTs:   Recent Labs   Lab 01/27/22  0845   ALBUMIN 2.1*    and All pertinent labs from the last 24 hours have been reviewed.    Diagnostic Results:  I have reviewed all pertinent imaging results/findings within the past 24 hours.

## 2022-01-27 NOTE — PROGRESS NOTES
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  Oncology was consulted due to history of metastatic signet ring cell carcinoma.  She is also known to have right hydronephrosis for which ureteral stent placement has been planned by Urology.     Today she continues to complain of abdominal pain, intermittent nausea and vomiting.  She is yet to initiate systemic palliative therapy for her cancer due to poor nutritional status and right hydronephrosis.         Interval History: no acute events overnight. Patient resting comfortably at time of visit. Improved hypokalemia. G-J tube planned today  Oncology Treatment Plan:   OP FOLFOXIRI Q2W    Medications:  Continuous Infusions:   dextrose 5% lactated ringers 100 mL/hr at 01/26/22 1952     Scheduled Meds:   fentaNYL  1 patch Transdermal Q72H    methylnaltrexone  8 mg Subcutaneous Every other day    metoclopramide HCl  5 mg Intravenous QID (AC & HS)    mupirocin   Nasal BID    ondansetron  4 mg Intravenous Q6H    piperacillin-tazobactam (ZOSYN) IVPB  4.5 g Intravenous Q8H     PRN Meds:sodium chloride, acetaminophen, albuterol-ipratropium, aluminum-magnesium hydroxide-simethicone, bisacodyL, dextrose 50%, dextrose 50%, diazePAM, diphenhydrAMINE, glucagon (human recombinant), glucose, glucose, HYDROmorphone, hyoscyamine, magnesium oxide, magnesium oxide, naloxone, phenazopyridine, potassium bicarbonate, potassium bicarbonate, potassium bicarbonate, potassium, sodium phosphates, potassium, sodium phosphates, potassium, sodium phosphates, prochlorperazine, promethazine (PHENERGAN) IVPB, simethicone, sodium chloride 0.9%, zolpidem     Review of Systems   Unable to perform ROS: Other  (limited ROS given current obtunded state)   Constitutional: Positive for activity change, appetite change and fatigue. Negative for chills, fever and unexpected weight change.   HENT: Negative for congestion, mouth sores, nosebleeds, sore throat, trouble swallowing and voice change.    Eyes: Negative for visual disturbance.   Respiratory: Negative for cough, chest tightness, shortness of breath and wheezing.    Cardiovascular: Negative for chest pain, palpitations and leg swelling.   Gastrointestinal: Negative for abdominal distention, abdominal pain, blood in stool, constipation, diarrhea, nausea and vomiting.   Genitourinary: Negative for difficulty urinating, dysuria and hematuria.   Musculoskeletal: Positive for arthralgias. Negative for back pain and myalgias.   Skin: Negative for pallor, rash and wound.   Neurological: Positive for weakness. Negative for dizziness, syncope and headaches.   Hematological: Negative for adenopathy. Does not bruise/bleed easily.   Psychiatric/Behavioral: Positive for dysphoric mood. The patient is nervous/anxious.      Objective:     Vital Signs (Most Recent):  Temp: 98 °F (36.7 °C) (01/27/22 0740)  Pulse: 74 (01/27/22 0740)  Resp: 20 (01/27/22 1134)  BP: 109/71 (01/27/22 0740)  SpO2: 99 % (01/27/22 0740) Vital Signs (24h Range):  Temp:  [97 °F (36.1 °C)-99.2 °F (37.3 °C)] 98 °F (36.7 °C)  Pulse:  [67-83] 74  Resp:  [16-20] 20  SpO2:  [93 %-99 %] 99 %  BP: ()/(50-77) 109/71     Weight: 41.8 kg (92 lb 2.4 oz)  Body mass index is 15.82 kg/m².  Body surface area is 1.37 meters squared.      Intake/Output Summary (Last 24 hours) at 1/27/2022 1206  Last data filed at 1/27/2022 0800  Gross per 24 hour   Intake 2796.72 ml   Output 525 ml   Net 2271.72 ml       Physical Exam  Vitals reviewed.   Constitutional:       Appearance: She is well-developed.   HENT:      Head: Normocephalic.      Right Ear: External ear normal.      Left Ear: External ear normal.      Nose: Nose normal.    Neck:      Thyroid: No thyroid mass.   Cardiovascular:      Rate and Rhythm: Normal rate and regular rhythm.      Heart sounds: Normal heart sounds. No murmur heard.      Pulmonary:      Effort: Pulmonary effort is normal. No respiratory distress.      Breath sounds: Decreased breath sounds present. No wheezing or rales.   Abdominal:      General: There is no distension.      Palpations: Abdomen is rigid.       Genitourinary:     Comments: deferred  Lymphadenopathy:      Head:      Right side of head: No submandibular, preauricular or posterior auricular adenopathy.      Left side of head: No submandibular, preauricular or posterior auricular adenopathy.   Skin:     General: Skin is warm and dry.   Neurological:      Mental Status: She is lethargic.         Significant Labs:   BMP:   Recent Labs   Lab 01/26/22  0938 01/27/22  0845   * 96    137   K 2.7* 3.3*    107   CO2 22* 20*   BUN 19 14   CREATININE 1.3 1.3   CALCIUM 8.1* 8.2*   MG  --  1.1*   , CBC:   Recent Labs   Lab 01/26/22  0938 01/27/22  0845   WBC 10.89 12.05   HGB 7.0* 10.2*   HCT 22.0* 33.1*    241   , LFTs:   Recent Labs   Lab 01/27/22  0845   ALBUMIN 2.1*    and All pertinent labs from the last 24 hours have been reviewed.    Diagnostic Results:  I have reviewed all pertinent imaging results/findings within the past 24 hours.    Assessment/Plan:     * Severe malnutrition  -G tube in place. Draining.  -plan for G-J tube placement today which would allow nutrition and decompression. Hopefully nutritional/functional status improves and continued GI function to initiate chemotherapy. Palliative medicine discussed with patient possible redirection to comfort care pending course    Hypokalemia  management per primary team    Bacteremia  --BC + gram neg bacteremia (E. Coli) Susceptibilities pending   --IV abx management per Primary team      Hydronephrosis  S/p bilateral stent placement     Metastatic signet ring cell  carcinoma  --plan to arrange outpatient follow up with Primary Oncologist pending resolution of current clinical situation to initiate chemotherapy. Hopefully patient improves from debility/malnutrition standpoint to initiate treatment. If not would need to discuss comfort care  -medi port placement on hold due to bacteremia.     Intractable nausea and vomiting, Chronic  -management per Primary team/palliative medicine       Colostomy present on admission        Chronic pain due to neoplasm  -management per palliative medicine               Thank you for your consult. I will follow-up with patient. Please contact us if you have any additional questions.     Jaky Saavedra NP  Hematology/Oncology  [facility]

## 2022-01-27 NOTE — PLAN OF CARE
Patient and mother educated on turning and repositioning to prevent skin breakdown. Patient remains on IV ABX with out adverse reactions. Peg tube remains intact draining green fluids from abdomen. Blanco cath intact draining brown colored urine.

## 2022-01-27 NOTE — ASSESSMENT & PLAN NOTE
-G tube in place. Draining.  -plan for G-J tube placement today which would allow nutrition and decompression. Hopefully nutritional/functional status improves and continued GI function to initiate chemotherapy. Palliative medicine discussed with patient possible redirection to comfort care pending course

## 2022-01-27 NOTE — ASSESSMENT & PLAN NOTE
--plan to arrange outpatient follow up with Primary Oncologist pending resolution of current clinical situation to initiate chemotherapy. Hopefully patient improves from debility/malnutrition standpoint to initiate treatment. If not would need to discuss comfort care  -medi port placement on hold due to bacteremia.

## 2022-01-27 NOTE — OP NOTE
Pre Op Diagnosis: reflux     Post Op Diagnosis: same     Procedure:  g - j tube placement with GI     Procedure performed by: Brian PATRICIO, Henrique ROJO     Written Informed Consent Obtained: Yes     Specimen Removed:  no     Estimated Blood Loss:  minimal     Findings: Local anesthesia and moderate sedation were used.     The patient tolerated the procedure well and there were no complications.      Sterile technique was performed in the LUQ, lidocaine was used as a local anesthetic.  G tube placement with assistance from GI and endoscopy.  Pt tolerated the procedure well without immediate complications.  Please see radiologist report for details. F/u with PCP and/or ordering physician.

## 2022-01-27 NOTE — TRANSFER OF CARE
"Anesthesia Transfer of Care Note    Patient: Madeline Warner    Procedure(s) Performed: Procedure(s) (LRB):  EGD (ESOPHAGOGASTRODUODENOSCOPY) (N/A)    Patient location: PACU    Anesthesia Type: MAC    Transport from OR: Transported from OR on room air with adequate spontaneous ventilation    Post pain: adequate analgesia    Post assessment: no apparent anesthetic complications    Post vital signs: stable    Level of consciousness: awake    Nausea/Vomiting: no nausea/vomiting    Complications: none    Transfer of care protocol was followed      Last vitals:   Visit Vitals  BP (!) 140/85   Pulse 78   Temp 37.7 °C (99.9 °F) (Temporal)   Resp 18   Ht 5' 4" (1.626 m)   Wt 41.7 kg (92 lb)   SpO2 (!) 94%   Breastfeeding No   BMI 15.79 kg/m²     "

## 2022-01-27 NOTE — PROGRESS NOTES
Progress Note   Palliative Medicine      SUBJECTIVE:     History of Present Illness:  Patient seen and examined with her mother at bedside. She reports her pain is well controlled on the current dose of Dilaudid. I offered to increase to 1.5mg if she feels this is inadequate. She received Relistor this AM and reports her bag is full. She asked for something for anxiety this AM and after receiving Ativan says that she responds better to low dose Valium. I am happy to rotate to this medication. She understands the J tube is supposed to be placed this afternoon.      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    T4 N1bM1 B 3/85 nodes positive    Encounter for blood transfusion     Leukocytosis 1/19/2022    PONV (postoperative nausea and vomiting)      Past Surgical History:   Procedure Laterality Date    APPENDECTOMY      BILATERAL OOPHORECTOMY      CHOLECYSTECTOMY      with Cytoreduction and salpingectomy    COLOSTOMY      CYSTOSCOPY N/A 12/21/2021    Procedure: CYSTOSCOPY;  Surgeon: John Martínez MD;  Location: [facility] OR;  Service: Urology;  Laterality: N/A;    CYSTOSCOPY WITH URETEROSCOPY, RETROGRADE PYELOGRAPHY, AND INSERTION OF STENT Bilateral 1/22/2022    Procedure: CYSTOSCOPY, WITH RETROGRADE PYELOGRAM AND URETERAL STENT INSERTION;  Surgeon: John Martínez MD;  Location: [facility] OR;  Service: Urology;  Laterality: Bilateral;    CYTOREDUCTION      ENDOSCOPIC ULTRASOUND OF UPPER GASTROINTESTINAL TRACT N/A 6/11/2021    Procedure: ULTRASOUND, UPPER GI TRACT, ENDOSCOPIC;  Surgeon: Rosaura Lakhani MD;  Location: [facility] ENDO;  Service: Endoscopy;  Laterality: N/A;  Linear scope    ERCP N/A 6/11/2021    Procedure: ERCP (ENDOSCOPIC RETROGRADE CHOLANGIOPANCREATOGRAPHY);  Surgeon: Rosaura COLEMAN  MD Lesvia;  Location: [facility] ENDO;  Service: Endoscopy;  Laterality: N/A;    ESOPHAGOGASTRODUODENOSCOPY N/A 5/6/2021    Procedure: EGD (ESOPHAGOGASTRODUODENOSCOPY);  Surgeon: Brandon Rosen MD;  Location: [facility] ENDO;  Service: Gastroenterology;  Laterality: N/A;    ESOPHAGOGASTRODUODENOSCOPY  06/11/2021    EXCISION OF LESION  10/20/2020    Procedure: EXCISION, LESION COLOSTOMY;  Surgeon: Layton Anderson MD;  Location: [facility] OR;  Service: General;;    FLUOROSCOPY Bilateral 1/19/2022    Procedure: Insertion of G-J tube and bilateral upper extremity venogram;  Surgeon: Bryan Torres MD;  Location: [facility] CATH LAB;  Service: General;  Laterality: Bilateral;    FLUOROSCOPY N/A 1/25/2022    Procedure: Arm Port Insertion;  Surgeon: Bryan Torres MD;  Location: [facility] CATH LAB;  Service: General;  Laterality: N/A;    LAPAROTOMY, EXPLORATORY  02/12/2020    LYSIS OF ADHESION, COLOSTOMY    REVISION COLOSTOMY N/A 10/20/2020    Procedure: REVISION, COLOSTOMY;  Surgeon: Layton Anderson MD;  Location: [facility] OR;  Service: General;  Laterality: N/A;  Ostomy bag placed    RIGHT COLECTOMY       History reviewed. No pertinent family history.  Review of patient's allergies indicates:  No Known Allergies    Medications:    Current Facility-Administered Medications:     0.9%  NaCl infusion (for blood administration), , Intravenous, Q24H PRN, Kerline Hernandez NP    acetaminophen tablet 650 mg, 650 mg, Oral, Q4H PRN, Soni Urias NP, 650 mg at 01/24/22 1722    albuterol-ipratropium 2.5 mg-0.5 mg/3 mL nebulizer solution 3 mL, 3 mL, Nebulization, Q6H PRN, Soni Urias NP    aluminum-magnesium hydroxide-simethicone 200-200-20 mg/5 mL suspension 30 mL, 30 mL, Oral, QID PRN, Soni Urias NP    bisacodyL suppository 10 mg, 10 mg, Other, Daily PRN, Kerline Hernandez NP    dextrose 5 % in lactated ringers infusion, , Intravenous, Continuous, Kerline Hernandez NP, Last Rate: 100 mL/hr at 01/26/22 1952,  Restarted at 01/26/22 1952    dextrose 50% injection 12.5 g, 12.5 g, Intravenous, PRN, Soni Urias NP    dextrose 50% injection 25 g, 25 g, Intravenous, PRN, Soni Urias NP    diazePAM injection 2.5 mg, 2.5 mg, Intravenous, Q6H PRN, Remedios Rossi PA-C    diphenhydrAMINE injection 25 mg, 25 mg, Intravenous, Q6H PRN, Jeana Adams MD    fentaNYL 100 mcg/hr 1 patch, 1 patch, Transdermal, Q72H, Remedios Rossi PA-C, 1 patch at 01/26/22 1006    glucagon (human recombinant) injection 1 mg, 1 mg, Intramuscular, PRN, Soni Urias NP    glucose chewable tablet 16 g, 16 g, Oral, PRN, Soni Urias NP    glucose chewable tablet 24 g, 24 g, Oral, PRN, Soni Urias NP    HYDROmorphone injection 1 mg, 1 mg, Intravenous, Q3H PRN, Remedios Rossi PA-C, 1 mg at 01/27/22 0831    hyoscyamine ODT 0.125 mg, 0.125 mg, Sublingual, Q4H PRN, John Martínez MD    magnesium oxide tablet 800 mg, 800 mg, Oral, PRN, Soni Urias NP    magnesium oxide tablet 800 mg, 800 mg, Oral, PRN, Soni Urias NP    methylnaltrexone Syrg 8 mg, 8 mg, Subcutaneous, Every other day, Sumi Doan MD, 8 mg at 01/27/22 0831    metoclopramide HCl injection 5 mg, 5 mg, Intravenous, QID (AC & HS), Remedios Rossi PA-C    mupirocin 2 % ointment, , Nasal, BID, Colt Lazaro MD, Given at 01/27/22 0834    naloxone 0.4 mg/mL injection 0.02 mg, 0.02 mg, Intravenous, PRN, Soni Urias NP    ondansetron injection 4 mg, 4 mg, Intravenous, Q6H, Soni Urias NP, 4 mg at 01/27/22 0526    phenazopyridine tablet 100 mg, 100 mg, Oral, TID PRN, John Martínez MD    piperacillin-tazobactam 4.5 g in dextrose 5 % 100 mL IVPB (ready to mix system), 4.5 g, Intravenous, Q8H, Kerline Hernandez NP, Last Rate: 25 mL/hr at 01/27/22 0945, 4.5 g at 01/27/22 0945    potassium bicarbonate disintegrating tablet 35 mEq, 35 mEq, Oral, PRN, Soni Urias NP    potassium bicarbonate  disintegrating tablet 50 mEq, 50 mEq, Oral, PRN, Soni Urias NP    potassium bicarbonate disintegrating tablet 60 mEq, 60 mEq, Oral, PRN, Soni Urias NP    potassium, sodium phosphates 280-160-250 mg packet 2 packet, 2 packet, Oral, PRN, Soni Urias NP    potassium, sodium phosphates 280-160-250 mg packet 2 packet, 2 packet, Oral, PRN, Soni Urias NP    potassium, sodium phosphates 280-160-250 mg packet 2 packet, 2 packet, Oral, PRN, Soni Urias NP    prochlorperazine injection Soln 5 mg, 5 mg, Intravenous, Q6H PRN, Soni Urias NP    promethazine (PHENERGAN) 6.25 mg in dextrose 5 % 50 mL IVPB, 6.25 mg, Intravenous, Q6H PRN, Soni Urias NP, Stopped at 01/21/22 0910    simethicone chewable tablet 80 mg, 1 tablet, Oral, QID PRN, Soni Urias NP    sodium chloride 0.9% flush 10 mL, 10 mL, Intravenous, Q8H PRN, Soni Urias NP    zolpidem tablet 5 mg, 5 mg, Oral, Nightly PRN, Soni Urias NP, 5 mg at 01/23/22 2119    Review of Symptoms    Symptom Assessment (ESAS 0-10 Scale)  Pain:  5  Dyspnea:  0  Anxiety:  0  Nausea:  0  Depression:  0  Anorexia:  0  Fatigue:  0  Insomnia:  0  Restlessness:  0  Agitation:  0     CAM / Delirium:  Negative  Constipation:  Negative  Diarrhea:  Negative    Bowel Management Plan (BMP):  Yes      Pain Assessment:  Location(s): generalized    Generalized       Location: generalized        Quality: none        Quantity: 5/10 in intensity        Chronicity: Onset 0 month(s) ago, gradually worsening        Aggravating Factors: none        Alleviating Factors: opiates        Associated Symptoms: constipation and nausea    Living Arrangements:  Lives with family      Advance Care Planning   Advance Directives:   Living Will: Yes        Copy on chart: Yes    [place]: No    Do Not Resuscitate Status: Yes    Medical Power of : Yes      Decision Making:  Patient answered questions         ROS:  Review of Systems   Constitutional: Positive  for activity change, appetite change and unexpected weight change.   HENT: Negative for sore throat and trouble swallowing.    Respiratory: Negative for cough and shortness of breath.    Cardiovascular: Negative for chest pain and leg swelling.   Gastrointestinal: Positive for abdominal pain and nausea. Negative for constipation, diarrhea and vomiting.   Musculoskeletal: Positive for arthralgias and myalgias. Negative for joint swelling.   Neurological: Positive for weakness. Negative for numbness.   Psychiatric/Behavioral: Positive for dysphoric mood. Negative for confusion and sleep disturbance. The patient is not nervous/anxious.        OBJECTIVE:     Physical Exam:  Vitals: Temp: 98 °F (36.7 °C) (01/27/22 0740)  Pulse: 74 (01/27/22 0740)  Resp: 20 (01/27/22 0831)  BP: 109/71 (01/27/22 0740)  SpO2: 99 % (01/27/22 0740)    Physical Exam  Vitals and nursing note reviewed.   Constitutional:       General: She is not in acute distress.     Appearance: Normal appearance. She is well-developed. She is cachectic. She is ill-appearing.      Comments: Fentanyl 100mcg patch L anterior chest   HENT:      Head: Normocephalic and atraumatic.   Cardiovascular:      Rate and Rhythm: Normal rate and regular rhythm.      Heart sounds: Normal heart sounds. No murmur heard.  No friction rub.   Pulmonary:      Effort: Pulmonary effort is normal. No respiratory distress.      Breath sounds: Normal breath sounds. No wheezing or rales.   Abdominal:      General: Bowel sounds are increased. There is no distension.      Tenderness: There is no abdominal tenderness.   Musculoskeletal:      Cervical back: Normal range of motion.      Right lower leg: No edema.      Left lower leg: No edema.   Skin:     General: Skin is dry.      Coloration: Skin is pale.      Findings: No rash.   Neurological:      Mental Status: She is oriented to person, place, and time. She is lethargic.      Cranial Nerves: No cranial nerve deficit.   Psychiatric:          Attention and Perception: Attention and perception normal.         Mood and Affect: Affect is flat.         Speech: Speech normal.         Behavior: Behavior is withdrawn. Behavior is cooperative.         Thought Content: Thought content normal.         Cognition and Memory: Cognition and memory normal.         Judgment: Judgment normal.         Labs:  WBC   Date Value Ref Range Status   01/27/2022 12.05 3.90 - 12.70 K/uL Final     Hemoglobin   Date Value Ref Range Status   01/27/2022 10.2 (L) 12.0 - 16.0 g/dL Final     Hematocrit   Date Value Ref Range Status   01/27/2022 33.1 (L) 37.0 - 48.5 % Final     MCV   Date Value Ref Range Status   01/27/2022 81 (L) 82 - 98 fL Final     Platelets   Date Value Ref Range Status   01/27/2022 241 150 - 450 K/uL Final       BMP  Lab Results   Component Value Date     01/27/2022    K 3.3 (L) 01/27/2022     01/27/2022    CO2 20 (L) 01/27/2022    BUN 14 01/27/2022    CREATININE 1.3 01/27/2022    CALCIUM 8.2 (L) 01/27/2022    ANIONGAP 10 01/27/2022    ESTGFRAFRICA 53 (A) 01/27/2022    EGFRNONAA 46 (A) 01/27/2022       Lab Results   Component Value Date    AST 11 01/20/2022    ALKPHOS 68 01/20/2022    BILITOT 0.3 01/20/2022       Albumin   Date Value Ref Range Status   01/27/2022 2.1 (L) 3.5 - 5.2 g/dL Final       Radiology:I have reviewed all pertinent imaging results/findings within the past 24 hours.  - CT abd/ pelvis 1/23/22 reviewed     ASSESSMENT   Madeline Warner is a 56 y.o. 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 She is followed in the PM clinic for pain and symptom management and were also consulted during her last admission. She was constipated at that time and was counseled extensively regarding the need for preventative  bowel regimen and given OIC education for home. Imaging indicates constipation and has been resistant to methyl-naltrexone and laxatives. Palliative Medicine was consulted to assist with pain management as well as GOC.     PLAN   1. Neoplasm related pain  - Continue Fentanyl 100mcg/ hr q72 hr   - Continue Dilaudid 1mg IV q3hr PRN.   - If pain uncontrolled would recommend increasing to Dilaudid 1.5mg q3hr PRN   - Once she is cleared for PO intake consider rotating to her home dose of oxycodone 30mg q4hr PRN.     2. Constipation  - Resumed Relistor as we are unable to give PO and she is having ostomy output  - Extensive discussion with team and concern of frozen abdomen. Hopeful we can manage with the current regimen and be able to start J tube feedings     3. Metastatic signet ring cell adenocarcinoma  - s/p ureteral stent  - Plan was to have port placed soon to facilitate palliative systemic chemotherapy but she is not optimistic this will occur and not interested in delaying comfort     4. Encounter for Palliative Care  - Code status: DNR/I. She agrees with her mother's decision and current code status.  - HCPOA on file and reviewed    5. Anxiety  - She reports better response to low dose diazepam vs Ativan  - Diazepam 2.5mg IV q6hr PRN anxiety or agitation      Thank you for allowing Palliative Medicine to be involved in the care of Madeline Warner.    Medical decision making: HIGH based on high risk of death untreated symptoms high risk medications poor prognosis management of more than one chronic illness in exacerbation or progression of disease managing side effects of medications or polypharmacy parenteral opioids    Plan required increased review of medication choice, interaction, dosing, frequency, and route due to patient complexity. Patient complexity increased by: high risk medication use, being on more than 8 medications, feeding tube, NPO and malnutrition    > 50% of 35 min visit spent in chart  review, face to face discussion of goals of care, symptom assessment, coordination of care and emotional support, formulating and communicating prognosis and goals of care, exploring burden/ benefit of various approaches of treatment.       Remedios Rossi PA-C  Palliative Medicine

## 2022-01-27 NOTE — PROGRESS NOTES
Patient's mother at nurses station stating patient having reaction to blood transfusion. Upon entering room patient turning off right side to back. Patient's mother stated patient's face is red. Patient face with blanchable redness due to laying on that side. VS taken /74, HR 71, o2 sat 100 on RA, temp 98.4 orally. Resp full and even, lung sounds clear. Patient states chest tight with metallic taste in her mouth. Patient also states she doesn't feel the pain medication if she really received it. LEW Hernandez NP notified of patient request, instructed to continue with blood transfusion. Educated patient on need to comply with order for telemetry. Patient verbalizes understanding.

## 2022-01-27 NOTE — OR NURSING
Site prepped using sterile technique for procedure by Dr. Richardson.   Dr. Doan grasped the J-tube using a forcep and pulled it into place.

## 2022-01-27 NOTE — BRIEF OP NOTE
Inpatient Endoscopy Brief Operative Note      Admit Date: 1/19/2022    Procedure Date and Time:  1/27/2022 2:29 PM    Attending Physician: Colt Lazaro MD     Principal Admitting Diagnoses: Severe malnutrition         Discharge Diagnosis: The primary encounter diagnosis was Hydronephrosis, unspecified hydronephrosis type. Diagnoses of Metastatic signet ring cell carcinoma, Chest pain, Gross hematuria, Severe malnutrition, Other partial intestinal obstruction, and Feeling of chest tightness were also pertinent to this visit.     Discharged Condition: Stable    Indication for Admission: Severe malnutrition     Procedure Performed: EGD with J placement    SEE PROVATIONS REPORTS FOR DETAILS.    Pathology (if any):  Specimen (24h ago, onward)            None          Estimated Blood Loss: 1 ml.    Discussed with: patient and other disciplines.    Disposition: Return to hospital abdul.    Recommendations:   1. Tube feeds per primary service  2. Continue Relistor and gastrograffin prn for constipation      Will follow peripherally. Please call if question or if GI input is needed.       Sumi Doan MD  Inpatient Gastroenterology  [facility]

## 2022-01-27 NOTE — PROGRESS NOTES
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 Pt will be placed in outpatient extended recovery for pain control, IVfs, and RD consult.     On exam, pt complain of nausea and 10/10 pain at G tube site. Pt reports ongoing chronic pain, N/V, weakness/fatigue, and weight loss for approximately one month.       Overview/Hospital Course:  Patient was admitted for abd pain. 1/20: G tube placed yesterday per IR. Today the patient has copious amounts of green fluid draining from the G tube- this is attached to a guzmán bag, her colostomy has no output, she has bowel sounds, but endorsing nasuea and abdominal pain. CT abdomen pending. Case discussed with Dr Barger and Dr Torres. Initial G tube placement check performed right after placement and the subsequent the following day is pending. As of 1/21/22  CT abdomen showed interval placement of percutaneous G tube with catheter coursing along the left inferior anterior margin with possible small adjacent hemorrhage. Patient c/o constipation requesting an injection. GI has been consulted to assist. Urology following and plans for stent placement tomorrow. G-tube still draining green fluid.  As of 1/22/22 pt reports passing some gas, still no BM. Will give x 3 doses of Relistor for constipation. Plans for ureteral stent placement today. As of 1/23/22 still no bowel movement. + Flatus. C/o abdominal pain and N/V. Abdominal xray pending. S/p bilateral ureteral stent placement.  Palliative care consulted for symptom management and goal planning. As of 1/24/22 patient very lethargic this am.  Pt had dose of Ativan this morning. Narcotics and ativan D/c'd until patient more alert. Mother reports pt passed a very small amount of hard stool overnight. Fever this morning, blood cx ordered. IV zosyn empirically. General Surgery consulted for possible obstruction. As of 1/25/22 patient is more awake but not back to her baseline. Abdominal imaging showed no obstruction. Colostomy with output. Plan for SBFT today. GI has been consulted. Case discussed with General surgery and IR. Blood cultures positive for gram negative rods. Continue IV zosyn. Port placement canceled due to bacteremia. Palliative care following. As of 1/26/22 pt awake and alert this morning, having ostomy output. H/H 7/22.0, will transfuse 1 unit of PRBCs. Serum potassium 2.7, will replete. Blood cultures growing E.COLI, awaiting susceptibility. Continue Zosyn.  Plans to exchange G-tube for GJ tube per IR and GI. Plan to use J-tube for nutrition. Continue current bowel regimen.            Interval History: Plans to have J tube placed today for nutrition. BC from 1/24 shows E coli ESB: that is sensitive to Ertapenem and Meropenem. Zosyn stopped, Ertapenem started. BC redrawn.    Review of Systems   Constitutional: Positive for activity change, appetite change, chills and fatigue. Negative for fever and unexpected weight change.   HENT: Negative for congestion, mouth sores, nosebleeds, sore throat, trouble swallowing and voice change.    Eyes: Negative for visual disturbance.   Respiratory: Negative for cough, chest tightness, shortness of breath and wheezing.    Cardiovascular: Negative for chest pain, palpitations and leg swelling.   Gastrointestinal: Positive for abdominal pain, nausea and vomiting. Negative for abdominal distention, blood in stool, constipation and diarrhea.   Genitourinary: Negative for difficulty urinating,  dysuria and hematuria.   Musculoskeletal: Positive for arthralgias. Negative for back pain and myalgias.   Skin: Negative for pallor, rash and wound.   Neurological: Positive for weakness. Negative for dizziness, syncope and headaches.   Hematological: Negative for adenopathy. Does not bruise/bleed easily.   Psychiatric/Behavioral: Positive for dysphoric mood. The patient is not nervous/anxious.      Objective:     Vital Signs (Most Recent):  Temp: 98 °F (36.7 °C) (01/27/22 1442)  Pulse: 84 (01/27/22 1442)  Resp: 19 (01/27/22 1442)  BP: 129/76 (01/27/22 1442)  SpO2: 96 % (01/27/22 1442) Vital Signs (24h Range):  Temp:  [97 °F (36.1 °C)-99.9 °F (37.7 °C)] 98 °F (36.7 °C)  Pulse:  [67-84] 84  Resp:  [16-20] 19  SpO2:  [93 %-100 %] 96 %  BP: (105-140)/(69-85) 129/76     Weight: 41.7 kg (92 lb)  Body mass index is 15.79 kg/m².    Intake/Output Summary (Last 24 hours) at 1/27/2022 1448  Last data filed at 1/27/2022 1405  Gross per 24 hour   Intake 3046.72 ml   Output 525 ml   Net 2521.72 ml      Physical Exam  Vitals and nursing note reviewed.   Constitutional:       General: She is not in acute distress ( ).     Appearance: She is cachectic. She is ill-appearing. She is not diaphoretic.   HENT:      Head: Normocephalic and atraumatic.      Nose: Nose normal.   Eyes:      General: No scleral icterus.     Conjunctiva/sclera: Conjunctivae normal.   Neck:      Trachea: No tracheal deviation.   Cardiovascular:      Rate and Rhythm: Normal rate and regular rhythm.      Heart sounds: Normal heart sounds. No murmur heard.  No friction rub. No gallop.    Pulmonary:      Effort: Pulmonary effort is normal. No respiratory distress.      Breath sounds: Normal breath sounds. No stridor. No wheezing or rales.   Chest:      Chest wall: No tenderness.   Abdominal:      General: Bowel sounds are normal. There is distension (mild).      Palpations: Abdomen is soft. There is no mass.      Tenderness: Tenderness: g tube site  There is no  guarding or rebound.      Comments: colostomy LLQ with liquid stool in bag   G tube dressing C/D/I- attached to a guzmán bag draining light green fluid   Musculoskeletal:         General: No tenderness or deformity. Normal range of motion.      Cervical back: Normal range of motion and neck supple.   Skin:     General: Skin is warm and dry.      Coloration: Skin is not pale.      Findings: No erythema or rash.   Neurological:      Mental Status: She is alert and oriented to person, place, and time.      Cranial Nerves: No cranial nerve deficit.      Motor: No abnormal muscle tone.      Coordination: Coordination normal.   Psychiatric:         Mood and Affect: Mood is depressed. Mood is not anxious. Affect is tearful. Affect is not angry.         Significant Labs:   All pertinent labs within the past 24 hours have been reviewed.  Recent Lab Results       01/27/22  1131   01/27/22  0918   01/27/22  0909   01/27/22  0845   01/26/22  1920        Albumin       2.1         Anion Gap       10         Appearance, UA         Clear       Bacteria, UA         Few       Baso #       0.02         Basophil %       0.2         Bilirubin (UA)         Negative       BUN       14         Calcium       8.2         Chloride       107         CO2       20         Color, UA         Yellow       Creatinine       1.3         Differential Method       Automated         eGFR if        53         eGFR if non        46  Comment: Calculation used to obtain the estimated glomerular filtration  rate (eGFR) is the CKD-EPI equation.            Eos #       0.3         Eosinophil %       2.4         Glucose       96         Glucose, UA         Negative       Gran # (ANC)       9.6         Gran %       79.7         Hematocrit       33.1         Hemoglobin       10.2         Hyaline Casts, UA         0       Immature Grans (Abs)       0.05  Comment: Mild elevation in immature granulocytes is non specific and   can be seen  in a variety of conditions including stress response,   acute inflammation, trauma and pregnancy. Correlation with other   laboratory and clinical findings is essential.           Immature Granulocytes       0.4         Ketones, UA         Negative       Leukocytes, UA         2+       Lymph #       1.4         Lymph %       11.6         Magnesium       1.1         MCH       25.0         MCHC       30.8         MCV       81         Microscopic Comment         SEE COMMENT  Comment: Other formed elements not mentioned in the report are not   present in the microscopic examination.          Mono #       0.7         Mono %       5.7         MPV       9.7         NITRITE UA         Negative       nRBC       0         Occult Blood UA         3+       pH, UA         7.0       Phosphorus       2.7         Platelets       241         POCT Glucose 116               Potassium       3.3         Protein, UA         2+  Comment: Recommend a 24 hour urine protein or a urine   protein/creatinine ratio if globulin induced proteinuria is  clinically suspected.          Acceptable   Yes   Yes           RBC       4.08         RBC, UA         >100       RDW       15.1         SARS Coronavirus 2 Antigen   Negative   Negative           Sodium       137         Specific [place], UA         1.015       Specimen UA         Urine, Catheterized       Squam Epithel, UA         2       UROBILINOGEN UA         Negative       WBC Clumps, UA         Few       WBC, UA         20       WBC       12.05                          01/26/22  1558        Albumin       Anion Gap       Appearance, UA       Bacteria, UA       Baso #       Basophil %       Bilirubin (UA)       BUN       Calcium       Chloride       CO2       Color, UA       Creatinine       Differential Method       eGFR if        eGFR if non        Eos #       Eosinophil %       Glucose       Glucose, UA       Gran # (ANC)       Gran %        Hematocrit       Hemoglobin       Hyaline Casts, UA       Immature Grans (Abs)       Immature Granulocytes       Ketones, UA       Leukocytes, UA       Lymph #       Lymph %       Magnesium       MCH       MCHC       MCV       Microscopic Comment       Mono #       Mono %       MPV       NITRITE UA       nRBC       Occult Blood UA       pH, UA       Phosphorus       Platelets       POCT Glucose 120       Potassium       Protein, UA        Acceptable       RBC       RBC, UA       RDW       SARS Coronavirus 2 Antigen       Sodium       Specific Gravity, UA       Specimen UA       Squam Epithel, UA       UROBILINOGEN UA       WBC Clumps, UA       WBC, UA       WBC             Significant Imaging: I have reviewed all pertinent imaging results/findings within the past 24 hours.      Assessment/Plan:      * Severe malnutrition  S/p G tube placement today per IR  Consult RD  Regular diet   IVFs  Pain control     1/25  Appears g-tube was placed for drainage   IR was unable to place GJ tube at that time  Spoke with IR will re-attempt GJ with assistance from GI once pt more stable   Due to bacteremia unable to start TPN.   Will discuss with team    1/26  - Plans to exchange G-tube for GJ tube per IR and GI. Plan to use J-tube for nutrition.  01/27:  --J tube to be placed today        Hypokalemia  Serum potassium 2.7, will replete       Bacteremia  Fever this morning, blood cx ordered. IV zosyn empirically.    1/26/22  -Blood cultures growing E.COLI, awaiting susceptibility   -Continue Zosyn    -WBCs have normalized   01/27:  --sensitivities returned, Zosyn dc'd, Ertapenem started  --repeat BC drawn        Hydronephrosis  Urology on board   S/p bilateral ureteral stent placement.     Therapeutic opioid-induced constipation (OIC)  Will give 3 doses of Relistor      Continue Relistor, Senna and Miralax at this time.           Gastrostomy tube in place  Placed on 1/19 per IR  Green fluid draining from the G tube-  this is attached to a guzmán bag, her colostomy has no output, she has bowel sounds, but endorsing nasuea and abdominal pain  CT abdomen showed interval placement of percutaneous G tube with catheter coursing along the left inferior anterior margin with possible small adjacent hemorrhage.    General sx recommends leaving this to gravity      Metastatic signet ring cell carcinoma  Oncology managing   Plan to arrange outpatient follow up with Primary Oncologist pending resolution of current clinical situation to initiate chemotherapy               Intractable nausea and vomiting, Chronic  Scheduled Zofran   Phenergan/compazine prn         Colostomy present on admission  Consult wound care           Gastroesophageal reflux disease without esophagitis  Cont Pepcid      Chronic pain due to neoplasm  Cont Fentanyl patch   IV Dilaudid q3 hours prn   Palliative care managing       VTE Risk Mitigation (From admission, onward)         Ordered     IP VTE HIGH RISK PATIENT  Once         01/19/22 1435     Place sequential compression device  Until discontinued         01/19/22 1435                Discharge Planning   VICTOR MANUEL: 1/20/2022     Code Status: DNR   Is the patient medically ready for discharge?:     Reason for patient still in hospital (select all that apply): Patient trending condition and Treatment  Discharge Plan A: Home with family                  CARMINA Tovar  Department of Hospital Medicine   [facility]

## 2022-01-27 NOTE — PROVATION PATIENT INSTRUCTIONS
Discharge Summary/Instructions after an Endoscopic Procedure  Patient Name: Madeline Cowan  Patient MRN: [id number]  Patient YOB: 1965 Thursday, January 27, 2022 Sumi Doan MD  Dear patient,  As a result of recent federal legislation (The Federal Cures Act), you may   receive lab or pathology results from your procedure in your [facility]   account before your physician is able to contact you. Your physician or   their representative will relay the results to you with their   recommendations at their soonest availability.  Thank you,  RESTRICTIONS:  During your procedure today, you received medications for sedation.  These   medications may affect your judgment, balance and coordination.  Therefore,   for 24 hours, you have the following restrictions:   - DO NOT drive a car, operate machinery, make legal/financial decisions,   sign important papers or drink alcohol.    ACTIVITY:  Today: no heavy lifting, straining or running due to procedural   sedation/anesthesia.  The following day: return to full activity including work.  DIET:  Eat and drink normally unless instructed otherwise.     TREATMENT FOR COMMON SIDE EFFECTS:  - Mild abdominal pain, nausea, belching, bloating or excessive gas:  rest,   eat lightly and use a heating pad.  - Sore Throat: treat with throat lozenges and/or gargle with warm salt   water.  - Because air was used during the procedure, expelling large amounts of air   from your rectum or belching is normal.  - If a bowel prep was taken, you may not have a bowel movement for 1-3 days.    This is normal.  SYMPTOMS TO WATCH FOR AND REPORT TO YOUR PHYSICIAN:  1. Abdominal pain or bloating, other than gas cramps.  2. Chest pain.  3. Back pain.  4. Signs of infection such as: chills or fever occurring within 24 hours   after the procedure.  5. Rectal bleeding, which would show as bright red, maroon, or black stools.   (A tablespoon of blood from the rectum is not serious, especially  if   hemorrhoids are present.)  6. Vomiting.  7. Weakness or dizziness.  GO DIRECTLY TO THE NEAREST EMERGENCY ROOM IF YOU HAVE ANY OF THE FOLLOWING:      Difficulty breathing              Chills and/or fever over 101 F   Persistent vomiting and/or vomiting blood   Severe abdominal pain   Severe chest pain   Black, tarry stools   Bleeding- more than one tablespoon   Any other symptom or condition that you feel may need urgent attention  Your doctor recommends these additional instructions:  If any biopsies were taken, your doctors clinic will contact you in 1 to 2   weeks with any results.  - Return patient to hospital abdul for ongoing care.   - Tube feeds per primary service and Nutrition service.   - Please follow the post-PEG recommendations including: Nutrition consult   for formula and volume, external bolster 1 cm from abdominal wall, change   dressing once per day, antibiotic ointment to site and check site for   bleeding q 4 hrs.   - Communicated with multiple disclipines regarding today's findings and   procedure.  For questions, problems or results please call your physician Sumi Doan MD at Work:  [phone]  If you have any questions about the above instructions, call the GI   department at [phone] or call the endoscopy unit at [phone]   from 7am until 3 pm.  [facility], EMERGENCY ROOM PHONE NUMBER:   [phone]  IF A COMPLICATION OR EMERGENCY SITUATION ARISES AND YOU ARE UNABLE TO REACH   YOUR PHYSICIAN - GO DIRECTLY TO THE EMERGENCY ROOM.  I have read or have had read to me these discharge instructions for my   procedure and have received a written copy.  I understand these   instructions and will follow-up with my physician if I have any questions.     __________________________________       _____________________________________  Nurse Signature                                          Patient/Designated   Responsible Party Signature  Sumi Doan  MD Sumi Doan MD  1/27/2022 2:24:13 PM  This report has been verified and signed electronically.  Dear patient,  As a result of recent federal legislation (The Federal Cures Act), you may   receive lab or pathology results from your procedure in your [facility]   account before your physician is able to contact you. Your physician or   their representative will relay the results to you with their   recommendations at their soonest availability.  Thank you,  PROVATION

## 2022-01-27 NOTE — PROGRESS NOTES
Patient return from endo recovery via bed. Asleep, easily aroused and oriented x4. VS WNL, afebrile, O2 sat 96% on room air. LUQ J tube inatct, surgical dressing CDI. J tube patent and draining to  bag. Patient c/o pain 5/10-7/10 with movement, denies nay nausea at present. Mother at bedside updated on medications, IVF; verbalizes understanding.

## 2022-01-27 NOTE — ANESTHESIA POSTPROCEDURE EVALUATION
Anesthesia Post Evaluation    Patient: Madeline Warner    Procedure(s) Performed: Procedure(s) (LRB):  EGD (ESOPHAGOGASTRODUODENOSCOPY) (N/A)    Final Anesthesia Type: MAC      Patient location during evaluation: PACU  Patient participation: Yes- Able to Participate  Level of consciousness: awake and alert  Post-procedure vital signs: reviewed and stable  Pain management: adequate  Airway patency: patent    PONV status at discharge: No PONV  Anesthetic complications: no      Cardiovascular status: hemodynamically stable  Respiratory status: spontaneous ventilation  Hydration status: euvolemic  Follow-up not needed.          Vitals Value Taken Time   /85 01/27/22 1301   Temp 37.7 °C (99.9 °F) 01/27/22 1259   Pulse 78 01/27/22 1259   Resp 18 01/27/22 1259   SpO2 94 % 01/27/22 1259         No case tracking events are documented in the log.      Pain/Kenisha Score: Pain Rating Prior to Med Admin: 7 (1/27/2022 11:34 AM)  Pain Rating Post Med Admin: 3 (1/27/2022  1:07 AM)

## 2022-01-27 NOTE — ASSESSMENT & PLAN NOTE
S/p G tube placement today per IR  Consult RD  Regular diet   IVFs  Pain control     1/25  Appears g-tube was placed for drainage   IR was unable to place GJ tube at that time  Spoke with IR will re-attempt GJ with assistance from GI once pt more stable   Due to bacteremia unable to start TPN.   Will discuss with team    1/26  - Plans to exchange G-tube for GJ tube per IR and GI. Plan to use J-tube for nutrition.  01/27:  --J tube to be placed today

## 2022-01-27 NOTE — ASSESSMENT & PLAN NOTE
Fever this morning, blood cx ordered. IV zosyn empirically.    1/26/22  -Blood cultures growing E.COLI, awaiting susceptibility   -Continue Zosyn    -WBCs have normalized   01/27:  --sensitivities returned, Zosyn dc'd, Ertapenem started  --repeat BC drawn

## 2022-01-28 PROBLEM — K56.609 INTESTINAL OBSTRUCTION: Status: RESOLVED | Noted: 2021-02-01 | Resolved: 2022-01-01

## 2022-01-28 NOTE — ASSESSMENT & PLAN NOTE
Now s/p exchange to GJ tube on 1/27/22. Drain Gtube to gravity to prevent nausea/vomiting. Can attempt Jtube feeds once cleared by GI and IR

## 2022-01-28 NOTE — PROGRESS NOTES
Advance Care Planning   [facility]  Palliative Care RN      Patient Name: Madeline Warner  MRN: [id number]  Admission Date: 1/19/2022  Hospital Length of Stay: 7 days  Code Status: DNR   Attending Provider: Kyler Barger MD  Palliative Care Provider: Remedios Rossi PA-C  Primary Care Physician: Mariam Peña MD  Principal Problem:Severe malnutrition    Preference obtained for [facility].  Updated Candis with Rudy who will arrange for info visit with mother Nancy if decision to transition to hospice over the weekend.  Updated ANA MARÍA Thomas, CM.      JANE Royal, Palliative Care   [phone]

## 2022-01-28 NOTE — ASSESSMENT & PLAN NOTE
Fever this morning, blood cx ordered. IV zosyn empirically.    1/26/22  -Blood cultures growing E.COLI, awaiting susceptibility   -Continue Zosyn    -WBCs have normalized   01/27:  --sensitivities returned, Zosyn dc'd, Ertapenem started  --repeat BC drawn  01/28/22- antibiotics continued per sensitivity results- repeat blood cultures pending

## 2022-01-28 NOTE — SUBJECTIVE & OBJECTIVE
Interval History:  J tube in place with tube feedings initiated per GI recommendation. Magnesium and Potassium replaced. Ertapenem continued. Repeat blood cultures with results pending.  Palliative Care following with Hospice info visit arranged.    Review of Systems   Constitutional: Positive for activity change, appetite change, chills and fatigue. Negative for fever and unexpected weight change.   HENT: Negative for congestion, mouth sores, nosebleeds, sore throat, trouble swallowing and voice change.    Eyes: Negative for visual disturbance.   Respiratory: Negative for cough, chest tightness, shortness of breath and wheezing.    Cardiovascular: Negative for chest pain, palpitations and leg swelling.   Gastrointestinal: Positive for abdominal pain, nausea (improved ) and vomiting (improved ). Negative for abdominal distention, blood in stool, constipation and diarrhea.   Genitourinary: Negative for difficulty urinating, dysuria and hematuria.   Musculoskeletal: Positive for arthralgias. Negative for back pain and myalgias.   Skin: Negative for pallor, rash and wound.   Neurological: Positive for weakness. Negative for dizziness, syncope and headaches.   Hematological: Negative for adenopathy. Does not bruise/bleed easily.   Psychiatric/Behavioral: Positive for dysphoric mood. The patient is not nervous/anxious.      Objective:     Vital Signs (Most Recent):  Temp: 100 °F (37.8 °C) (01/28/22 1652)  Pulse: 97 (01/28/22 1652)  Resp: 18 (01/28/22 1652)  BP: 120/75 (01/28/22 1652)  SpO2: (!) 93 % (01/28/22 1652) Vital Signs (24h Range):  Temp:  [98.3 °F (36.8 °C)-100.4 °F (38 °C)] 100 °F (37.8 °C)  Pulse:  [82-97] 97  Resp:  [16-20] 18  SpO2:  [93 %-97 %] 93 %  BP: (110-134)/(67-75) 120/75     Weight: 46.4 kg (102 lb 4.7 oz)  Body mass index is 17.56 kg/m².    Intake/Output Summary (Last 24 hours) at 1/28/2022 1703  Last data filed at 1/28/2022 1245  Gross per 24 hour   Intake 360 ml   Output 300 ml   Net 60 ml       Physical Exam  Vitals and nursing note reviewed.   Constitutional:       Appearance: She is cachectic. She is ill-appearing. She is not diaphoretic.   HENT:      Head: Normocephalic and atraumatic.      Nose: Nose normal.   Eyes:      General: No scleral icterus.     Conjunctiva/sclera: Conjunctivae normal.   Neck:      Trachea: No tracheal deviation.   Cardiovascular:      Rate and Rhythm: Normal rate and regular rhythm.      Heart sounds: Normal heart sounds. No murmur heard.  No friction rub. No gallop.    Pulmonary:      Effort: Pulmonary effort is normal. No respiratory distress.      Breath sounds: Normal breath sounds. No stridor. No wheezing or rales.   Chest:      Chest wall: No tenderness.   Abdominal:      General: Bowel sounds are normal. There is distension (mild).      Palpations: Abdomen is soft. There is no mass.      Tenderness: Tenderness: g tube site  There is no guarding or rebound.      Comments: colostomy LLQ with liquid stool in bag   G/J tube dressing C/D/I- attached to a guzmán bag draining light green fluid   Musculoskeletal:         General: No tenderness or deformity. Normal range of motion.      Cervical back: Normal range of motion and neck supple.   Skin:     General: Skin is warm and dry.      Coloration: Skin is not pale.      Findings: No erythema or rash.   Neurological:      Mental Status: She is oriented to person, place, and time and easily aroused.      Cranial Nerves: No cranial nerve deficit.      Motor: No abnormal muscle tone.      Coordination: Coordination normal.   Psychiatric:         Mood and Affect: Mood is depressed. Mood is not anxious. Affect is not angry or tearful.         Significant Labs:   All pertinent labs within the past 24 hours have been reviewed.  CBC:   Recent Labs   Lab 01/27/22  0845   WBC 12.05   HGB 10.2*   HCT 33.1*        CMP:   Recent Labs   Lab 01/27/22  0845 01/28/22  1320    138   K 3.3* 2.8*    107   CO2 20* 21*   GLU 96  93   BUN 14 12   CREATININE 1.3 1.4   CALCIUM 8.2* 7.7*   ALBUMIN 2.1*  --    ANIONGAP 10 10   EGFRNONAA 46* 42*       Significant Imaging:

## 2022-01-28 NOTE — ASSESSMENT & PLAN NOTE
"2019       RE: Shine Chua  [address]  [place] [address]     Dear Colleague,    Thank you for referring your patient, Shine Chua, to the [facility]. Please see a copy of my visit note below.    [facility]  Orthopedics  Saqib Degroot MD  2019     Name: Shine Chua  MRN: [id number]  Age: 6 year old  : 2012  Referring provider: Yovanny Tilley     Chief Complaint: RECHECK (follow up 3 MRIs and 2 CTs )     History of Present Illness:   Shine Chua is a 6 year old, female who presents today with her parents for an evaluation of progressive scoliosis. I last evaluated the patient on 2018, at which time the patient's mother reported she had been growing slowly. We discussed the possibility of corrective surgery in 6-12 months.    Today, the patient's parents report she is doing well. She has been able to control her bowel movements but is still having trouble with urination. She still has difficulty urinating and has occasional leaky urine. They still do not have any history of from her early childhood from [place]. They are unsure on the treatment for her tethered cord.     KIM: Not taken   Gwdxju23: 35  Pain scale 0-10: no      Review of Systems:   A 10-point review of systems was obtained and is negative except for as noted in the HPI.     Physical Examination:  Height 0.998 m (3' 3.29\"), weight 14.5 kg (32 lb).  Constitutional - Patient is healthy, well-nourished and appears stated age.  Respiratory - Patient is breathing normally and in no respiratory distress.  ENT - Hearing intact to the spoken word.  Musculoskeletal - Gait appears age appropriate.    Imaging:  CT of the thoracic spine without contrast (2019)  1. Segmentation anomalies at T3-4, T5, T9-T11, and L3-L5 with 28  degree rightward convex scoliosis at the T10-11 level. Notably, " stable   the  segmentation anomaly from L3 through L5 lacks right-sided pedicles,  and the facet joints are hypoplastic on the left at L1-2 and L2-3.  2. Change in left hydroureteronephrosis.    CT of the Lumbar spine without contrast (4/1/2019)  IMPRESSION:  1. Segmentation anomalies at T3-4, T5, T9-T11, and L3-L5 with 28  degree rightward convex scoliosis at the T10-11 level. Notably, the  segmentation anomaly from L3 through L5 lacks right-sided pedicles,  and the facet joints are hypoplastic on the left at L1-2 and L2-3.  2. Change in left hydroureteronephrosis.    Radiograph of the complete spine scoliosis (4/8/2019)  1. Congenital scoliosis, slightly advanced compared to the prior  study. Please see orthopedic note for more information.  2. Normal center weightbearing axes and there is no significant leg  length discrepancy.  3. Large stool burden.    I have independently reviewed the above imaging studies; the results were discussed with the patient.     Assessment:   6 year old, female with congenital scoliosis with VACTERL. She has a left sided bar from T7-T9 and a complex anomaly from L3-S1.     Plan:     Recommend against bracing the patient at this time because of the minimal improvement with congenital scoliosis patients.     Relatively stable curve at this time.    Follow-up in 6 months for repeat x-rays and routine recheck.     Anticipate in the future that the thoracic curve will progress and need intervention.    Scribe Disclosure:  I, Harlan Cruz, am serving as a scribe to document services personally performed by Saqib Degroot MD at this visit, based upon the provider's statements to me. All documentation has been reviewed by the aforementioned provider prior to being entered into the official medical record.  Saqib Degroot MD        Again, thank you for allowing me to participate in the care of your patient.      Sincerely,    Saqib Degroot MD

## 2022-01-28 NOTE — ASSESSMENT & PLAN NOTE
Will give 3 doses of Relistor  -continue bowel regime outpatient per Palliative Care recommendations   Continue Relistor, Senna and Miralax at this time.

## 2022-01-28 NOTE — PLAN OF CARE
[facility] - Med Surg  Discharge Reassessment    Primary Care Provider: Mariam Peña MD    Expected Discharge Date: 1/20/2022    Reassessment (most recent)     Discharge Reassessment - 01/28/22 1149        Discharge Reassessment    Assessment Type Discharge Planning Reassessment     Did the patient's condition or plan change since previous assessment? No     Discharge Plan discussed with: Patient     Communicated VICTOR MANUEL with patient/caregiver Date not available/Unable to determine     Discharge Plan A Home Health     Discharge Plan B Hospice/home     DME Needed Upon Discharge  none     Discharge Barriers Identified None     Why the patient remains in the hospital Requires continued medical care

## 2022-01-28 NOTE — PROGRESS NOTES
[facility] Surg  Colorectal Surgery  Progress Note    Subjective:       Post-Op Info:  Procedure(s) (LRB):  EGD (ESOPHAGOGASTRODUODENOSCOPY) (N/A)   1 Day Post-Op     Interval History: GJ tube exchange yesterday. No further nausea/vomiting. Gtube to gravity drainage today. Pending starting TFs.    Medications:  Continuous Infusions:   lactated ringers 100 mL/hr at 01/28/22 0019     Scheduled Meds:   ertapenem (INVANZ) IVPB  1 g Intravenous Q24H    fentaNYL  1 patch Transdermal Q72H    methylnaltrexone  8 mg Subcutaneous Every other day    metoclopramide HCl  5 mg Intravenous QID (AC & HS)    mupirocin   Nasal BID    ondansetron  4 mg Intravenous Q6H     PRN Meds:sodium chloride, acetaminophen, albuterol-ipratropium, aluminum-magnesium hydroxide-simethicone, bisacodyL, dextrose 50%, dextrose 50%, diazePAM, diphenhydrAMINE, glucagon (human recombinant), glucose, glucose, HYDROmorphone, hyoscyamine, magnesium oxide, magnesium oxide, naloxone, phenazopyridine, potassium bicarbonate, potassium bicarbonate, potassium bicarbonate, potassium, sodium phosphates, potassium, sodium phosphates, potassium, sodium phosphates, prochlorperazine, promethazine (PHENERGAN) IVPB, simethicone, sodium chloride 0.9%, zolpidem     Review of patient's allergies indicates:  No Known Allergies  Objective:     Vital Signs (Most Recent):  Temp: (!) 100.4 °F (38 °C) (01/28/22 1120)  Pulse: 90 (01/28/22 1120)  Resp: 18 (01/28/22 1453)  BP: 117/67 (01/28/22 1120)  SpO2: 97 % (01/28/22 1120) Vital Signs (24h Range):  Temp:  [98.3 °F (36.8 °C)-100.4 °F (38 °C)] 100.4 °F (38 °C)  Pulse:  [81-93] 90  Resp:  [16-20] 18  SpO2:  [93 %-97 %] 97 %  BP: (110-134)/(62-74) 117/67     Weight: 46.4 kg (102 lb 4.7 oz)  Body mass index is 17.56 kg/m².    Intake/Output - Last 3 Shifts       01/26 0700 01/27 0659 01/27 0700 01/28 0659 01/28 0700 01/29 0659    P.O. 100 0 0    I.V. (mL/kg) 1635.1 (39.1)      Blood 397.5      IV Piggyback 664.2 250      Total Intake(mL/kg) 2796.7 (66.9) 250 (5.4) 0 (0)    Urine (mL/kg/hr) 400 (0.4) 650 (0.6)     Drains       Stool 725 125     Total Output 1125 775     Net +1671.7 -525 0                 Physical Exam  Constitutional:       Appearance: She is well-developed. She is ill-appearing.   HENT:      Head: Normocephalic and atraumatic.   Eyes:      Conjunctiva/sclera: Conjunctivae normal.   Neck:      Thyroid: No thyromegaly.   Cardiovascular:      Rate and Rhythm: Normal rate.   Pulmonary:      Effort: Pulmonary effort is normal. No respiratory distress.   Abdominal:      Comments: Soft, mild distention; ostomy viable with stool/gas in bag; GJ tube in place with G portion to gravity drainage with bilious output in bag and J tube clamped currently   Musculoskeletal:         General: No tenderness. Normal range of motion.      Cervical back: Normal range of motion.   Skin:     General: Skin is warm and dry.      Capillary Refill: Capillary refill takes less than 2 seconds.      Findings: No rash.   Neurological:      Mental Status: She is oriented to person, place, and time.         Significant Labs:  I have reviewed all pertinent lab results within the past 24 hours.  CBC:   Recent Labs   Lab 01/27/22  0845   WBC 12.05   RBC 4.08   HGB 10.2*   HCT 33.1*      MCV 81*   MCH 25.0*   MCHC 30.8*     BMP:   Recent Labs   Lab 01/27/22  0845   GLU 96      K 3.3*      CO2 20*   BUN 14   CREATININE 1.3   CALCIUM 8.2*   MG 1.1*       Significant Diagnostics:  I have reviewed all pertinent imaging results/findings within the past 24 hours.    Assessment/Plan:     * Severe malnutrition  Okay to attempt Jtube feeds via GJ tube once cleared by IR and GI    Bacteremia  Management per Medicine    Hydronephrosis  Patient has had bilateral ureteral stents placed    Therapeutic opioid-induced constipation (OIC)  Will need a good bowel regimen to assist with avoiding constipation due to narcotic use    Gastrostomy tube in  place  Now s/p exchange to GJ tube on 1/27/22. Drain Gtube to gravity to prevent nausea/vomiting. Can attempt Jtube feeds once cleared by GI and IR    Metastatic signet ring cell carcinoma  No current surgical intervention required at this time.  Patient has ileus and no definitive obstruction.      Intractable nausea and vomiting, Chronic  Likely related to her metastatic signet ring carcinoma and narcotic use    Colostomy present on admission  stable    Gastroesophageal reflux disease without esophagitis  Management per Medicine    Chronic pain due to neoplasm  Care per primary team        Layton Anderson MD  Colorectal Surgery  [facility] - Med [facility]

## 2022-01-28 NOTE — PROGRESS NOTES
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 Pt will be placed in outpatient extended recovery for pain control, IVfs, and RD consult.     On exam, pt complain of nausea and 10/10 pain at G tube site. Pt reports ongoing chronic pain, N/V, weakness/fatigue, and weight loss for approximately one month.       Overview/Hospital Course:  Patient was admitted for abd pain. 1/20: G tube placed yesterday per IR. Today the patient has copious amounts of green fluid draining from the G tube- this is attached to a guzmán bag, her colostomy has no output, she has bowel sounds, but endorsing nasuea and abdominal pain. CT abdomen pending. Case discussed with Dr Barger and Dr Torres. Initial G tube placement check performed right after placement and the subsequent the following day is pending. As of 1/21/22  CT abdomen showed interval placement of percutaneous G tube with catheter coursing along the left inferior anterior margin with possible small adjacent hemorrhage. Patient c/o constipation requesting an injection. GI has been consulted to assist. Urology following and plans for stent placement tomorrow. G-tube still draining green fluid.  As of 1/22/22 pt reports passing some gas, still no BM. Will give x 3 doses of Relistor for constipation. Plans for ureteral stent placement today. As of 1/23/22 still no bowel movement. + Flatus. C/o abdominal pain and N/V. Abdominal xray pending. S/p bilateral ureteral stent placement. Palliative care consulted for symptom  management and goal planning. As of 1/24/22 patient very lethargic this am.  Pt had dose of Ativan this morning. Narcotics and ativan D/c'd until patient more alert. Mother reports pt passed a very small amount of hard stool overnight. Fever this morning, blood cx ordered. IV zosyn empirically. General Surgery consulted for possible obstruction. As of 1/25/22 patient is more awake but not back to her baseline. Abdominal imaging showed no obstruction. Colostomy with output. Plan for SBFT today. GI has been consulted. Case discussed with General surgery and IR. Blood cultures positive for gram negative rods. Continue IV zosyn. Port placement canceled due to bacteremia. Palliative care following. As of 1/26/22 pt awake and alert this morning, having ostomy output. H/H 7/22.0, will transfuse 1 unit of PRBCs. Serum potassium 2.7, will replete. Blood cultures growing E.COLI, awaiting susceptibility. Continue Zosyn.  Plans to exchange G-tube for GJ tube per IR and GI. Plan to use J-tube for nutrition. Continue current bowel regimen.   01/27: Plans to have J tube placed today for nutrition. BC from 1/24 shows E coli ESB: that is sensitive to Ertapenem and Meropenem. Zosyn stopped, Ertapenem started. BC redrawn. On 1/28/22, J tube in place with tube feedings initiated per GI recommendation. Magnesium and Potassium replaced. Ertapenem continued. Repeat blood cultures with results pending.  Palliative Care following with Hospice info visit arranged.       Interval History:  J tube in place with tube feedings initiated per GI recommendation. Magnesium and Potassium replaced. Ertapenem continued. Repeat blood cultures with results pending.  Palliative Care following with Hospice info visit arranged.    Review of Systems   Constitutional: Positive for activity change, appetite change, chills and fatigue. Negative for fever and unexpected weight change.   HENT: Negative for congestion, mouth sores, nosebleeds, sore throat, trouble  swallowing and voice change.    Eyes: Negative for visual disturbance.   Respiratory: Negative for cough, chest tightness, shortness of breath and wheezing.    Cardiovascular: Negative for chest pain, palpitations and leg swelling.   Gastrointestinal: Positive for abdominal pain, nausea (improved ) and vomiting (improved ). Negative for abdominal distention, blood in stool, constipation and diarrhea.   Genitourinary: Negative for difficulty urinating, dysuria and hematuria.   Musculoskeletal: Positive for arthralgias. Negative for back pain and myalgias.   Skin: Negative for pallor, rash and wound.   Neurological: Positive for weakness. Negative for dizziness, syncope and headaches.   Hematological: Negative for adenopathy. Does not bruise/bleed easily.   Psychiatric/Behavioral: Positive for dysphoric mood. The patient is not nervous/anxious.      Objective:     Vital Signs (Most Recent):  Temp: 100 °F (37.8 °C) (01/28/22 1652)  Pulse: 97 (01/28/22 1652)  Resp: 18 (01/28/22 1652)  BP: 120/75 (01/28/22 1652)  SpO2: (!) 93 % (01/28/22 1652) Vital Signs (24h Range):  Temp:  [98.3 °F (36.8 °C)-100.4 °F (38 °C)] 100 °F (37.8 °C)  Pulse:  [82-97] 97  Resp:  [16-20] 18  SpO2:  [93 %-97 %] 93 %  BP: (110-134)/(67-75) 120/75     Weight: 46.4 kg (102 lb 4.7 oz)  Body mass index is 17.56 kg/m².    Intake/Output Summary (Last 24 hours) at 1/28/2022 1703  Last data filed at 1/28/2022 1245  Gross per 24 hour   Intake 360 ml   Output 300 ml   Net 60 ml      Physical Exam  Vitals and nursing note reviewed.   Constitutional:       Appearance: She is cachectic. She is ill-appearing. She is not diaphoretic.   HENT:      Head: Normocephalic and atraumatic.      Nose: Nose normal.   Eyes:      General: No scleral icterus.     Conjunctiva/sclera: Conjunctivae normal.   Neck:      Trachea: No tracheal deviation.   Cardiovascular:      Rate and Rhythm: Normal rate and regular rhythm.      Heart sounds: Normal heart sounds. No murmur  heard.  No friction rub. No gallop.    Pulmonary:      Effort: Pulmonary effort is normal. No respiratory distress.      Breath sounds: Normal breath sounds. No stridor. No wheezing or rales.   Chest:      Chest wall: No tenderness.   Abdominal:      General: Bowel sounds are normal. There is distension (mild).      Palpations: Abdomen is soft. There is no mass.      Tenderness: Tenderness: g tube site  There is no guarding or rebound.      Comments: colostomy LLQ with liquid stool in bag   G/J tube dressing C/D/I- attached to a guzmán bag draining light green fluid   Musculoskeletal:         General: No tenderness or deformity. Normal range of motion.      Cervical back: Normal range of motion and neck supple.   Skin:     General: Skin is warm and dry.      Coloration: Skin is not pale.      Findings: No erythema or rash.   Neurological:      Mental Status: She is oriented to person, place, and time and easily aroused.      Cranial Nerves: No cranial nerve deficit.      Motor: No abnormal muscle tone.      Coordination: Coordination normal.   Psychiatric:         Mood and Affect: Mood is depressed. Mood is not anxious. Affect is not angry or tearful.         Significant Labs:   All pertinent labs within the past 24 hours have been reviewed.  CBC:   Recent Labs   Lab 01/27/22  0845   WBC 12.05   HGB 10.2*   HCT 33.1*        CMP:   Recent Labs   Lab 01/27/22  0845 01/28/22  1320    138   K 3.3* 2.8*    107   CO2 20* 21*   GLU 96 93   BUN 14 12   CREATININE 1.3 1.4   CALCIUM 8.2* 7.7*   ALBUMIN 2.1*  --    ANIONGAP 10 10   EGFRNONAA 46* 42*       Significant Imaging:           Assessment/Plan:      * Severe malnutrition  S/p G tube placement today per IR  Consult RD  Regular diet   IVFs  Pain control     1/25  Appears g-tube was placed for drainage   IR was unable to place GJ tube at that time  Spoke with IR will re-attempt GJ with assistance from GI once pt more stable   Due to bacteremia unable to  start TPN.   Will discuss with team    1/26  - Plans to exchange G-tube for GJ tube per IR and GI. Plan to use J-tube for nutrition.  01/27:  --J tube to be placed today  --1/28/22- tube feeding initiated per recommendations with pleasure feeds allowed         Hypokalemia  Serum potassium 2.7, will replete   -1/28/22- K 2.8 - magnesium (1.0) and potassium replaced         Bacteremia  Fever this morning, blood cx ordered. IV zosyn empirically.  1/26/22  -Blood cultures growing E.COLI, awaiting susceptibility   -Continue Zosyn    -WBCs have normalized   01/27:  --sensitivities returned, Zosyn dc'd, Ertapenem started  --repeat BC drawn  01/28/22- antibiotics continued per sensitivity results- repeat blood cultures pending       Hydronephrosis  Urology on board   S/p bilateral ureteral stent placement.     Therapeutic opioid-induced constipation (OIC)  Will give 3 doses of Relistor  -continue bowel regime outpatient per Palliative Care recommendations   Continue Relistor, Senna and Miralax at this time.         Gastrostomy tube in place  Placed on 1/19 per IR  Green fluid draining from the G tube- this is attached to a guzmán bag, her colostomy has no output, she has bowel sounds, but endorsing nasuea and abdominal pain  CT abdomen showed interval placement of percutaneous G tube with catheter coursing along the left inferior anterior margin with possible small adjacent hemorrhage.    General sx recommends leaving this to gravity      Metastatic signet ring cell carcinoma  Oncology managing   Plan to arrange outpatient follow up with Primary Oncologist pending resolution of current clinical situation to initiate chemotherapy  - Hopefully patient improves from debility/malnutrition standpoint to initiate treatment. If not would need to discuss comfort care  -medi port placement on hold due to bacteremia.        Intractable nausea and vomiting, Chronic  Scheduled Zofran   Phenergan/compazine prn         Colostomy present  on admission  Consult wound care       Gastroesophageal reflux disease without esophagitis  Cont Pepcid      Chronic pain due to neoplasm  Cont Fentanyl patch   IV Dilaudid q3 hours prn   Palliative care managing       VTE Risk Mitigation (From admission, onward)         Ordered     IP VTE HIGH RISK PATIENT  Once         01/19/22 1435     Place sequential compression device  Until discontinued         01/19/22 1435                Discharge Planning   VICTOR MANUEL: 1/20/2022     Code Status: DNR   Is the patient medically ready for discharge?:     Reason for patient still in hospital (select all that apply): Patient trending condition, Treatment and Consult recommendations  Discharge Plan A: Home Health                  Kaleigh Sargent NP  Department of Hospital Medicine   [facility]

## 2022-01-28 NOTE — PROGRESS NOTES
Progress Note   Palliative Medicine      SUBJECTIVE:     History of Present Illness:  Patient seen and examined with her mother at bedside. She was resting well and her mother hoped that I would let her sleep. She says that she is waking up asking for pain medications before the 3hr rere but then falls back asleep so does not feel we should escalate doses yet. She did rest well after the diazepam. She understands the plan to start TF today and the blood culture results. She remains hopeful she will still be able to start chemotherapy. I think Madeline has different plans in mind and think that she might want to redirect over the weekend. In light of her previous wishes of comfort and dying at home I recommend a hospice info visit today that way she has a contact already in place. She was agreeable with this visit today and has no preference in agency.       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    T4 N1bM1 B 3/85 nodes positive    Encounter for blood transfusion     Leukocytosis 1/19/2022    PONV (postoperative nausea and vomiting)      Past Surgical History:   Procedure Laterality Date    APPENDECTOMY      BILATERAL OOPHORECTOMY      CHOLECYSTECTOMY      with Cytoreduction and salpingectomy    COLOSTOMY      CYSTOSCOPY N/A 12/21/2021    Procedure: CYSTOSCOPY;  Surgeon: John Martínez MD;  Location: [facility] OR;  Service: Urology;  Laterality: N/A;    CYSTOSCOPY WITH URETEROSCOPY, RETROGRADE PYELOGRAPHY, AND INSERTION OF STENT Bilateral 1/22/2022    Procedure: CYSTOSCOPY, WITH RETROGRADE PYELOGRAM AND URETERAL STENT INSERTION;  Surgeon: John Martínez MD;  Location: [facility] OR;  Service: Urology;  Laterality: Bilateral;    CYTOREDUCTION      ENDOSCOPIC ULTRASOUND OF UPPER  GASTROINTESTINAL TRACT N/A 6/11/2021    Procedure: ULTRASOUND, UPPER GI TRACT, ENDOSCOPIC;  Surgeon: Rosaura Lakhani MD;  Location: [facility] ENDO;  Service: Endoscopy;  Laterality: N/A;  Linear scope    ERCP N/A 6/11/2021    Procedure: ERCP (ENDOSCOPIC RETROGRADE CHOLANGIOPANCREATOGRAPHY);  Surgeon: Rosarua Lakhani MD;  Location: [facility] ENDO;  Service: Endoscopy;  Laterality: N/A;    ESOPHAGOGASTRODUODENOSCOPY N/A 5/6/2021    Procedure: EGD (ESOPHAGOGASTRODUODENOSCOPY);  Surgeon: Brandon Rosen MD;  Location: [facility];  Service: Gastroenterology;  Laterality: N/A;    ESOPHAGOGASTRODUODENOSCOPY  06/11/2021    ESOPHAGOGASTRODUODENOSCOPY N/A 1/27/2022    Procedure: EGD (ESOPHAGOGASTRODUODENOSCOPY);  Surgeon: Sumi Doan MD;  Location: [facility];  Service: Endoscopy;  Laterality: N/A;    EXCISION OF LESION  10/20/2020    Procedure: EXCISION, LESION COLOSTOMY;  Surgeon: Layton Anderson MD;  Location: [facility] OR;  Service: General;;    FLUOROSCOPY Bilateral 1/19/2022    Procedure: Insertion of G-J tube and bilateral upper extremity venogram;  Surgeon: Bryan Torres MD;  Location: [facility] CATH LAB;  Service: General;  Laterality: Bilateral;    FLUOROSCOPY N/A 1/25/2022    Procedure: Arm Port Insertion;  Surgeon: Bryan Torres MD;  Location: [facility] CATH LAB;  Service: General;  Laterality: N/A;    LAPAROTOMY, EXPLORATORY  02/12/2020    LYSIS OF ADHESION, COLOSTOMY    REVISION COLOSTOMY N/A 10/20/2020    Procedure: REVISION, COLOSTOMY;  Surgeon: Layton Anderson MD;  Location: [facility] OR;  Service: General;  Laterality: N/A;  Ostomy bag placed    RIGHT COLECTOMY       History reviewed. No pertinent family history.  Review of patient's allergies indicates:  No Known Allergies    Medications:    Current Facility-Administered Medications:     0.9%  NaCl infusion (for blood administration), , Intravenous, Q24H PRN, Kerline Hernandez NP    acetaminophen tablet 650 mg, 650 mg, Oral, Q4H  PRN, Soni Urias NP, 650 mg at 01/24/22 1722    albuterol-ipratropium 2.5 mg-0.5 mg/3 mL nebulizer solution 3 mL, 3 mL, Nebulization, Q6H PRN, Soni Urias NP    aluminum-magnesium hydroxide-simethicone 200-200-20 mg/5 mL suspension 30 mL, 30 mL, Oral, QID PRN, Soni Urias NP    bisacodyL suppository 10 mg, 10 mg, Other, Daily PRN, Kerline Hernandez NP    dextrose 50% injection 12.5 g, 12.5 g, Intravenous, PRN, Soni Urias NP    dextrose 50% injection 25 g, 25 g, Intravenous, PRN, Soni Urias NP    diazePAM injection 2.5 mg, 2.5 mg, Intravenous, Q6H PRN, Remdeios Rossi PA-C, 2.5 mg at 01/28/22 0557    diphenhydrAMINE injection 25 mg, 25 mg, Intravenous, Q6H PRN, Jeana Adams MD    ertapenem (INVANZ) 1 g in sodium chloride 0.9% 100 mL IVPB, 1 g, Intravenous, Q24H, BULMARO Peters-C, Stopped at 01/27/22 1611    fentaNYL 100 mcg/hr 1 patch, 1 patch, Transdermal, Q72H, Remedios Rossi PA-C, 1 patch at 01/26/22 1006    glucagon (human recombinant) injection 1 mg, 1 mg, Intramuscular, PRN, Soni Urias NP    glucose chewable tablet 16 g, 16 g, Oral, PRN, Soni Urias NP    glucose chewable tablet 24 g, 24 g, Oral, PRN, Soni Urias NP    HYDROmorphone injection 1 mg, 1 mg, Intravenous, Q3H PRN, Remedios Rossi PA-C, 1 mg at 01/28/22 1056    hyoscyamine ODT 0.125 mg, 0.125 mg, Sublingual, Q4H PRN, John Martínez MD    lactated ringers infusion, , Intravenous, Continuous, Colt Lazaro MD, Last Rate: 100 mL/hr at 01/28/22 0019, New Bag at 01/28/22 0019    magnesium oxide tablet 800 mg, 800 mg, Oral, PRN, Soni Urias, NP    magnesium oxide tablet 800 mg, 800 mg, Oral, PRN, Soni Urias, MAGUI    methylnaltrexone Syrg 8 mg, 8 mg, Subcutaneous, Every other day, Sumi Doan MD, 8 mg at 01/27/22 0831    metoclopramide HCl injection 5 mg, 5 mg, Intravenous, QID (AC & HS), Remedios Rossi PA-C, 5 mg at 01/28/22 1058     mupirocin 2 % ointment, , Nasal, BID, Colt Lazaro MD, Given at 01/28/22 1148    naloxone 0.4 mg/mL injection 0.02 mg, 0.02 mg, Intravenous, PRN, Soni Urias NP    ondansetron injection 4 mg, 4 mg, Intravenous, Q6H, Soni Urias NP, 4 mg at 01/28/22 1148    phenazopyridine tablet 100 mg, 100 mg, Oral, TID PRN, John Martínez MD    potassium bicarbonate disintegrating tablet 35 mEq, 35 mEq, Oral, PRN, Soni Urias NP    potassium bicarbonate disintegrating tablet 50 mEq, 50 mEq, Oral, PRN, Soni Urias NP    potassium bicarbonate disintegrating tablet 60 mEq, 60 mEq, Oral, PRN, Soni Urias NP    potassium, sodium phosphates 280-160-250 mg packet 2 packet, 2 packet, Oral, PRN, Soni Urias NP    potassium, sodium phosphates 280-160-250 mg packet 2 packet, 2 packet, Oral, PRN, Soni Urias NP    potassium, sodium phosphates 280-160-250 mg packet 2 packet, 2 packet, Oral, PRN, Soni Urias NP    prochlorperazine injection Soln 5 mg, 5 mg, Intravenous, Q6H PRN, Soni Urias NP    promethazine (PHENERGAN) 6.25 mg in dextrose 5 % 50 mL IVPB, 6.25 mg, Intravenous, Q6H PRN, Soni Urias NP, Stopped at 01/21/22 0910    simethicone chewable tablet 80 mg, 1 tablet, Oral, QID PRN, Soni Urias NP    sodium chloride 0.9% flush 10 mL, 10 mL, Intravenous, Q8H PRN, Soni Urias NP    zolpidem tablet 5 mg, 5 mg, Oral, Nightly PRN, Soni Urias NP, 5 mg at 01/23/22 2119    Review of Symptoms    Symptom Assessment (ESAS 0-10 Scale)  Unable to complete assessment     CAM / Delirium:  Negative  Constipation:  Negative  Diarrhea:  Negative    Bowel Management Plan (BMP):  Yes      Pain Assessment:  Location(s): generalized    Generalized       Location: generalized        Quality: none        Aggravating Factors: none        Alleviating Factors: opiates        Associated Symptoms: constipation and nausea    Living Arrangements:  Lives with  family      Advance Care Planning   Advance Directives:   Living Will: Yes        Copy on chart: Yes    LaPOST: No    Do Not Resuscitate Status: Yes    Medical Power of : Yes      Decision Making:  Family answered questions         ROS:  Review of Systems   Unable to perform ROS: Other       OBJECTIVE:     Physical Exam:  Vitals: Temp: (!) 100.4 °F (38 °C) (01/28/22 1120)  Pulse: 90 (01/28/22 1120)  Resp: 17 (01/28/22 1120)  BP: 117/67 (01/28/22 1120)  SpO2: 97 % (01/28/22 1120)    Physical Exam  Vitals and nursing note reviewed.   Constitutional:       General: She is sleeping. She is not in acute distress.     Appearance: Normal appearance. She is well-developed. She is cachectic. She is ill-appearing.      Comments: Fentanyl 100mcg patch L anterior chest   HENT:      Head: Normocephalic and atraumatic.   Cardiovascular:      Rate and Rhythm: Normal rate and regular rhythm.      Heart sounds: Normal heart sounds. No murmur heard.  No friction rub.   Pulmonary:      Effort: Pulmonary effort is normal. No respiratory distress.      Breath sounds: Normal breath sounds. No wheezing or rales.   Abdominal:      General: Bowel sounds are increased. There is no distension.      Tenderness: There is no abdominal tenderness.   Musculoskeletal:      Cervical back: Normal range of motion.      Right lower leg: No edema.      Left lower leg: No edema.   Skin:     General: Skin is dry.      Coloration: Skin is pale.      Findings: No rash.         Labs:  WBC   Date Value Ref Range Status   01/27/2022 12.05 3.90 - 12.70 K/uL Final     Hemoglobin   Date Value Ref Range Status   01/27/2022 10.2 (L) 12.0 - 16.0 g/dL Final     Hematocrit   Date Value Ref Range Status   01/27/2022 33.1 (L) 37.0 - 48.5 % Final     MCV   Date Value Ref Range Status   01/27/2022 81 (L) 82 - 98 fL Final     Platelets   Date Value Ref Range Status   01/27/2022 241 150 - 450 K/uL Final       BMP  Lab Results   Component Value Date      01/27/2022    K 3.3 (L) 01/27/2022     01/27/2022    CO2 20 (L) 01/27/2022    BUN 14 01/27/2022    CREATININE 1.3 01/27/2022    CALCIUM 8.2 (L) 01/27/2022    ANIONGAP 10 01/27/2022    ESTGFRAFRICA 53 (A) 01/27/2022    EGFRNONAA 46 (A) 01/27/2022       Lab Results   Component Value Date    AST 11 01/20/2022    ALKPHOS 68 01/20/2022    BILITOT 0.3 01/20/2022       Albumin   Date Value Ref Range Status   01/27/2022 2.1 (L) 3.5 - 5.2 g/dL Final       Radiology:I have reviewed all pertinent imaging results/findings within the past 24 hours.  - CT abd/ pelvis 1/23/22 reviewed     ASSESSMENT   Madeline Warner is a 56 y.o. 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 She is followed in the PM clinic for pain and symptom management and were also consulted during her last admission. She was constipated at that time and was counseled extensively regarding the need for preventative bowel regimen and given OIC education for home. Imaging indicates constipation and has been resistant to methyl-naltrexone and laxatives. Palliative Medicine was consulted to assist with pain management as well as GOC.     PLAN   1. Neoplasm related pain  - Continue Fentanyl 100mcg/ hr q72 hr   - Continue Dilaudid 1mg IV q3hr PRN pain  - If pain uncontrolled would recommend increasing to Dilaudid 1.5mg q3hr PRN   - Once she is cleared for PO intake consider rotating to her home dose of oxycodone solution 30mg q4hr PRN via J tube (no crushed meds)     2. Constipation  - Resumed Relistor as we are unable to give PO and she is having ostomy output  - Extensive discussion with team and concern of frozen abdomen. Hopeful we can manage with the current regimen and be able to start J tube feedings     3. Metastatic signet  ring cell adenocarcinoma  - s/p ureteral stent  - Plan was to have port placed soon to facilitate palliative systemic chemotherapy but she is not optimistic this will occur and not interested in delaying comfort     4. Encounter for Palliative Care  - Code status: DNR/I. She agrees with her mother's decision and current code status.  - HCPOA on file and reviewed  - Hospice info visit today in the event this is needed over the weekend    5. Anxiety  - She reports better response to low dose diazepam vs Ativan  - Diazepam 2.5mg IV q6hr PRN anxiety or agitation      Thank you for allowing Palliative Medicine to be involved in the care of Madeline Warner.    Medical decision making: HIGH based on high risk of death untreated symptoms high risk medications poor prognosis management of more than one chronic illness in exacerbation or progression of disease managing side effects of medications or polypharmacy parenteral opioids    Plan required increased review of medication choice, interaction, dosing, frequency, and route due to patient complexity. Patient complexity increased by: high risk medication use, being on more than 8 medications, feeding tube, NPO and malnutrition    > 50% of 35 min visit spent in chart review, face to face discussion of goals of care, symptom assessment, coordination of care and emotional support, formulating and communicating prognosis and goals of care, exploring burden/ benefit of various approaches of treatment.       Remedios Rossi PA-C  Palliative Medicine

## 2022-01-28 NOTE — PROGRESS NOTES
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  Oncology was consulted due to history of metastatic signet ring cell carcinoma.  She is also known to have right hydronephrosis for which ureteral stent placement has been planned by Urology.     Today she continues to complain of abdominal pain, intermittent nausea and vomiting.  She is yet to initiate systemic palliative therapy for her cancer due to poor nutritional status and right hydronephrosis.         Interval History: No acute events overnight. Patient resting comfortably at time of visit. Upon awakening she c/o severe generalized pain. S/p G-J tube placement.       Oncology Treatment Plan:   OP FOLFOXIRI Q2W    Medications:  Continuous Infusions:   lactated ringers 100 mL/hr at 01/28/22 0019     Scheduled Meds:   ertapenem (INVANZ) IVPB  1 g Intravenous Q24H    fentaNYL  1 patch Transdermal Q72H    methylnaltrexone  8 mg Subcutaneous Every other day    metoclopramide HCl  5 mg Intravenous QID (AC & HS)    mupirocin   Nasal BID    ondansetron  4 mg Intravenous Q6H     PRN Meds:sodium chloride, acetaminophen, albuterol-ipratropium, aluminum-magnesium hydroxide-simethicone, bisacodyL, dextrose 50%, dextrose 50%, diazePAM, diphenhydrAMINE, glucagon (human recombinant), glucose, glucose, HYDROmorphone, hyoscyamine, magnesium oxide, magnesium oxide, naloxone, phenazopyridine, potassium bicarbonate, potassium bicarbonate, potassium bicarbonate, potassium, sodium phosphates, potassium, sodium phosphates, potassium, sodium phosphates, prochlorperazine, promethazine (PHENERGAN) IVPB, simethicone, sodium chloride 0.9%, zolpidem     Review of Systems   Unable to perform ROS: Other  (limited ROS given current obtunded state)   Constitutional: Positive for activity change, appetite change and fatigue. Negative for chills, fever and unexpected weight change.   HENT: Negative for congestion, mouth sores, nosebleeds, sore throat, trouble swallowing and voice change.    Eyes: Negative for visual disturbance.   Respiratory: Negative for cough, chest tightness, shortness of breath and wheezing.    Cardiovascular: Negative for chest pain, palpitations and leg swelling.   Gastrointestinal: Negative for abdominal distention, abdominal pain, blood in stool, constipation, diarrhea, nausea and vomiting.   Genitourinary: Negative for difficulty urinating, dysuria and hematuria.   Musculoskeletal: Positive for arthralgias. Negative for back pain and myalgias.   Skin: Negative for pallor, rash and wound.   Neurological: Positive for weakness. Negative for dizziness, syncope and headaches.   Hematological: Negative for adenopathy. Does not bruise/bleed easily.   Psychiatric/Behavioral: Positive for dysphoric mood. The patient is nervous/anxious.      Objective:     Vital Signs (Most Recent):  Temp: (!) 100.4 °F (38 °C) (01/28/22 1120)  Pulse: 90 (01/28/22 1120)  Resp: 17 (01/28/22 1120)  BP: 117/67 (01/28/22 1120)  SpO2: 97 % (01/28/22 1120) Vital Signs (24h Range):  Temp:  [98 °F (36.7 °C)-100.4 °F (38 °C)] 100.4 °F (38 °C)  Pulse:  [78-93] 90  Resp:  [16-20] 17  SpO2:  [93 %-100 %] 97 %  BP: (110-140)/(62-85) 117/67     Weight: 46.4 kg (102 lb 4.7 oz)  Body mass index is 17.56 kg/m².  Body surface area is 1.45 meters squared.      Intake/Output Summary (Last 24 hours) at 1/28/2022 1138  Last data filed at 1/28/2022 0800  Gross per 24 hour   Intake 250 ml   Output 775 ml   Net -525 ml       Physical Exam  Vitals reviewed.   Constitutional:       Appearance: She is well-developed.   HENT:      Head: Normocephalic.      Right Ear: External ear normal.      Left Ear: External ear normal.      Nose: Nose normal.    Neck:      Thyroid: No thyroid mass.   Cardiovascular:      Rate and Rhythm: Normal rate and regular rhythm.      Heart sounds: Normal heart sounds. No murmur heard.      Pulmonary:      Effort: Pulmonary effort is normal. No respiratory distress.      Breath sounds: Decreased breath sounds present. No wheezing or rales.   Abdominal:      General: There is no distension.      Palpations: Abdomen is rigid.       Genitourinary:     Comments: deferred  Lymphadenopathy:      Head:      Right side of head: No submandibular, preauricular or posterior auricular adenopathy.      Left side of head: No submandibular, preauricular or posterior auricular adenopathy.   Skin:     General: Skin is warm and dry.   Neurological:      Mental Status: She is lethargic.         Significant Labs:   BMP:   Recent Labs   Lab 01/27/22  0845   GLU 96      K 3.3*      CO2 20*   BUN 14   CREATININE 1.3   CALCIUM 8.2*   MG 1.1*   , CBC:   Recent Labs   Lab 01/27/22  0845   WBC 12.05   HGB 10.2*   HCT 33.1*      , LFTs:   Recent Labs   Lab 01/27/22  0845   ALBUMIN 2.1*    and All pertinent labs from the last 24 hours have been reviewed.    Diagnostic Results:  I have reviewed all pertinent imaging results/findings within the past 24 hours.    Assessment/Plan:     * Severe malnutrition  -G tube in place. Draining.  -s/pfor G-J tube placement to allow nutrition and decompression. Start feeding pending GI recs. Hopefully nutritional/functional status improves and continued GI function to initiate chemotherapy. Palliative medicine discussed with patient possible redirection to comfort care pending course    Hypokalemia  -Improving   -management per primary team    Bacteremia  --BC + gram neg bacteremia (E. Coli)   -on Ertapenem   --IV abx management per Primary team      Hydronephrosis  S/p bilateral stent placement     Metastatic signet ring cell carcinoma  --plan to arrange outpatient follow up with Primary Oncologist pending  resolution of current clinical situation to initiate chemotherapy. Hopefully patient improves from debility/malnutrition standpoint to initiate treatment. If not would need to discuss comfort care  -medi port placement on hold due to bacteremia.     Intractable nausea and vomiting, Chronic  -no recent N/V  -management per Primary team/palliative medicine       Colostomy present on admission        Chronic pain due to neoplasm  -management per palliative medicine               Thank you for your consult. I will follow-up with patient. Please contact us if you have any additional questions.     Jaky Saavedra NP  Hematology/Oncology  [facility]

## 2022-01-28 NOTE — SUBJECTIVE & OBJECTIVE
Interval History: No acute events overnight. Patient resting comfortably at time of visit. Upon awakening she c/o severe generalized pain. S/p G-J tube placement.       Oncology Treatment Plan:   OP FOLFOXIRI Q2W    Medications:  Continuous Infusions:   lactated ringers 100 mL/hr at 01/28/22 0019     Scheduled Meds:   ertapenem (INVANZ) IVPB  1 g Intravenous Q24H    fentaNYL  1 patch Transdermal Q72H    methylnaltrexone  8 mg Subcutaneous Every other day    metoclopramide HCl  5 mg Intravenous QID (AC & HS)    mupirocin   Nasal BID    ondansetron  4 mg Intravenous Q6H     PRN Meds:sodium chloride, acetaminophen, albuterol-ipratropium, aluminum-magnesium hydroxide-simethicone, bisacodyL, dextrose 50%, dextrose 50%, diazePAM, diphenhydrAMINE, glucagon (human recombinant), glucose, glucose, HYDROmorphone, hyoscyamine, magnesium oxide, magnesium oxide, naloxone, phenazopyridine, potassium bicarbonate, potassium bicarbonate, potassium bicarbonate, potassium, sodium phosphates, potassium, sodium phosphates, potassium, sodium phosphates, prochlorperazine, promethazine (PHENERGAN) IVPB, simethicone, sodium chloride 0.9%, zolpidem     Review of Systems   Unable to perform ROS: Other (limited ROS given current obtunded state)   Constitutional: Positive for activity change, appetite change and fatigue. Negative for chills, fever and unexpected weight change.   HENT: Negative for congestion, mouth sores, nosebleeds, sore throat, trouble swallowing and voice change.    Eyes: Negative for visual disturbance.   Respiratory: Negative for cough, chest tightness, shortness of breath and wheezing.    Cardiovascular: Negative for chest pain, palpitations and leg swelling.   Gastrointestinal: Negative for abdominal distention, abdominal pain, blood in stool, constipation, diarrhea, nausea and vomiting.   Genitourinary: Negative for difficulty urinating, dysuria and hematuria.   Musculoskeletal: Positive for arthralgias. Negative  for back pain and myalgias.   Skin: Negative for pallor, rash and wound.   Neurological: Positive for weakness. Negative for dizziness, syncope and headaches.   Hematological: Negative for adenopathy. Does not bruise/bleed easily.   Psychiatric/Behavioral: Positive for dysphoric mood. The patient is nervous/anxious.      Objective:     Vital Signs (Most Recent):  Temp: (!) 100.4 °F (38 °C) (01/28/22 1120)  Pulse: 90 (01/28/22 1120)  Resp: 17 (01/28/22 1120)  BP: 117/67 (01/28/22 1120)  SpO2: 97 % (01/28/22 1120) Vital Signs (24h Range):  Temp:  [98 °F (36.7 °C)-100.4 °F (38 °C)] 100.4 °F (38 °C)  Pulse:  [78-93] 90  Resp:  [16-20] 17  SpO2:  [93 %-100 %] 97 %  BP: (110-140)/(62-85) 117/67     Weight: 46.4 kg (102 lb 4.7 oz)  Body mass index is 17.56 kg/m².  Body surface area is 1.45 meters squared.      Intake/Output Summary (Last 24 hours) at 1/28/2022 1138  Last data filed at 1/28/2022 0800  Gross per 24 hour   Intake 250 ml   Output 775 ml   Net -525 ml       Physical Exam  Vitals reviewed.   Constitutional:       Appearance: She is well-developed.   HENT:      Head: Normocephalic.      Right Ear: External ear normal.      Left Ear: External ear normal.      Nose: Nose normal.   Neck:      Thyroid: No thyroid mass.   Cardiovascular:      Rate and Rhythm: Normal rate and regular rhythm.      Heart sounds: Normal heart sounds. No murmur heard.      Pulmonary:      Effort: Pulmonary effort is normal. No respiratory distress.      Breath sounds: Decreased breath sounds present. No wheezing or rales.   Abdominal:      General: There is no distension.      Palpations: Abdomen is rigid.       Genitourinary:     Comments: deferred  Lymphadenopathy:      Head:      Right side of head: No submandibular, preauricular or posterior auricular adenopathy.      Left side of head: No submandibular, preauricular or posterior auricular adenopathy.   Skin:     General: Skin is warm and dry.   Neurological:      Mental Status: She is  lethargic.         Significant Labs:   BMP:   Recent Labs   Lab 01/27/22  0845   GLU 96      K 3.3*      CO2 20*   BUN 14   CREATININE 1.3   CALCIUM 8.2*   MG 1.1*   , CBC:   Recent Labs   Lab 01/27/22  0845   WBC 12.05   HGB 10.2*   HCT 33.1*      , LFTs:   Recent Labs   Lab 01/27/22  0845   ALBUMIN 2.1*    and All pertinent labs from the last 24 hours have been reviewed.    Diagnostic Results:  I have reviewed all pertinent imaging results/findings within the past 24 hours.

## 2022-01-28 NOTE — SUBJECTIVE & OBJECTIVE
Interval History: GJ tube exchange yesterday. No further nausea/vomiting. Gtube to gravity drainage today. Pending starting TFs.    Medications:  Continuous Infusions:   lactated ringers 100 mL/hr at 01/28/22 0019     Scheduled Meds:   ertapenem (INVANZ) IVPB  1 g Intravenous Q24H    fentaNYL  1 patch Transdermal Q72H    methylnaltrexone  8 mg Subcutaneous Every other day    metoclopramide HCl  5 mg Intravenous QID (AC & HS)    mupirocin   Nasal BID    ondansetron  4 mg Intravenous Q6H     PRN Meds:sodium chloride, acetaminophen, albuterol-ipratropium, aluminum-magnesium hydroxide-simethicone, bisacodyL, dextrose 50%, dextrose 50%, diazePAM, diphenhydrAMINE, glucagon (human recombinant), glucose, glucose, HYDROmorphone, hyoscyamine, magnesium oxide, magnesium oxide, naloxone, phenazopyridine, potassium bicarbonate, potassium bicarbonate, potassium bicarbonate, potassium, sodium phosphates, potassium, sodium phosphates, potassium, sodium phosphates, prochlorperazine, promethazine (PHENERGAN) IVPB, simethicone, sodium chloride 0.9%, zolpidem     Review of patient's allergies indicates:  No Known Allergies  Objective:     Vital Signs (Most Recent):  Temp: (!) 100.4 °F (38 °C) (01/28/22 1120)  Pulse: 90 (01/28/22 1120)  Resp: 18 (01/28/22 1453)  BP: 117/67 (01/28/22 1120)  SpO2: 97 % (01/28/22 1120) Vital Signs (24h Range):  Temp:  [98.3 °F (36.8 °C)-100.4 °F (38 °C)] 100.4 °F (38 °C)  Pulse:  [81-93] 90  Resp:  [16-20] 18  SpO2:  [93 %-97 %] 97 %  BP: (110-134)/(62-74) 117/67     Weight: 46.4 kg (102 lb 4.7 oz)  Body mass index is 17.56 kg/m².    Intake/Output - Last 3 Shifts       01/26 0700 01/27 0659 01/27 0700 01/28 0659 01/28 0700 01/29 0659    P.O. 100 0 0    I.V. (mL/kg) 1635.1 (39.1)      Blood 397.5      IV Piggyback 664.2 250     Total Intake(mL/kg) 2796.7 (66.9) 250 (5.4) 0 (0)    Urine (mL/kg/hr) 400 (0.4) 650 (0.6)     Drains       Stool 725 125     Total Output 1125 775     Net +1671.7 -525 0                  Physical Exam  Constitutional:       Appearance: She is well-developed. She is ill-appearing.   HENT:      Head: Normocephalic and atraumatic.   Eyes:      Conjunctiva/sclera: Conjunctivae normal.   Neck:      Thyroid: No thyromegaly.   Cardiovascular:      Rate and Rhythm: Normal rate.   Pulmonary:      Effort: Pulmonary effort is normal. No respiratory distress.   Abdominal:      Comments: Soft, mild distention; ostomy viable with stool/gas in bag; GJ tube in place with G portion to gravity drainage with bilious output in bag and J tube clamped currently   Musculoskeletal:         General: No tenderness. Normal range of motion.      Cervical back: Normal range of motion.   Skin:     General: Skin is warm and dry.      Capillary Refill: Capillary refill takes less than 2 seconds.      Findings: No rash.   Neurological:      Mental Status: She is oriented to person, place, and time.         Significant Labs:  I have reviewed all pertinent lab results within the past 24 hours.  CBC:   Recent Labs   Lab 01/27/22  0845   WBC 12.05   RBC 4.08   HGB 10.2*   HCT 33.1*      MCV 81*   MCH 25.0*   MCHC 30.8*     BMP:   Recent Labs   Lab 01/27/22  0845   GLU 96      K 3.3*      CO2 20*   BUN 14   CREATININE 1.3   CALCIUM 8.2*   MG 1.1*       Significant Diagnostics:  I have reviewed all pertinent imaging results/findings within the past 24 hours.

## 2022-01-28 NOTE — ASSESSMENT & PLAN NOTE
S/p G tube placement today per IR  Consult RD  Regular diet   IVFs  Pain control     1/25  Appears g-tube was placed for drainage   IR was unable to place GJ tube at that time  Spoke with IR will re-attempt GJ with assistance from GI once pt more stable   Due to bacteremia unable to start TPN.   Will discuss with team    1/26  - Plans to exchange G-tube for GJ tube per IR and GI. Plan to use J-tube for nutrition.  01/27:  --J tube to be placed today  --1/28/22- tube feeding initiated per recommendations with pleasure feeds allowed

## 2022-01-28 NOTE — ASSESSMENT & PLAN NOTE
-G tube in place. Draining.  -s/pfor G-J tube placement to allow nutrition and decompression. Start feeding pending GI recs. Hopefully nutritional/functional status improves and continued GI function to initiate chemotherapy. Palliative medicine discussed with patient possible redirection to comfort care pending course

## 2022-01-28 NOTE — ASSESSMENT & PLAN NOTE
No current surgical intervention required at this time.  Patient has ileus and no definitive obstruction.

## 2022-01-28 NOTE — ASSESSMENT & PLAN NOTE
No medication changes  Call with questions or concerns  Let us know if you start to have concerns regarding leg discomfort and we can order ABIs (leg studies)  Follow up in 1 year  You can get updated cholesterol blood work at your follow up with Dr. Elin Lebron Okay to attempt Jtube feeds via GJ tube once cleared by IR and GI

## 2022-01-29 NOTE — PROGRESS NOTES
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  Oncology was consulted due to history of metastatic signet ring cell carcinoma.  She is also known to have right hydronephrosis for which ureteral stent placement has been planned by Urology.     Today she continues to complain of abdominal pain, intermittent nausea and vomiting.  She is yet to initiate systemic palliative therapy for her cancer due to poor nutritional status and right hydronephrosis.         Interval History:  Both patient and mother at bedside was sleeping.  Spoke with nursing staff    Oncology Treatment Plan:   OP FOLFOXIRI Q2W    Medications:  Continuous Infusions:   lactated ringers 100 mL/hr at 01/29/22 0555     Scheduled Meds:   ertapenem (INVANZ) IVPB  1 g Intravenous Q24H    fentaNYL  1 patch Transdermal Q72H    methylnaltrexone  8 mg Subcutaneous Every other day    metoclopramide HCl  5 mg Intravenous QID (AC & HS)    ondansetron  4 mg Intravenous Q6H     PRN Meds:sodium chloride, acetaminophen, albuterol-ipratropium, aluminum-magnesium hydroxide-simethicone, bisacodyL, dextrose 50%, dextrose 50%, diazePAM, diphenhydrAMINE, glucagon (human recombinant), glucose, glucose, HYDROmorphone, hyoscyamine, magnesium oxide, magnesium oxide, naloxone, phenazopyridine, potassium bicarbonate, potassium bicarbonate, potassium bicarbonate, potassium, sodium phosphates, potassium, sodium phosphates, potassium, sodium phosphates, prochlorperazine, promethazine (PHENERGAN) IVPB, simethicone, sodium chloride 0.9%, zolpidem     Review of Systems   Unable to perform ROS: Acuity of condition     Objective:     Vital Signs (Most Recent):  Temp: 98.9 °F (37.2 °C) (01/29/22  0758)  Pulse: 86 (01/29/22 0758)  Resp: 20 (01/29/22 0952)  BP: 105/62 (01/29/22 0758)  SpO2: 95 % (01/29/22 0758) Vital Signs (24h Range):  Temp:  [98.2 °F (36.8 °C)-100.4 °F (38 °C)] 98.9 °F (37.2 °C)  Pulse:  [86-97] 86  Resp:  [16-20] 20  SpO2:  [92 %-98 %] 95 %  BP: (105-122)/(62-75) 105/62     Weight: 46.4 kg (102 lb 4.7 oz)  Body mass index is 17.56 kg/m².  Body surface area is 1.45 meters squared.      Intake/Output Summary (Last 24 hours) at 1/29/2022 1059  Last data filed at 1/29/2022 0400  Gross per 24 hour   Intake 3203.82 ml   Output 1600 ml   Net 1603.82 ml       Physical Exam  Vitals reviewed.         Significant Labs:   BMP:   Recent Labs   Lab 01/28/22  1320 01/29/22  0943   GLU 93 116*    140   K 2.8* 3.3*    108   CO2 21* 25   BUN 12 10   CREATININE 1.4 1.4   CALCIUM 7.7* 7.9*   MG 1.0* 1.8   , CBC:   Recent Labs   Lab 01/29/22  0943   WBC 9.38   HGB 9.8*   HCT 31.0*      , CMP:   Recent Labs   Lab 01/28/22  1320 01/29/22  0943    140   K 2.8* 3.3*    108   CO2 21* 25   GLU 93 116*   BUN 12 10   CREATININE 1.4 1.4   CALCIUM 7.7* 7.9*   PROT  --  5.1*   ALBUMIN  --  1.9*   BILITOT  --  0.3   ALKPHOS  --  57   AST  --  10   ALT  --  <5*   ANIONGAP 10 7*   EGFRNONAA 42* 42*   , Coagulation: No results for input(s): PT, INR, APTT in the last 48 hours., Haptoglobin: No results for input(s): HAPTOGLOBIN in the last 48 hours., Immunology: No results for input(s): SPEP, INNA, AGNES, FREELAMBDALI in the last 48 hours., LDH: No results for input(s): LDHCSF, BFSOURCE in the last 48 hours. and LFTs:   Recent Labs   Lab 01/29/22  0943   ALT <5*   AST 10   ALKPHOS 57   BILITOT 0.3   PROT 5.1*   ALBUMIN 1.9*       Diagnostic Results:  I have reviewed all pertinent imaging results/findings within the past 24 hours.    Assessment/Plan:     * Severe malnutrition  -G tube in place. Draining.  -s/pfor G-J tube placement to allow nutrition and decompression. Start feeding pending GI recs.  Hopefully nutritional/functional status improves and continued GI function to initiate chemotherapy. Palliative medicine discussed with patient possible redirection to comfort care pending course    Hypokalemia  -Improving   -management per primary team    Bacteremia  --BC + gram neg bacteremia (E. Coli)   -on Ertapenem   --IV abx management per Primary team      Hydronephrosis  S/p bilateral stent placement     Metastatic signet ring cell carcinoma  --plan to arrange outpatient follow up with Primary Oncologist pending resolution of current clinical situation to initiate chemotherapy. Hopefully patient improves from debility/malnutrition standpoint to initiate treatment. If not would need to discuss comfort care  -medi port placement on hold due to bacteremia.   01/29/2022.  At spoke with nursing staff patient and mother was sleeping reviewed palliative care note agree entirely with hospice care at this point    Intractable nausea and vomiting, Chronic  -no recent N/V  -management per Primary team/palliative medicine       Colostomy present on admission        Chronic pain due to neoplasm  -management per palliative medicine               Thank you for your consult. I will follow-up with patient. Please contact us if you have any additional questions.     Oh Negro MD  Hematology/Oncology  [facility]

## 2022-01-29 NOTE — SUBJECTIVE & OBJECTIVE
Interval History: pt was very emotional this morning and expressed anger.  Pt states she wanted to discharge to home with hospice.  Pt in distress upon exam and reported abdominal pain with nausea and vomiting.  Symptoms relieved with anxiolytics, antiemetics, and analgesia as needed. Bowel regime in place. General Surgery and Heme/Onc following.     Review of Systems   Constitutional: Positive for activity change, appetite change, chills and fatigue. Negative for fever and unexpected weight change.   HENT: Negative for congestion, mouth sores, nosebleeds, sore throat, trouble swallowing and voice change.    Eyes: Negative for visual disturbance.   Respiratory: Negative for cough, chest tightness, shortness of breath and wheezing.    Cardiovascular: Negative for chest pain, palpitations and leg swelling.   Gastrointestinal: Positive for abdominal pain, nausea and vomiting. Negative for abdominal distention, blood in stool, constipation and diarrhea.   Genitourinary: Negative for difficulty urinating, dysuria and hematuria.   Musculoskeletal: Positive for arthralgias. Negative for back pain and myalgias.   Skin: Negative for pallor, rash and wound.   Neurological: Positive for weakness. Negative for dizziness, syncope and headaches.   Hematological: Negative for adenopathy. Does not bruise/bleed easily.   Psychiatric/Behavioral: Positive for agitation. The patient is nervous/anxious.      Objective:     Vital Signs (Most Recent):  Temp: 99.7 °F (37.6 °C) (01/29/22 1649)  Pulse: 84 (01/29/22 1649)  Resp: 16 (01/29/22 1649)  BP: (!) 104/57 (01/29/22 1649)  SpO2: 95 % (01/29/22 1649) Vital Signs (24h Range):  Temp:  [98.2 °F (36.8 °C)-99.7 °F (37.6 °C)] 99.7 °F (37.6 °C)  Pulse:  [84-90] 84  Resp:  [16-20] 16  SpO2:  [92 %-98 %] 95 %  BP: (104-124)/(57-75) 104/57     Weight: 46.4 kg (102 lb 4.7 oz)  Body mass index is 17.56 kg/m².    Intake/Output Summary (Last 24 hours) at 1/29/2022 1656  Last data filed at 1/29/2022  1555  Gross per 24 hour   Intake 3263.82 ml   Output 1875 ml   Net 1388.82 ml      Physical Exam  Vitals and nursing note reviewed.   Constitutional:       General: She is in acute distress.      Appearance: She is cachectic. She is ill-appearing. She is not diaphoretic.   HENT:      Head: Normocephalic and atraumatic.      Nose: Nose normal.   Eyes:      General: No scleral icterus.     Conjunctiva/sclera: Conjunctivae normal.   Neck:      Trachea: No tracheal deviation.   Cardiovascular:      Rate and Rhythm: Normal rate and regular rhythm.      Heart sounds: Normal heart sounds. No murmur heard.  No friction rub. No gallop.    Pulmonary:      Effort: Pulmonary effort is normal. No respiratory distress.      Breath sounds: Normal breath sounds. No stridor. No wheezing or rales.   Chest:      Chest wall: No tenderness.   Abdominal:      General: Bowel sounds are normal. There is distension (mild).      Palpations: Abdomen is soft. There is no mass.      Tenderness: Tenderness: g tube site  There is no guarding or rebound.      Comments: colostomy LLQ with liquid stool in bag   G/J tube dressing C/D/I- attached to a guzmán bag draining light green fluid   Genitourinary:     Comments: Guzmán catheter   Musculoskeletal:         General: No tenderness or deformity. Normal range of motion.      Cervical back: Normal range of motion and neck supple.   Skin:     General: Skin is warm and dry.      Coloration: Skin is not pale.      Findings: No erythema or rash.   Neurological:      Mental Status: She is oriented to person, place, and time. She is lethargic.      Cranial Nerves: No cranial nerve deficit.      Motor: No abnormal muscle tone.      Coordination: Coordination normal.   Psychiatric:         Mood and Affect: Mood is anxious. Affect is not angry or tearful.         Behavior: Behavior is agitated.         Significant Labs:   All pertinent labs within the past 24 hours have been reviewed.  CBC:   Recent Labs   Lab  01/29/22  0943   WBC 9.38   HGB 9.8*   HCT 31.0*        CMP:   Recent Labs   Lab 01/28/22  1320 01/29/22  0943    140   K 2.8* 3.3*    108   CO2 21* 25   GLU 93 116*   BUN 12 10   CREATININE 1.4 1.4   CALCIUM 7.7* 7.9*   PROT  --  5.1*   ALBUMIN  --  1.9*   BILITOT  --  0.3   ALKPHOS  --  57   AST  --  10   ALT  --  <5*   ANIONGAP 10 7*   EGFRNONAA 42* 42*       Significant Imaging:

## 2022-01-29 NOTE — SUBJECTIVE & OBJECTIVE
Interval History:  Complaining of back and flank pain today.  Tube feeds have started and are up to 30 cc/hour.  Having good ostomy output.    Medications:  Continuous Infusions:   lactated ringers 100 mL/hr at 01/29/22 0555     Scheduled Meds:   ertapenem (INVANZ) IVPB  1 g Intravenous Q24H    fentaNYL  1 patch Transdermal Q72H    methylnaltrexone  8 mg Subcutaneous Every other day    metoclopramide HCl  5 mg Intravenous QID (AC & HS)    mineral oil  30 mL Oral Daily    ondansetron  4 mg Intravenous Q6H    senna-docusate 8.6-50 mg  1 tablet Oral BID     PRN Meds:sodium chloride, acetaminophen, albuterol-ipratropium, aluminum-magnesium hydroxide-simethicone, bisacodyL, dextrose 50%, dextrose 50%, diazePAM, diphenhydrAMINE, glucagon (human recombinant), glucose, glucose, HYDROmorphone, hyoscyamine, magnesium oxide, magnesium oxide, naloxone, phenazopyridine, potassium bicarbonate, potassium bicarbonate, potassium bicarbonate, potassium, sodium phosphates, potassium, sodium phosphates, potassium, sodium phosphates, prochlorperazine, promethazine (PHENERGAN) IVPB, simethicone, sodium chloride 0.9%, zolpidem     Review of patient's allergies indicates:  No Known Allergies  Objective:     Vital Signs (Most Recent):  Temp: 99.3 °F (37.4 °C) (01/29/22 1233)  Pulse: 89 (01/29/22 1233)  Resp: 20 (01/29/22 1250)  BP: 124/74 (01/29/22 1233)  SpO2: 96 % (01/29/22 1233) Vital Signs (24h Range):  Temp:  [98.2 °F (36.8 °C)-100 °F (37.8 °C)] 99.3 °F (37.4 °C)  Pulse:  [86-97] 89  Resp:  [16-20] 20  SpO2:  [92 %-98 %] 96 %  BP: (105-124)/(62-75) 124/74     Weight: 46.4 kg (102 lb 4.7 oz)  Body mass index is 17.56 kg/m².    Intake/Output - Last 3 Shifts       01/27 0700  01/28 0659 01/28 0700  01/29 0659 01/29 0700 01/30 0659    P.O. 0 480     I.V. (mL/kg)  2481.7 (53.5)     Blood       NG/GT  130     IV Piggyback 250 112.1     Total Intake(mL/kg) 250 (5.4) 3203.8 (69)     Urine (mL/kg/hr) 650 (0.6) 1250 (1.1)     Other  350      Stool 125  275    Total Output 775 1600 275    Net -525 +1603.8 -275                 Physical Exam  Constitutional:       General: She is in acute distress.      Appearance: She is well-developed. She is ill-appearing.   HENT:      Head: Normocephalic and atraumatic.   Eyes:      Conjunctiva/sclera: Conjunctivae normal.   Neck:      Thyroid: No thyromegaly.   Cardiovascular:      Rate and Rhythm: Normal rate.   Pulmonary:      Effort: Pulmonary effort is normal. No respiratory distress.   Abdominal:      Comments: Soft, mild distention; ostomy viable with stool/gas in bag; GJ tube in place with G portion to gravity drainage with bilious output in bag and J tube hooked to Tube Feeds   Musculoskeletal:         General: No tenderness. Normal range of motion.      Cervical back: Normal range of motion.   Skin:     General: Skin is warm and dry.      Capillary Refill: Capillary refill takes less than 2 seconds.      Findings: No rash.   Neurological:      Mental Status: She is oriented to person, place, and time.         Significant Labs:  I have reviewed all pertinent lab results within the past 24 hours.  CBC:   Recent Labs   Lab 01/29/22  0943   WBC 9.38   RBC 3.83*   HGB 9.8*   HCT 31.0*      MCV 81*   MCH 25.6*   MCHC 31.6*     BMP:   Recent Labs   Lab 01/29/22  0943   *      K 3.3*      CO2 25   BUN 10   CREATININE 1.4   CALCIUM 7.9*   MG 1.8       Significant Diagnostics:  I have reviewed all pertinent imaging results/findings within the past 24 hours.

## 2022-01-29 NOTE — SUBJECTIVE & OBJECTIVE
Interval History:  Both patient and mother at bedside was sleeping.  Spoke with nursing staff    Oncology Treatment Plan:   OP FOLFOXIRI Q2W    Medications:  Continuous Infusions:   lactated ringers 100 mL/hr at 01/29/22 0555     Scheduled Meds:   ertapenem (INVANZ) IVPB  1 g Intravenous Q24H    fentaNYL  1 patch Transdermal Q72H    methylnaltrexone  8 mg Subcutaneous Every other day    metoclopramide HCl  5 mg Intravenous QID (AC & HS)    ondansetron  4 mg Intravenous Q6H     PRN Meds:sodium chloride, acetaminophen, albuterol-ipratropium, aluminum-magnesium hydroxide-simethicone, bisacodyL, dextrose 50%, dextrose 50%, diazePAM, diphenhydrAMINE, glucagon (human recombinant), glucose, glucose, HYDROmorphone, hyoscyamine, magnesium oxide, magnesium oxide, naloxone, phenazopyridine, potassium bicarbonate, potassium bicarbonate, potassium bicarbonate, potassium, sodium phosphates, potassium, sodium phosphates, potassium, sodium phosphates, prochlorperazine, promethazine (PHENERGAN) IVPB, simethicone, sodium chloride 0.9%, zolpidem     Review of Systems   Unable to perform ROS: Acuity of condition     Objective:     Vital Signs (Most Recent):  Temp: 98.9 °F (37.2 °C) (01/29/22 0758)  Pulse: 86 (01/29/22 0758)  Resp: 20 (01/29/22 0952)  BP: 105/62 (01/29/22 0758)  SpO2: 95 % (01/29/22 0758) Vital Signs (24h Range):  Temp:  [98.2 °F (36.8 °C)-100.4 °F (38 °C)] 98.9 °F (37.2 °C)  Pulse:  [86-97] 86  Resp:  [16-20] 20  SpO2:  [92 %-98 %] 95 %  BP: (105-122)/(62-75) 105/62     Weight: 46.4 kg (102 lb 4.7 oz)  Body mass index is 17.56 kg/m².  Body surface area is 1.45 meters squared.      Intake/Output Summary (Last 24 hours) at 1/29/2022 1059  Last data filed at 1/29/2022 0400  Gross per 24 hour   Intake 3203.82 ml   Output 1600 ml   Net 1603.82 ml       Physical Exam  Vitals reviewed.         Significant Labs:   BMP:   Recent Labs   Lab 01/28/22  1320 01/29/22  0943   GLU 93 116*    140   K 2.8* 3.3*    108    CO2 21* 25   BUN 12 10   CREATININE 1.4 1.4   CALCIUM 7.7* 7.9*   MG 1.0* 1.8   , CBC:   Recent Labs   Lab 01/29/22  0943   WBC 9.38   HGB 9.8*   HCT 31.0*      , CMP:   Recent Labs   Lab 01/28/22  1320 01/29/22  0943    140   K 2.8* 3.3*    108   CO2 21* 25   GLU 93 116*   BUN 12 10   CREATININE 1.4 1.4   CALCIUM 7.7* 7.9*   PROT  --  5.1*   ALBUMIN  --  1.9*   BILITOT  --  0.3   ALKPHOS  --  57   AST  --  10   ALT  --  <5*   ANIONGAP 10 7*   EGFRNONAA 42* 42*   , Coagulation: No results for input(s): PT, INR, APTT in the last 48 hours., Haptoglobin: No results for input(s): HAPTOGLOBIN in the last 48 hours., Immunology: No results for input(s): SPEP, INNA, AGNES, FREELAMBDALI in the last 48 hours., LDH: No results for input(s): LDHCSF, BFSOURCE in the last 48 hours. and LFTs:   Recent Labs   Lab 01/29/22  0943   ALT <5*   AST 10   ALKPHOS 57   BILITOT 0.3   PROT 5.1*   ALBUMIN 1.9*       Diagnostic Results:  I have reviewed all pertinent imaging results/findings within the past 24 hours.

## 2022-01-29 NOTE — PROGRESS NOTES
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 Pt will be placed in outpatient extended recovery for pain control, IVfs, and RD consult.     On exam, pt complain of nausea and 10/10 pain at G tube site. Pt reports ongoing chronic pain, N/V, weakness/fatigue, and weight loss for approximately one month.       Overview/Hospital Course:  Patient was admitted for abd pain. 1/20: G tube placed yesterday per IR. Today the patient has copious amounts of green fluid draining from the G tube- this is attached to a guzmán bag, her colostomy has no output, she has bowel sounds, but endorsing nasuea and abdominal pain. CT abdomen pending. Case discussed with Dr Barger and Dr Torres. Initial G tube placement check performed right after placement and the subsequent the following day is pending. As of 1/21/22  CT abdomen showed interval placement of percutaneous G tube with catheter coursing along the left inferior anterior margin with possible small adjacent hemorrhage. Patient c/o constipation requesting an injection. GI has been consulted to assist. Urology following and plans for stent placement tomorrow. G-tube still draining green fluid.  As of 1/22/22 pt reports passing some gas, still no BM. Will give x 3 doses of Relistor for constipation. Plans for ureteral stent placement today. As of 1/23/22 still no bowel movement. + Flatus. C/o abdominal pain and N/V. Abdominal xray pending. S/p bilateral ureteral stent placement. Palliative care consulted for symptom  management and goal planning. As of 1/24/22 patient very lethargic this am.  Pt had dose of Ativan this morning. Narcotics and ativan D/c'd until patient more alert. Mother reports pt passed a very small amount of hard stool overnight. Fever this morning, blood cx ordered. IV zosyn empirically. General Surgery consulted for possible obstruction. As of 1/25/22 patient is more awake but not back to her baseline. Abdominal imaging showed no obstruction. Colostomy with output. Plan for SBFT today. GI has been consulted. Case discussed with General surgery and IR. Blood cultures positive for gram negative rods. Continue IV zosyn. Port placement canceled due to bacteremia. Palliative care following. As of 1/26/22 pt awake and alert this morning, having ostomy output. H/H 7/22.0, will transfuse 1 unit of PRBCs. Serum potassium 2.7, will replete. Blood cultures growing E.COLI, awaiting susceptibility. Continue Zosyn.  Plans to exchange G-tube for GJ tube per IR and GI. Plan to use J-tube for nutrition. Continue current bowel regimen.   01/27: Plans to have J tube placed today for nutrition. BC from 1/24 shows E coli ESB: that is sensitive to Ertapenem and Meropenem. Zosyn stopped, Ertapenem started. BC redrawn. On 1/28/22, J tube in place with tube feedings initiated per GI recommendation. Magnesium and Potassium replaced. Ertapenem continued. Repeat blood cultures with results pending.  Palliative Care following with Hospice info visit arranged. On 1/29/22, pt very emotional and expressed desire to discharge today with hospice to spend time with granddaughter. Palliative care updated on current pt status.  Blood cultures with no growth to date.  Will discuss patient wishes tomorrow morning and determine need for port placement pending results of conversation. TF tolerated with adequate colostomy output. Bowel regimen in place per General Surgery.       Interval History: pt was very emotional this morning and expressed  anger.  Pt states she wanted to discharge to home with hospice.  Pt in distress upon exam and reported abdominal pain with nausea and vomiting.  Symptoms relieved with anxiolytics, antiemetics, and analgesia as needed. Bowel regime in place. General Surgery and Heme/Onc following.     Review of Systems   Constitutional: Positive for activity change, appetite change, chills and fatigue. Negative for fever and unexpected weight change.   HENT: Negative for congestion, mouth sores, nosebleeds, sore throat, trouble swallowing and voice change.    Eyes: Negative for visual disturbance.   Respiratory: Negative for cough, chest tightness, shortness of breath and wheezing.    Cardiovascular: Negative for chest pain, palpitations and leg swelling.   Gastrointestinal: Positive for abdominal pain, nausea and vomiting. Negative for abdominal distention, blood in stool, constipation and diarrhea.   Genitourinary: Negative for difficulty urinating, dysuria and hematuria.   Musculoskeletal: Positive for arthralgias. Negative for back pain and myalgias.   Skin: Negative for pallor, rash and wound.   Neurological: Positive for weakness. Negative for dizziness, syncope and headaches.   Hematological: Negative for adenopathy. Does not bruise/bleed easily.   Psychiatric/Behavioral: Positive for agitation. The patient is nervous/anxious.      Objective:     Vital Signs (Most Recent):  Temp: 99.7 °F (37.6 °C) (01/29/22 1649)  Pulse: 84 (01/29/22 1649)  Resp: 16 (01/29/22 1649)  BP: (!) 104/57 (01/29/22 1649)  SpO2: 95 % (01/29/22 1649) Vital Signs (24h Range):  Temp:  [98.2 °F (36.8 °C)-99.7 °F (37.6 °C)] 99.7 °F (37.6 °C)  Pulse:  [84-90] 84  Resp:  [16-20] 16  SpO2:  [92 %-98 %] 95 %  BP: (104-124)/(57-75) 104/57     Weight: 46.4 kg (102 lb 4.7 oz)  Body mass index is 17.56 kg/m².    Intake/Output Summary (Last 24 hours) at 1/29/2022 1656  Last data filed at 1/29/2022 1555  Gross per 24 hour   Intake 3263.82 ml   Output 1875 ml   Net  1388.82 ml      Physical Exam  Vitals and nursing note reviewed.   Constitutional:       General: She is in acute distress.      Appearance: She is cachectic. She is ill-appearing. She is not diaphoretic.   HENT:      Head: Normocephalic and atraumatic.      Nose: Nose normal.   Eyes:      General: No scleral icterus.     Conjunctiva/sclera: Conjunctivae normal.   Neck:      Trachea: No tracheal deviation.   Cardiovascular:      Rate and Rhythm: Normal rate and regular rhythm.      Heart sounds: Normal heart sounds. No murmur heard.  No friction rub. No gallop.    Pulmonary:      Effort: Pulmonary effort is normal. No respiratory distress.      Breath sounds: Normal breath sounds. No stridor. No wheezing or rales.   Chest:      Chest wall: No tenderness.   Abdominal:      General: Bowel sounds are normal. There is distension (mild).      Palpations: Abdomen is soft. There is no mass.      Tenderness: Tenderness: g tube site  There is no guarding or rebound.      Comments: colostomy LLQ with liquid stool in bag   G/J tube dressing C/D/I- attached to a guzmán bag draining light green fluid   Genitourinary:     Comments: Guzmán catheter   Musculoskeletal:         General: No tenderness or deformity. Normal range of motion.      Cervical back: Normal range of motion and neck supple.   Skin:     General: Skin is warm and dry.      Coloration: Skin is not pale.      Findings: No erythema or rash.   Neurological:      Mental Status: She is oriented to person, place, and time. She is lethargic.      Cranial Nerves: No cranial nerve deficit.      Motor: No abnormal muscle tone.      Coordination: Coordination normal.   Psychiatric:         Mood and Affect: Mood is anxious. Affect is not angry or tearful.         Behavior: Behavior is agitated.         Significant Labs:   All pertinent labs within the past 24 hours have been reviewed.  CBC:   Recent Labs   Lab 01/29/22  0943   WBC 9.38   HGB 9.8*   HCT 31.0*        CMP:    Recent Labs   Lab 01/28/22  1320 01/29/22  0943    140   K 2.8* 3.3*    108   CO2 21* 25   GLU 93 116*   BUN 12 10   CREATININE 1.4 1.4   CALCIUM 7.7* 7.9*   PROT  --  5.1*   ALBUMIN  --  1.9*   BILITOT  --  0.3   ALKPHOS  --  57   AST  --  10   ALT  --  <5*   ANIONGAP 10 7*   EGFRNONAA 42* 42*       Significant Imaging:           Assessment/Plan:      * Severe malnutrition  S/p G tube placement today per IR  Consult RD  Regular diet   IVFs  Pain control     1/25  Appears g-tube was placed for drainage   IR was unable to place GJ tube at that time  Spoke with IR will re-attempt GJ with assistance from GI once pt more stable   Due to bacteremia unable to start TPN.   Will discuss with team    1/26  - Plans to exchange G-tube for GJ tube per IR and GI. Plan to use J-tube for nutrition.  01/27:  --J tube to be placed today  --1/28/22- tube feeding initiated per recommendations with pleasure feeds allowed   --1/29- tube feeding continued- pt tolerated- full liquid pleasure feeds       Hypokalemia  Serum potassium 2.7, will replete   -1/28/22- K 2.8 - magnesium (1.0) and potassium replaced   -1/29/22- K 3.3- replaced- Mag 1.8        Bacteremia  Fever this morning, blood cx ordered. IV zosyn empirically.    1/26/22  -Blood cultures growing E.COLI, awaiting susceptibility   -Continue Zosyn    -WBCs have normalized   01/27:  --sensitivities returned, Zosyn dc'd, Ertapenem started  --repeat BC drawn  01/28/22- antibiotics continued per sensitivity results- repeat blood cultures pending   -1/29/22- blood cultures with no growth to date- current antibiotic continued       Hydronephrosis  Urology on board   S/p bilateral ureteral stent placement.     Therapeutic opioid-induced constipation (OIC)  Will give 3 doses of Relistor  -continue bowel regime outpatient per Palliative Care recommendations   Continue Relistor, Senna and Miralax at this time.   -Mineral oil ordered per Gen Surg        Gastrostomy tube in  place  Placed on 1/19 per IR  Green fluid draining from the G tube- this is attached to a guzmán bag, her colostomy has no output, she has bowel sounds, but endorsing nasuea and abdominal pain  CT abdomen showed interval placement of percutaneous G tube with catheter coursing along the left inferior anterior margin with possible small adjacent hemorrhage.    General sx recommends leaving this to gravity      Metastatic signet ring cell carcinoma  Oncology managing   Plan to arrange outpatient follow up with Primary Oncologist pending resolution of current clinical situation to initiate chemotherapy  -will arrange port placement when appropriate if desired by patient                Intractable nausea and vomiting, Chronic  Scheduled Zofran   Phenergan/compazine prn         Colostomy present on admission  Consult wound care             Gastroesophageal reflux disease without esophagitis  Cont Pepcid      Chronic pain due to neoplasm  Cont Fentanyl patch   IV Dilaudid q3 hours prn   Palliative care managing       VTE Risk Mitigation (From admission, onward)         Ordered     IP VTE HIGH RISK PATIENT  Once         01/19/22 1435     Place sequential compression device  Until discontinued         01/19/22 1435                Discharge Planning   VICTOR MANUEL: 1/20/2022     Code Status: DNR   Is the patient medically ready for discharge?:     Reason for patient still in hospital (select all that apply): Patient trending condition, Treatment and Consult recommendations  Discharge Plan A: Home Health                  Kaleigh Sragent NP  Department of Hospital Medicine   [facility]

## 2022-01-29 NOTE — ASSESSMENT & PLAN NOTE
Needs good bowel regimen to assist with avoiding constipation due to narcotic use, senna-docusate and mineral oil ordered

## 2022-01-29 NOTE — ASSESSMENT & PLAN NOTE
--plan to arrange outpatient follow up with Primary Oncologist pending resolution of current clinical situation to initiate chemotherapy. Hopefully patient improves from debility/malnutrition standpoint to initiate treatment. If not would need to discuss comfort care  -medi port placement on hold due to bacteremia.   01/29/2022.  At spoke with nursing staff patient and mother was sleeping reviewed palliative care note agree entirely with hospice care at this point

## 2022-01-29 NOTE — ASSESSMENT & PLAN NOTE
Fever this morning, blood cx ordered. IV zosyn empirically.    1/26/22  -Blood cultures growing E.COLI, awaiting susceptibility   -Continue Zosyn    -WBCs have normalized   01/27:  --sensitivities returned, Zosyn dc'd, Ertapenem started  --repeat BC drawn  01/28/22- antibiotics continued per sensitivity results- repeat blood cultures pending   -1/29/22- blood cultures with no growth to date- current antibiotic continued

## 2022-01-29 NOTE — PROGRESS NOTES
[facility]  Progress Note    Subjective:     Post-Op Info:  Procedure(s) (LRB):  EGD (ESOPHAGOGASTRODUODENOSCOPY) (N/A)   2 Days Post-Op     Interval History:  Complaining of back and flank pain today.  Tube feeds have started and are up to 30 cc/hour.  Having good ostomy output.    Medications:  Continuous Infusions:   lactated ringers 100 mL/hr at 01/29/22 0555     Scheduled Meds:   ertapenem (INVANZ) IVPB  1 g Intravenous Q24H    fentaNYL  1 patch Transdermal Q72H    methylnaltrexone  8 mg Subcutaneous Every other day    metoclopramide HCl  5 mg Intravenous QID (AC & HS)    mineral oil  30 mL Oral Daily    ondansetron  4 mg Intravenous Q6H    senna-docusate 8.6-50 mg  1 tablet Oral BID     PRN Meds:sodium chloride, acetaminophen, albuterol-ipratropium, aluminum-magnesium hydroxide-simethicone, bisacodyL, dextrose 50%, dextrose 50%, diazePAM, diphenhydrAMINE, glucagon (human recombinant), glucose, glucose, HYDROmorphone, hyoscyamine, magnesium oxide, magnesium oxide, naloxone, phenazopyridine, potassium bicarbonate, potassium bicarbonate, potassium bicarbonate, potassium, sodium phosphates, potassium, sodium phosphates, potassium, sodium phosphates, prochlorperazine, promethazine (PHENERGAN) IVPB, simethicone, sodium chloride 0.9%, zolpidem     Review of patient's allergies indicates:  No Known Allergies  Objective:     Vital Signs (Most Recent):  Temp: 99.3 °F (37.4 °C) (01/29/22 1233)  Pulse: 89 (01/29/22 1233)  Resp: 20 (01/29/22 1250)  BP: 124/74 (01/29/22 1233)  SpO2: 96 % (01/29/22 1233) Vital Signs (24h Range):  Temp:  [98.2 °F (36.8 °C)-100 °F (37.8 °C)] 99.3 °F (37.4 °C)  Pulse:  [86-97] 89  Resp:  [16-20] 20  SpO2:  [92 %-98 %] 96 %  BP: (105-124)/(62-75) 124/74     Weight: 46.4 kg (102 lb 4.7 oz)  Body mass index is 17.56 kg/m².    Intake/Output - Last 3 Shifts       01/27 0700 01/28 0659 01/28 0700 01/29 0659 01/29 0700 01/30 0659    P.O. 0 480     I.V. (mL/kg)   2481.7 (53.5)     Blood       NG/GT  130     IV Piggyback 250 112.1     Total Intake(mL/kg) 250 (5.4) 3203.8 (69)     Urine (mL/kg/hr) 650 (0.6) 1250 (1.1)     Other  350     Stool 125  275    Total Output 775 1600 275    Net -525 +1603.8 -275                 Physical Exam  Constitutional:       General: She is in acute distress.      Appearance: She is well-developed. She is ill-appearing.   HENT:      Head: Normocephalic and atraumatic.   Eyes:      Conjunctiva/sclera: Conjunctivae normal.   Neck:      Thyroid: No thyromegaly.   Cardiovascular:      Rate and Rhythm: Normal rate.   Pulmonary:      Effort: Pulmonary effort is normal. No respiratory distress.   Abdominal:      Comments: Soft, mild distention; ostomy viable with stool/gas in bag; GJ tube in place with G portion to gravity drainage with bilious output in bag and J tube hooked to Tube Feeds   Musculoskeletal:         General: No tenderness. Normal range of motion.      Cervical back: Normal range of motion.   Skin:     General: Skin is warm and dry.      Capillary Refill: Capillary refill takes less than 2 seconds.      Findings: No rash.   Neurological:      Mental Status: She is oriented to person, place, and time.         Significant Labs:  I have reviewed all pertinent lab results within the past 24 hours.  CBC:   Recent Labs   Lab 01/29/22  0943   WBC 9.38   RBC 3.83*   HGB 9.8*   HCT 31.0*      MCV 81*   MCH 25.6*   MCHC 31.6*     BMP:   Recent Labs   Lab 01/29/22  0943   *      K 3.3*      CO2 25   BUN 10   CREATININE 1.4   CALCIUM 7.9*   MG 1.8       Significant Diagnostics:  I have reviewed all pertinent imaging results/findings within the past 24 hours.    Assessment/Plan:     * Severe malnutrition  Continue TFs via Jtube    Bacteremia  Management per Medicine    Hydronephrosis  Patient has had bilateral ureteral stents placed    Therapeutic opioid-induced constipation (OIC)  Needs good bowel regimen to assist with  avoiding constipation due to narcotic use, senna-docusate and mineral oil ordered    Gastrostomy tube in place  Now s/p exchange to GJ tube on 1/27/22. Drain Gtube to gravity to prevent nausea/vomiting PRN. Continue TFs via J tube as tolerates    Metastatic signet ring cell carcinoma  No current surgical intervention required at this time.  Patient has ileus and no definitive obstruction. Okay for TFs for nutrition via Jtube    Intractable nausea and vomiting, Chronic  Likely related to her metastatic signet ring carcinoma and narcotic use    Colostomy present on admission  stable    Gastroesophageal reflux disease without esophagitis  Management per Medicine    Chronic pain due to neoplasm  Care per primary team        Layton Anderson MD  Colorectal Surgery  [facility]

## 2022-01-29 NOTE — ASSESSMENT & PLAN NOTE
Oncology managing   Plan to arrange outpatient follow up with Primary Oncologist pending resolution of current clinical situation to initiate chemotherapy  -will arrange port placement when appropriate if desired by patient

## 2022-01-29 NOTE — PLAN OF CARE
Patient tolerated tube feeding well. No complaints of discomfort from feeding noted this shift. No n/v noted. Patient medicated several for generalized pain through out the shift.

## 2022-01-29 NOTE — ASSESSMENT & PLAN NOTE
S/p G tube placement today per IR  Consult RD  Regular diet   IVFs  Pain control     1/25  Appears g-tube was placed for drainage   IR was unable to place GJ tube at that time  Spoke with IR will re-attempt GJ with assistance from GI once pt more stable   Due to bacteremia unable to start TPN.   Will discuss with team    1/26  - Plans to exchange G-tube for GJ tube per IR and GI. Plan to use J-tube for nutrition.  01/27:  --J tube to be placed today  --1/28/22- tube feeding initiated per recommendations with pleasure feeds allowed   --1/29- tube feeding continued- pt tolerated- full liquid pleasure feeds

## 2022-01-29 NOTE — PLAN OF CARE
Patient remains free from injury or fall this shift. Pain management effective with current medications as ordered. GJ tube patent, Gastric portion draining to dependent collection bag thin greenish brown liquid at start of shift currently thin light green. Enteral nutrition initiated  to jejunum portion of tube with Isosource 1.5 @ 20ml/hr. Educated patient and mother on plan for enteral feedings and progression to goal rate of 40ml/hr. Safety measures maintained, encouraged frequent weight shift throughout the day.

## 2022-01-29 NOTE — ASSESSMENT & PLAN NOTE
Serum potassium 2.7, will replete   -1/28/22- K 2.8 - magnesium (1.0) and potassium replaced   -1/29/22- K 3.3- replaced- Mag 1.8

## 2022-01-29 NOTE — ASSESSMENT & PLAN NOTE
Now s/p exchange to GJ tube on 1/27/22. Drain Gtube to gravity to prevent nausea/vomiting PRN. Continue TFs via J tube as tolerates

## 2022-01-29 NOTE — ASSESSMENT & PLAN NOTE
No current surgical intervention required at this time.  Patient has ileus and no definitive obstruction. Okay for TFs for nutrition via [facility] [facility]

## 2022-01-29 NOTE — ASSESSMENT & PLAN NOTE
Will give 3 doses of Relistor  -continue bowel regime outpatient per Palliative Care recommendations   Continue Relistor, Senna and Miralax at this time.   -Mineral oil ordered per Gen Surg

## 2022-01-30 NOTE — SUBJECTIVE & OBJECTIVE
Interval History: pt in bed and tearful while reporting nausea and dry heaving with no evidence of vomiting.  Heme/Onc discussed Hospice with patient's mother.  Palliative Care following and pt/mother have had informational discussions with [facility].  Pt/mother requesting to speak with Palliative Care tomorrow prior to making a plan due to established rapport.  TF held due to concern for N/V with antiemetics given. Pt reports more nausea and no evidence of vomiting TF noted. General Surgery following with TF to be resumed at lower rate for tolerance.  Glucose treated per Hypoglycemia protocol.  Heme/Onc, General Surgery, and Palliative Care following.     Review of Systems   Constitutional: Positive for activity change, appetite change, chills and fatigue. Negative for fever and unexpected weight change.   HENT: Negative for congestion, mouth sores, nosebleeds, sore throat, trouble swallowing and voice change.    Eyes: Negative for visual disturbance.   Respiratory: Negative for cough, chest tightness, shortness of breath and wheezing.    Cardiovascular: Negative for chest pain, palpitations and leg swelling.   Gastrointestinal: Positive for abdominal pain, nausea and vomiting. Negative for abdominal distention, blood in stool, constipation and diarrhea.   Genitourinary: Negative for difficulty urinating, dysuria and hematuria.   Musculoskeletal: Positive for arthralgias. Negative for back pain and myalgias.   Skin: Negative for pallor, rash and wound.   Neurological: Positive for weakness. Negative for dizziness, syncope and headaches.   Hematological: Negative for adenopathy. Does not bruise/bleed easily.   Psychiatric/Behavioral: Positive for agitation. The patient is nervous/anxious.      Objective:     Vital Signs (Most Recent):  Temp:  (mother of patient refused her vitals) (01/30/22 1517)  Pulse: 77 (01/30/22 1135)  Resp: 12 (01/30/22 1550)  BP: 110/71 (01/30/22 1135)  SpO2: 96 % (01/30/22 1135) Vital  Signs (24h Range):  Temp:  [97.1 °F (36.2 °C)-98.7 °F (37.1 °C)] 98.1 °F (36.7 °C)  Pulse:  [77-95] 77  Resp:  [12-18] 12  SpO2:  [94 %-97 %] 96 %  BP: ()/(56-76) 110/71     Weight: 46.4 kg (102 lb 4.7 oz)  Body mass index is 17.56 kg/m².    Intake/Output Summary (Last 24 hours) at 1/30/2022 1739  Last data filed at 1/30/2022 1226  Gross per 24 hour   Intake 560 ml   Output 200 ml   Net 360 ml      Physical Exam  Vitals and nursing note reviewed.   Constitutional:       General: She is in acute distress.      Appearance: She is cachectic. She is ill-appearing. She is not diaphoretic.   HENT:      Head: Normocephalic and atraumatic.      Nose: Nose normal.   Eyes:      General: No scleral icterus.     Conjunctiva/sclera: Conjunctivae normal.   Neck:      Trachea: No tracheal deviation.   Cardiovascular:      Rate and Rhythm: Normal rate and regular rhythm.      Heart sounds: Normal heart sounds. No murmur heard.  No friction rub. No gallop.    Pulmonary:      Effort: Pulmonary effort is normal. No respiratory distress.      Breath sounds: Normal breath sounds. No stridor. No wheezing or rales.   Chest:      Chest wall: No tenderness.   Abdominal:      General: Bowel sounds are normal. There is distension (mild).      Palpations: Abdomen is soft. There is no mass.      Tenderness: Tenderness: g tube site  There is no guarding or rebound.      Comments: colostomy LLQ with liquid stool in bag   G/J tube dressing C/D/I- attached to a guzmán bag draining light green fluid   Genitourinary:     Comments: Guzmán catheter   Musculoskeletal:         General: No tenderness or deformity. Normal range of motion.      Cervical back: Normal range of motion and neck supple.   Skin:     General: Skin is warm and dry.      Coloration: Skin is not pale.      Findings: No erythema or rash.   Neurological:      Mental Status: She is oriented to person, place, and time. She is lethargic.      Cranial Nerves: No cranial nerve deficit.       Motor: No abnormal muscle tone.      Coordination: Coordination normal.   Psychiatric:         Mood and Affect: Mood is anxious. Affect is not angry or tearful.         Behavior: Behavior is agitated.         Significant Labs:   All pertinent labs within the past 24 hours have been reviewed.  CBC:   Recent Labs   Lab 01/29/22  0943   WBC 9.38   HGB 9.8*   HCT 31.0*        CMP:   Recent Labs   Lab 01/29/22  0943 01/30/22  0906    142   K 3.3* 3.5    106   CO2 25 27   * 70   BUN 10 12   CREATININE 1.4 1.5*   CALCIUM 7.9* 7.7*   PROT 5.1*  --    ALBUMIN 1.9*  --    BILITOT 0.3  --    ALKPHOS 57  --    AST 10  --    ALT <5*  --    ANIONGAP 7* 9   EGFRNONAA 42* 39*     POCT Glucose:   Recent Labs   Lab 01/30/22  1205 01/30/22  1658   POCTGLUCOSE 66* 73       Significant Imaging:

## 2022-01-30 NOTE — ASSESSMENT & PLAN NOTE
--plan to arrange outpatient follow up with Primary Oncologist pending resolution of current clinical situation to initiate chemotherapy. Hopefully patient improves from debility/malnutrition standpoint to initiate treatment. If not would need to discuss comfort care  -medi port placement on hold due to bacteremia.   01/29/2022.  At spoke with nursing staff patient and mother was sleeping reviewed palliative care note agree entirely with hospice care at this point  01/30/2022.  Extensive conversation with mother at outside of a room.  I strongly urged them to consider hospice with talked about the nature hospice philosophy hospice inpatient outpatient and would agree with plan and course of action they have chosen [facility] would like to make sure that there is an inpatient unit available for them should they need that

## 2022-01-30 NOTE — ASSESSMENT & PLAN NOTE
Fever this morning, blood cx ordered. IV zosyn empirically.    1/26/22  -Blood cultures growing E.COLI, awaiting susceptibility   -Continue Zosyn    -WBCs have normalized   01/27:  --sensitivities returned, Zosyn dc'd, Ertapenem started  --repeat BC drawn  01/28/22- antibiotics continued per sensitivity results- repeat blood cultures pending   -1/29/22- blood cultures with no growth to date- current antibiotic continued   -1/30/22- current plan of care continued- repeat blood cultures with no growth to date

## 2022-01-30 NOTE — PLAN OF CARE
Pt remains free from injury/falls this shift. Safety precautions maintained. Pain managed with IV prn meds. Tube feed held d/t intolerance overnight. VSS. No signs and symptoms of acute distress noted at this time. Chart reviewed, will continue to monitor.

## 2022-01-30 NOTE — PROGRESS NOTES
During rounds walked into patient yelling, throwing things, really agitated. Patient expressed feelings of hopelessness and frustration r/t her dx and prognosis. Patient states wants to go home, she said she is tired and over everything. Patient  wants to see her granddaughter and be able to really spend time with her. She said she knows she isn't where she needs to be with feeding, etc but at this point she doesn't feel like it's worth it. JORGE ALBERTO Calzada NP and HALEIGH Flores notified.

## 2022-01-30 NOTE — PLAN OF CARE
Patient medicated several times during shift for pain. Per patient she feels little to no pain relief after medication is given. Patient started on continuous feeding x2 days ago. Patient had 2 episodes of n/v. Tube feeding placed on hold for 1 hour. Patient stated that she felt nauseas again and begin to vomit once tube feeding was restarted. Feeding turned off again and patient reassessed  in 1 hour, stated that she felt a little better. Will continue to observe patient

## 2022-01-30 NOTE — PLAN OF CARE
Casandra reached out to [facility] on today. Liaison will reach out to patient's mother and follow back up with Casandra. SWer will remain available.

## 2022-01-30 NOTE — PROGRESS NOTES
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  Oncology was consulted due to history of metastatic signet ring cell carcinoma.  She is also known to have right hydronephrosis for which ureteral stent placement has been planned by Urology.     Today she continues to complain of abdominal pain, intermittent nausea and vomiting.  She is yet to initiate systemic palliative therapy for her cancer due to poor nutritional status and right hydronephrosis.         Interval History:  Extensive conversation with mother    Oncology Treatment Plan:   OP FOLFOXIRI Q2W    Medications:  Continuous Infusions:   lactated ringers 100 mL/hr at 01/30/22 0253     Scheduled Meds:   ertapenem (INVANZ) IVPB  1 g Intravenous Q24H    fentaNYL  1 patch Transdermal Q72H    methylnaltrexone  8 mg Subcutaneous Every other day    metoclopramide HCl  5 mg Intravenous QID (AC & HS)    mineral oil  30 mL Oral Daily    ondansetron  4 mg Intravenous Q6H    senna-docusate 8.6-50 mg  1 tablet Oral BID     PRN Meds:sodium chloride, acetaminophen, albuterol-ipratropium, aluminum-magnesium hydroxide-simethicone, bisacodyL, dextrose 50%, dextrose 50%, diazePAM, diphenhydrAMINE, glucagon (human recombinant), glucose, glucose, HYDROmorphone, hyoscyamine, magnesium oxide, magnesium oxide, naloxone, phenazopyridine, potassium bicarbonate, potassium bicarbonate, potassium bicarbonate, potassium, sodium phosphates, potassium, sodium phosphates, potassium, sodium phosphates, prochlorperazine, promethazine (PHENERGAN) IVPB, simethicone, sodium chloride 0.9%, zolpidem     Review of Systems   Constitutional: Positive for fatigue.   Neurological: Positive for weakness.      Objective:     Vital Signs (Most Recent):  Temp: 98.7 °F (37.1 °C) (01/30/22 0755)  Pulse: 84 (01/30/22 0755)  Resp: 14 (01/30/22 0755)  BP: 108/66 (01/30/22 0755)  SpO2: (!) 94 % (01/30/22 0755) Vital Signs (24h Range):  Temp:  [97.1 °F (36.2 °C)-99.7 °F (37.6 °C)] 98.7 °F (37.1 °C)  Pulse:  [81-95] 84  Resp:  [14-20] 14  SpO2:  [94 %-97 %] 94 %  BP: ()/(56-76) 108/66     Weight: 46.4 kg (102 lb 4.7 oz)  Body mass index is 17.56 kg/m².  Body surface area is 1.45 meters squared.      Intake/Output Summary (Last 24 hours) at 1/30/2022 0930  Last data filed at 1/30/2022 0400  Gross per 24 hour   Intake 1080 ml   Output 1775 ml   Net -695 ml       Physical Exam  Vitals reviewed.         Significant Labs:   BMP:   Recent Labs   Lab 01/28/22  1320 01/29/22  0943   GLU 93 116*    140   K 2.8* 3.3*    108   CO2 21* 25   BUN 12 10   CREATININE 1.4 1.4   CALCIUM 7.7* 7.9*   MG 1.0* 1.8   , CBC:   Recent Labs   Lab 01/29/22  0943   WBC 9.38   HGB 9.8*   HCT 31.0*      , CMP:   Recent Labs   Lab 01/28/22  1320 01/29/22  0943    140   K 2.8* 3.3*    108   CO2 21* 25   GLU 93 116*   BUN 12 10   CREATININE 1.4 1.4   CALCIUM 7.7* 7.9*   PROT  --  5.1*   ALBUMIN  --  1.9*   BILITOT  --  0.3   ALKPHOS  --  57   AST  --  10   ALT  --  <5*   ANIONGAP 10 7*   EGFRNONAA 42* 42*   , Coagulation: No results for input(s): PT, INR, APTT in the last 48 hours., Haptoglobin: No results for input(s): HAPTOGLOBIN in the last 48 hours., Immunology: No results for input(s): SPEP, INNA, AGNES, FREELAMBDALI in the last 48 hours., LDH: No results for input(s): LDHCSF, BFSOURCE in the last 48 hours., LFTs:   Recent Labs   Lab 01/29/22  0943   ALT <5*   AST 10   ALKPHOS 57   BILITOT 0.3   PROT 5.1*   ALBUMIN 1.9*   , Reticulocytes: No results for input(s): RETIC in the last 48 hours., Tumor Markers: No results for input(s): PSA, CEA, , AFPTM, [id number],  in the last 48 hours.    Invalid input(s): ALGTM, Uric  Acid No results for input(s): URICACID in the last 48 hours. and Urine Studies: No results for input(s): COLORU, APPEARANCEUA, PHUR, SPECGRAV, PROTEINUA, GLUCUA, KETONESU, BILIRUBINUA, OCCULTUA, NITRITE, UROBILINOGEN, LEUKOCYTESUR, RBCUA, WBCUA, BACTERIA, SQUAMEPITHEL, HYALINECASTS in the last 48 hours.    Invalid input(s): WRIGHTSUR    Diagnostic Results:  I have reviewed all pertinent imaging results/findings within the past 24 hours.    Assessment/Plan:     * Severe malnutrition  -G tube in place. Draining.  -s/pfor G-J tube placement to allow nutrition and decompression. Start feeding pending GI recs. Hopefully nutritional/functional status improves and continued GI function to initiate chemotherapy. Palliative medicine discussed with patient possible redirection to comfort care pending course    Hypokalemia  -Improving   -management per primary team    Bacteremia  --BC + gram neg bacteremia (E. Coli)   -on Ertapenem   --IV abx management per Primary team      Hydronephrosis  S/p bilateral stent placement     Metastatic signet ring cell carcinoma  --plan to arrange outpatient follow up with Primary Oncologist pending resolution of current clinical situation to initiate chemotherapy. Hopefully patient improves from debility/malnutrition standpoint to initiate treatment. If not would need to discuss comfort care  -medi port placement on hold due to bacteremia.   01/29/2022.  At spoke with nursing staff patient and mother was sleeping reviewed palliative care note agree entirely with hospice care at this point  01/30/2022.  Extensive conversation with mother at outside of a room.  I strongly urged them to consider hospice with talked about the nature hospice philosophy hospice inpatient outpatient and would agree with plan and course of action they have chosen [facility] would like to make sure that there is an inpatient unit available for them should they need that    Intractable nausea and vomiting,  Chronic  -no recent N/V  -management per Primary team/palliative medicine       Colostomy present on admission        Chronic pain due to neoplasm  -management per palliative medicine               Thank you for your consult. I will follow-up with patient. Please contact us if you have any additional questions.     Oh Negro MD  Hematology/Oncology  [facility]

## 2022-01-30 NOTE — SUBJECTIVE & OBJECTIVE
Interval History:  Extensive conversation with mother    Oncology Treatment Plan:   OP FOLFOXIRI Q2W    Medications:  Continuous Infusions:   lactated ringers 100 mL/hr at 01/30/22 0253     Scheduled Meds:   ertapenem (INVANZ) IVPB  1 g Intravenous Q24H    fentaNYL  1 patch Transdermal Q72H    methylnaltrexone  8 mg Subcutaneous Every other day    metoclopramide HCl  5 mg Intravenous QID (AC & HS)    mineral oil  30 mL Oral Daily    ondansetron  4 mg Intravenous Q6H    senna-docusate 8.6-50 mg  1 tablet Oral BID     PRN Meds:sodium chloride, acetaminophen, albuterol-ipratropium, aluminum-magnesium hydroxide-simethicone, bisacodyL, dextrose 50%, dextrose 50%, diazePAM, diphenhydrAMINE, glucagon (human recombinant), glucose, glucose, HYDROmorphone, hyoscyamine, magnesium oxide, magnesium oxide, naloxone, phenazopyridine, potassium bicarbonate, potassium bicarbonate, potassium bicarbonate, potassium, sodium phosphates, potassium, sodium phosphates, potassium, sodium phosphates, prochlorperazine, promethazine (PHENERGAN) IVPB, simethicone, sodium chloride 0.9%, zolpidem     Review of Systems   Constitutional: Positive for fatigue.   Neurological: Positive for weakness.     Objective:     Vital Signs (Most Recent):  Temp: 98.7 °F (37.1 °C) (01/30/22 0755)  Pulse: 84 (01/30/22 0755)  Resp: 14 (01/30/22 0755)  BP: 108/66 (01/30/22 0755)  SpO2: (!) 94 % (01/30/22 0755) Vital Signs (24h Range):  Temp:  [97.1 °F (36.2 °C)-99.7 °F (37.6 °C)] 98.7 °F (37.1 °C)  Pulse:  [81-95] 84  Resp:  [14-20] 14  SpO2:  [94 %-97 %] 94 %  BP: ()/(56-76) 108/66     Weight: 46.4 kg (102 lb 4.7 oz)  Body mass index is 17.56 kg/m².  Body surface area is 1.45 meters squared.      Intake/Output Summary (Last 24 hours) at 1/30/2022 0930  Last data filed at 1/30/2022 0400  Gross per 24 hour   Intake 1080 ml   Output 1775 ml   Net -695 ml       Physical Exam  Vitals reviewed.         Significant Labs:   BMP:   Recent Labs   Lab  01/28/22  1320 01/29/22  0943   GLU 93 116*    140   K 2.8* 3.3*    108   CO2 21* 25   BUN 12 10   CREATININE 1.4 1.4   CALCIUM 7.7* 7.9*   MG 1.0* 1.8   , CBC:   Recent Labs   Lab 01/29/22  0943   WBC 9.38   HGB 9.8*   HCT 31.0*      , CMP:   Recent Labs   Lab 01/28/22  1320 01/29/22  0943    140   K 2.8* 3.3*    108   CO2 21* 25   GLU 93 116*   BUN 12 10   CREATININE 1.4 1.4   CALCIUM 7.7* 7.9*   PROT  --  5.1*   ALBUMIN  --  1.9*   BILITOT  --  0.3   ALKPHOS  --  57   AST  --  10   ALT  --  <5*   ANIONGAP 10 7*   EGFRNONAA 42* 42*   , Coagulation: No results for input(s): PT, INR, APTT in the last 48 hours., Haptoglobin: No results for input(s): HAPTOGLOBIN in the last 48 hours., Immunology: No results for input(s): SPEP, INNA, AGNES, FREELAMBDALI in the last 48 hours., LDH: No results for input(s): LDHCSF, BFSOURCE in the last 48 hours., LFTs:   Recent Labs   Lab 01/29/22  0943   ALT <5*   AST 10   ALKPHOS 57   BILITOT 0.3   PROT 5.1*   ALBUMIN 1.9*   , Reticulocytes: No results for input(s): RETIC in the last 48 hours., Tumor Markers: No results for input(s): PSA, CEA, , AFPTM, [id number],  in the last 48 hours.    Invalid input(s): ALGTM, Uric Acid No results for input(s): URICACID in the last 48 hours. and Urine Studies: No results for input(s): COLORU, APPEARANCEUA, PHUR, SPECGRAV, PROTEINUA, GLUCUA, KETONESU, BILIRUBINUA, OCCULTUA, NITRITE, UROBILINOGEN, LEUKOCYTESUR, RBCUA, WBCUA, BACTERIA, SQUAMEPITHEL, HYALINECASTS in the last 48 hours.    Invalid input(s): WRIGHTSUR    Diagnostic Results:  I have reviewed all pertinent imaging results/findings within the past 24 hours.

## 2022-01-30 NOTE — PROGRESS NOTES
[facility] Surg  Colorectal Surgery  Progress Note    Subjective:     Post-Op Info:  Procedure(s) (LRB):  EGD (ESOPHAGOGASTRODUODENOSCOPY) (N/A)   3 Days Post-Op     Interval History:  Patient with some abdominal pain as well as some nausea and 1 episode of emesis this morning.  Tube feeds held.    Medications:  Continuous Infusions:   lactated ringers 100 mL/hr at 01/30/22 0253     Scheduled Meds:   ertapenem (INVANZ) IVPB  1 g Intravenous Q24H    fentaNYL  1 patch Transdermal Q72H    methylnaltrexone  8 mg Subcutaneous Every other day    metoclopramide HCl  5 mg Intravenous QID (AC & HS)    mineral oil  30 mL Oral Daily    ondansetron  4 mg Intravenous Q6H    senna-docusate 8.6-50 mg  1 tablet Oral BID     PRN Meds:sodium chloride, acetaminophen, albuterol-ipratropium, aluminum-magnesium hydroxide-simethicone, bisacodyL, dextrose 50%, dextrose 50%, diazePAM, diphenhydrAMINE, glucagon (human recombinant), glucose, glucose, HYDROmorphone, hyoscyamine, magnesium oxide, magnesium oxide, naloxone, phenazopyridine, potassium bicarbonate, potassium bicarbonate, potassium bicarbonate, potassium, sodium phosphates, potassium, sodium phosphates, potassium, sodium phosphates, prochlorperazine, promethazine (PHENERGAN) IVPB, simethicone, sodium chloride 0.9%, zolpidem     Review of patient's allergies indicates:  No Known Allergies  Objective:     Vital Signs (Most Recent):  Temp: 98.7 °F (37.1 °C) (01/30/22 0755)  Pulse: 84 (01/30/22 0755)  Resp: 16 (01/30/22 0939)  BP: 108/66 (01/30/22 0755)  SpO2: (!) 94 % (01/30/22 0755) Vital Signs (24h Range):  Temp:  [97.1 °F (36.2 °C)-99.7 °F (37.6 °C)] 98.7 °F (37.1 °C)  Pulse:  [81-95] 84  Resp:  [14-20] 16  SpO2:  [94 %-97 %] 94 %  BP: ()/(56-76) 108/66     Weight: 46.4 kg (102 lb 4.7 oz)  Body mass index is 17.56 kg/m².    Intake/Output - Last 3 Shifts       01/28 0700 01/29 0659 01/29 0700 01/30 0659 01/30 0700 01/31 0659    P.O. 480 800 0    I.V. (mL/kg)  2481.7 (53.5)      NG/ 460     IV Piggyback 112.1      Total Intake(mL/kg) 3203.8 (69) 1260 (27.2) 0 (0)    Urine (mL/kg/hr) 1250 (1.1) 500 (0.4)     Other 350      Stool  1275     Total Output 1600 1775     Net +1603.8 -515 0                 Physical Exam  Constitutional:       General: She is in acute distress.      Appearance: She is well-developed. She is ill-appearing.   HENT:      Head: Normocephalic and atraumatic.   Eyes:      Conjunctiva/sclera: Conjunctivae normal.   Neck:      Thyroid: No thyromegaly.   Cardiovascular:      Rate and Rhythm: Normal rate.   Pulmonary:      Effort: Pulmonary effort is normal. No respiratory distress.   Abdominal:      Comments: Soft, mild distention; ostomy viable with stool/gas in bag; GJ tube in place with G portion to gravity drainage with bilious output in bag and J tube clamped   Musculoskeletal:         General: No tenderness. Normal range of motion.      Cervical back: Normal range of motion.   Skin:     General: Skin is warm and dry.      Capillary Refill: Capillary refill takes less than 2 seconds.      Findings: No rash.   Neurological:      Mental Status: She is oriented to person, place, and time.         Significant Labs:  I have reviewed all pertinent lab results within the past 24 hours.  CBC:   Recent Labs   Lab 01/29/22  0943   WBC 9.38   RBC 3.83*   HGB 9.8*   HCT 31.0*      MCV 81*   MCH 25.6*   MCHC 31.6*     BMP:   Recent Labs   Lab 01/29/22  0943   *      K 3.3*      CO2 25   BUN 10   CREATININE 1.4   CALCIUM 7.9*   MG 1.8       Significant Diagnostics:  I have reviewed all pertinent imaging results/findings within the past 24 hours.    Assessment/Plan:     * Severe malnutrition  Continue TFs via Jtube as tolerates    Bacteremia  Management per Medicine    Hydronephrosis  Patient has had bilateral ureteral stents placed    Therapeutic opioid-induced constipation (OIC)  Needs good bowel regimen to assist with avoiding  constipation due to narcotic use, senna-docusate and mineral oil ordered    Gastrostomy tube in place  Now s/p exchange to GJ tube on 1/27/22. Drain Gtube to gravity to prevent nausea/vomiting PRN. Continue TFs via J tube as tolerates    Metastatic signet ring cell carcinoma  No current surgical intervention required at this time.  Patient has ileus and no definitive obstruction. Okay for TFs for nutrition via Jtube as tolerates    Intractable nausea and vomiting, Chronic  Likely related to her metastatic signet ring carcinoma and narcotic use    Colostomy present on admission  stable    Gastroesophageal reflux disease without esophagitis  Management per Medicine    Chronic pain due to neoplasm  Care per primary team        Layton Anderson MD  Colorectal Surgery  [facility]

## 2022-01-30 NOTE — ASSESSMENT & PLAN NOTE
S/p G tube placement today per IR  Consult RD  Regular diet   IVFs  Pain control     1/25  Appears g-tube was placed for drainage   IR was unable to place GJ tube at that time  Spoke with IR will re-attempt GJ with assistance from GI once pt more stable   Due to bacteremia unable to start TPN.   Will discuss with team    1/26  - Plans to exchange G-tube for GJ tube per IR and GI. Plan to use J-tube for nutrition.  01/27:  --J tube to be placed today  --1/28/22- tube feeding initiated per recommendations with pleasure feeds allowed   --1/29- tube feeding continued- pt tolerated- full liquid pleasure feeds   --1/30- tube feeding held due to concern for N/V- no evidence of vomiting TF. Feeding resumed at lower rate

## 2022-01-30 NOTE — ASSESSMENT & PLAN NOTE
Placed on 1/19 per IR  Green fluid draining from the G tube- this is attached to a guzmán bag, her colostomy has no output, she has bowel sounds, but endorsing nasuea and abdominal pain  CT abdomen showed interval placement of percutaneous G tube with catheter coursing along the left inferior anterior margin with possible small adjacent hemorrhage.    General Surgery recommends leaving this to gravity

## 2022-01-30 NOTE — SUBJECTIVE & OBJECTIVE
Interval History:  Patient with some abdominal pain as well as some nausea and 1 episode of emesis this morning.  Tube feeds held.    Medications:  Continuous Infusions:   lactated ringers 100 mL/hr at 01/30/22 0253     Scheduled Meds:   ertapenem (INVANZ) IVPB  1 g Intravenous Q24H    fentaNYL  1 patch Transdermal Q72H    methylnaltrexone  8 mg Subcutaneous Every other day    metoclopramide HCl  5 mg Intravenous QID (AC & HS)    mineral oil  30 mL Oral Daily    ondansetron  4 mg Intravenous Q6H    senna-docusate 8.6-50 mg  1 tablet Oral BID     PRN Meds:sodium chloride, acetaminophen, albuterol-ipratropium, aluminum-magnesium hydroxide-simethicone, bisacodyL, dextrose 50%, dextrose 50%, diazePAM, diphenhydrAMINE, glucagon (human recombinant), glucose, glucose, HYDROmorphone, hyoscyamine, magnesium oxide, magnesium oxide, naloxone, phenazopyridine, potassium bicarbonate, potassium bicarbonate, potassium bicarbonate, potassium, sodium phosphates, potassium, sodium phosphates, potassium, sodium phosphates, prochlorperazine, promethazine (PHENERGAN) IVPB, simethicone, sodium chloride 0.9%, zolpidem     Review of patient's allergies indicates:  No Known Allergies  Objective:     Vital Signs (Most Recent):  Temp: 98.7 °F (37.1 °C) (01/30/22 0755)  Pulse: 84 (01/30/22 0755)  Resp: 16 (01/30/22 0939)  BP: 108/66 (01/30/22 0755)  SpO2: (!) 94 % (01/30/22 0755) Vital Signs (24h Range):  Temp:  [97.1 °F (36.2 °C)-99.7 °F (37.6 °C)] 98.7 °F (37.1 °C)  Pulse:  [81-95] 84  Resp:  [14-20] 16  SpO2:  [94 %-97 %] 94 %  BP: ()/(56-76) 108/66     Weight: 46.4 kg (102 lb 4.7 oz)  Body mass index is 17.56 kg/m².    Intake/Output - Last 3 Shifts       01/28 0700 01/29 0659 01/29 0700 01/30 0659 01/30 0700 01/31 0659    P.O. 480 800 0    I.V. (mL/kg) 2481.7 (53.5)      NG/ 460     IV Piggyback 112.1      Total Intake(mL/kg) 3203.8 (69) 1260 (27.2) 0 (0)    Urine (mL/kg/hr) 1250 (1.1) 500 (0.4)     Other 350       Stool  1275     Total Output 1600 1775     Net +1603.8 -515 0                 Physical Exam  Constitutional:       General: She is in acute distress.      Appearance: She is well-developed. She is ill-appearing.   HENT:      Head: Normocephalic and atraumatic.   Eyes:      Conjunctiva/sclera: Conjunctivae normal.   Neck:      Thyroid: No thyromegaly.   Cardiovascular:      Rate and Rhythm: Normal rate.   Pulmonary:      Effort: Pulmonary effort is normal. No respiratory distress.   Abdominal:      Comments: Soft, mild distention; ostomy viable with stool/gas in bag; GJ tube in place with G portion to gravity drainage with bilious output in bag and J tube clamped   Musculoskeletal:         General: No tenderness. Normal range of motion.      Cervical back: Normal range of motion.   Skin:     General: Skin is warm and dry.      Capillary Refill: Capillary refill takes less than 2 seconds.      Findings: No rash.   Neurological:      Mental Status: She is oriented to person, place, and time.         Significant Labs:  I have reviewed all pertinent lab results within the past 24 hours.  CBC:   Recent Labs   Lab 01/29/22  0943   WBC 9.38   RBC 3.83*   HGB 9.8*   HCT 31.0*      MCV 81*   MCH 25.6*   MCHC 31.6*     BMP:   Recent Labs   Lab 01/29/22  0943   *      K 3.3*      CO2 25   BUN 10   CREATININE 1.4   CALCIUM 7.9*   MG 1.8       Significant Diagnostics:  I have reviewed all pertinent imaging results/findings within the past 24 hours.

## 2022-01-30 NOTE — ASSESSMENT & PLAN NOTE
No current surgical intervention required at this time.  Patient has ileus and no definitive obstruction. Okay for TFs for nutrition via Jtube as tolerates

## 2022-01-30 NOTE — ASSESSMENT & PLAN NOTE
Serum potassium 2.7, will replete   -1/28/22- K 2.8 - magnesium (1.0) and potassium replaced   -1/29/22- K 3.3- replaced- Mag 1.8  -1/30/22- potassium and Magnesium normalized- Ca of 7.7 corrected to 9

## 2022-01-30 NOTE — PROGRESS NOTES
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 Pt will be placed in outpatient extended recovery for pain control, IVfs, and RD consult.     On exam, pt complain of nausea and 10/10 pain at G tube site. Pt reports ongoing chronic pain, N/V, weakness/fatigue, and weight loss for approximately one month.       Overview/Hospital Course:  Patient was admitted for abd pain. 1/20: G tube placed yesterday per IR. Today the patient has copious amounts of green fluid draining from the G tube- this is attached to a guzmán bag, her colostomy has no output, she has bowel sounds, but endorsing nasuea and abdominal pain. CT abdomen pending. Case discussed with Dr Barger and Dr Torres. Initial G tube placement check performed right after placement and the subsequent the following day is pending. As of 1/21/22  CT abdomen showed interval placement of percutaneous G tube with catheter coursing along the left inferior anterior margin with possible small adjacent hemorrhage. Patient c/o constipation requesting an injection. GI has been consulted to assist. Urology following and plans for stent placement tomorrow. G-tube still draining green fluid.  As of 1/22/22 pt reports passing some gas, still no BM. Will give x 3 doses of Relistor for constipation. Plans for ureteral stent placement today. As of 1/23/22 still no bowel movement. + Flatus. C/o abdominal pain and N/V. Abdominal xray pending. S/p bilateral ureteral stent placement. Palliative care consulted for symptom  management and goal planning. As of 1/24/22 patient very lethargic this am.  Pt had dose of Ativan this morning. Narcotics and ativan D/c'd until patient more alert. Mother reports pt passed a very small amount of hard stool overnight. Fever this morning, blood cx ordered. IV zosyn empirically. General Surgery consulted for possible obstruction. As of 1/25/22 patient is more awake but not back to her baseline. Abdominal imaging showed no obstruction. Colostomy with output. Plan for SBFT today. GI has been consulted. Case discussed with General surgery and IR. Blood cultures positive for gram negative rods. Continue IV zosyn. Port placement canceled due to bacteremia. Palliative care following. As of 1/26/22 pt awake and alert this morning, having ostomy output. H/H 7/22.0, will transfuse 1 unit of PRBCs. Serum potassium 2.7, will replete. Blood cultures growing E.COLI, awaiting susceptibility. Continue Zosyn.  Plans to exchange G-tube for GJ tube per IR and GI. Plan to use J-tube for nutrition. Continue current bowel regimen.   01/27: Plans to have J tube placed today for nutrition. BC from 1/24 shows E coli ESB: that is sensitive to Ertapenem and Meropenem. Zosyn stopped, Ertapenem started. BC redrawn. On 1/28/22, J tube in place with tube feedings initiated per GI recommendation. Magnesium and Potassium replaced. Ertapenem continued. Repeat blood cultures with results pending.  Palliative Care following with Hospice info visit arranged. On 1/29/22, pt very emotional and expressed desire to discharge today with hospice to spend time with granddaughter. Palliative care updated on current pt status.  Blood cultures with no growth to date.  Will discuss patient wishes tomorrow morning and determine need for port placement pending results of conversation. TF tolerated with adequate colostomy output. Bowel regimen in place per General Surgery. On 1/30/22, Heme/Onc discussed Hospice with patient's mother.  Palliative  Care following and pt/mother have had informational discussions with [facility].  Pt/mother requesting to speak with Palliative Care tomorrow prior to making a plan due to established rapport.  TF held due to concern for N/V with antiemetics given. Pt reports more nausea and no evidence of vomiting TF noted. General Surgery following with TF to be resumed at lower rate for tolerance.  Glucose treated per Hypoglycemia protocol.        Interval History: pt in bed and tearful while reporting nausea and dry heaving with no evidence of vomiting.  Heme/Onc discussed Hospice with patient's mother.  Palliative Care following and pt/mother have had informational discussions with [facility].  Pt/mother requesting to speak with Palliative Care tomorrow prior to making a plan due to established rapport.  TF held due to concern for N/V with antiemetics given. Pt reports more nausea and no evidence of vomiting TF noted. General Surgery following with TF to be resumed at lower rate for tolerance.  Glucose treated per Hypoglycemia protocol.  Heme/Onc, General Surgery, and Palliative Care following.     Review of Systems   Constitutional: Positive for activity change, appetite change, chills and fatigue. Negative for fever and unexpected weight change.   HENT: Negative for congestion, mouth sores, nosebleeds, sore throat, trouble swallowing and voice change.    Eyes: Negative for visual disturbance.   Respiratory: Negative for cough, chest tightness, shortness of breath and wheezing.    Cardiovascular: Negative for chest pain, palpitations and leg swelling.   Gastrointestinal: Positive for abdominal pain, nausea and vomiting. Negative for abdominal distention, blood in stool, constipation and diarrhea.   Genitourinary: Negative for difficulty urinating, dysuria and hematuria.   Musculoskeletal: Positive for arthralgias. Negative for back pain and myalgias.   Skin: Negative for pallor, rash and wound.   Neurological:  Positive for weakness. Negative for dizziness, syncope and headaches.   Hematological: Negative for adenopathy. Does not bruise/bleed easily.   Psychiatric/Behavioral: Positive for agitation. The patient is nervous/anxious.      Objective:     Vital Signs (Most Recent):  Temp:  (mother of patient refused her vitals) (01/30/22 1517)  Pulse: 77 (01/30/22 1135)  Resp: 12 (01/30/22 1550)  BP: 110/71 (01/30/22 1135)  SpO2: 96 % (01/30/22 1135) Vital Signs (24h Range):  Temp:  [97.1 °F (36.2 °C)-98.7 °F (37.1 °C)] 98.1 °F (36.7 °C)  Pulse:  [77-95] 77  Resp:  [12-18] 12  SpO2:  [94 %-97 %] 96 %  BP: ()/(56-76) 110/71     Weight: 46.4 kg (102 lb 4.7 oz)  Body mass index is 17.56 kg/m².    Intake/Output Summary (Last 24 hours) at 1/30/2022 1739  Last data filed at 1/30/2022 1226  Gross per 24 hour   Intake 560 ml   Output 200 ml   Net 360 ml      Physical Exam  Vitals and nursing note reviewed.   Constitutional:       General: She is in acute distress.      Appearance: She is cachectic. She is ill-appearing. She is not diaphoretic.   HENT:      Head: Normocephalic and atraumatic.      Nose: Nose normal.   Eyes:      General: No scleral icterus.     Conjunctiva/sclera: Conjunctivae normal.   Neck:      Trachea: No tracheal deviation.   Cardiovascular:      Rate and Rhythm: Normal rate and regular rhythm.      Heart sounds: Normal heart sounds. No murmur heard.  No friction rub. No gallop.    Pulmonary:      Effort: Pulmonary effort is normal. No respiratory distress.      Breath sounds: Normal breath sounds. No stridor. No wheezing or rales.   Chest:      Chest wall: No tenderness.   Abdominal:      General: Bowel sounds are normal. There is distension (mild).      Palpations: Abdomen is soft. There is no mass.      Tenderness: Tenderness: g tube site  There is no guarding or rebound.      Comments: colostomy LLQ with liquid stool in bag   G/J tube dressing C/D/I- attached to a guzmán bag draining light green fluid    Genitourinary:     Comments: Blanco catheter   Musculoskeletal:         General: No tenderness or deformity. Normal range of motion.      Cervical back: Normal range of motion and neck supple.   Skin:     General: Skin is warm and dry.      Coloration: Skin is not pale.      Findings: No erythema or rash.   Neurological:      Mental Status: She is oriented to person, place, and time. She is lethargic.      Cranial Nerves: No cranial nerve deficit.      Motor: No abnormal muscle tone.      Coordination: Coordination normal.   Psychiatric:         Mood and Affect: Mood is anxious. Affect is not angry or tearful.         Behavior: Behavior is agitated.         Significant Labs:   All pertinent labs within the past 24 hours have been reviewed.  CBC:   Recent Labs   Lab 01/29/22  0943   WBC 9.38   HGB 9.8*   HCT 31.0*        CMP:   Recent Labs   Lab 01/29/22  0943 01/30/22  0906    142   K 3.3* 3.5    106   CO2 25 27   * 70   BUN 10 12   CREATININE 1.4 1.5*   CALCIUM 7.9* 7.7*   PROT 5.1*  --    ALBUMIN 1.9*  --    BILITOT 0.3  --    ALKPHOS 57  --    AST 10  --    ALT <5*  --    ANIONGAP 7* 9   EGFRNONAA 42* 39*     POCT Glucose:   Recent Labs   Lab 01/30/22  1205 01/30/22  1658   POCTGLUCOSE 66* 73       Significant Imaging:           Assessment/Plan:      * Severe malnutrition  S/p G tube placement today per IR  Consult RD  Regular diet   IVFs  Pain control     1/25  Appears g-tube was placed for drainage   IR was unable to place GJ tube at that time  Spoke with IR will re-attempt GJ with assistance from GI once pt more stable   Due to bacteremia unable to start TPN.   Will discuss with team    1/26  - Plans to exchange G-tube for GJ tube per IR and GI. Plan to use J-tube for nutrition.  01/27:  --J tube to be placed today  --1/28/22- tube feeding initiated per recommendations with pleasure feeds allowed   --1/29- tube feeding continued- pt tolerated- full liquid pleasure feeds   --1/30-  tube feeding held due to concern for N/V- no evidence of vomiting TF. Feeding resumed at lower rate       Hypokalemia  Serum potassium 2.7, will replete   -1/28/22- K 2.8 - magnesium (1.0) and potassium replaced   -1/29/22- K 3.3- replaced- Mag 1.8  -1/30/22- potassium and Magnesium normalized- Ca of 7.7 corrected to 9        Bacteremia  Fever this morning, blood cx ordered. IV zosyn empirically.    1/26/22  -Blood cultures growing E.COLI, awaiting susceptibility   -Continue Zosyn    -WBCs have normalized   01/27:  --sensitivities returned, Zosyn dc'd, Ertapenem started  --repeat BC drawn  01/28/22- antibiotics continued per sensitivity results- repeat blood cultures pending   -1/29/22- blood cultures with no growth to date- current antibiotic continued   -1/30/22- current plan of care continued- repeat blood cultures with no growth to date       Hydronephrosis  Urology on board   S/p bilateral ureteral stent placement.   -guzmán catheter in place     Therapeutic opioid-induced constipation (OIC)  Will give 3 doses of Relistor  -continue bowel regime outpatient per Palliative Care recommendations   Continue Relistor, Senna and Miralax at this time.   -Mineral oil ordered per Gen Surg        Gastrostomy tube in place  Placed on 1/19 per IR  Green fluid draining from the G tube- this is attached to a guzmán bag, her colostomy has no output, she has bowel sounds, but endorsing nasuea and abdominal pain  CT abdomen showed interval placement of percutaneous G tube with catheter coursing along the left inferior anterior margin with possible small adjacent hemorrhage.    General Surgery recommends leaving this to gravity      Metastatic signet ring cell carcinoma  Oncology managing   Plan to arrange outpatient follow up with Primary Oncologist pending resolution of current clinical situation to initiate chemotherapy  -will arrange port placement when appropriate if desired by patient                Intractable nausea and  vomiting, Chronic  Scheduled Zofran   Phenergan/compazine prn         Colostomy present on admission  Consult wound care             Gastroesophageal reflux disease without esophagitis  Cont Pepcid      Chronic pain due to neoplasm  Cont Fentanyl patch   IV Dilaudid q3 hours prn   Palliative care managing       VTE Risk Mitigation (From admission, onward)         Ordered     IP VTE HIGH RISK PATIENT  Once         01/19/22 1435     Place sequential compression device  Until discontinued         01/19/22 1435                Discharge Planning   VICTOR MANUEL: 1/20/2022     Code Status: DNR   Is the patient medically ready for discharge?:     Reason for patient still in hospital (select all that apply): Patient trending condition, Laboratory test and Consult recommendations  Discharge Plan A: Home Health                  Kaleigh Sargent NP  Department of Hospital Medicine   [facility] - Med Surg

## 2022-01-31 PROBLEM — Z51.5 COMFORT MEASURES ONLY STATUS: Status: ACTIVE | Noted: 2022-01-01

## 2022-01-31 NOTE — ASSESSMENT & PLAN NOTE
--plan to arrange outpatient follow up with Primary Oncologist pending resolution of current clinical situation to talk about port placement if able. Hopefully patient improves from debility/malnutrition standpoint to initiate treatment. If not would need to discuss comfort care again.  -medi port placement on hold due functional status   01/29/2022.  At spoke with nursing staff patient and mother was sleeping reviewed palliative care note agree entirely with hospice care at this point  01/30/2022.  Extensive conversation with mother at outside of a room.  I strongly urged them to consider hospice with talked about the [facility] inpatient outpatient and would agree with plan and course of action they have chosen [facility] would like to make sure that there is an inpatient unit available for them should they need that.  1/31/22. Mother states she is able to care for daughter at her house upon discharge, with possible help from home health. She is aware that chemotherapy can not be re-initiated until patients functional status improves.

## 2022-01-31 NOTE — PLAN OF CARE
Patient BS noted 69 at start of shift. Patient noted asymptomatic.Patient given D50 as Per Md orders, tolerated well. Tube feeding noted at 10ml/hr at start of shift. Will increase as per Md orders through out the shift. Patients mother remains at bedside

## 2022-01-31 NOTE — PROGRESS NOTES
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  Oncology was consulted due to history of metastatic signet ring cell carcinoma.  She is also known to have right hydronephrosis for which ureteral stent placement has been planned by Urology.     Today patient continues to complain of abdominal pain, and soreness around Gtube site. She has some nausea but denies vomiting. She is yet to initiate systemic palliative therapy for her cancer due to poor nutritional status and right hydronephrosis.         Interval History: Patient was able to tolerate a popcicle and some chicken broth. She has some gas and nausea, but denies vomiting. Spoke with mother of patient (Nancy) who plans on having daughter discharged home with her and her  to live with them. She is hopeful they are able to have port placed once patients overall health status improves. She understands that possible chemotherapy in the future is all dependent on patients functional status.    Oncology Treatment Plan:   OP FOLFOXIRI Q2W    Medications:  Continuous Infusions:   lactated ringers 100 mL/hr at 01/31/22 0600     Scheduled Meds:   ertapenem (INVANZ) IVPB  1 g Intravenous Q24H    fentaNYL  1 patch Transdermal Q72H    methylnaltrexone  8 mg Subcutaneous Every other day    metoclopramide HCl  5 mg Intravenous QID (AC & HS)    mineral oil  30 mL Oral Daily    ondansetron  4 mg Intravenous Q6H    senna-docusate 8.6-50 mg  1 tablet Oral BID     PRN Meds:sodium chloride, acetaminophen, albuterol-ipratropium, aluminum-magnesium hydroxide-simethicone, bisacodyL, dextrose 50%, dextrose 50%, diazePAM, diphenhydrAMINE, glucagon (human recombinant), glucose, glucose,  HYDROmorphone, hyoscyamine, magnesium oxide, magnesium oxide, naloxone, phenazopyridine, potassium bicarbonate, potassium bicarbonate, potassium bicarbonate, potassium, sodium phosphates, potassium, sodium phosphates, potassium, sodium phosphates, prochlorperazine, promethazine (PHENERGAN) IVPB, simethicone, sodium chloride 0.9%, zolpidem     Review of Systems   Constitutional: Positive for activity change and fatigue. Negative for appetite change, chills, diaphoresis and fever.   Respiratory: Negative for cough and shortness of breath.    Cardiovascular: Negative for chest pain.   Gastrointestinal: Positive for nausea. Negative for constipation, diarrhea and vomiting.        Colostomy placed   Musculoskeletal: Negative for arthralgias and myalgias.   Neurological: Positive for weakness. Negative for dizziness, light-headedness and headaches.   Hematological: Negative for adenopathy. Does not bruise/bleed easily.   Psychiatric/Behavioral: Positive for decreased concentration and dysphoric mood.     Objective:     Vital Signs (Most Recent):  Temp: 98.5 °F (36.9 °C) (01/31/22 0739)  Pulse: 87 (01/31/22 0739)  Resp: 14 (01/31/22 0823)  BP: 111/66 (01/31/22 0739)  SpO2: (!) 92 % (01/31/22 0739) Vital Signs (24h Range):  Temp:  [98.1 °F (36.7 °C)-98.9 °F (37.2 °C)] 98.5 °F (36.9 °C)  Pulse:  [77-88] 87  Resp:  [12-18] 14  SpO2:  [92 %-96 %] 92 %  BP: (109-111)/(57-71) 111/66     Weight: 48.5 kg (106 lb 14.8 oz)  Body mass index is 18.35 kg/m².  Body surface area is 1.48 meters squared.      Intake/Output Summary (Last 24 hours) at 1/31/2022 1009  Last data filed at 1/31/2022 1000  Gross per 24 hour   Intake 6176.77 ml   Output 1000 ml   Net 5176.77 ml       Physical Exam  Constitutional:       General: She is not in acute distress.  HENT:      Head: Normocephalic and atraumatic.      Mouth/Throat:      Mouth: Mucous membranes are moist.   Cardiovascular:      Rate and Rhythm: Normal rate and regular rhythm.      Pulses:  Normal pulses.      Heart sounds: Normal heart sounds.   Pulmonary:      Effort: Pulmonary effort is normal.      Breath sounds: Normal breath sounds.   Abdominal:      General: Bowel sounds are normal.      Tenderness: There is no abdominal tenderness.      Comments: G tube placed and colostomy bag with output    Musculoskeletal:         General: No tenderness.      Right lower leg: No edema.      Left lower leg: No edema.   Skin:     General: Skin is warm.      Coloration: Skin is not jaundiced.   Neurological:      Mental Status: She is alert. Mental status is at baseline.       Significant Labs:   BMP:   Recent Labs   Lab 01/30/22  0906   GLU 70      K 3.5      CO2 27   BUN 12   CREATININE 1.5*   CALCIUM 7.7*   MG 1.7     Diagnostic Results:  I have reviewed all pertinent imaging results/findings within the past 24 hours.    Assessment/Plan:     * Severe malnutrition  -G tube in place. Draining.  -s/p for G-J tube placement to allow nutrition and decompression. Started feeding per GI recs. Hopefully nutritional/functional status improves.   -palliative care has discussed hospice with patient, moving forward with this.    Hypokalemia  -Improving   -management per primary team    Bacteremia  --BC + gram neg bacteremia (E. Coli)   -on Ertapenem   --IV abx management per Primary team      Hydronephrosis  S/p bilateral stent placement     Metastatic signet ring cell carcinoma  --plan to arrange outpatient follow up with Primary Oncologist pending resolution of current clinical situation to talk about port placement if able. Hopefully patient improves from debility/malnutrition standpoint to initiate treatment. If not would need to discuss comfort care again.  -medi port placement on hold due functional status   01/29/2022.  At spoke with nursing staff patient and mother was sleeping reviewed palliative care note agree entirely with hospice care at this point  01/30/2022.  Extensive conversation with  mother at outside of a room.  I strongly urged them to consider hospice with talked about the [facility] inpatient outpatient and would agree with plan and course of action they have chosen [facility] hospice would like to make sure that there is an inpatient unit available for them should they need that.  1/31/22. Mother has spoken to hematology/oncology and palliative care today. Proceeding with hospice. Due to functional status of patient, unable to continue with port placement and initiate treatment.     Intractable nausea and vomiting, Chronic  -no recent vomiting  -management per Primary team/palliative medicine       Colostomy present on admission        Chronic pain due to neoplasm  -management per palliative medicine           Thank you for your consult. I will follow-up with patient. Please contact us if you have any additional questions.     Nela Feliciano PA-C  Hematology/Oncology  [facility]

## 2022-01-31 NOTE — PROGRESS NOTES
Advance Care Planning     Progress Note   Palliative Medicine      SUBJECTIVE:     History of Present Illness:  Patient seen and examined with her mother at bedside. I was contacted over the weekend in regards to her request for discharge home. It seemed that she was less committed to the idea of hospice and end of life care but rather more control in her situation. I thought that the changes made to diet would help her feel better but her mother says she has still been very down and waiting to talk to me. She has not tolerated increasing TF greater than 25 as she begins to complain of abdominal pain then dry heave. I presented the option to follow up with her primary oncologist and have the port placed later with  to help with TF and IV abx however her mother was very overwhelmed with this option. I agree this is not ideal but there is nothing more that can be addressed during this admission to make her a better candidate for treatment or enable her to get stronger. I fear the opposite is occurring as she is not able to get out of bed and becoming more and more depressed isolated from her family. I suspect her primary oncologist would agree with the on call oncologist that cancer treatment is not an option and not likely to be the case any time soon. I think that hospice will be the final outcome and recommend enrolling now vs going home to prove the plan will not work. Both eventually agreed with my recommendation and would like to wait to discharge tomorrow so the house can be prepared tonight. She will need to complete her course of IV abx and a PICC line will need to be placed. In light of her continued pain and PRN usage I will increase fentanyl and Dilaudid dose.      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 12/01/2017    T4 N1bM1 B 3/85 nodes positive    Encounter for blood transfusion     Leukocytosis 1/19/2022    PONV (postoperative nausea and vomiting)      Past Surgical History:   Procedure Laterality Date    APPENDECTOMY      BILATERAL OOPHORECTOMY      CHOLECYSTECTOMY      with Cytoreduction and salpingectomy    COLOSTOMY      CYSTOSCOPY N/A 12/21/2021    Procedure: CYSTOSCOPY;  Surgeon: John Martínez MD;  Location: [facility];  Service: Urology;  Laterality: N/A;    CYSTOSCOPY WITH URETEROSCOPY, RETROGRADE PYELOGRAPHY, AND INSERTION OF STENT Bilateral 1/22/2022    Procedure: CYSTOSCOPY, WITH RETROGRADE PYELOGRAM AND URETERAL STENT INSERTION;  Surgeon: John Martínez MD;  Location: [facility] OR;  Service: Urology;  Laterality: Bilateral;    CYTOREDUCTION      ENDOSCOPIC ULTRASOUND OF UPPER GASTROINTESTINAL TRACT N/A 6/11/2021    Procedure: ULTRASOUND, UPPER GI TRACT, ENDOSCOPIC;  Surgeon: Rosaura Lakhani MD;  Location: [facility];  Service: Endoscopy;  Laterality: N/A;  Linear scope    ERCP N/A 6/11/2021    Procedure: ERCP (ENDOSCOPIC RETROGRADE CHOLANGIOPANCREATOGRAPHY);  Surgeon: Rosaura Lakhani MD;  Location: [facility];  Service: Endoscopy;  Laterality: N/A;    ESOPHAGOGASTRODUODENOSCOPY N/A 5/6/2021    Procedure: EGD (ESOPHAGOGASTRODUODENOSCOPY);  Surgeon: Brandon Rosen MD;  Location: [facility];  Service: Gastroenterology;  Laterality: N/A;    ESOPHAGOGASTRODUODENOSCOPY  06/11/2021    ESOPHAGOGASTRODUODENOSCOPY N/A 1/27/2022    Procedure: EGD (ESOPHAGOGASTRODUODENOSCOPY);  Surgeon: Sumi Doan MD;  Location: [facility] ENDO;  Service: Endoscopy;  Laterality: N/A;    EXCISION OF LESION  10/20/2020    Procedure: EXCISION, LESION COLOSTOMY;  Surgeon: Layton Anderson MD;  Location: [facility];  Service: General;;    FLUOROSCOPY Bilateral 1/19/2022    Procedure: Insertion of G-J tube and bilateral upper extremity venogram;  Surgeon: Bryan  CORTEZ Torres MD;  Location: [facility] [place] CATH LAB;  Service: General;  Laterality: Bilateral;    FLUOROSCOPY N/A 1/25/2022    Procedure: Arm Port Insertion;  Surgeon: Bryan Torres MD;  Location: [facility] [place] CATH LAB;  Service: General;  Laterality: N/A;    LAPAROTOMY, EXPLORATORY  02/12/2020    LYSIS OF ADHESION, COLOSTOMY    REVISION COLOSTOMY N/A 10/20/2020    Procedure: REVISION, COLOSTOMY;  Surgeon: Layton Anderson MD;  Location: [facility] [place] OR;  Service: General;  Laterality: N/A;  Ostomy bag placed    RIGHT COLECTOMY       History reviewed. No pertinent family history.  Review of patient's allergies indicates:  No Known Allergies    Medications:    Current Facility-Administered Medications:     0.9%  NaCl infusion (for blood administration), , Intravenous, Q24H PRN, Kerline Hernandez NP    acetaminophen tablet 650 mg, 650 mg, Oral, Q4H PRN, Soni Urias NP, 650 mg at 01/24/22 1722    albuterol-ipratropium 2.5 mg-0.5 mg/3 mL nebulizer solution 3 mL, 3 mL, Nebulization, Q6H PRN, Soni Urias NP    aluminum-magnesium hydroxide-simethicone 200-200-20 mg/5 mL suspension 30 mL, 30 mL, Oral, QID PRN, Soni Urias NP    bisacodyL suppository 10 mg, 10 mg, Other, Daily PRN, Kerline Hernandez NP    diazePAM injection 2.5 mg, 2.5 mg, Intravenous, Q6H PRN, Remedios Rossi PA-C, 2.5 mg at 01/30/22 1031    ertapenem (INVANZ) 1 g in sodium chloride 0.9% 100 mL IVPB, 1 g, Intravenous, Q24H, CARMINA Peters, Stopped at 01/30/22 1621    fentaNYL 100 mcg/hr 1 patch, 1 patch, Transdermal, Q72H, Remedios Rossi PA-C, 1 patch at 01/29/22 0952    fentaNYL 25 mcg/hr 1 patch, 1 patch, Transdermal, Q72H, Remedios Rossi PA-C    HYDROmorphone (PF) injection 1.5 mg, 1.5 mg, Intravenous, Q3H PRN, Remedios Rossi PA-C    hyoscyamine ODT 0.125 mg, 0.125 mg, Sublingual, Q4H PRN, John Martínez MD    lactated ringers infusion, , Intravenous, Continuous, Colt Lazaro MD, Last Rate: 100  mL/hr at 01/31/22 0600, Rate Verify at 01/31/22 0600    metoclopramide HCl injection 5 mg, 5 mg, Intravenous, QID (AC & HS), Remedios Rossi PA-C, 5 mg at 01/31/22 0558    ondansetron injection 8 mg, 8 mg, Intravenous, Q6H, Remedios Rossi PA-C    prochlorperazine injection Soln 5 mg, 5 mg, Intravenous, Q6H PRN, Soni Urias NP    senna-docusate 8.6-50 mg per tablet 1 tablet, 1 tablet, Oral, BID, Layton Anderson MD, 1 tablet at 01/31/22 1011    simethicone chewable tablet 80 mg, 1 tablet, Oral, QID PRN, Soni Urias NP    Review of Symptoms    Symptom Assessment (ESAS 0-10 Scale)  Pain:  10  Dyspnea:  0  Anxiety:  0  Nausea:  0  Depression:  0  Anorexia:  0  Fatigue:  0  Insomnia:  0  Restlessness:  0  Agitation:  0     CAM / Delirium:  Negative  Constipation:  Negative  Diarrhea:  Negative    Bowel Management Plan (BMP):  Yes      Pain Assessment:  Location(s): generalized    Generalized       Location: generalized        Quality: none        Quantity: 10/10 in intensity, gradually worsening        Aggravating Factors: none        Alleviating Factors: opiates        Associated Symptoms: constipation and nausea    ECOG Performance Status rdGrdrrdarddrderd:rd rd3rd Living Arrangements:  Lives with family      Advance Care Planning   Advance Directives:   Living Will: Yes        Copy on chart: Yes    LaPOST: No    Do Not Resuscitate Status: Yes    Medical Power of : Yes      Decision Making:  Family answered questions         ROS:  Review of Systems   Constitutional: Positive for fatigue. Negative for appetite change.   Respiratory: Negative for cough and shortness of breath.    Cardiovascular: Negative for chest pain and leg swelling.   Gastrointestinal: Positive for abdominal pain, nausea and vomiting.   Neurological: Positive for weakness. Negative for numbness.   Psychiatric/Behavioral: Positive for agitation and dysphoric mood. The patient is not nervous/anxious.        OBJECTIVE:     Physical  Exam:  Vitals: Temp: 98.5 °F (36.9 °C) (01/31/22 0739)  Pulse: 87 (01/31/22 0739)  Resp: 14 (01/31/22 0823)  BP: 111/66 (01/31/22 0739)  SpO2: (!) 92 % (01/31/22 0739)    Physical Exam  Vitals and nursing note reviewed.   Constitutional:       General: She is not in acute distress.     Appearance: Normal appearance. She is well-developed. She is cachectic. She is ill-appearing.      Comments: Fentanyl 100mcg patch L posterior chest   HENT:      Head: Normocephalic and atraumatic.   Cardiovascular:      Rate and Rhythm: Normal rate and regular rhythm.      Heart sounds: Normal heart sounds. No murmur heard.  No friction rub.   Pulmonary:      Effort: Pulmonary effort is normal. No respiratory distress.      Breath sounds: Normal breath sounds. No wheezing or rales.   Abdominal:      General: Bowel sounds are increased. There is no distension.      Tenderness: There is abdominal tenderness. There is guarding.   Musculoskeletal:      Cervical back: Normal range of motion.      Right lower leg: No edema.      Left lower leg: No edema.   Skin:     General: Skin is dry.      Coloration: Skin is pale.      Findings: No rash.   Neurological:      Mental Status: She is alert.         Labs:  WBC   Date Value Ref Range Status   01/29/2022 9.38 3.90 - 12.70 K/uL Final       Hemoglobin   Date Value Ref Range Status   01/29/2022 9.8 (L) 12.0 - 16.0 g/dL Final       Hematocrit   Date Value Ref Range Status   01/29/2022 31.0 (L) 37.0 - 48.5 % Final       MCV   Date Value Ref Range Status   01/29/2022 81 (L) 82 - 98 fL Final       Platelets   Date Value Ref Range Status   01/29/2022 284 150 - 450 K/uL Final       BMP  Lab Results   Component Value Date     01/30/2022    K 3.5 01/30/2022     01/30/2022    CO2 27 01/30/2022    BUN 12 01/30/2022    CREATININE 1.5 (H) 01/30/2022    CALCIUM 7.7 (L) 01/30/2022    ANIONGAP 9 01/30/2022    ESTGFRAFRICA 45 (A) 01/30/2022    EGFRNONAA 39 (A) 01/30/2022       Lab Results    Component Value Date    AST 10 01/29/2022    ALKPHOS 57 01/29/2022    BILITOT 0.3 01/29/2022       Albumin   Date Value Ref Range Status   01/29/2022 1.9 (L) 3.5 - 5.2 g/dL Final       Radiology:I have reviewed all pertinent imaging results/findings within the past 24 hours.  - CT abd/ pelvis 1/23/22 reviewed     ASSESSMENT   Madeline Warner is a 56 y.o. 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 She is followed in the PM clinic for pain and symptom management and were also consulted during her last admission. She was constipated at that time and was counseled extensively regarding the need for preventative bowel regimen and given OIC education for home. Imaging indicates constipation and has been resistant to methyl-naltrexone and laxatives. Palliative Medicine was consulted to assist with pain management as well as GOC.     PLAN   1. Neoplasm related pain  - Increase fentanyl to 125mcg/ hr q72 hr   - Increase Dilaudid to 1.5mg IV q3hr PRN pain  - Once she is cleared for PO intake consider rotating to her home dose of oxycodone solution 30mg q4hr PRN via J tube (no crushed meds)     2. Constipation  - Resumed Relistor as we are unable to give PO and she is having ostomy output     3. Metastatic signet ring cell adenocarcinoma  - s/p ureteral stent  - Plan was to have port placed soon to facilitate palliative systemic chemotherapy but she is not optimistic this will occur and not interested in delaying comfort.   - She will now enroll with hospice and plan to return home tomorrow     4. Encounter for Palliative Care  - Code status: DNR/I- comfort care  - HCPOA on file and reviewed  - Hospice to reach out and complete enrollment  - Prognosis: days to weeks    5. Anxiety  - Diazepam  2.5mg IV q6hr PRN anxiety or agitation    6. ESBL bacteremia  - PICC to be placed today to complete course of IV abx with hospice      Thank you for allowing Palliative Medicine to be involved in the care of Madeline Warner.       Medical decision making: HIGH based on high risk of death untreated symptoms high risk medications poor prognosis management of more than one chronic illness in exacerbation or progression of disease managing side effects of medications or polypharmacy parenteral opioids    Plan required increased review of medication choice, interaction, dosing, frequency, and route due to patient complexity. Patient complexity increased by: high risk medication use, being on more than 8 medications, feeding tube, NPO and malnutrition    Total visit time 125 min  > 50% of 35 min visit spent in chart review, and coordination of care.     90 min ACP time spent: face to face discussion of goals of care, symptom assessment, coordination of care and emotional support, formulating and communicating prognosis and goals of care, exploring burden/ benefit of various approaches of treatment. [id number], [id number]      Remedios Rossi PA-C  Palliative Medicine

## 2022-01-31 NOTE — ASSESSMENT & PLAN NOTE
-G tube in place. Draining.  -s/p for G-J tube placement to allow nutrition and decompression. Started feeding per GI recs. Hopefully nutritional/functional status improves.   -palliative care has discussed hospice with patient, moving forward with this.

## 2022-01-31 NOTE — ASSESSMENT & PLAN NOTE
--pending PICC line placement per IR.   --plan to be discharged on hospice  -- Due to functional status of patient, unable to continue with port placement and initiate treatment.

## 2022-01-31 NOTE — PLAN OF CARE
RD Recommendations    1. Continue enteral nutrition as prescribed: Isosource 1.5, continuous @ 40 mL/hr     - 100mL H2O flush q 4-6 hours or per MD/NP.    - Provides 1440 calories (100%EEN), 65g protein (>100%EPN), and 733ml H20 + FWF.     - Monitor for refeeding. Check Mg, K+, Na, Phos, and glucose; correct as indicated.      2. Advance PO diet as tolerated, goal: regular and encourage intake    3. RD to follow up to monitor intake, tolerance, and labs.   *Please send consult if acute nutrition needs arise.    Goals:   Pt will tolerate >50% estimated energy and protein needs by RD f/u.      Iliana Fried, MS, RD, LDN

## 2022-01-31 NOTE — ASSESSMENT & PLAN NOTE
-Palliative Care following   -analgesia as needed   -antiemetics as needed   -Case management consulted to assist with discharge planning   -pt to discharge home with [facility] tomorrow

## 2022-01-31 NOTE — PROGRESS NOTES
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 Pt will be placed in outpatient extended recovery for pain control, IVfs, and RD consult.     On exam, pt complain of nausea and 10/10 pain at G tube site. Pt reports ongoing chronic pain, N/V, weakness/fatigue, and weight loss for approximately one month.       Overview/Hospital Course:  Patient was admitted for abd pain. 1/20: G tube placed yesterday per IR. Today the patient has copious amounts of green fluid draining from the G tube- this is attached to a guzmán bag, her colostomy has no output, she has bowel sounds, but endorsing nasuea and abdominal pain. CT abdomen pending. Case discussed with Dr Barger and Dr Torres. Initial G tube placement check performed right after placement and the subsequent the following day is pending. As of 1/21/22  CT abdomen showed interval placement of percutaneous G tube with catheter coursing along the left inferior anterior margin with possible small adjacent hemorrhage. Patient c/o constipation requesting an injection. GI has been consulted to assist. Urology following and plans for stent placement tomorrow. G-tube still draining green fluid.  As of 1/22/22 pt reports passing some gas, still no BM. Will give x 3 doses of Relistor for constipation. Plans for ureteral stent placement today. As of 1/23/22 still no bowel movement. + Flatus. C/o abdominal pain and N/V. Abdominal xray pending. S/p bilateral ureteral stent placement. Palliative care consulted for symptom  management and goal planning. As of 1/24/22 patient very lethargic this am.  Pt had dose of Ativan this morning. Narcotics and ativan D/c'd until patient more alert. Mother reports pt passed a very small amount of hard stool overnight. Fever this morning, blood cx ordered. IV zosyn empirically. General Surgery consulted for possible obstruction. As of 1/25/22 patient is more awake but not back to her baseline. Abdominal imaging showed no obstruction. Colostomy with output. Plan for SBFT today. GI has been consulted. Case discussed with General surgery and IR. Blood cultures positive for gram negative rods. Continue IV zosyn. Port placement canceled due to bacteremia. Palliative care following. As of 1/26/22 pt awake and alert this morning, having ostomy output. H/H 7/22.0, will transfuse 1 unit of PRBCs. Serum potassium 2.7, will replete. Blood cultures growing E.COLI, awaiting susceptibility. Continue Zosyn.  Plans to exchange G-tube for GJ tube per IR and GI. Plan to use J-tube for nutrition. Continue current bowel regimen.   01/27: Plans to have J tube placed today for nutrition. BC from 1/24 shows E coli ESB: that is sensitive to Ertapenem and Meropenem. Zosyn stopped, Ertapenem started. BC redrawn. On 1/28/22, J tube in place with tube feedings initiated per GI recommendation. Magnesium and Potassium replaced. Ertapenem continued. Repeat blood cultures with results pending.  Palliative Care following with Hospice info visit arranged. On 1/29/22, pt very emotional and expressed desire to discharge today with hospice to spend time with granddaughter. Palliative care updated on current pt status.  Blood cultures with no growth to date.  Will discuss patient wishes tomorrow morning and determine need for port placement pending results of conversation. TF tolerated with adequate colostomy output. Bowel regimen in place per General Surgery. On 1/30/22, Heme/Onc discussed Hospice with patient's mother.  Palliative  Care following and pt/mother have had informational discussions with [facility].  Pt/mother requesting to speak with Palliative Care tomorrow prior to making a plan due to established rapport.  TF held due to concern for N/V with antiemetics given. Pt reports more nausea and no evidence of vomiting TF noted. General Surgery following with TF to be resumed at lower rate for tolerance.  Glucose treated per Hypoglycemia protocol.  On 1/31/22, N/V improved on prescribed regime with TF tolerated at 25-30 cc/hr.  PICC line placement unsuccessful. IR consulted for placement. Case discussed with Palliative care and Heme/Onc with PICC line placement for long term antibiotics and discharge to [facility] planned for tomorrow.   Case Management consulted for discharge planning.          Interval History: pt in bed upon exam with no signs of acute distress. N/V improved on prescribed regime with TF tolerated at 25-30 cc/hr.  PICC line placement unsuccessful. IR consulted for placement. Case discussed with Palliative care and Heme/Onc with PICC line placement for long term antibiotics and discharge to [facility] planned for tomorrow.  Case Management consulted for discharge planning.      Review of Systems   Constitutional: Positive for activity change, appetite change, chills and fatigue. Negative for fever and unexpected weight change.   HENT: Negative for congestion, mouth sores, nosebleeds, sore throat, trouble swallowing and voice change.    Eyes: Negative for visual disturbance.   Respiratory: Negative for cough, chest tightness, shortness of breath and wheezing.    Cardiovascular: Negative for chest pain, palpitations and leg swelling.   Gastrointestinal: Positive for abdominal pain, nausea and vomiting. Negative for abdominal distention, blood in stool, constipation and diarrhea.   Genitourinary: Negative for difficulty urinating, dysuria and hematuria.   Musculoskeletal: Positive for arthralgias. Negative  for back pain and myalgias.   Skin: Negative for pallor, rash and wound.   Neurological: Positive for weakness. Negative for dizziness, syncope and headaches.   Hematological: Negative for adenopathy. Does not bruise/bleed easily.   Psychiatric/Behavioral: Negative for agitation. The patient is not nervous/anxious.      Objective:     Vital Signs (Most Recent):  Temp: 99.7 °F (37.6 °C) (01/31/22 1201)  Pulse: 91 (01/31/22 1201)  Resp: 17 (01/31/22 1557)  BP: 126/73 (01/31/22 1201)  SpO2: 98 % (01/31/22 1201) Vital Signs (24h Range):  Temp:  [98.5 °F (36.9 °C)-99.7 °F (37.6 °C)] 99.7 °F (37.6 °C)  Pulse:  [80-91] 91  Resp:  [14-18] 17  SpO2:  [92 %-98 %] 98 %  BP: (109-126)/(57-73) 126/73     Weight: 48.5 kg (106 lb 14.8 oz)  Body mass index is 18.35 kg/m².    Intake/Output Summary (Last 24 hours) at 1/31/2022 1721  Last data filed at 1/31/2022 1542  Gross per 24 hour   Intake 6626.77 ml   Output 1750 ml   Net 4876.77 ml      Physical Exam  Vitals and nursing note reviewed.   Constitutional:       General: She is not in acute distress.     Appearance: She is cachectic. She is ill-appearing. She is not diaphoretic.   HENT:      Head: Normocephalic and atraumatic.      Nose: Nose normal.   Eyes:      General: No scleral icterus.     Conjunctiva/sclera: Conjunctivae normal.   Neck:      Trachea: No tracheal deviation.   Cardiovascular:      Rate and Rhythm: Normal rate and regular rhythm.      Heart sounds: Normal heart sounds. No murmur heard.  No friction rub. No gallop.    Pulmonary:      Effort: Pulmonary effort is normal. No respiratory distress.      Breath sounds: Normal breath sounds. No stridor. No wheezing or rales.   Chest:      Chest wall: No tenderness.   Abdominal:      General: Bowel sounds are normal. There is distension (mild).      Palpations: Abdomen is soft. There is no mass.      Tenderness: Tenderness: g tube site  There is no guarding or rebound.      Comments: colostomy LLQ with liquid stool in  bag   G/J tube dressing C/D/I- attached to a guzmán bag draining light green fluid   Genitourinary:     Comments: Guzmán catheter   Musculoskeletal:         General: No tenderness or deformity. Normal range of motion.      Cervical back: Normal range of motion and neck supple.   Skin:     General: Skin is warm and dry.      Coloration: Skin is not pale.      Findings: No erythema or rash.   Neurological:      Mental Status: She is oriented to person, place, and time. She is lethargic.      Cranial Nerves: No cranial nerve deficit.      Motor: No abnormal muscle tone.      Coordination: Coordination normal.   Psychiatric:         Mood and Affect: Affect is not angry or tearful.         Behavior: Behavior is not agitated.         Significant Labs:   All pertinent labs within the past 24 hours have been reviewed.  CBC: No results for input(s): WBC, HGB, HCT, PLT in the last 48 hours.  CMP:   Recent Labs   Lab 01/30/22  0906      K 3.5      CO2 27   GLU 70   BUN 12   CREATININE 1.5*   CALCIUM 7.7*   ANIONGAP 9   EGFRNONAA 39*       Significant Imaging:           Assessment/Plan:      * Severe malnutrition  S/p G tube placement today per IR  Consult RD  Regular diet   IVFs  Pain control     1/25  Appears g-tube was placed for drainage   IR was unable to place GJ tube at that time  Spoke with IR will re-attempt GJ with assistance from GI once pt more stable   Due to bacteremia unable to start TPN.   Will discuss with team    1/26  - Plans to exchange G-tube for GJ tube per IR and GI. Plan to use J-tube for nutrition.  01/27:  --J tube to be placed today  --1/28/22- tube feeding initiated per recommendations with pleasure feeds allowed   --1/29- tube feeding continued- pt tolerated- full liquid pleasure feeds   --1/30- tube feeding held due to concern for N/V- no evidence of vomiting TF. Feeding resumed at lower rate   -1/31/22- TF tolerated at 25-30 cc/hr- Social work consulted for discharge planning        Comfort measures only status  -Palliative Care following   -analgesia as needed   -antiemetics as needed   -Case management consulted to assist with discharge planning   -pt to discharge home with [facility] tomorrow    Hypokalemia  Serum potassium 2.7, will replete   -1/28/22- K 2.8 - magnesium (1.0) and potassium replaced   -1/29/22- K 3.3- replaced- Mag 1.8  -1/30/22- potassium and Magnesium normalized- Ca of 7.7 corrected to 9        Bacteremia  Fever this morning, blood cx ordered. IV zosyn empirically.    1/26/22  -Blood cultures growing E.COLI, awaiting susceptibility   -Continue Zosyn    -WBCs have normalized   01/27:  --sensitivities returned, Zosyn dc'd, Ertapenem started  --repeat BC drawn  01/28/22- antibiotics continued per sensitivity results- repeat blood cultures pending   -1/29/22- blood cultures with no growth to date- current antibiotic continued   -1/30/22- current plan of care continued- repeat blood cultures with no growth to date   -1/31/22- IR consulted for PICC line placement- IVANZ x 5 days- pt to discharge tomorrow       Hydronephrosis  Urology on board   S/p bilateral ureteral stent placement.   -guzmán catheter in place     Therapeutic opioid-induced constipation (OIC)  Will give 3 doses of Relistor  -continue bowel regime outpatient per Palliative Care recommendations   Continue Relistor, Senna and Miralax at this time.   -Mineral oil ordered per Gen Surg        Gastrostomy tube in place  Placed on 1/19 per IR  Green fluid draining from the G tube- this is attached to a guzmán bag, her colostomy has no output, she has bowel sounds, but endorsing nasuea and abdominal pain  CT abdomen showed interval placement of percutaneous G tube with catheter coursing along the left inferior anterior margin with possible small adjacent hemorrhage.    General Surgery recommends leaving this to gravity      Metastatic signet ring cell carcinoma  Oncology managing   Plan to arrange outpatient  follow up with Primary Oncologist pending resolution of current clinical situation to initiate chemotherapy  -port placement on hold -will arrange when appropriate as outpatient                Intractable nausea and vomiting, Chronic  Scheduled Zofran   Phenergan/compazine prn         Colostomy present on admission  Consult wound care             Gastroesophageal reflux disease without esophagitis  Cont Pepcid      Chronic pain due to neoplasm  Cont Fentanyl patch   IV Dilaudid q3 hours prn   Palliative care managing       VTE Risk Mitigation (From admission, onward)         Ordered     IP VTE HIGH RISK PATIENT  Once         01/19/22 1435     Place sequential compression device  Until discontinued         01/19/22 1435                Discharge Planning   VICTOR MANUEL: 1/20/2022     Code Status: DNR   Is the patient medically ready for discharge?:     Reason for patient still in hospital (select all that apply): Patient trending condition, Treatment and Consult recommendations  Discharge Plan A: Home Health                  Kaleigh Sargent NP  Department of Hospital Medicine   [facility]

## 2022-01-31 NOTE — PROGRESS NOTES
[facility]  Adult Nutrition  Progress Note    SUMMARY     Recommendations    1. Continue enteral nutrition as prescribed: Isosource 1.5, continuous @ 40 mL/hr     - 100mL H2O flush q 4-6 hours or per MD/NP.    - Provides 1440 calories (100%EEN), 65g protein (>100%EPN), and 733ml H20 + FWF.     - Monitor for refeeding. Check Mg, K+, Na, Phos, and glucose; correct as indicated.      2. Advance PO diet as tolerated, goal: regular and encourage intake    3. RD to follow up to monitor intake, tolerance, and labs.   *Please send consult if acute nutrition needs arise.    Goals:    1. Initiate enteral nutrition within 48 hours.   Nutrition Goal Status: met   2. Pt will tolerate >50% estimated energy and protein needs by RD f/u.  Nutrition Goal Status: not met, continues   Communication of RD recs: POC, sticky note, secure chat and second sign    Assessment and Plan  Nutrition Problem:    Severe Protein-Calorie Malnutrition    in the context of Chronic Illness/Injury  Related to (etiology):    Inadequate ability to consume sufficient energy    Inadequate oral intake    Altered GI function    Increased energy and protein needs    Medical condition  Signs and Symptoms (as evidenced by):    Energy Intake: <75% of estimated energy requirement for  >7 days    Body Fat Depletion: moderate and severe depletion of orbitals, triceps and thoracic and lumbar region     Muscle Mass Depletion: moderate and severe depletion of temples, clavicle region, scapular region, interosseous muscle and lower extremities     Weight Loss: 12 lbs, 12%% x 1 month   Interventions(treatment strategy):    Enteral nutrition     High calorie, high protein diet    Collaboration with other care providers  Nutrition Diagnosis Status:   Improving    Reason for Assessment  Reason for assessment: RD follow-up  Diagnosis: Severe malnutrition    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 Reports no other GI issues. Would love to "just get everything through the tube". PEG placed yesterday; says the abdominal area is painful, sore and tender.      1/24: Pt sleeping during RD visit, person at bedside reports poor appetite and intake since last assessment, 0-50%. PEG still not used, says they are considering g-j-tube. Wt gain since last assessment, possible fluid. Pt could benefit from ONS TID. Will continue to monitor.      1/26: RD assessment completed per EMR, pt with continued NPO status, 0-25% energy needs. Significant drainage from g-tube, plans to place g/j tube today with EN initiation after 24h. Unable to initate PN d/t bacteremia. Pt with continued weight loss since last assessment. Significant altered labs noted (see below). Will continue to monitor.     1/31: TF initiated and advancing to goal, full liquid diet. Weight gain since last assessment. Will continue to monitor.    Nutrition Discharge Planning - pending medical course, enteral nutrition via PEG, PO diet as tolerated    Nutrition Risk Screen  Nutrition risk screen: tube feeding or parenteral nutrition     Nutrition/Diet History  Patient Reported Diet/Restrictions/Preferences: regular diet, soylent nutrition supplement at home  Food Preferences: likes well seasoned food  Food Allergies: NKFA  Factors Affecting Nutritional Intake: abdominal pain, altered GI function, nausea/vomiting and pain, NPO   Spiritual, Cultural Beliefs, Zoroastrian Practices, Values that Affect Care: no    Anthropometrics  Temp: 99.7 °F (37.6 °C)  Height Method: Stated  Height: 5' 4" " (162.6 cm)  Height (inches): 64 in  Weight Method: Bed Scale  Weight: 48.5 kg (106 lb 14.8 oz)  Weight (lb): 106.92 lb  Ideal Body Weight (IBW), Female: 120 lb  % Ideal Body Weight, Female (lb): 76.67 %  BMI (Calculated): 18.3     Wt Readings from Last 5 Encounters:   01/31/22 48.5 kg (106 lb 14.8 oz)   01/14/22 40.4 kg (89 lb 1.1 oz)   01/10/22 41.4 kg (91 lb 4.3 oz)   01/06/22 45.6 kg (100 lb 8.5 oz)   12/21/21 41.6 kg (91 lb 11.4 oz)     Labs  Pertinent Labs: reviewed  Lab Results   Component Value Date     01/30/2022    CALCIUM 7.7 (L) 01/30/2022    HGB 9.8 (L) 01/29/2022    HCT 31.0 (L) 01/29/2022    K 3.5 01/30/2022    PHOS 2.5 (L) 01/30/2022    BUN 12 01/30/2022    CREATININE 1.5 (H) 01/30/2022    ESTGFRAFRICA 45 (A) 01/30/2022    EGFRNONAA 39 (A) 01/30/2022    ALBUMIN 1.9 (L) 01/29/2022     Meds  Pertinent Medications: reviewed    ertapenem (INVANZ) IVPB  1 g Intravenous Q24H    fentaNYL  1 patch Transdermal Q72H    fentaNYL  1 patch Transdermal Q72H    metoclopramide HCl  5 mg Intravenous QID (AC & HS)    ondansetron  8 mg Intravenous Q6H    senna-docusate 8.6-50 mg  1 tablet Oral BID     PRN Meds:sodium chloride, acetaminophen, albuterol-ipratropium, aluminum-magnesium hydroxide-simethicone, bisacodyL, diazePAM, HYDROmorphone, hyoscyamine, prochlorperazine, simethicone   Continuous Infusions:   lactated ringers 100 mL/hr at 01/31/22 1427     Physical Findings/Assessment  N/V: frequent  Wounds: not indicated   Edema:  not indicated   Last Bowel Movement: 01/31/22      Reginaldo Score: 13  Mouth/Teeth WDL: WDL    O2 Device (Oxygen Therapy): room air  NFPE performed indicating severe muscle and fat wasting    Estimated/Assessed Needs  Weight Used For Calorie Calculations: 40.2 kg (88 lb 10 oz)  Energy Calorie Requirements (kcal): 6451-2442 (35-40, malnutrition)  Energy Need Method: Kcal/kg  Protein Requirements: 48-60g (1.2-1.5g/kg (malnutrition)  Weight Used For Protein Calculations: 40.2 kg (88 lb  10 oz)  RDA Method (mL): 1400  CHO Requirement: 175-200g (50% CHO)    Nutrition Prescription Ordered  Isosource 1.5, continuous @ 40 mL/hr and full liquid diet    Evaluation of Received Nutrient/Fluid Intake  Energy Calories Required: not meeting needs  Protein Required: not meeting needs  Tolerance: n/v indicated  % Intake of Estimated Energy Needs: 0 - 25 %  % Meal Intake: 0 - 25 %    Monitor and Evaluation  Food and Nutrient Intake: energy intake  Food and Nutrient Administration: diet order and enteral nutrition administration  Anthropometric Measurements: weight, weight change, body mass index  Biochemical Data, Medical Tests and Procedures: electrolyte and renal panel, lipid profile, gastrointestinal profile, glucose/endocrine profile, Inflammatory profile  Nutrition-Focused Physical Findings: overall appearance     Malnutrition Assessment  Malnutrition Type: chronic illness  Energy Intake: severe energy intake  Edema (Fluid Accumulation): 0-->no edema present   Subcutaneous Fat Loss (Final Summary): severe protein-calorie malnutrition  Muscle Loss Evaluation (Final Summary): severe protein-calorie malnutrition    Severe Weight Loss (Malnutrition): greater than 5% in 1 month    Nutrition Follow-Up  Level of nutrition risk: high  Frequency of follow-up: 2-3x weekly  Tentative Next Date to be Seen by RD: 2/1/22  Iliana Fried, MS, RD, LDN

## 2022-01-31 NOTE — PROGRESS NOTES
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  Oncology was consulted due to history of metastatic signet ring cell carcinoma.  She is also known to have right hydronephrosis for which ureteral stent placement has been planned by Urology.     Today patient continues to complain of abdominal pain, and soreness around Gtube site. She has some nausea but denies vomiting. She is yet to initiate systemic palliative therapy for her cancer due to poor nutritional status and right hydronephrosis.         Interval History: Patient was able to tolerate a popcicle and some chicken broth. She has some gas and nausea, but denies vomiting. Spoke with mother of patient (Nancy) who plans on having daughter discharged home with her and her  to live with them. She is hopeful they are able to have port placed once patients overall health status improves. She understands that possible chemotherapy in the future is all dependent on patients functional status.    Oncology Treatment Plan:   OP FOLFOXIRI Q2W    Medications:  Continuous Infusions:   lactated ringers 100 mL/hr at 01/31/22 0600     Scheduled Meds:   ertapenem (INVANZ) IVPB  1 g Intravenous Q24H    fentaNYL  1 patch Transdermal Q72H    methylnaltrexone  8 mg Subcutaneous Every other day    metoclopramide HCl  5 mg Intravenous QID (AC & HS)    mineral oil  30 mL Oral Daily    ondansetron  4 mg Intravenous Q6H    senna-docusate 8.6-50 mg  1 tablet Oral BID     PRN Meds:sodium chloride, acetaminophen, albuterol-ipratropium, aluminum-magnesium hydroxide-simethicone, bisacodyL, dextrose 50%, dextrose 50%, diazePAM, diphenhydrAMINE, glucagon (human recombinant), glucose, glucose,  HYDROmorphone, hyoscyamine, magnesium oxide, magnesium oxide, naloxone, phenazopyridine, potassium bicarbonate, potassium bicarbonate, potassium bicarbonate, potassium, sodium phosphates, potassium, sodium phosphates, potassium, sodium phosphates, prochlorperazine, promethazine (PHENERGAN) IVPB, simethicone, sodium chloride 0.9%, zolpidem     Review of Systems   Constitutional: Positive for activity change and fatigue. Negative for appetite change, chills, diaphoresis and fever.   Respiratory: Negative for cough and shortness of breath.    Cardiovascular: Negative for chest pain.   Gastrointestinal: Positive for nausea. Negative for constipation, diarrhea and vomiting.        Colostomy placed   Musculoskeletal: Negative for arthralgias and myalgias.   Neurological: Positive for weakness. Negative for dizziness, light-headedness and headaches.   Hematological: Negative for adenopathy. Does not bruise/bleed easily.   Psychiatric/Behavioral: Positive for decreased concentration and dysphoric mood.     Objective:     Vital Signs (Most Recent):  Temp: 98.5 °F (36.9 °C) (01/31/22 0739)  Pulse: 87 (01/31/22 0739)  Resp: 14 (01/31/22 0823)  BP: 111/66 (01/31/22 0739)  SpO2: (!) 92 % (01/31/22 0739) Vital Signs (24h Range):  Temp:  [98.1 °F (36.7 °C)-98.9 °F (37.2 °C)] 98.5 °F (36.9 °C)  Pulse:  [77-88] 87  Resp:  [12-18] 14  SpO2:  [92 %-96 %] 92 %  BP: (109-111)/(57-71) 111/66     Weight: 48.5 kg (106 lb 14.8 oz)  Body mass index is 18.35 kg/m².  Body surface area is 1.48 meters squared.      Intake/Output Summary (Last 24 hours) at 1/31/2022 1009  Last data filed at 1/31/2022 1000  Gross per 24 hour   Intake 6176.77 ml   Output 1000 ml   Net 5176.77 ml       Physical Exam  Constitutional:       General: She is not in acute distress.  HENT:      Head: Normocephalic and atraumatic.      Mouth/Throat:      Mouth: Mucous membranes are moist.   Cardiovascular:      Rate and Rhythm: Normal rate and regular rhythm.      Pulses:  Normal pulses.      Heart sounds: Normal heart sounds.   Pulmonary:      Effort: Pulmonary effort is normal.      Breath sounds: Normal breath sounds.   Abdominal:      General: Bowel sounds are normal.      Tenderness: There is no abdominal tenderness.      Comments: G tube placed and colostomy bag with output    Musculoskeletal:         General: No tenderness.      Right lower leg: No edema.      Left lower leg: No edema.   Skin:     General: Skin is warm.      Coloration: Skin is not jaundiced.   Neurological:      Mental Status: She is alert. Mental status is at baseline.       Significant Labs:   BMP:   Recent Labs   Lab 01/30/22  0906   GLU 70      K 3.5      CO2 27   BUN 12   CREATININE 1.5*   CALCIUM 7.7*   MG 1.7     Diagnostic Results:  I have reviewed all pertinent imaging results/findings within the past 24 hours.    Assessment/Plan:     * Severe malnutrition  -G tube in place. Draining.  -s/p for G-J tube placement to allow nutrition and decompression. Started feeding per GI recs. Hopefully nutritional/functional status improves and continued GI function to initiate chemotherapy. Palliative medicine discussed with patient possible redirection to comfort care pending course.  She will follow up with primary oncologist once discharged to assess possibility of port placement only if functional status improves.    Hypokalemia  -Improving   -management per primary team    Bacteremia  --BC + gram neg bacteremia (E. Coli)   -on Ertapenem   --IV abx management per Primary team      Hydronephrosis  S/p bilateral stent placement     Metastatic signet ring cell carcinoma  --plan to arrange outpatient follow up with Primary Oncologist pending resolution of current clinical situation to talk about port placement if able. Hopefully patient improves from debility/malnutrition standpoint to initiate treatment. If not would need to discuss comfort care again.  -medi port placement on hold due functional  status   01/29/2022.  At spoke with nursing staff patient and mother was sleeping reviewed palliative care note agree entirely with hospice care at this point  01/30/2022.  Extensive conversation with mother at outside of a room.  I strongly urged them to consider hospice with talked about [facility] outpatient and would agree with plan and course of action they have chosen [facility] hospice would like to make sure that there is an inpatient unit available for them should they need that.  1/31/22. Mother states she is able to care for daughter at her house upon discharge, with possible help from home health. She is aware that chemotherapy can not be re-initiated until patients functional status improves.     Intractable nausea and vomiting, Chronic  -no recent vomiting  -management per Primary team/palliative medicine       Colostomy present on admission        Chronic pain due to neoplasm  -management per palliative medicine               Thank you for your consult. I will follow-up with patient. Please contact us if you have any additional questions.     Nela Feliciano PA-C  Hematology/Oncology  [facility]

## 2022-01-31 NOTE — ASSESSMENT & PLAN NOTE
-G tube in place. Draining.  -s/p for G-J tube placement to allow nutrition and decompression. Started feeding per GI recs. Hopefully nutritional/functional status improves and continued GI function to initiate chemotherapy. Palliative medicine discussed with patient possible redirection to comfort care pending course.  She will follow up with primary oncologist once discharged to assess possibility of port placement only if functional status improves.

## 2022-01-31 NOTE — SUBJECTIVE & OBJECTIVE
Interval History: pt in bed upon exam with no signs of acute distress. N/V improved on prescribed regime with TF tolerated at 25-30 cc/hr.  PICC line placement unsuccessful. IR consulted for placement. Case discussed with Palliative care and Heme/Onc with PICC line placement for long term antibiotics and discharge to [facility] planned for tomorrow.  Case Management consulted for discharge planning.      Review of Systems   Constitutional: Positive for activity change, appetite change, chills and fatigue. Negative for fever and unexpected weight change.   HENT: Negative for congestion, mouth sores, nosebleeds, sore throat, trouble swallowing and voice change.    Eyes: Negative for visual disturbance.   Respiratory: Negative for cough, chest tightness, shortness of breath and wheezing.    Cardiovascular: Negative for chest pain, palpitations and leg swelling.   Gastrointestinal: Positive for abdominal pain, nausea and vomiting. Negative for abdominal distention, blood in stool, constipation and diarrhea.   Genitourinary: Negative for difficulty urinating, dysuria and hematuria.   Musculoskeletal: Positive for arthralgias. Negative for back pain and myalgias.   Skin: Negative for pallor, rash and wound.   Neurological: Positive for weakness. Negative for dizziness, syncope and headaches.   Hematological: Negative for adenopathy. Does not bruise/bleed easily.   Psychiatric/Behavioral: Negative for agitation. The patient is not nervous/anxious.      Objective:     Vital Signs (Most Recent):  Temp: 99.7 °F (37.6 °C) (01/31/22 1201)  Pulse: 91 (01/31/22 1201)  Resp: 17 (01/31/22 1557)  BP: 126/73 (01/31/22 1201)  SpO2: 98 % (01/31/22 1201) Vital Signs (24h Range):  Temp:  [98.5 °F (36.9 °C)-99.7 °F (37.6 °C)] 99.7 °F (37.6 °C)  Pulse:  [80-91] 91  Resp:  [14-18] 17  SpO2:  [92 %-98 %] 98 %  BP: (109-126)/(57-73) 126/73     Weight: 48.5 kg (106 lb 14.8 oz)  Body mass index is 18.35 kg/m².    Intake/Output Summary  (Last 24 hours) at 1/31/2022 1721  Last data filed at 1/31/2022 1542  Gross per 24 hour   Intake 6626.77 ml   Output 1750 ml   Net 4876.77 ml      Physical Exam  Vitals and nursing note reviewed.   Constitutional:       General: She is not in acute distress.     Appearance: She is cachectic. She is ill-appearing. She is not diaphoretic.   HENT:      Head: Normocephalic and atraumatic.      Nose: Nose normal.   Eyes:      General: No scleral icterus.     Conjunctiva/sclera: Conjunctivae normal.   Neck:      Trachea: No tracheal deviation.   Cardiovascular:      Rate and Rhythm: Normal rate and regular rhythm.      Heart sounds: Normal heart sounds. No murmur heard.  No friction rub. No gallop.    Pulmonary:      Effort: Pulmonary effort is normal. No respiratory distress.      Breath sounds: Normal breath sounds. No stridor. No wheezing or rales.   Chest:      Chest wall: No tenderness.   Abdominal:      General: Bowel sounds are normal. There is distension (mild).      Palpations: Abdomen is soft. There is no mass.      Tenderness: Tenderness: g tube site  There is no guarding or rebound.      Comments: colostomy LLQ with liquid stool in bag   G/J tube dressing C/D/I- attached to a guzmán bag draining light green fluid   Genitourinary:     Comments: Guzmán catheter   Musculoskeletal:         General: No tenderness or deformity. Normal range of motion.      Cervical back: Normal range of motion and neck supple.   Skin:     General: Skin is warm and dry.      Coloration: Skin is not pale.      Findings: No erythema or rash.   Neurological:      Mental Status: She is oriented to person, place, and time. She is lethargic.      Cranial Nerves: No cranial nerve deficit.      Motor: No abnormal muscle tone.      Coordination: Coordination normal.   Psychiatric:         Mood and Affect: Affect is not angry or tearful.         Behavior: Behavior is not agitated.         Significant Labs:   All pertinent labs within the past 24  hours have been reviewed.  CBC: No results for input(s): WBC, HGB, HCT, PLT in the last 48 hours.  CMP:   Recent Labs   Lab 01/30/22  0906      K 3.5      CO2 27   GLU 70   BUN 12   CREATININE 1.5*   CALCIUM 7.7*   ANIONGAP 9   EGFRNONAA 39*       Significant Imaging:

## 2022-01-31 NOTE — CHAPLAIN
Follow up visit with patient and her mother.  Pt was resting at the time of my visit but I did speak with her mother.  They currently had no needs and spiritual care remains available as needed.    Chaplain Romie Sun M.Div., [facility]

## 2022-01-31 NOTE — ASSESSMENT & PLAN NOTE
Oncology managing   Plan to arrange outpatient follow up with Primary Oncologist pending resolution of current clinical situation to initiate chemotherapy  -port placement on hold -will arrange when appropriate as outpatient

## 2022-01-31 NOTE — CONSULTS
MARCIAL spoke with Candis at [facility]. Per Candis hospice agency will setup all tube feedings, facility is in need of consult for tube feedings and patients current MAR. Cm sent tube feedings consult and MAR via [facility].

## 2022-01-31 NOTE — ASSESSMENT & PLAN NOTE
S/p G tube placement today per IR  Consult RD  Regular diet   IVFs  Pain control     1/25  Appears g-tube was placed for drainage   IR was unable to place GJ tube at that time  Spoke with IR will re-attempt GJ with assistance from GI once pt more stable   Due to bacteremia unable to start TPN.   Will discuss with team    1/26  - Plans to exchange G-tube for GJ tube per IR and GI. Plan to use J-tube for nutrition.  01/27:  --J tube to be placed today  --1/28/22- tube feeding initiated per recommendations with pleasure feeds allowed   --1/29- tube feeding continued- pt tolerated- full liquid pleasure feeds   --1/30- tube feeding held due to concern for N/V- no evidence of vomiting TF. Feeding resumed at lower rate   -1/31/22- TF tolerated at 25-30 cc/hr- Social work consulted for discharge planning

## 2022-01-31 NOTE — ASSESSMENT & PLAN NOTE
Fever this morning, blood cx ordered. IV zosyn empirically.    1/26/22  -Blood cultures growing E.COLI, awaiting susceptibility   -Continue Zosyn    -WBCs have normalized   01/27:  --sensitivities returned, Zosyn dc'd, Ertapenem started  --repeat BC drawn  01/28/22- antibiotics continued per sensitivity results- repeat blood cultures pending   -1/29/22- blood cultures with no growth to date- current antibiotic continued   -1/30/22- current plan of care continued- repeat blood cultures with no growth to date   -1/31/22- IR consulted for PICC line placement- IVANZ x 5 days- pt to discharge tomorrow

## 2022-02-01 NOTE — PLAN OF CARE
Problem: Adjustment to Illness (Sepsis/Septic Shock)  Goal: Optimal Coping  Outcome: Ongoing, Progressing     Problem: Nutrition Impaired (Sepsis/Septic Shock)  Goal: Optimal Nutrition Intake  Outcome: Ongoing, Progressing     Problem: Adult Inpatient Plan of Care  Goal: Plan of Care Review  Outcome: Ongoing, Progressing     Problem: Adult Inpatient Plan of Care  Goal: Optimal Comfort and Wellbeing  Outcome: Ongoing, Progressing     Problem: Skin Injury Risk Increased  Goal: Skin Health and Integrity  Outcome: Ongoing, Progressing     Problem: Fall Injury Risk  Goal: Absence of Fall and Fall-Related Injury  Outcome: Ongoing, Progressing     Problem: Pain Chronic (Persistent)  Goal: Acceptable Pain Control and Functional Ability  Outcome: Ongoing, Progressing

## 2022-02-01 NOTE — PROGRESS NOTES
Advance Care Planning     Progress Note   Palliative Medicine      SUBJECTIVE:     History of Present Illness:  Patient seen and examined with her mother at bedside. She is having issues with IV access to allow continued IV abx at home but IR is supposed to try today. [facility] is arranging DME delivery this morning. Madeline appears more calm this morning despite reporting worsening pain. She asks that we decrease the TF to 20ml/ hr. She enjoyed the ice cream this morning. The plan is to discharge home with hospice once IV access is secured and DME is delivered. I have faxed over the current medication list to the hospice list to ensure smooth transition.    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    T4 N1bM1 B 3/85 nodes positive    Encounter for blood transfusion     Leukocytosis 1/19/2022    PONV (postoperative nausea and vomiting)      Past Surgical History:   Procedure Laterality Date    APPENDECTOMY      BILATERAL OOPHORECTOMY      CHOLECYSTECTOMY      with Cytoreduction and salpingectomy    COLOSTOMY      CYSTOSCOPY N/A 12/21/2021    Procedure: CYSTOSCOPY;  Surgeon: John Martínez MD;  Location: [facility] OR;  Service: Urology;  Laterality: N/A;    CYSTOSCOPY WITH URETEROSCOPY, RETROGRADE PYELOGRAPHY, AND INSERTION OF STENT Bilateral 1/22/2022    Procedure: CYSTOSCOPY, WITH RETROGRADE PYELOGRAM AND URETERAL STENT INSERTION;  Surgeon: John Martínez MD;  Location: [facility] OR;  Service: Urology;  Laterality: Bilateral;    CYTOREDUCTION      ENDOSCOPIC ULTRASOUND OF UPPER GASTROINTESTINAL TRACT N/A 6/11/2021    Procedure: ULTRASOUND, UPPER GI TRACT, ENDOSCOPIC;  Surgeon: Rosaura Lakhani MD;  Location: [facility] ENDO;  Service: Endoscopy;  Laterality: N/A;  Linear  scope    ERCP N/A 6/11/2021    Procedure: ERCP (ENDOSCOPIC RETROGRADE CHOLANGIOPANCREATOGRAPHY);  Surgeon: Rosaura Lakhani MD;  Location: [facility] ENDO;  Service: Endoscopy;  Laterality: N/A;    ESOPHAGOGASTRODUODENOSCOPY N/A 5/6/2021    Procedure: EGD (ESOPHAGOGASTRODUODENOSCOPY);  Surgeon: Brandon Rosen MD;  Location: [facility] ENDO;  Service: Gastroenterology;  Laterality: N/A;    ESOPHAGOGASTRODUODENOSCOPY  06/11/2021    ESOPHAGOGASTRODUODENOSCOPY N/A 1/27/2022    Procedure: EGD (ESOPHAGOGASTRODUODENOSCOPY);  Surgeon: Sumi Doan MD;  Location: [facility];  Service: Endoscopy;  Laterality: N/A;    EXCISION OF LESION  10/20/2020    Procedure: EXCISION, LESION COLOSTOMY;  Surgeon: Layton Anderson MD;  Location: [facility] OR;  Service: General;;    FLUOROSCOPY Bilateral 1/19/2022    Procedure: Insertion of G-J tube and bilateral upper extremity venogram;  Surgeon: Bryan Torres MD;  Location: [facility] CATH LAB;  Service: General;  Laterality: Bilateral;    FLUOROSCOPY N/A 1/25/2022    Procedure: Arm Port Insertion;  Surgeon: Bryan Torres MD;  Location: [facility] CATH LAB;  Service: General;  Laterality: N/A;    LAPAROTOMY, EXPLORATORY  02/12/2020    LYSIS OF ADHESION, COLOSTOMY    REVISION COLOSTOMY N/A 10/20/2020    Procedure: REVISION, COLOSTOMY;  Surgeon: Layton Anderson MD;  Location: [facility] OR;  Service: General;  Laterality: N/A;  Ostomy bag placed    RIGHT COLECTOMY       History reviewed. No pertinent family history.  Review of patient's allergies indicates:  No Known Allergies    Medications:    Current Facility-Administered Medications:     0.9%  NaCl infusion (for blood administration), , Intravenous, Q24H PRN, Kerline Hernandez NP    acetaminophen tablet 650 mg, 650 mg, Oral, Q4H PRN, Soni Urias NP, 650 mg at 01/24/22 1722    albuterol-ipratropium 2.5 mg-0.5 mg/3 mL nebulizer solution 3 mL, 3 mL, Nebulization, Q6H PRN, Soni Urias, NP    aluminum-magnesium  hydroxide-simethicone 200-200-20 mg/5 mL suspension 30 mL, 30 mL, Oral, QID PRN, Soni Urias, NP    bisacodyL suppository 10 mg, 10 mg, Other, Daily PRN, Kerline Hernandez, NP    diazePAM injection 2.5 mg, 2.5 mg, Intravenous, Q6H PRN, Remedios Rossi PA-C, 2.5 mg at 01/30/22 1031    ertapenem (INVANZ) 1 g in sodium chloride 0.9% 100 mL IVPB, 1 g, Intravenous, Q24H, Vicky Joya, FNP-C, Stopped at 01/31/22 1629    fentaNYL 100 mcg/hr 1 patch, 1 patch, Transdermal, Q72H, Remedios Rossi PA-C, 1 patch at 02/01/22 1047    fentaNYL 25 mcg/hr 1 patch, 1 patch, Transdermal, Q72H, Remedios Rossi PA-C, 1 patch at 01/31/22 1154    HYDROmorphone (PF) injection 1.5 mg, 1.5 mg, Intravenous, Q3H PRN, Remedios Rossi PA-C, 1.5 mg at 02/01/22 0859    hyoscyamine ODT 0.125 mg, 0.125 mg, Sublingual, Q4H PRN, John Martínez MD    lactated ringers infusion, , Intravenous, Continuous, Colt Lazaro MD, Last Rate: 100 mL/hr at 01/31/22 1910, Rate Verify at 01/31/22 1910    metoclopramide HCl injection 5 mg, 5 mg, Intravenous, QID (AC & HS), Remedios Rossi PA-C, 5 mg at 02/01/22 1049    ondansetron injection 8 mg, 8 mg, Intravenous, Q6H, Remedios Rossi PA-C, 8 mg at 02/01/22 1108    prochlorperazine injection Soln 5 mg, 5 mg, Intravenous, Q6H PRN, Soni Urias, MAGUI    senna-docusate 8.6-50 mg per tablet 1 tablet, 1 tablet, Oral, BID, Layton Anderson MD, 1 tablet at 02/01/22 0912    simethicone chewable tablet 80 mg, 1 tablet, Oral, QID PRN, Soni Urias NP    Review of Symptoms    Symptom Assessment (ESAS 0-10 Scale)  Pain:  10  Dyspnea:  0  Anxiety:  0  Nausea:  0  Depression:  0  Anorexia:  0  Fatigue:  0  Insomnia:  0  Restlessness:  0  Agitation:  0     CAM / Delirium:  Negative  Constipation:  Negative  Diarrhea:  Negative    Bowel Management Plan (BMP):  Yes      Pain Assessment:  Location(s): generalized    Generalized       Location: generalized         Quality: none        Quantity: 10/10 in intensity, gradually worsening        Aggravating Factors: none        Alleviating Factors: opiates        Associated Symptoms: constipation and nausea    ECOG Performance Status rdGrdrrdarddrderd:rd rd3rd Living Arrangements:  Lives with family      Advance Care Planning   Advance Directives:   Living Will: Yes        Copy on chart: Yes    LaPOST: No    Do Not Resuscitate Status: Yes    Medical Power of : Yes      Decision Making:  Family answered questions         ROS:  Review of Systems   Constitutional: Positive for fatigue. Negative for appetite change.   Respiratory: Negative for cough and shortness of breath.    Cardiovascular: Negative for chest pain and leg swelling.   Gastrointestinal: Positive for abdominal pain, nausea and vomiting.   Neurological: Positive for weakness. Negative for numbness.   Psychiatric/Behavioral: Positive for agitation and dysphoric mood. The patient is not nervous/anxious.        OBJECTIVE:     Physical Exam:  Vitals: Temp: 97.6 °F (36.4 °C) (02/01/22 0740)  Pulse: 79 (02/01/22 0740)  Resp: 16 (02/01/22 1047)  BP: (!) 103/59 (02/01/22 0740)  SpO2: 95 % (02/01/22 0740)    Physical Exam  Vitals and nursing note reviewed.   Constitutional:       General: She is not in acute distress.     Appearance: Normal appearance. She is well-developed. She is cachectic. She is ill-appearing.      Comments: Fentanyl 100mcg patch L posterior chest   HENT:      Head: Normocephalic and atraumatic.   Cardiovascular:      Rate and Rhythm: Normal rate and regular rhythm.      Heart sounds: Normal heart sounds. No murmur heard.  No friction rub.   Pulmonary:      Effort: Pulmonary effort is normal. No respiratory distress.      Breath sounds: Normal breath sounds. No wheezing or rales.   Abdominal:      General: Bowel sounds are increased. There is no distension.      Tenderness: There is abdominal tenderness. There is guarding.   Musculoskeletal:      Cervical back:  Normal range of motion.      Right lower leg: No edema.      Left lower leg: No edema.   Skin:     General: Skin is dry.      Coloration: Skin is pale.      Findings: No rash.   Neurological:      Mental Status: She is alert.         Labs:  WBC   Date Value Ref Range Status   01/29/2022 9.38 3.90 - 12.70 K/uL Final     MCV   Date Value Ref Range Status   01/29/2022 81 (L) 82 - 98 fL Final       Hemoglobin   Date Value Ref Range Status   01/29/2022 9.8 (L) 12.0 - 16.0 g/dL Final      Hematocrit   Date Value Ref Range Status   01/29/2022 31.0 (L) 37.0 - 48.5 % Final                         BUN 12 01/30/2022    CREATININE 1.5 (H) 01/30/2022    CALCIUM 7.7 (L) 01/30/2022    ANIONGAP 9 01/30/2022    ESTGFRAFRICA 45 (A) 01/30/2022    EGFRNONAA 39 (A) 01/30/2022       Lab Results   Component Value Date    AST 10 01/29/2022    ALKPHOS 57 01/29/2022    BILITOT 0.3 01/29/2022       Albumin   Date Value Ref Range Status   01/29/2022 1.9 (L) 3.5 - 5.2 g/dL Final       Radiology:I have reviewed all pertinent imaging results/findings within the past 24 hours.  - CT abd/ pelvis 1/23/22 reviewed     ASSESSMENT   Madeline Warner is a 56 y.o. 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 She is followed in the PM clinic for pain and symptom management and were also consulted during her last admission. She was constipated at that time and was counseled extensively regarding the need for preventative bowel regimen and given OIC education for home. Imaging indicates constipation and has been resistant to methyl-naltrexone and laxatives. Palliative Medicine was consulted to assist with pain management as well as GOC.     PLAN   1. Neoplasm related pain  - Continue fentanyl 125mcg/ hr q72 hr   - Continue  Dilaudid 1.5mg IV q3hr PRN pain     2. Constipation  - Resumed Relistor as we are unable to give PO and she is having ostomy output     3. Metastatic signet ring cell adenocarcinoma  - s/p ureteral stent  - Plan was to have port placed soon to facilitate palliative systemic chemotherapy but she is not optimistic this will occur and not interested in delaying comfort.   - She will now enroll with hospice and plan to return home today     4. Encounter for Palliative Care  - Code status: DNR/I- comfort care  - HCPOA on file and reviewed  - Hospice enrollment in process  - Prognosis: days to weeks    5. Anxiety  - Diazepam 2.5mg IV q6hr PRN anxiety or agitation    6. ESBL bacteremia  - PICC to be placed today by IR to complete course of IV abx with hospice      Thank you for allowing Palliative Medicine to be involved in the care of Madeline Warner.         Medical decision making: HIGH based on high risk of death untreated symptoms high risk medications poor prognosis management of more than one chronic illness in exacerbation or progression of disease managing side effects of medications or polypharmacy parenteral opioids    Plan required increased review of medication choice, interaction, dosing, frequency, and route due to patient complexity. Patient complexity increased by: high risk medication use, being on more than 8 medications, feeding tube, NPO and malnutrition    > 50% of 35 min visit spent in chart review, and coordination of care, goals of care, symptom assessment, coordination of care and emotional support, formulating and communicating prognosis and goals of care, exploring burden/ benefit of various approaches of treatment.     Remedios Rossi PA-C  Palliative Medicine

## 2022-02-01 NOTE — DISCHARGE SUMMARY
[facility] Medicine  Discharge Summary      Patient Name: Madeline Warner  MRN: [id number]  Patient Class: IP- Inpatient  Admission Date: 1/19/2022  Hospital Length of Stay: 11 days  Discharge Date and Time: 2/1/2022  6:44 PM  Attending Physician: Dr. 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 Pt will be placed in outpatient extended recovery for pain control, IVfs, and RD consult.     On exam, pt complain of nausea and 10/10 pain at G tube site. Pt reports ongoing chronic pain, N/V, weakness/fatigue, and weight loss for approximately one month.       Procedure(s) (LRB):  EGD (ESOPHAGOGASTRODUODENOSCOPY) (N/A)      Hospital Course:   Patient was admitted for abd pain. 1/20: G tube placed yesterday per IR. Today the patient has copious amounts of green fluid draining from the G tube- this is attached to a guzmán bag, her colostomy has no output, she has bowel sounds, but endorsing nasuea and abdominal pain. CT abdomen pending. Case discussed with Dr Barger and Dr Torres. Initial G tube placement check performed right after placement and the subsequent the following day is pending. As of 1/21/22  CT abdomen showed interval placement of percutaneous G tube with catheter coursing along the left inferior anterior margin with possible small adjacent hemorrhage. Patient c/o constipation requesting an injection. GI has been consulted to assist. Urology following and plans for stent placement tomorrow. G-tube still draining green fluid.  As of 1/22/22 pt reports passing some gas, still no BM. Will give x 3 doses of Relistor for constipation. Plans for ureteral stent placement today. As of 1/23/22 still no bowel movement. + Flatus. C/o abdominal pain and N/V. Abdominal xray  pending. S/p bilateral ureteral stent placement. Palliative care consulted for symptom management and goal planning. As of 1/24/22 patient very lethargic this am.  Pt had dose of Ativan this morning. Narcotics and ativan D/c'd until patient more alert. Mother reports pt passed a very small amount of hard stool overnight. Fever this morning, blood cx ordered. IV zosyn empirically. General Surgery consulted for possible obstruction. As of 1/25/22 patient is more awake but not back to her baseline. Abdominal imaging showed no obstruction. Colostomy with output. Plan for SBFT today. GI has been consulted. Case discussed with General surgery and IR. Blood cultures positive for gram negative rods. Continue IV zosyn. Port placement canceled due to bacteremia. Palliative care following. As of 1/26/22 pt awake and alert this morning, having ostomy output. H/H 7/22.0, will transfuse 1 unit of PRBCs. Serum potassium 2.7, will replete. Blood cultures growing E.COLI, awaiting susceptibility. Continue Zosyn.  Plans to exchange G-tube for GJ tube per IR and GI. Plan to use J-tube for nutrition. Continue current bowel regimen. On 01/27: Plans to have J tube placed today for nutrition. BC from 1/24 shows E coli ESB: that is sensitive to Ertapenem and Meropenem. Zosyn stopped, Ertapenem started. BC redrawn. On 1/28/22, J tube in place with tube feedings initiated per GI recommendation. Magnesium and Potassium replaced. Ertapenem continued. Repeat blood cultures with results pending.  Palliative Care following with Hospice info visit arranged. On 1/29/22, pt very emotional and expressed desire to discharge today with hospice to spend time with granddaughter. Palliative care updated on current pt status.  Blood cultures with no growth to date.  Will discuss patient wishes tomorrow morning and determine need for port placement pending results of conversation. TF tolerated with adequate colostomy output. Bowel regimen in place per  General Surgery. On 1/30/22, Heme/Onc discussed Hospice with patient's mother.  Palliative Care following and pt/mother have had informational discussions with [facility].  Pt/mother requesting to speak with Palliative Care tomorrow prior to making a plan due to established rapport.  TF held due to concern for N/V with antiemetics given. Pt reports more nausea and no evidence of vomiting TF noted. General Surgery following with TF to be resumed at lower rate for tolerance.  Glucose treated per Hypoglycemia protocol.  On 1/31/22, N/V improved on prescribed regime with TF tolerated at 25-30 cc/hr.  PICC line placement unsuccessful. IR consulted for placement. Case discussed with Palliative care and Heme/Onc with PICC line placement for long term antibiotics and discharge to [facility] planned for tomorrow.   Case Management consulted for discharge planning.  On 2/1/22, PICC line placed for continued IV antibiotic therapy.  Repeat blood cultures with no growth to date. Palliative Care following. Pt seen and examined and deemed stable for discharge to home with Hospice.  Medications reconciled.  Pt/mother to follow up with [facility] upon discharge for further evaluation.          Goals of Care Treatment Preferences:  Code Status: DNR    Health care agent: mother Nancy Su  [facility] care agent number: [phone]    Living Will: Yes              Consults:   Consults (From admission, onward)        Status Ordering Provider     Inpatient consult to Interventional Radiology  Once        Provider:  HALEIGH Wagoenr    Completed ANGIE DIXON     Inpatient consult to Social Work/Case Management  Once        Provider:  (Not yet assigned)    Completed ANGIE DIXON     Inpatient consult to Social Work/Case Management  Once        Provider:  (Not yet assigned)    Completed ANGIE DIXON     Inpatient consult to Social Work  Once        Provider:  (Not yet assigned)    Completed PATRICIA LANE      Inpatient consult to Social Work  Once        Provider:  (Not yet assigned)    Completed PATRICIA ROSSI     Inpatient consult to Gastroenterology  Once        Provider:  Sumi Doan MD    Completed ROMA LEDESMA     Inpatient consult to General Surgery  Once        Provider:  Rishi Krishnan MD    Completed ROMA LEDESMA     Inpatient consult to Palliative Care  Once        Provider:  Patricia Rossi PA-C    Completed ROMA LEDESMA     Inpatient consult to Urology  Once        Provider:  John Martínez MD    Completed ROMA LEDESMA     Inpatient consult to Registered Dietitian/Nutritionist  Once        Provider:  (Not yet assigned)    Completed NIVIA JIMENEZ     IP consult to case management  Once        Provider:  (Not yet assigned)    Completed WENDY NGUYEN     Inpatient consult to Registered Dietitian/Nutritionist  Once        Provider:  (Not yet assigned)    Completed YURY MENARD     Inpatient consult to Registered Dietitian/Nutritionist  Once        Provider:  (Not yet assigned)    Completed KAMARI MONTOYA          Final Active Diagnoses:    Diagnosis Date Noted POA    PRINCIPAL PROBLEM:  Severe malnutrition [E43] 01/19/2022 Yes    Comfort measures only status [Z51.5] 01/31/2022 Not Applicable    Hypokalemia [E87.6] 01/26/2022 Yes    Bacteremia [R78.81] 01/24/2022 No    Hydronephrosis [N13.30] 01/22/2022 Yes    Therapeutic opioid-induced constipation (OIC) [K59.03, T40.2X5A] 01/21/2022 Yes    Gastrostomy tube in place [Z93.1] 01/20/2022 Not Applicable    Metastatic signet ring cell carcinoma [C79.9] 01/19/2022 Yes    Intractable nausea and vomiting, Chronic [R11.2] 01/03/2022 Yes    Colostomy present on admission [Z93.3] 06/14/2021 Not Applicable    Gastroesophageal reflux disease without esophagitis [K21.9] 04/20/2021 Yes    Chronic pain due to neoplasm [G89.3] 08/28/2020 Yes      Problems Resolved During this Admission:    Diagnosis  Date Noted Date Resolved POA    Leukocytosis [D72.829] 01/19/2022 01/20/2022 Yes       Discharged Condition: stable    Disposition: Hospice/Home    Follow Up:    Patient Instructions:      Tube Feedings/Formulas   Order Comments: 100mL H2O flush q 4-6 hours or per MD/NP.     Order Specific Question Answer Comments   Select Adult Formula: Isosource 1.5 lucius    Route: Jejunostomy    Formula Rate (mL/hr): 25      Activity as tolerated       Significant Diagnostic Studies: Labs: All labs within the past 24 hours have been reviewed    Pending Diagnostic Studies:     None         Medications:  Reconciled Home Medications:      Medication List      START taking these medications    sodium chloride 0.9% SolP 100 mL with ertapenem 1 gram SolR 1 g  Inject 1 g into the vein once daily. for 4 doses        CONTINUE taking these medications    fentaNYL 100 mcg/hr  Commonly known as: DURAGESIC  Place 1 patch onto the skin every 72 hours.     ondansetron 8 MG Tbdl  Commonly known as: ZOFRAN-ODT  Take 1 tablet (8 mg total) by mouth every 12 (twelve) hours as needed.     * promethazine 25 MG tablet  Commonly known as: PHENERGAN  Take 0.5 tablets (12.5 mg total) by mouth every 6 (six) hours as needed for Nausea.     * promethazine 12.5 MG Supp  Commonly known as: PHENERGAN  Place 1 suppository (12.5 mg total) rectally every 6 (six) hours as needed for Nausea.         * This list has 2 medication(s) that are the same as other medications prescribed for you. Read the directions carefully, and ask your doctor or other care provider to review them with you.            STOP taking these medications    metoclopramide HCl 5 MG tablet  Commonly known as: REGLAN     MOVANTIK 25 mg tablet  Generic drug: naloxegoL     omeprazole 40 MG capsule  Commonly known as: PRILOSEC     oxyCODONE 30 MG Tab  Commonly known as: ROXICODONE     phenazopyridine 200 MG tablet  Commonly known as: PYRIDIUM     solifenacin 10 MG tablet  Commonly known as: VESICARE      zolpidem 5 MG Tab  Commonly known as: AMBIEN            Indwelling Lines/Drains at time of discharge:   Lines/Drains/Airways     Peripherally Inserted Central Catheter Line            PICC Double Lumen 02/01/22 1315 right brachial <1 day          Drain                 Colostomy LUQ -- days         Colostomy 06/15/21 0016 Descending/sigmoid  days         Urethral Catheter 01/22/22 1400 Latex;Straight-tip 16 Fr. 10 days         Gastrostomy/Enterostomy 01/27/22 1406 Gastrostomy-jejunostomy LUQ feeding 5 days                Time spent on the discharge of patient: > 40 minutes         Kaleigh Sargent NP  Department of Hospital Medicine  [facility] - Med Surg

## 2022-02-01 NOTE — ASSESSMENT & PLAN NOTE
No current surgical intervention required at this time.  Patient has ileus and no definitive obstruction. Okay for TFs for nutrition via [employer] as tolerates. Agree with palliative care/hospice discussion

## 2022-02-01 NOTE — CONSULTS
Chart reviewed by Dr. Bullard.       ASSESSMENT/PLAN:    Infection    The order for a PICC has been placed and the procedure will be performed asap.          Thank you for the consult.

## 2022-02-01 NOTE — SUBJECTIVE & OBJECTIVE
Interval History:  Patient tolerated small amount of tube feeds as well as some chicken broth and minor amount of p.o. intake today.  Having ostomy output.  Still with some cramping with increasing tube feeds.    Medications:  Continuous Infusions:   lactated ringers 100 mL/hr at 01/31/22 1910     Scheduled Meds:   ertapenem (INVANZ) IVPB  1 g Intravenous Q24H    fentaNYL  1 patch Transdermal Q72H    fentaNYL  1 patch Transdermal Q72H    metoclopramide HCl  5 mg Intravenous QID (AC & HS)    ondansetron  8 mg Intravenous Q6H     PRN Meds:sodium chloride, bisacodyL, diazePAM, HYDROmorphone, hyoscyamine, prochlorperazine     Review of patient's allergies indicates:  No Known Allergies  Objective:     Vital Signs (Most Recent):  Temp: 97.6 °F (36.4 °C) (02/01/22 0740)  Pulse: 79 (02/01/22 0740)  Resp: 16 (02/01/22 1047)  BP: (!) 103/59 (02/01/22 0740)  SpO2: 95 % (02/01/22 0740) Vital Signs (24h Range):  Temp:  [97.6 °F (36.4 °C)-99.7 °F (37.6 °C)] 97.6 °F (36.4 °C)  Pulse:  [79-91] 79  Resp:  [14-18] 16  SpO2:  [93 %-98 %] 95 %  BP: ()/(54-73) 103/59     Weight: 48.7 kg (107 lb 5.8 oz)  Body mass index is 18.43 kg/m².    Intake/Output - Last 3 Shifts       01/30 0700 01/31 0659 01/31 0700 02/01 0659 02/01 0700 02/02 0659    P.O. 0 478 0    I.V. (mL/kg) 5852.3 (120.7) 1156.1 (23.7)     NG/GT 10 450     IV Piggyback 196.5 92.8     Total Intake(mL/kg) 6058.8 (124.9) 2176.9 (44.7) 0 (0)    Urine (mL/kg/hr) 650 (0.6) 950 (0.8)     Emesis/NG output 0      Stool 350 550     Total Output 1000 1500     Net +5058.8 +676.9 0                 Physical Exam  Constitutional:       Appearance: She is well-developed. She is ill-appearing.   HENT:      Head: Normocephalic and atraumatic.   Eyes:      Conjunctiva/sclera: Conjunctivae normal.   Neck:      Thyroid: No thyromegaly.   Cardiovascular:      Rate and Rhythm: Normal rate.   Pulmonary:      Effort: Pulmonary effort is normal. No respiratory distress.   Abdominal:       Comments: Soft, no distention; ostomy viable with stool/gas in bag; GJ tube in place with G portion to gravity drainage with bilious output in bag and J tube with TFs running at 20cc/hr   Musculoskeletal:         General: No tenderness. Normal range of motion.      Cervical back: Normal range of motion.   Skin:     General: Skin is warm and dry.      Capillary Refill: Capillary refill takes less than 2 seconds.      Findings: No rash.   Neurological:      Mental Status: She is oriented to person, place, and time.         Significant Labs:  I have reviewed all pertinent lab results within the past 24 hours.  CBC:   Recent Labs   Lab 01/29/22  0943   WBC 9.38   RBC 3.83*   HGB 9.8*   HCT 31.0*      MCV 81*   MCH 25.6*   MCHC 31.6*     BMP:   Recent Labs   Lab 01/30/22  0906   GLU 70      K 3.5      CO2 27   BUN 12   CREATININE 1.5*   CALCIUM 7.7*   MG 1.7       Significant Diagnostics:  I have reviewed all pertinent imaging results/findings within the past 24 hours.

## 2022-02-01 NOTE — PROGRESS NOTES
[facility]  General Surgery  Progress Note    Subjective:     Post-Op Info:  Procedure(s) (LRB):  EGD (ESOPHAGOGASTRODUODENOSCOPY) (N/A)   5 Days Post-Op     Interval History:  Patient tolerated small amount of tube feeds as well as some chicken broth and minor amount of p.o. intake today.  Having ostomy output.  Still with some cramping with increasing tube feeds.    Medications:  Continuous Infusions:   lactated ringers 100 mL/hr at 01/31/22 1910     Scheduled Meds:   ertapenem (INVANZ) IVPB  1 g Intravenous Q24H    fentaNYL  1 patch Transdermal Q72H    fentaNYL  1 patch Transdermal Q72H    metoclopramide HCl  5 mg Intravenous QID (AC & HS)    ondansetron  8 mg Intravenous Q6H     PRN Meds:sodium chloride, bisacodyL, diazePAM, HYDROmorphone, hyoscyamine, prochlorperazine     Review of patient's allergies indicates:  No Known Allergies  Objective:     Vital Signs (Most Recent):  Temp: 97.6 °F (36.4 °C) (02/01/22 0740)  Pulse: 79 (02/01/22 0740)  Resp: 16 (02/01/22 1047)  BP: (!) 103/59 (02/01/22 0740)  SpO2: 95 % (02/01/22 0740) Vital Signs (24h Range):  Temp:  [97.6 °F (36.4 °C)-99.7 °F (37.6 °C)] 97.6 °F (36.4 °C)  Pulse:  [79-91] 79  Resp:  [14-18] 16  SpO2:  [93 %-98 %] 95 %  BP: ()/(54-73) 103/59     Weight: 48.7 kg (107 lb 5.8 oz)  Body mass index is 18.43 kg/m².    Intake/Output - Last 3 Shifts       01/30 0700 01/31 0659 01/31 0700 02/01 0659 02/01 0700 02/02 0659    P.O. 0 478 0    I.V. (mL/kg) 5852.3 (120.7) 1156.1 (23.7)     NG/GT 10 450     IV Piggyback 196.5 92.8     Total Intake(mL/kg) 6058.8 (124.9) 2176.9 (44.7) 0 (0)    Urine (mL/kg/hr) 650 (0.6) 950 (0.8)     Emesis/NG output 0      Stool 350 550     Total Output 1000 1500     Net +5058.8 +676.9 0                 Physical Exam  Constitutional:       Appearance: She is well-developed. She is ill-appearing.   HENT:      Head: Normocephalic and atraumatic.   Eyes:      Conjunctiva/sclera: Conjunctivae normal.   Neck:       Thyroid: No thyromegaly.   Cardiovascular:      Rate and Rhythm: Normal rate.   Pulmonary:      Effort: Pulmonary effort is normal. No respiratory distress.   Abdominal:      Comments: Soft, no distention; ostomy viable with stool/gas in bag; GJ tube in place with G portion to gravity drainage with bilious output in bag and J tube with TFs running at 20cc/hr   Musculoskeletal:         General: No tenderness. Normal range of motion.      Cervical back: Normal range of motion.   Skin:     General: Skin is warm and dry.      Capillary Refill: Capillary refill takes less than 2 seconds.      Findings: No rash.   Neurological:      Mental Status: She is oriented to person, place, and time.         Significant Labs:  I have reviewed all pertinent lab results within the past 24 hours.  CBC:   Recent Labs   Lab 01/29/22  0943   WBC 9.38   RBC 3.83*   HGB 9.8*   HCT 31.0*      MCV 81*   MCH 25.6*   MCHC 31.6*     BMP:   Recent Labs   Lab 01/30/22  0906   GLU 70      K 3.5      CO2 27   BUN 12   CREATININE 1.5*   CALCIUM 7.7*   MG 1.7       Significant Diagnostics:  I have reviewed all pertinent imaging results/findings within the past 24 hours.    Assessment/Plan:     * Severe malnutrition  Continue TFs via Jtube as tolerates    Bacteremia  Management per Medicine    Hydronephrosis  Patient has had bilateral ureteral stents placed    Therapeutic opioid-induced constipation (OIC)  Needs good bowel regimen to assist with avoiding constipation due to narcotic use, senna-docusate and mineral oil ordered    Gastrostomy tube in place  Now s/p exchange to GJ tube on 1/27/22. Drain Gtube to gravity to prevent nausea/vomiting PRN. Continue TFs via J tube as tolerates    Metastatic signet ring cell carcinoma  No current surgical intervention required at this time.  Patient has ileus and no definitive obstruction. Okay for TFs for nutrition via Jtube as tolerates. Agree with palliative care/hospice  discussion    Intractable nausea and vomiting, Chronic  Likely related to her metastatic signet ring carcinoma and narcotic use    Colostomy present on admission  stable    Gastroesophageal reflux disease without esophagitis  Management per Medicine    Chronic pain due to neoplasm  Care per primary team        Latyon Anderson MD  General Surgery  '[facility] S[facility]

## 2022-02-01 NOTE — PROCEDURES
"Madeline Warner is a 56 y.o. female patient.    Temp: 99.7 °F (37.6 °C) (01/31/22 1201)  Pulse: 91 (01/31/22 1201)  Resp: 17 (01/31/22 1557)  BP: 126/73 (01/31/22 1201)  SpO2: 98 % (01/31/22 1201)  Weight: 48.5 kg (106 lb 14.8 oz) (01/31/22 0600)  Height: 5' 4" (162.6 cm) (01/27/22 1259)    <PICC>    Attempt # 1 - Right Arm, Basilic Vein, Note # 1 - Unable to advance last 7 cm of PICC, Post-Attempt Arm Circumference # 1 - 28; Attempt # 2 - Left Arm, Basilic Vein, Note # 2 - Unalbe to advance past region of Axillary vessel, Post-Attempt Arm Circumference # 2 - 27; unable to advance picc past region of axillary vessel, Post-Attempt Arm Circumference # 2 - 27;   pt with history of many piccs and Midlines, Pt reported Picc unsuccessful when last attempted in [facility].  Easily able to access venous vessel from either extermity but unable to advance to SVC. Occlusive dressing placed to site, Dr Barger notified, recommendation for attempt for PICC be made by IR.   Patient Response:  Patient tolerated procedure with no complaints      Name Miah Broussard RN  1/31/2022    "

## 2022-02-01 NOTE — OP NOTE
Pre Op Diagnosis: abx     Post Op Diagnosis: same     Procedure:  RUE PICC     Procedure performed by: Juan Miguel PATRICIO, Henrique ROJO     Written Informed Consent Obtained: Yes     Specimen Removed:  no     Estimated Blood Loss:  minimal     Findings: Local anesthesia.     The patient tolerated the procedure well and there were no complications.      The RUE was sterilely prepped and draped.  Lidocaine was used as a local anesthetic.  Using u/s guidance a 5 Portuguese 25 cm picc was placed into the basilic vein into the subclavian vein.  Placement was verified using X Ray.  PICC was secured in place with attachment device and is ready to use.  Pt tolerated proc well without immediate complications.

## 2022-02-01 NOTE — PLAN OF CARE
[facility] - Med Surg  Discharge Final Note    Primary Care Provider: Mariam Peña MD    Expected Discharge Date: 2/1/2022    Final Discharge Note (most recent)     Final Note - 02/01/22 1517        Final Note    Assessment Type Final Discharge Note     Anticipated Discharge Disposition Hospice/Home     Hospital Resources/Appts/Education Provided Provided patient/caregiver with written discharge plan information;Appointments scheduled and added to AVS        Post-Acute Status    Discharge Delays None known at this time                 Important Message from Medicare  Important Message from Medicare regarding Discharge Appeal Rights: Given to patient/caregiver,Explained to patient/caregiver,Other (comments) (spoke with Nancy over telephone)     Date IMM was signed: 02/01/22  Time IMM was signed: 1018    Patient to dc with [facility]. DC orders and AVS sent via [facility]. No other cm needs.

## 2022-02-01 NOTE — PROGRESS NOTES
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  Oncology was consulted due to history of metastatic signet ring cell carcinoma.  She is also known to have right hydronephrosis for which ureteral stent placement has been planned by Urology.     Patient continues to complain of minor abdominal pain, and soreness around Gtube site.She is yet to initiate systemic palliative therapy for her cancer due to poor nutritional status and right hydronephrosis.          Interval History: Patient denies any nausea or vomiting. She is more alert today. Spoke with mother of patient (Ted), hopes to be discharged soon with hospice. Patient has family members coming to town to see her. No acute events over night.     Oncology Treatment Plan:   OP FOLFOXIRI Q2W    Medications:  Continuous Infusions:   lactated ringers 100 mL/hr at 01/31/22 1910     Scheduled Meds:   ertapenem (INVANZ) IVPB  1 g Intravenous Q24H    fentaNYL  1 patch Transdermal Q72H    fentaNYL  1 patch Transdermal Q72H    metoclopramide HCl  5 mg Intravenous QID (AC & HS)    ondansetron  8 mg Intravenous Q6H    senna-docusate 8.6-50 mg  1 tablet Oral BID     PRN Meds:sodium chloride, acetaminophen, albuterol-ipratropium, aluminum-magnesium hydroxide-simethicone, bisacodyL, diazePAM, HYDROmorphone, hyoscyamine, prochlorperazine, simethicone     Review of Systems   Constitutional: Positive for activity change and fatigue. Negative for appetite change, chills, diaphoresis and fever.   Respiratory: Negative for cough and shortness of breath.    Cardiovascular: Negative for chest pain.   Gastrointestinal: Negative for constipation, diarrhea, nausea and vomiting.        Colostomy  placed   Musculoskeletal: Negative for arthralgias and myalgias.   Neurological: Positive for weakness. Negative for dizziness, light-headedness and headaches.   Hematological: Negative for adenopathy. Does not bruise/bleed easily.   Psychiatric/Behavioral: Positive for dysphoric mood. Negative for decreased concentration.     Objective:     Vital Signs (Most Recent):  Temp: 97.6 °F (36.4 °C) (02/01/22 0740)  Pulse: 79 (02/01/22 0740)  Resp: 18 (02/01/22 0859)  BP: (!) 103/59 (02/01/22 0740)  SpO2: 95 % (02/01/22 0740) Vital Signs (24h Range):  Temp:  [97.6 °F (36.4 °C)-99.7 °F (37.6 °C)] 97.6 °F (36.4 °C)  Pulse:  [79-91] 79  Resp:  [14-18] 18  SpO2:  [93 %-98 %] 95 %  BP: ()/(54-73) 103/59     Weight: 48.7 kg (107 lb 5.8 oz)  Body mass index is 18.43 kg/m².  Body surface area is 1.48 meters squared.      Intake/Output Summary (Last 24 hours) at 2/1/2022 1001  Last data filed at 2/1/2022 0800  Gross per 24 hour   Intake 2058.86 ml   Output 1500 ml   Net 558.86 ml       Physical Exam  Constitutional:       General: She is not in acute distress.  HENT:      Head: Normocephalic and atraumatic.      Mouth/Throat:      Mouth: Mucous membranes are moist.   Cardiovascular:      Rate and Rhythm: Normal rate and regular rhythm.      Pulses: Normal pulses.      Heart sounds: Normal heart sounds.   Pulmonary:      Effort: Pulmonary effort is normal.      Breath sounds: Normal breath sounds.   Abdominal:      General: Bowel sounds are normal.      Tenderness: There is no abdominal tenderness.      Comments: G tube placed and colostomy bag with output    Musculoskeletal:         General: No tenderness.      Right lower leg: No edema.      Left lower leg: No edema.   Skin:     General: Skin is warm.      Coloration: Skin is not jaundiced.   Neurological:      Mental Status: She is alert. Mental status is at baseline.       Significant Labs:   Lab Results   Component Value Date    WBC 9.38 01/29/2022    RBC 3.83 (L)  01/29/2022    HGB 9.8 (L) 01/29/2022    HCT 31.0 (L) 01/29/2022    MCV 81 (L) 01/29/2022    MCH 25.6 (L) 01/29/2022    MCHC 31.6 (L) 01/29/2022    RDW 15.2 (H) 01/29/2022     01/29/2022    MPV 9.6 01/29/2022    GRAN 7.2 01/29/2022    GRAN 76.4 (H) 01/29/2022    LYMPH 1.2 01/29/2022    LYMPH 12.9 (L) 01/29/2022    MONO 0.6 01/29/2022    MONO 6.6 01/29/2022    EOS 0.3 01/29/2022    BASO 0.02 01/29/2022    EOSINOPHIL 3.3 01/29/2022    BASOPHIL 0.2 01/29/2022     CMP  Sodium   Date Value Ref Range Status   01/30/2022 142 136 - 145 mmol/L Final     Potassium   Date Value Ref Range Status   01/30/2022 3.5 3.5 - 5.1 mmol/L Final     Chloride   Date Value Ref Range Status   01/30/2022 106 95 - 110 mmol/L Final     CO2   Date Value Ref Range Status   01/30/2022 27 23 - 29 mmol/L Final     Glucose   Date Value Ref Range Status   01/30/2022 70 70 - 110 mg/dL Final     BUN   Date Value Ref Range Status   01/30/2022 12 6 - 20 mg/dL Final     Creatinine   Date Value Ref Range Status   01/30/2022 1.5 (H) 0.5 - 1.4 mg/dL Final     Calcium   Date Value Ref Range Status   01/30/2022 7.7 (L) 8.7 - 10.5 mg/dL Final     Total Protein   Date Value Ref Range Status   01/29/2022 5.1 (L) 6.0 - 8.4 g/dL Final     Albumin   Date Value Ref Range Status   01/29/2022 1.9 (L) 3.5 - 5.2 g/dL Final     Total Bilirubin   Date Value Ref Range Status   01/29/2022 0.3 0.1 - 1.0 mg/dL Final     Comment:     For infants and newborns, interpretation of results should be based  on gestational age, weight and in agreement with clinical  observations.    Premature Infant recommended reference ranges:  Up to 24 hours.............<8.0 mg/dL  Up to 48 hours............<12.0 mg/dL  3-5 days..................<15.0 mg/dL  6-29 days.................<15.0 mg/dL       Alkaline Phosphatase   Date Value Ref Range Status   01/29/2022 57 55 - 135 U/L Final     AST   Date Value Ref Range Status   01/29/2022 10 10 - 40 U/L Final     ALT   Date Value Ref Range Status    01/29/2022 <5 (L) 10 - 44 U/L Final     Anion Gap   Date Value Ref Range Status   01/30/2022 9 8 - 16 mmol/L Final     eGFR if    Date Value Ref Range Status   01/30/2022 45 (A) >60 mL/min/1.73 m^2 Final     eGFR if non    Date Value Ref Range Status   01/30/2022 39 (A) >60 mL/min/1.73 m^2 Final     Comment:     Calculation used to obtain the estimated glomerular filtration  rate (eGFR) is the CKD-EPI equation.          Diagnostic Results:  I have reviewed all pertinent imaging results/findings within the past 24 hours.    Assessment/Plan:     * Severe malnutrition  -G tube in place. Draining.  -s/p for G-J tube placement to allow nutrition and decompression. Started feeding per GI recs. Hopefully nutritional/functional status improves.   -palliative care has discussed hospice with patient, moving forward with this.    Hypokalemia  -Improving   -management per primary team    Bacteremia  --BC + gram neg bacteremia (E. Coli)   -on Ertapenem   --IV abx management per Primary team      Hydronephrosis  S/p bilateral stent placement     Metastatic signet ring cell carcinoma  --pending PICC line placement per IR.   --plan to be discharged on hospice  -- Due to functional status of patient, unable to continue with port placement and initiate treatment.     Intractable nausea and vomiting, Chronic  -no recent vomiting  -management per Primary team/palliative medicine       Colostomy present on admission        Chronic pain due to neoplasm  -management per palliative medicine             Thank you for your consult. I will follow-up with patient. Please contact us if you have any additional questions.     Nela Feliciano PA-C  Hematology/Oncology  [facility] - [facility]

## 2022-02-02 NOTE — PLAN OF CARE
Problem: Adjustment to Illness (Sepsis/Septic Shock)  Goal: Optimal Coping  Outcome: Met     Problem: Bleeding (Sepsis/Septic Shock)  Goal: Absence of Bleeding  Outcome: Met     Problem: Glycemic Control Impaired (Sepsis/Septic Shock)  Goal: Blood Glucose Level Within Desired Range  Outcome: Met     Problem: Infection Progression (Sepsis/Septic Shock)  Goal: Absence of Infection Signs and Symptoms  Outcome: Met     Problem: Nutrition Impaired (Sepsis/Septic Shock)  Goal: Optimal Nutrition Intake  Outcome: Met     Problem: Adult Inpatient Plan of Care  Goal: Plan of Care Review  Outcome: Met  Goal: Patient-Specific Goal (Individualized)  Outcome: Met  Goal: Absence of Hospital-Acquired Illness or Injury  Outcome: Met  Goal: Optimal Comfort and Wellbeing  Outcome: Met  Goal: Readiness for Transition of Care  Outcome: Met     Problem: Infection  Goal: Absence of Infection Signs and Symptoms  Outcome: Met     Problem: Skin Injury Risk Increased  Goal: Skin Health and Integrity  Outcome: Met     Problem: Fall Injury Risk  Goal: Absence of Fall and Fall-Related Injury  Outcome: Met     Problem: Pain Chronic (Persistent)  Goal: Acceptable Pain Control and Functional Ability  Outcome: Met     Problem: Coping Ineffective  Goal: Effective Coping  Outcome: Met

## 2022-02-02 NOTE — PHYSICIAN QUERY
PT Name: Madeline Wraner  MR #: [id number]    DOCUMENTATION CLARIFICATION     CDS/: Guido Flanagan RN, CCDS       Contact information:   [email or web address]      This form is a permanent document in the medical record.    Query Date: February 2, 2022      By submitting this query, we are merely seeking further clarification of documentation.  Please utilize your independent clinical judgment when addressing the question(s) below.    The Medical Record reflects the following:    Clinical Information Location in Medical Record   1/26 small bowel follow through: Findings most consistent with ileus with delayed transit time to the colon;    1/26 : Patient has ileus and no definitive obstruction.; ileus present from narcotics;  -----Chronic pain due to neoplasm    1/13 HM:  As of    1/25/22 patient is more awake but not back to her baseline. Abdominal imaging showed no obstruction Fluoroscopy       Colorectal Surgery-[facility] MD      [facility] ()            Please clarify/confirm the Consultants diagnosis of __ileus___:     [ x ] Diagnosis ruled in   [  ] Diagnosis ruled in, but it resolved prior to my assessment of the patient   [  ] Diagnosis ruled out   [  ] Other diagnosis (please specify): _____________________________   [  ] Clinically undetermined     Present on admission (POA) status:   [   ] Yes (Y)                          [  ] Clinically Undetermined (W)  [   ] No (N)                            [   ] Documentation insufficient to determine if condition is POA (U)

## 2022-02-15 NOTE — PROGRESS NOTES
Outpatient Care Management  Patient Not Qualified    Patient: Madeline Warner  MRN:  [id number]  Date of Service:  2/15/2022  Completed by:  Fam Tobias RN    Chief Complaint   Patient presents with    Case Closure    OPCM Chart Review       Patient Summary     Program:  OPCM High Risk  Qualification Status:  No  Reason Not Qualified:  Other (see comment)  Home hospice pt

## 2022-02-23 NOTE — TELEPHONE ENCOUNTER
Spoke to pt's relative who said pt was on Hospice and they are waiting for her to pass away any day now. She wanted me to cancel her appt's

## 2023-06-09 NOTE — PATIENT INSTRUCTIONS
Follow-up with GI, Dr. Lakhani  Follow-up with Dr. Martínez re stent  IR port placement and G-J tube  Most recent biopsy to be sent for STRATA  Path expedite MSI testing if possible  Following above will need chemo teaching/consent, tentative FOLFOXIRI plan placed, see above  
abdominal

## 2023-09-20 NOTE — SUBJECTIVE & OBJECTIVE
Interval History: Patient denies any nausea or vomiting. She is more alert today. Spoke with mother of patient (Ted), hopes to be discharged soon with hospice. Patient has family members coming to town to see her. No acute events over night.     Oncology Treatment Plan:   OP FOLFOXIRI Q2W    Medications:  Continuous Infusions:   lactated ringers 100 mL/hr at 01/31/22 1910     Scheduled Meds:   ertapenem (INVANZ) IVPB  1 g Intravenous Q24H    fentaNYL  1 patch Transdermal Q72H    fentaNYL  1 patch Transdermal Q72H    metoclopramide HCl  5 mg Intravenous QID (AC & HS)    ondansetron  8 mg Intravenous Q6H    senna-docusate 8.6-50 mg  1 tablet Oral BID     PRN Meds:sodium chloride, acetaminophen, albuterol-ipratropium, aluminum-magnesium hydroxide-simethicone, bisacodyL, diazePAM, HYDROmorphone, hyoscyamine, prochlorperazine, simethicone     Review of Systems   Constitutional: Positive for activity change and fatigue. Negative for appetite change, chills, diaphoresis and fever.   Respiratory: Negative for cough and shortness of breath.    Cardiovascular: Negative for chest pain.   Gastrointestinal: Negative for constipation, diarrhea, nausea and vomiting.        Colostomy placed   Musculoskeletal: Negative for arthralgias and myalgias.   Neurological: Positive for weakness. Negative for dizziness, light-headedness and headaches.   Hematological: Negative for adenopathy. Does not bruise/bleed easily.   Psychiatric/Behavioral: Positive for dysphoric mood. Negative for decreased concentration.     Objective:     Vital Signs (Most Recent):  Temp: 97.6 °F (36.4 °C) (02/01/22 0740)  Pulse: 79 (02/01/22 0740)  Resp: 18 (02/01/22 0859)  BP: (!) 103/59 (02/01/22 0740)  SpO2: 95 % (02/01/22 0740) Vital Signs (24h Range):  Temp:  [97.6 °F (36.4 °C)-99.7 °F (37.6 °C)] 97.6 °F (36.4 °C)  Pulse:  [79-91] 79  Resp:  [14-18] 18  SpO2:  [93 %-98 %] 95 %  BP: ()/(54-73) 103/59     Weight: 48.7 kg (107 lb 5.8 oz)  Body  mass index is 18.43 kg/m².  Body surface area is 1.48 meters squared.      Intake/Output Summary (Last 24 hours) at 2/1/2022 1001  Last data filed at 2/1/2022 0800  Gross per 24 hour   Intake 2058.86 ml   Output 1500 ml   Net 558.86 ml       Physical Exam  Constitutional:       General: She is not in acute distress.  HENT:      Head: Normocephalic and atraumatic.      Mouth/Throat:      Mouth: Mucous membranes are moist.   Cardiovascular:      Rate and Rhythm: Normal rate and regular rhythm.      Pulses: Normal pulses.      Heart sounds: Normal heart sounds.   Pulmonary:      Effort: Pulmonary effort is normal.      Breath sounds: Normal breath sounds.   Abdominal:      General: Bowel sounds are normal.      Tenderness: There is no abdominal tenderness.      Comments: G tube placed and colostomy bag with output    Musculoskeletal:         General: No tenderness.      Right lower leg: No edema.      Left lower leg: No edema.   Skin:     General: Skin is warm.      Coloration: Skin is not jaundiced.   Neurological:      Mental Status: She is alert. Mental status is at baseline.       Significant Labs:   Lab Results   Component Value Date    WBC 9.38 01/29/2022    RBC 3.83 (L) 01/29/2022    HGB 9.8 (L) 01/29/2022    HCT 31.0 (L) 01/29/2022    MCV 81 (L) 01/29/2022    MCH 25.6 (L) 01/29/2022    MCHC 31.6 (L) 01/29/2022    RDW 15.2 (H) 01/29/2022     01/29/2022    MPV 9.6 01/29/2022    GRAN 7.2 01/29/2022    GRAN 76.4 (H) 01/29/2022    LYMPH 1.2 01/29/2022    LYMPH 12.9 (L) 01/29/2022    MONO 0.6 01/29/2022    MONO 6.6 01/29/2022    EOS 0.3 01/29/2022    BASO 0.02 01/29/2022    EOSINOPHIL 3.3 01/29/2022    BASOPHIL 0.2 01/29/2022     CMP  Sodium   Date Value Ref Range Status   01/30/2022 142 136 - 145 mmol/L Final     Potassium   Date Value Ref Range Status   01/30/2022 3.5 3.5 - 5.1 mmol/L Final     Chloride   Date Value Ref Range Status   01/30/2022 106 95 - 110 mmol/L Final     CO2   Date Value Ref Range Status    01/30/2022 27 23 - 29 mmol/L Final     Glucose   Date Value Ref Range Status   01/30/2022 70 70 - 110 mg/dL Final     BUN   Date Value Ref Range Status   01/30/2022 12 6 - 20 mg/dL Final     Creatinine   Date Value Ref Range Status   01/30/2022 1.5 (H) 0.5 - 1.4 mg/dL Final     Calcium   Date Value Ref Range Status   01/30/2022 7.7 (L) 8.7 - 10.5 mg/dL Final     Total Protein   Date Value Ref Range Status   01/29/2022 5.1 (L) 6.0 - 8.4 g/dL Final     Albumin   Date Value Ref Range Status   01/29/2022 1.9 (L) 3.5 - 5.2 g/dL Final     Total Bilirubin   Date Value Ref Range Status   01/29/2022 0.3 0.1 - 1.0 mg/dL Final     Comment:     For infants and newborns, interpretation of results should be based  on gestational age, weight and in agreement with clinical  observations.    Premature Infant recommended reference ranges:  Up to 24 hours.............<8.0 mg/dL  Up to 48 hours............<12.0 mg/dL  3-5 days..................<15.0 mg/dL  6-29 days.................<15.0 mg/dL       Alkaline Phosphatase   Date Value Ref Range Status   01/29/2022 57 55 - 135 U/L Final     AST   Date Value Ref Range Status   01/29/2022 10 10 - 40 U/L Final     ALT   Date Value Ref Range Status   01/29/2022 <5 (L) 10 - 44 U/L Final     Anion Gap   Date Value Ref Range Status   01/30/2022 9 8 - 16 mmol/L Final     eGFR if    Date Value Ref Range Status   01/30/2022 45 (A) >60 mL/min/1.73 m^2 Final     eGFR if non    Date Value Ref Range Status   01/30/2022 39 (A) >60 mL/min/1.73 m^2 Final     Comment:     Calculation used to obtain the estimated glomerular filtration  rate (eGFR) is the CKD-EPI equation.          Diagnostic Results:  I have reviewed all pertinent imaging results/findings within the past 24 hours.   yes

## (undated) DEVICE — TOWEL OR DISP STRL BLUE 4/PK

## (undated) DEVICE — SEE MEDLINE ITEM 157185

## (undated) DEVICE — ELECTRODE LOOP CUTTING BIPOLAR

## (undated) DEVICE — GLOVE SURG BIOGEL LATEX SZ 7.5

## (undated) DEVICE — GAUZE SPONGE 4X4 12PLY

## (undated) DEVICE — COVER LIGHT HANDLE 80/CA

## (undated) DEVICE — BOWL STERILE LARGE 32OZ

## (undated) DEVICE — BAG POSTOP W/ACCESS CUT TO FIT

## (undated) DEVICE — ELECTRODE BLD EXT 6.50 ST DISP

## (undated) DEVICE — SEE MEDLINE ITEM 152739

## (undated) DEVICE — APPLICATOR CHLORAPREP ORN 26ML

## (undated) DEVICE — PAD ABD 8X10 STERILE

## (undated) DEVICE — ELECTRODE REM PLYHSV RETURN 9

## (undated) DEVICE — UROVIEW 2600/2800

## (undated) DEVICE — CONTAINER SPECIMEN OR STER 4OZ

## (undated) DEVICE — WIRE GUIDE 0.038OLD

## (undated) DEVICE — SUT VICRYL PLUS 3-0 SH 18IN

## (undated) DEVICE — SEE MEDLINE ITEM 157117

## (undated) DEVICE — SPONGE LAP 18X18 PREWASHED

## (undated) DEVICE — ADAPTER TUOHY BORST 6FR

## (undated) DEVICE — SET IRR URLGY 2LINE UNIV SPIKE

## (undated) DEVICE — UNDERGLOVES BIOGEL PI SIZE 7.5

## (undated) DEVICE — SOL NS 1000CC

## (undated) DEVICE — SOL WATER STRL IRR 1000ML

## (undated) DEVICE — SET IRRIGATION WARMING NORMAL

## (undated) DEVICE — SUPPORT ULNA NERVE PROTECTOR

## (undated) DEVICE — GOWN SMARTGOWN LVL4 X-LONG XL

## (undated) DEVICE — ADHESIVE MASTISOL VIAL 48/BX

## (undated) DEVICE — COVER OVERHEAD SURG LT BLUE

## (undated) DEVICE — SKIN MARKER DEVON 160

## (undated) DEVICE — SEE MEDLINE ITEM 154981

## (undated) DEVICE — UNDERGLOVES BIOGEL PI SZ 6 LF

## (undated) DEVICE — NDL SPINAL 20GX3.5 HUB

## (undated) DEVICE — SEE MEDLINE ITEM 157027

## (undated) DEVICE — SEE MEDLINE ITEM 152622

## (undated) DEVICE — SYR ONLY LUER LOCK 20CC

## (undated) DEVICE — CATH 5FR OPEN END URETERAL

## (undated) DEVICE — SOL IRR NACL .9% 3000ML

## (undated) DEVICE — MANIFOLD 4 PORT

## (undated) DEVICE — SYR 10CC LUER LOCK

## (undated) DEVICE — CLOSURE SKIN STERI STRIP 1/2X4